# Patient Record
Sex: MALE | Race: WHITE | HISPANIC OR LATINO | Employment: OTHER | URBAN - METROPOLITAN AREA
[De-identification: names, ages, dates, MRNs, and addresses within clinical notes are randomized per-mention and may not be internally consistent; named-entity substitution may affect disease eponyms.]

---

## 2017-01-06 ENCOUNTER — OFFICE VISIT (OUTPATIENT)
Dept: ELECTROPHYSIOLOGY | Facility: CLINIC | Age: 77
End: 2017-01-06
Payer: MEDICARE

## 2017-01-06 ENCOUNTER — HOSPITAL ENCOUNTER (OUTPATIENT)
Dept: CARDIOLOGY | Facility: CLINIC | Age: 77
Discharge: HOME | End: 2017-01-06
Payer: MEDICARE

## 2017-01-06 VITALS
BODY MASS INDEX: 23.87 KG/M2 | DIASTOLIC BLOOD PRESSURE: 70 MMHG | WEIGHT: 176 LBS | SYSTOLIC BLOOD PRESSURE: 150 MMHG | HEART RATE: 55 BPM

## 2017-01-06 DIAGNOSIS — K20.90 ESOPHAGITIS: ICD-10-CM

## 2017-01-06 DIAGNOSIS — I45.10 RBBB: ICD-10-CM

## 2017-01-06 DIAGNOSIS — E78.5 HYPERLIPIDEMIA, UNSPECIFIED HYPERLIPIDEMIA TYPE: Chronic | ICD-10-CM

## 2017-01-06 DIAGNOSIS — J44.9 CHRONIC OBSTRUCTIVE PULMONARY DISEASE, UNSPECIFIED COPD TYPE: Primary | ICD-10-CM

## 2017-01-06 DIAGNOSIS — I48.0 PAF (PAROXYSMAL ATRIAL FIBRILLATION): Primary | ICD-10-CM

## 2017-01-06 DIAGNOSIS — I48.0 PAF (PAROXYSMAL ATRIAL FIBRILLATION): ICD-10-CM

## 2017-01-06 DIAGNOSIS — I48.0 PAROXYSMAL ATRIAL FIBRILLATION: Chronic | ICD-10-CM

## 2017-01-06 DIAGNOSIS — K25.7 CHRONIC GASTRIC ULCER: ICD-10-CM

## 2017-01-06 DIAGNOSIS — I10 ESSENTIAL HYPERTENSION: Chronic | ICD-10-CM

## 2017-01-06 PROCEDURE — 1160F RVW MEDS BY RX/DR IN RCRD: CPT | Mod: S$GLB,,, | Performed by: INTERNAL MEDICINE

## 2017-01-06 PROCEDURE — 99214 OFFICE O/P EST MOD 30 MIN: CPT | Mod: S$GLB,,, | Performed by: INTERNAL MEDICINE

## 2017-01-06 PROCEDURE — 1159F MED LIST DOCD IN RCRD: CPT | Mod: S$GLB,,, | Performed by: INTERNAL MEDICINE

## 2017-01-06 PROCEDURE — 1157F ADVNC CARE PLAN IN RCRD: CPT | Mod: S$GLB,,, | Performed by: INTERNAL MEDICINE

## 2017-01-06 PROCEDURE — 93000 ELECTROCARDIOGRAM COMPLETE: CPT | Mod: S$GLB,,, | Performed by: INTERNAL MEDICINE

## 2017-01-06 PROCEDURE — 99999 PR PBB SHADOW E&M-EST. PATIENT-LVL III: CPT | Mod: PBBFAC,,, | Performed by: INTERNAL MEDICINE

## 2017-01-06 PROCEDURE — 3077F SYST BP >= 140 MM HG: CPT | Mod: S$GLB,,, | Performed by: INTERNAL MEDICINE

## 2017-01-06 PROCEDURE — 1126F AMNT PAIN NOTED NONE PRSNT: CPT | Mod: S$GLB,,, | Performed by: INTERNAL MEDICINE

## 2017-01-06 PROCEDURE — 3078F DIAST BP <80 MM HG: CPT | Mod: S$GLB,,, | Performed by: INTERNAL MEDICINE

## 2017-01-06 RX ORDER — MAGNESIUM 200 MG
TABLET ORAL DAILY
COMMUNITY
Start: 2016-11-06

## 2017-01-06 NOTE — PROGRESS NOTES
Subjective:    Patient ID:  Benitez Cabrales is a 76 y.o. male who presents for follow up of AF    HPI  76 y.o. M  Remote dx of AF (~ 8 y ago; only Rx was beta blockade)  HTN   HL  RBBB, longstanding     Had event monitor placed for dizzy/palpitations. Turns out dizziness was really vertigo and balance issues -- no LH, no presync/sync.    Interval history:  He remains on amiodarone (normal PFT and TFT/LFT); recent hospitalization for GI bleed (unclear if upper vs lower, though ulcer noted on EGD), off pradaxa since then (11/27) - never required transfusion.  No bleeding since.  Concerned about going back on blood thinner; currently on ASA 81mg.  CHADSVASC 3.  ILR with no events on several recent interrogations.    My interpretation of today's ecg is sinus shraddha at 55 bpm with first deg AVB and RBBB (baseline).    Review of Systems   Constitution: Negative for weakness and malaise/fatigue.   HENT: Negative for congestion and headaches.    Eyes: Negative for blurred vision and double vision.   Cardiovascular: Negative for chest pain, dyspnea on exertion and palpitations.   Respiratory: Negative for cough and shortness of breath.    Endocrine: Negative for cold intolerance and heat intolerance.   Hematologic/Lymphatic: Positive for bleeding problem. Bruises/bleeds easily.   Skin: Negative for dry skin and flushing.   Musculoskeletal: Negative for back pain and falls.   Gastrointestinal: Negative for nausea and vomiting.   Genitourinary: Negative for dysuria and flank pain.   Neurological: Negative for dizziness and light-headedness.   Psychiatric/Behavioral: Negative for altered mental status. The patient is not nervous/anxious.         Objective:    Physical Exam   Constitutional: He is oriented to person, place, and time. He appears well-developed and well-nourished.   HENT:   Head: Normocephalic and atraumatic.   Eyes: Conjunctivae and EOM are normal.   Neck: Normal range of motion. Neck supple.   Cardiovascular:  Normal rate, regular rhythm and normal heart sounds.    Pulmonary/Chest: Effort normal and breath sounds normal.   Abdominal: Soft. Bowel sounds are normal.   Musculoskeletal: Normal range of motion. He exhibits no edema.   Neurological: He is alert and oriented to person, place, and time. No cranial nerve deficit.   Skin: Skin is warm and dry.   Psychiatric: He has a normal mood and affect. His behavior is normal.   Nursing note and vitals reviewed.        Assessment:       1. Chronic obstructive pulmonary disease, unspecified COPD type    2. Paroxysmal atrial fibrillation    3. Essential hypertension    4. RBBB    5. Chronic gastric ulcer    6. Esophagitis    7. Hyperlipidemia, unspecified hyperlipidemia type         Plan:       76 year old male with pAF on amiodarone with ILR demonstrating no recent episodes of AF.  Recent GI bleed, off NOAC currently (was taking incorrectly, as he was taking 2 pills of his pradaxa once daily).  Discussed risks and benefits of anticoagulation,   His annual risk of stroke + thromboembolism is 4.3% with no therapy, 3.4% with aspirin, and about 1% with full anticoagulation.  Given no further bleeding, would recommend stopping ASA (as NSAIDS can increase the risk of ulcers) and resume full anticoagulation.  Will resume pradaxa BID and discontinue aspirin.    Mickey Nixon MD

## 2017-01-06 NOTE — MR AVS SNAPSHOT
[facility]  [address]  [facility] [address]  Phone: [phone]  Fax: [phone]                  Benitez Cabrales   2017 10:00 AM   Office Visit    Description:  Male : 1940   Provider:  Mickey Morales MD   Department:  [facility]           Diagnoses this Visit        Comments    Chronic obstructive pulmonary disease, unspecified COPD type    -  Primary     Paroxysmal atrial fibrillation         Essential hypertension         RBBB         Chronic gastric ulcer         Esophagitis         Hyperlipidemia, unspecified hyperlipidemia type                To Do List           Future Appointments        Provider Department Dept Phone    2017 8:00 AM HOME MONITOR DEVICE CHECK, [facility] [phone]      Goals (5 Years of Data)     None      [facility]     [facility] -  Assistance  Registered nurses in the [facility] provide clinical advisement, health education, appointment booking, and other advisory services.  Call for this free service at [phone].             Medications           Message regarding Medications     Verify the changes and/or additions to your medication regime listed below are the same as discussed with your clinician today.  If any of these changes or additions are incorrect, please notify your healthcare provider.             Verify that the below list of medications is an accurate representation of the medications you are currently taking.  If none reported, the list may be blank. If incorrect, please contact your healthcare provider. Carry this list with you in case of emergency.           Current Medications     amiodarone (PACERONE) 200 MG Tab Take 1 tablet (200 mg total) by mouth once daily.    cholecalciferol, vitamin D3, (VITAMIN D3) 2,000 unit Cap Take 1 capsule by mouth once daily.    dabigatran etexilate (PRADAXA) 150 mg Cap Take 1 capsule (150 mg total) by mouth 2  (two) times daily.    DIABETIC SUPPLIES,MISCELL (BD MAGNI-GUIDE SYRINGE MAGNIFI MISC) by Misc.(Non-Drug; Combo Route) route.    diclofenac sodium (VOLTAREN) 1 % Gel Apply 2 g topically 4 (four) times daily.    fish oil-omega-3 fatty acids 300-1,000 mg capsule Take 2 g by mouth once daily.      magnesium 200 mg Tab Take by mouth once daily.    meclizine (ANTIVERT) 25 mg tablet Take 1 tablet (25 mg total) by mouth 3 (three) times daily as needed for Dizziness (at least one HS).    metoprolol succinate (TOPROL-XL) 50 MG 24 hr tablet Take 1 tablet (50 mg total) by mouth once daily.    MV-MN/FA/LYCOPENE/LUT/HB#178 (NEHEMIAH MULTIVITAMIN FOR MEN ORAL) Take 1 tablet by mouth once daily.      pantoprazole (PROTONIX) 40 MG tablet Take 1 tablet (40 mg total) by mouth 2 (two) times daily.    rosuvastatin (CRESTOR) 10 MG tablet Take 1 tablet (10 mg total) by mouth every evening.    valsartan (DIOVAN) 160 MG tablet Take 1 tablet (160 mg total) by mouth 2 (two) times daily.    VITAMIN K2 ORAL Take by mouth. 2 tablet every other Day.           Clinical Reference Information           Vital Signs - Last Recorded  Most recent update: 1/6/2017 10:15 AM by Jaja Rincon MA    BP Pulse Wt BMI       (!) 150/70 (!) 55 79.8 kg (176 lb) 23.87 kg/m2       Blood Pressure          Most Recent Value    BP  (!)  150/70      Allergies as of 1/6/2017     No Known Allergies      Immunizations Administered on Date of Encounter - 1/6/2017     None

## 2017-01-10 ENCOUNTER — TELEPHONE (OUTPATIENT)
Dept: ADMINISTRATIVE | Facility: HOSPITAL | Age: 77
End: 2017-01-10

## 2017-01-10 NOTE — TELEPHONE ENCOUNTER
Called [facility] and requested the pathology report from Colonoscopy 9/22/2016 and EGD report/pathology from 11/27/2016.  Fax received, updated health maintenance and reports sent to scanning.

## 2017-01-17 ENCOUNTER — LAB VISIT (OUTPATIENT)
Dept: LAB | Facility: HOSPITAL | Age: 77
End: 2017-01-17
Attending: PHYSICIAN ASSISTANT
Payer: MEDICARE

## 2017-01-17 DIAGNOSIS — D68.8: ICD-10-CM

## 2017-01-17 DIAGNOSIS — K92.1 BLOOD IN STOOL: Primary | ICD-10-CM

## 2017-01-17 DIAGNOSIS — K27.9: ICD-10-CM

## 2017-01-17 LAB
BASOPHILS # BLD AUTO: 0.03 K/UL
BASOPHILS NFR BLD: 0.4 %
DIFFERENTIAL METHOD: ABNORMAL
EOSINOPHIL # BLD AUTO: 0.3 K/UL
EOSINOPHIL NFR BLD: 3.6 %
ERYTHROCYTE [DISTWIDTH] IN BLOOD BY AUTOMATED COUNT: 13.5 %
HCT VFR BLD AUTO: 35.9 %
HGB BLD-MCNC: 11.9 G/DL
LYMPHOCYTES # BLD AUTO: 1.5 K/UL
LYMPHOCYTES NFR BLD: 20.7 %
MCH RBC QN AUTO: 29.5 PG
MCHC RBC AUTO-ENTMCNC: 33.1 %
MCV RBC AUTO: 89 FL
MONOCYTES # BLD AUTO: 1 K/UL
MONOCYTES NFR BLD: 13.9 %
NEUTROPHILS # BLD AUTO: 4.4 K/UL
NEUTROPHILS NFR BLD: 61.1 %
PLATELET # BLD AUTO: 195 K/UL
PMV BLD AUTO: 12 FL
RBC # BLD AUTO: 4.03 M/UL
WBC # BLD AUTO: 7.25 K/UL

## 2017-01-17 PROCEDURE — 36415 COLL VENOUS BLD VENIPUNCTURE: CPT | Mod: PO

## 2017-01-17 PROCEDURE — 85025 COMPLETE CBC W/AUTO DIFF WBC: CPT

## 2017-01-20 ENCOUNTER — CLINICAL SUPPORT (OUTPATIENT)
Dept: ELECTROPHYSIOLOGY | Facility: CLINIC | Age: 77
End: 2017-01-20
Payer: MEDICARE

## 2017-01-20 DIAGNOSIS — Z95.818 STATUS POST PLACEMENT OF IMPLANTABLE LOOP RECORDER: ICD-10-CM

## 2017-01-20 DIAGNOSIS — R00.2 PALPITATIONS: ICD-10-CM

## 2017-01-20 PROCEDURE — 93299 LOOP RECORDER REMOTE: CPT | Mod: S$GLB,,, | Performed by: INTERNAL MEDICINE

## 2017-01-20 PROCEDURE — 93297 REM INTERROG DEV EVAL ICPMS: CPT | Mod: S$GLB,,, | Performed by: INTERNAL MEDICINE

## 2017-01-31 ENCOUNTER — OFFICE VISIT (OUTPATIENT)
Dept: OPTOMETRY | Facility: CLINIC | Age: 77
End: 2017-01-31
Payer: MEDICARE

## 2017-01-31 DIAGNOSIS — H11.32 SUBCONJUNCTIVAL HEMORRHAGE, LEFT: Primary | ICD-10-CM

## 2017-01-31 PROCEDURE — 99999 PR PBB SHADOW E&M-EST. PATIENT-LVL II: CPT | Mod: PBBFAC,,, | Performed by: OPTOMETRIST

## 2017-01-31 PROCEDURE — 92012 INTRM OPH EXAM EST PATIENT: CPT | Mod: S$GLB,,, | Performed by: OPTOMETRIST

## 2017-01-31 NOTE — LETTER
January 31, 2017        No Recipients             [facility]  [address] [facility] [address]  [place] [address]  Phone: [phone]  Fax: [phone]   Patient: Benitez Cabrales   MR Number: [id number]   YOB: 1940   Date of Visit: 1/31/2017       Dear Dr. Harrison Recipients:    I saw your patient, Benitez Cabrales, today for evaluation. Attached you will find relevant portions of my assessment and plan of care.       If you have questions, please do not hesitate to call me. I look forward to following Benitez Cabrales along with you.    Sincerely,      Elizabeth Devries, OD            CC  No Recipients    Enclosure

## 2017-01-31 NOTE — PROGRESS NOTES
HPI     Dls: 9/19/16 Dr. Devries    Pt states x 2 days ago red os, tearing os, pain 1-2 os, photophobia ou, no   fbs  no mucous, no itching. Pt used otc saline and allergy gtts.            Last edited by Elizabeth Devries, OD on 1/31/2017  2:57 PM.     Assessment /Plan     For exam results, see Encounter Report.    Subconjunctival hemorrhage, left      Discussed diagnosis with patient. Pt is hypertensive and on blood thinner. He reports bouts of sneezing due to allergies recently.  Educated patient that it can take 2-3 weeks for symptoms to resolve. Artificial tears QID for comfort. Can alternate between warm and cold compresses. RTC if no improvement in symptoms.

## 2017-01-31 NOTE — PATIENT INSTRUCTIONS
Subconjunctival Hemorrhage    A subconjunctival hemorrhage is when a blood vessel breaks open in the white of the eye. It causes a bright red patch in the white of the eye. It is similar to a bruise on the skin. This type of hemorrhage is common. It can look quite alarming, but it is usually harmless.    Understanding the conjunctiva  The conjunctiva is the thin layer that covers the inside of the eyelids and the surface of the eye. It has many tiny blood vessels that bring oxygen and nutrients to the eye. The sclera is the white part of the eye that lies beneath the conjunctiva. Sometimes a blood vessel in the conjunctiva breaks open and bleeds. The blood then collects under the conjunctiva and turns part of the eye red. Over several weeks, your body then absorbs the blood.    What causes subconjunctival hemorrhage?  In many cases the cause isnt known. But some health conditions may make it more likely. These include:  Eye injury  Eye surgery  High blood pressure  Inflammation of the conjunctiva  Contact lens use  Diabetes  Arteriosclerosis  Tumor of the conjunctiva  Diseases that affect blood clotting  Violent sneezing, coughing, or vomiting  Certain medicines that can increase bleeding, such as aspirin  Pushing hard during childbirth  Straining during constipation    Symptoms of subconjunctival hemorrhage  The main symptom is a red patch on the eye. You may notice it after waking up in the morning. In most cases just 1 eye will have a hemorrhage. It usually happens once and then goes away. But some health conditions may cause repeat hemorrhages. You may feel like you have something in your eye, but this is not common. The hemorrhage shouldnt affect your eyesight or cause any pain. If you do have pain, you may have another type of problem with your eye.    Diagnosing subconjunctival hemorrhage  Your health care provider will ask about your health history. You may have a physical exam. This includes a basic eye  exam. Your health care provider will make sure you dont have other causes of red eye that may need other treatment.  You will need to see an eye doctor if you have had an eye injury. This doctor might use a special lighted microscope to look closely at your eye. This helps show the doctor if the injury hurt the eye itself and not just its outer layer.  If this is not your first subconjunctival hemorrhage, your doctor may need to find the cause. For example, you may need blood tests to check for a blood clotting disorder.  Treatment for subconjunctival hemorrhage  In most cases you will not need treatment. The red patch will usually go away on its own in a few weeks. It will turn from red to brown and then yellow. There are no treatments to speed up this process. Your doctor may suggest you use a warm compress and artificial tears eye drops to help relieve some of the redness.  If your subconjunctival hemorrhage was caused by a health condition, that condition will be treated. For example, you may need a blood pressure medicine to treat high blood pressure.    When to call your health care provider  Call your health care provider right away if you have any of these:  Hemorrhage that doesnt go away in 2 to 3 weeks  Eye pain  Loss of eyesight  Another subconjunctival hemorrhage       © 4166-8563 [employer]. [address] [place] [address], [place] [address]. All rights reserved. This information is not intended as a substitute for professional medical care. Always follow your healthcare professional's instructions.

## 2017-01-31 NOTE — MR AVS SNAPSHOT
[facility]  [address] [facility]  [place] [address]  Phone: [phone]  Fax: [phone]                  Benitez Cabrales   2017 2:30 PM   Office Visit    Description:  Male : 1940   Provider:  Elizabeth Devries OD   Department:  [facility]           Reason for Visit     Eye Problem           Diagnoses this Visit        Comments    Subconjunctival hemorrhage, left    -  Primary            To Do List           Goals (5 Years of Data)     None      Follow-Up and Disposition     Return if symptoms worsen or fail to improve.      [facility]     [facility] -  Assistance  Registered nurses in the [facility] Center provide clinical advisement, health education, appointment booking, and other advisory services.  Call for this free service at [phone].             Medications           Message regarding Medications     Verify the changes and/or additions to your medication regime listed below are the same as discussed with your clinician today.  If any of these changes or additions are incorrect, please notify your healthcare provider.             Verify that the below list of medications is an accurate representation of the medications you are currently taking.  If none reported, the list may be blank. If incorrect, please contact your healthcare provider. Carry this list with you in case of emergency.           Current Medications     amiodarone (PACERONE) 200 MG Tab Take 1 tablet (200 mg total) by mouth once daily.    cholecalciferol, vitamin D3, (VITAMIN D3) 2,000 unit Cap Take 1 capsule by mouth once daily.    dabigatran etexilate (PRADAXA) 150 mg Cap Take 1 capsule (150 mg total) by mouth 2 (two) times daily.    DIABETIC SUPPLIES,MISCELL (BD MAGNI-GUIDE SYRINGE MAGNIFI MISC) by Misc.(Non-Drug; Combo Route) route.    diclofenac sodium (VOLTAREN) 1 % Gel Apply 2 g topically 4 (four) times daily.    fish oil-omega-3 fatty acids 300-1,000 mg capsule Take  2 g by mouth once daily.      magnesium 200 mg Tab Take by mouth once daily.    meclizine (ANTIVERT) 25 mg tablet Take 1 tablet (25 mg total) by mouth 3 (three) times daily as needed for Dizziness (at least one HS).    metoprolol succinate (TOPROL-XL) 50 MG 24 hr tablet Take 1 tablet (50 mg total) by mouth once daily.    MV-MN/FA/LYCOPENE/LUT/HB#178 (NEHEMIAH MULTIVITAMIN FOR MEN ORAL) Take 1 tablet by mouth once daily.      pantoprazole (PROTONIX) 40 MG tablet Take 1 tablet (40 mg total) by mouth 2 (two) times daily.    rosuvastatin (CRESTOR) 10 MG tablet Take 1 tablet (10 mg total) by mouth every evening.    valsartan (DIOVAN) 160 MG tablet Take 1 tablet (160 mg total) by mouth 2 (two) times daily.    VITAMIN K2 ORAL Take by mouth. 2 tablet every other Day.           Clinical Reference Information           Allergies as of 1/31/2017     No Known Allergies      Immunizations Administered on Date of Encounter - 1/31/2017     None      Instructions    Subconjunctival Hemorrhage    A subconjunctival hemorrhage is when a blood vessel breaks open in the white of the eye. It causes a bright red patch in the white of the eye. It is similar to a bruise on the skin. This type of hemorrhage is common. It can look quite alarming, but it is usually harmless.    Understanding the conjunctiva  The conjunctiva is the thin layer that covers the inside of the eyelids and the surface of the eye. It has many tiny blood vessels that bring oxygen and nutrients to the eye. The sclera is the white part of the eye that lies beneath the conjunctiva. Sometimes a blood vessel in the conjunctiva breaks open and bleeds. The blood then collects under the conjunctiva and turns part of the eye red. Over several weeks, your body then absorbs the blood.    What causes subconjunctival hemorrhage?  In many cases the cause isnt known. But some health conditions may make it more likely. These include:  Eye injury  Eye surgery  High blood  pressure  Inflammation of the conjunctiva  Contact lens use  Diabetes  Arteriosclerosis  Tumor of the conjunctiva  Diseases that affect blood clotting  Violent sneezing, coughing, or vomiting  Certain medicines that can increase bleeding, such as aspirin  Pushing hard during childbirth  Straining during constipation    Symptoms of subconjunctival hemorrhage  The main symptom is a red patch on the eye. You may notice it after waking up in the morning. In most cases just 1 eye will have a hemorrhage. It usually happens once and then goes away. But some health conditions may cause repeat hemorrhages. You may feel like you have something in your eye, but this is not common. The hemorrhage shouldnt affect your eyesight or cause any pain. If you do have pain, you may have another type of problem with your eye.    Diagnosing subconjunctival hemorrhage  Your health care provider will ask about your health history. You may have a physical exam. This includes a basic eye exam. Your health care provider will make sure you dont have other causes of red eye that may need other treatment.  You will need to see an eye doctor if you have had an eye injury. This doctor might use a special lighted microscope to look closely at your eye. This helps show the doctor if the injury hurt the eye itself and not just its outer layer.  If this is not your first subconjunctival hemorrhage, your doctor may need to find the cause. For example, you may need blood tests to check for a blood clotting disorder.  Treatment for subconjunctival hemorrhage  In most cases you will not need treatment. The red patch will usually go away on its own in a few weeks. It will turn from red to brown and then yellow. There are no treatments to speed up this process. Your doctor may suggest you use a warm compress and artificial tears eye drops to help relieve some of the redness.  If your subconjunctival hemorrhage was caused by a health condition, that  condition will be treated. For example, you may need a blood pressure medicine to treat high blood pressure.    When to call your health care provider  Call your health care provider right away if you have any of these:  Hemorrhage that doesnt go away in 2 to 3 weeks  Eye pain  Loss of eyesight  Another subconjunctival hemorrhage       © 0922-4308 [employer]. [address] [place] [address], [place] [id number]. All rights reserved. This information is not intended as a substitute for professional medical care. Always follow your healthcare professional's instructions.

## 2017-02-20 ENCOUNTER — CLINICAL SUPPORT (OUTPATIENT)
Dept: ELECTROPHYSIOLOGY | Facility: CLINIC | Age: 77
End: 2017-02-20
Payer: MEDICARE

## 2017-02-20 DIAGNOSIS — R00.2 PALPITATIONS: ICD-10-CM

## 2017-02-20 DIAGNOSIS — Z95.818 STATUS POST PLACEMENT OF IMPLANTABLE LOOP RECORDER: ICD-10-CM

## 2017-02-20 PROCEDURE — 93299 LOOP RECORDER REMOTE: CPT | Mod: S$GLB,,, | Performed by: INTERNAL MEDICINE

## 2017-02-20 PROCEDURE — 93297 REM INTERROG DEV EVAL ICPMS: CPT | Mod: S$GLB,,, | Performed by: INTERNAL MEDICINE

## 2017-03-22 ENCOUNTER — CLINICAL SUPPORT (OUTPATIENT)
Dept: ELECTROPHYSIOLOGY | Facility: CLINIC | Age: 77
End: 2017-03-22
Payer: MEDICARE

## 2017-03-22 DIAGNOSIS — Z95.818 STATUS POST PLACEMENT OF IMPLANTABLE LOOP RECORDER: ICD-10-CM

## 2017-03-22 DIAGNOSIS — R00.2 PALPITATIONS: ICD-10-CM

## 2017-03-22 PROCEDURE — 93299 LOOP RECORDER REMOTE: CPT | Mod: S$GLB,,, | Performed by: INTERNAL MEDICINE

## 2017-03-22 PROCEDURE — 93297 REM INTERROG DEV EVAL ICPMS: CPT | Mod: S$GLB,,, | Performed by: INTERNAL MEDICINE

## 2017-04-07 DIAGNOSIS — I48.0 PAF (PAROXYSMAL ATRIAL FIBRILLATION): Primary | ICD-10-CM

## 2017-04-07 RX ORDER — VALSARTAN 160 MG/1
160 TABLET ORAL 2 TIMES DAILY
Qty: 180 TABLET | Refills: 3 | Status: SHIPPED | OUTPATIENT
Start: 2017-04-07 | End: 2018-03-23

## 2017-04-24 ENCOUNTER — CLINICAL SUPPORT (OUTPATIENT)
Dept: ELECTROPHYSIOLOGY | Facility: CLINIC | Age: 77
End: 2017-04-24
Payer: MEDICARE

## 2017-04-24 DIAGNOSIS — R00.2 PALPITATIONS: ICD-10-CM

## 2017-04-24 DIAGNOSIS — Z95.818 STATUS POST PLACEMENT OF IMPLANTABLE LOOP RECORDER: ICD-10-CM

## 2017-04-24 PROCEDURE — 93297 REM INTERROG DEV EVAL ICPMS: CPT | Mod: S$GLB,,, | Performed by: INTERNAL MEDICINE

## 2017-04-24 PROCEDURE — 93299 LOOP RECORDER REMOTE: CPT | Mod: S$GLB,,, | Performed by: INTERNAL MEDICINE

## 2017-04-25 DIAGNOSIS — E78.5 HYPERLIPIDEMIA, UNSPECIFIED HYPERLIPIDEMIA TYPE: Primary | ICD-10-CM

## 2017-04-25 RX ORDER — ROSUVASTATIN CALCIUM 10 MG/1
10 TABLET, COATED ORAL NIGHTLY
Qty: 90 TABLET | Refills: 3 | Status: SHIPPED | OUTPATIENT
Start: 2017-04-25 | End: 2018-05-16 | Stop reason: SDUPTHER

## 2017-04-29 DIAGNOSIS — Z95.818 STATUS POST PLACEMENT OF IMPLANTABLE LOOP RECORDER: Primary | ICD-10-CM

## 2017-04-29 DIAGNOSIS — R00.2 PALPITATIONS: ICD-10-CM

## 2017-05-08 ENCOUNTER — OFFICE VISIT (OUTPATIENT)
Dept: FAMILY MEDICINE | Facility: CLINIC | Age: 77
End: 2017-05-08
Payer: MEDICARE

## 2017-05-08 VITALS
WEIGHT: 179.88 LBS | BODY MASS INDEX: 24.36 KG/M2 | SYSTOLIC BLOOD PRESSURE: 128 MMHG | DIASTOLIC BLOOD PRESSURE: 50 MMHG | HEART RATE: 57 BPM | HEIGHT: 72 IN | OXYGEN SATURATION: 97 % | TEMPERATURE: 98 F

## 2017-05-08 DIAGNOSIS — M54.2 NECK PAIN: Primary | ICD-10-CM

## 2017-05-08 PROCEDURE — 1159F MED LIST DOCD IN RCRD: CPT | Mod: S$GLB,,, | Performed by: NURSE PRACTITIONER

## 2017-05-08 PROCEDURE — 3078F DIAST BP <80 MM HG: CPT | Mod: S$GLB,,, | Performed by: NURSE PRACTITIONER

## 2017-05-08 PROCEDURE — 1160F RVW MEDS BY RX/DR IN RCRD: CPT | Mod: S$GLB,,, | Performed by: NURSE PRACTITIONER

## 2017-05-08 PROCEDURE — 99999 PR PBB SHADOW E&M-EST. PATIENT-LVL IV: CPT | Mod: PBBFAC,,, | Performed by: NURSE PRACTITIONER

## 2017-05-08 PROCEDURE — 99214 OFFICE O/P EST MOD 30 MIN: CPT | Mod: S$GLB,,, | Performed by: NURSE PRACTITIONER

## 2017-05-08 PROCEDURE — 3074F SYST BP LT 130 MM HG: CPT | Mod: S$GLB,,, | Performed by: NURSE PRACTITIONER

## 2017-05-08 NOTE — MR AVS SNAPSHOT
[facility]  [address] [facility]  [place] [address]  Phone: [phone]  Fax: [phone]                  Benitez Cabrales   2017 1:20 PM   Office Visit    Description:  Male : 1940   Provider:  Ari Brannon NP   Department:  [facility]           Reason for Visit     Neck Pain           Diagnoses this Visit        Comments    Neck pain    -  Primary            To Do List           Future Appointments        Provider Department Dept Phone    2017 8:00 AM HOME MONITOR DEVICE CHECK, [facility] [phone]      Goals (5 Years of Data)     None      Follow-Up and Disposition     Return if symptoms worsen or fail to improve.      [facility] On [facility]     [facility] On Call Nurse Care Line -  Assistance  Unless otherwise directed by your provider, please contact [facility] On-Call, our nurse care line that is available for  assistance.     Registered nurses in the [facility] On Call Center provide: appointment scheduling, clinical advisement, health education, and other advisory services.  Call: [phone] (toll free)               Medications           Message regarding Medications     Verify the changes and/or additions to your medication regime listed below are the same as discussed with your clinician today.  If any of these changes or additions are incorrect, please notify your healthcare provider.             Verify that the below list of medications is an accurate representation of the medications you are currently taking.  If none reported, the list may be blank. If incorrect, please contact your healthcare provider. Carry this list with you in case of emergency.           Current Medications     amiodarone (PACERONE) 200 MG Tab Take 1 tablet (200 mg total) by mouth once daily.    cholecalciferol, vitamin D3, (VITAMIN D3) 2,000 unit Cap Take 1 capsule by mouth once daily.    dabigatran etexilate (PRADAXA) 150 mg Cap Take 1 capsule (150 mg  total) by mouth 2 (two) times daily.    DIABETIC SUPPLIES,MISCELL (BD MAGNI-GUIDE SYRINGE MAGNIFI MISC) by Misc.(Non-Drug; Combo Route) route.    diclofenac sodium (VOLTAREN) 1 % Gel Apply 2 g topically 4 (four) times daily.    fish oil-omega-3 fatty acids 300-1,000 mg capsule Take 2 g by mouth once daily.      magnesium 200 mg Tab Take by mouth once daily.    meclizine (ANTIVERT) 25 mg tablet Take 1 tablet (25 mg total) by mouth 3 (three) times daily as needed for Dizziness (at least one HS).    metoprolol succinate (TOPROL-XL) 50 MG 24 hr tablet Take 1 tablet (50 mg total) by mouth once daily.    MV-MN/FA/LYCOPENE/LUT/HB#178 (NEHEMIAH MULTIVITAMIN FOR MEN ORAL) Take 1 tablet by mouth once daily.      pantoprazole (PROTONIX) 40 MG tablet Take 1 tablet (40 mg total) by mouth 2 (two) times daily.    rosuvastatin (CRESTOR) 10 MG tablet Take 1 tablet (10 mg total) by mouth every evening.    valsartan (DIOVAN) 160 MG tablet Take 1 tablet (160 mg total) by mouth 2 (two) times daily.    VITAMIN K2 ORAL Take by mouth. 2 tablet every other Day.           Clinical Reference Information           Your Vitals Were     BP Pulse Temp Height Weight SpO2    128/50 (BP Location: Right arm, Patient Position: Sitting, BP Method: Manual) 57 97.8 °F (36.6 °C) (Oral) 6' (1.829 m) 81.6 kg (179 lb 14.3 oz) 97%    BMI                24.4 kg/m2          Blood Pressure          Most Recent Value    BP  (!)  128/50      Allergies as of 5/8/2017     No Known Allergies      Immunizations Administered on Date of Encounter - 5/8/2017     None      Orders Placed During Today's Visit      Normal Orders This Visit    Ambulatory Referral to Physical/Occupational Therapy       Instructions    Heat for the neck.  Ok to use Voltaren gel on neck.        Language Assistance Services     ATTENTION: Language assistance services are available, free of charge. Please call [phone].      ATENCIÓN: Si estefaniala paulette, tiene a lane disposición servicios gratuitos  de asistencia lingüística. Daniela valle [phone].     TEE Ý: N?u b?n nói Ti?ng Vi?t, có các d?ch v? h? tr? ngôn ng? mi?n phí dành cho b?n. G?i s? [phone].         [facility] complies with applicable Federal civil rights laws and does not discriminate on the basis of race, color, national origin, age, disability, or sex.
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
## 2017-05-22 ENCOUNTER — CLINICAL SUPPORT (OUTPATIENT)
Dept: REHABILITATION | Facility: HOSPITAL | Age: 77
End: 2017-05-22
Attending: NURSE PRACTITIONER
Payer: MEDICARE

## 2017-05-22 DIAGNOSIS — R29.3 POSTURE IMBALANCE: ICD-10-CM

## 2017-05-22 DIAGNOSIS — R29.898 DECREASED RANGE OF MOTION OF NECK: ICD-10-CM

## 2017-05-22 DIAGNOSIS — M54.2 NECK PAIN: Primary | ICD-10-CM

## 2017-05-22 DIAGNOSIS — R53.1 DECREASED STRENGTH: ICD-10-CM

## 2017-05-22 PROCEDURE — 97161 PT EVAL LOW COMPLEX 20 MIN: CPT | Mod: PN

## 2017-05-22 PROCEDURE — 97110 THERAPEUTIC EXERCISES: CPT | Mod: PN

## 2017-05-22 PROCEDURE — G8978 MOBILITY CURRENT STATUS: HCPCS | Mod: CK,PN

## 2017-05-22 PROCEDURE — 97140 MANUAL THERAPY 1/> REGIONS: CPT | Mod: PN

## 2017-05-22 PROCEDURE — G8979 MOBILITY GOAL STATUS: HCPCS | Mod: CJ,PN
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
## 2017-05-23 PROBLEM — R29.898 DECREASED RANGE OF MOTION OF NECK: Status: ACTIVE | Noted: 2017-05-23

## 2017-05-23 PROBLEM — R53.1 DECREASED STRENGTH: Status: ACTIVE | Noted: 2017-05-23

## 2017-05-23 PROBLEM — R29.3 POSTURE IMBALANCE: Status: ACTIVE | Noted: 2017-05-23
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
## 2017-05-26 ENCOUNTER — CLINICAL SUPPORT (OUTPATIENT)
Dept: ELECTROPHYSIOLOGY | Facility: CLINIC | Age: 77
End: 2017-05-26
Payer: MEDICARE

## 2017-05-26 DIAGNOSIS — Z95.818 STATUS POST PLACEMENT OF IMPLANTABLE LOOP RECORDER: ICD-10-CM

## 2017-05-26 DIAGNOSIS — R00.2 PALPITATIONS: ICD-10-CM

## 2017-05-30 ENCOUNTER — CLINICAL SUPPORT (OUTPATIENT)
Dept: REHABILITATION | Facility: HOSPITAL | Age: 77
End: 2017-05-30
Attending: NURSE PRACTITIONER
Payer: MEDICARE

## 2017-05-30 DIAGNOSIS — R29.898 DECREASED RANGE OF MOTION OF NECK: ICD-10-CM

## 2017-05-30 DIAGNOSIS — R53.1 DECREASED STRENGTH: ICD-10-CM

## 2017-05-30 DIAGNOSIS — R29.3 POSTURE IMBALANCE: ICD-10-CM

## 2017-05-30 PROCEDURE — 97110 THERAPEUTIC EXERCISES: CPT | Mod: PN

## 2017-05-30 PROCEDURE — 97140 MANUAL THERAPY 1/> REGIONS: CPT | Mod: PN

## 2017-05-30 NOTE — PROGRESS NOTES
"                                                    Physical Therapy Daily Note     Name: Benitez Cabrales  Clinic Number: [id number]  Diagnosis:   Encounter Diagnoses   Name Primary?   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 Pt states that he continues to have difficulty turning his head to the side while driving. Pt reports completing HEP once a day as "that's already enough to do."   Pain Scale: Benitez rates pain on a scale of 0-10 to be 5 currently.    Objective     Benitez received individual therapeutic exercises to develop strength, endurance, ROM, flexibility, posture and core stabilization for 42 minutes including:  UBE: 3' fwd, 3' bwd  UT stretch: 4 x 10"  LS stretch: 4 x 10"  Doorway stretch: 3 x 20"  Scapular squeezes: 2 x 10  Chin tucks: 3 minutes x 5" holds   Rotation with chin tucks: 2 x 10 x 3" holds    Side flexion with chin tucks: 2 x 10 x 3" holds  Bi shoulder ER: 2 x 10 x RTB  Bi shoulder IR: 2 x 10 x RTB   SA punches: 2 x 10 - consider adding weight next time     Benitez received the following manual therapy techniques: were applied to the: CSP/TSP/subcranial spine for 10 minutes:  10  Minutes - Joint mobilizations, Manual traction, Myofacial release/Soft tissue Mobilization and Friction Massage  8 minutes - preparation and application of kinesiotape (1 x I-strip) for CSP inhibition. Patient received education regarding appropriate care and removal of Kinesiotape. Patient instructed in proper removal techniques if skin irritation occurs.    The patient received the following unsupervised modalities after being cleared " for contradictions: MHP for 0 minutes    Written Home Exercises Provided: reviewed HEP from initial visit for understanding/technique. Pt demo good understanding of the education provided. Benitez demonstrated good return demonstration of activities.     Education provided re: upright posture and importance of consistency/compliance with HEP to improve flexibility, ROM, CSP stability, and reduce c/o pain. Benitez verbalized good understanding of education provided. No spiritual or educational barriers to learning provided    Assessment     Patient tolerated treatment well today. Pt with fair return demonstration of HEP and reporting completion of exercises once a day, indicating decreased compliance. Pt demonstrates difficulty maintaining chin tucks with cervical motion likely from decreased muscular endurance. Pt is also still limited by decreased ROM, decreased strength, and increased pain. C/o crepitus and decreased joint mobility may be attributed to OA. This is a 76 y.o. male referred to outpatient physical therapy and presents with a medical diagnosis of left neck pain and demonstrates limitations as described in the problem list. Pt prognosis is Good. Pt will continue to benefit from skilled outpatient physical therapy to address the deficits listed in the problem list, provide pt/family education and to maximize pt's level of independence in the home and community environment.     Goals as follows: See IE on 5/22/17 (PN due by 6/22/17)     Plan     Continue with established Plan of Care towards PT goals.    Therapist: Tammie Mcdonald, PT  5/30/2017

## 2017-06-06 ENCOUNTER — CLINICAL SUPPORT (OUTPATIENT)
Dept: REHABILITATION | Facility: HOSPITAL | Age: 77
End: 2017-06-06
Attending: NURSE PRACTITIONER
Payer: MEDICARE

## 2017-06-06 DIAGNOSIS — R29.3 POSTURE IMBALANCE: ICD-10-CM

## 2017-06-06 DIAGNOSIS — R53.1 DECREASED STRENGTH: ICD-10-CM

## 2017-06-06 DIAGNOSIS — R29.898 DECREASED RANGE OF MOTION OF NECK: ICD-10-CM

## 2017-06-06 PROCEDURE — 97110 THERAPEUTIC EXERCISES: CPT | Mod: PN

## 2017-06-06 NOTE — PROGRESS NOTES
"                                                    Physical Therapy Daily Note     Name: Benitez Cabrales  Clinic Number: [id number]  Diagnosis:   Encounter Diagnoses   Name Primary?   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 He says that his grandkids were in town, so the stress from that may have caused his neck to tighten up. He reports compliance with his HEP but noted minimal change or improvement in symptoms following home stretching. His c/c remains pain in the L neck. Pt reports that the tape helped some and he kept it on for 3 days; symptoms returned after he took it off.    Pain Scale: Benitez rates pain on a scale of 0-10 to be 6 currently.    Objective     Palpation: TTP scalenes / CSP paraspinals on L    Benitez received individual therapeutic exercises to develop strength, endurance, ROM, flexibility, posture and core stabilization for 45 minutes including:  UBE: 3' fwd, 3' bwd  UT stretch: 4 x 10"  LS stretch: 4 x 10"  +scalene stretch: add next visit  Doorway stretch: 3 x 20"  Scapular squeezes: 2 x 10  Chin tucks: 3 minutes x 5" holds   Rotation with chin tucks: 2 x 10 x 3" holds    Side flexion with chin tucks: 2 x 10 x 3" holds  Bi shoulder ER: 2 x 10 x RTB - decreased reps today (15x) 2* to time constraints  Bi shoulder IR: 2 x 10 x RTB  - decreased reps today (15x) 2* to time constraints  SA punches: 2 x 10 - consider adding weight next time     Benitez received the following manual therapy techniques: were applied to the: CSP/TSP/subcranial spine:  15 Minutes - Joint mobilizations, Manual traction, Myofacial release/Soft tissue " Mobilization and Friction Massage  0 minutes - preparation and application of kinesiotape (1 x I-strip) for CSP inhibition. Patient received education regarding appropriate care and removal of Kinesiotape. Patient instructed in proper removal techniques if skin irritation occurs.  Consider IDN next visit?    The patient received the following unsupervised modalities after being cleared for contradictions: MHP for 0 minutes    Written Home Exercises Provided: reviewed HEP from initial visit for understanding/technique. Pt demo good understanding of the education provided. Benitez demonstrated good return demonstration of activities.     Education provided re: upright posture and importance of consistency/compliance with HEP to improve flexibility, ROM, CSP stability, and reduce c/o pain. Benitez verbalized good understanding of education provided. No spiritual or educational barriers to learning provided    Assessment     Patient tolerated treatment well today. Decreased MF mobility and muscular flexibility in L cervical musculature continues to be causative factor for pain 2* poor postural awareness and alignment. This is a 76 y.o. male referred to outpatient physical therapy and presents with a medical diagnosis of left neck pain and demonstrates limitations as described in the problem list. Pt prognosis is Good. Pt will continue to benefit from skilled outpatient physical therapy to address the deficits listed in the problem list, provide pt/family education and to maximize pt's level of independence in the home and community environment.     Goals as follows: See IE on 5/22/17 (PN due by 6/22/17)     Plan     Continue with established Plan of Care towards PT goals.    Therapist: Tammie Mcdonald, PT  6/6/2017

## 2017-06-08 ENCOUNTER — CLINICAL SUPPORT (OUTPATIENT)
Dept: REHABILITATION | Facility: HOSPITAL | Age: 77
End: 2017-06-08
Attending: NURSE PRACTITIONER
Payer: MEDICARE

## 2017-06-08 DIAGNOSIS — R29.3 POSTURE IMBALANCE: ICD-10-CM

## 2017-06-08 DIAGNOSIS — R29.898 DECREASED RANGE OF MOTION OF NECK: ICD-10-CM

## 2017-06-08 DIAGNOSIS — R53.1 DECREASED STRENGTH: ICD-10-CM

## 2017-06-08 PROCEDURE — 97140 MANUAL THERAPY 1/> REGIONS: CPT | Mod: PN

## 2017-06-08 NOTE — PROGRESS NOTES
"                                                    Physical Therapy Daily Note     Name: Benitez Cabrales  Clinic Number: [id number]  Diagnosis:   No diagnosis found.  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 Pt states that right after the last session he felt a lot better, but it got worse once he "cooled down." Pt reports that he might have done too much with his back and now there is pain radiating out to the shoulder.      Pain Scale: Benitez rates pain on a scale of 0-10 to be 6 currently.    Objective     Palpation: TTP scalenes / CSP paraspinals on L    Benitez received individual therapeutic exercises to develop strength, endurance, ROM, flexibility, posture and core stabilization for 30 minutes including:  UBE: 3' fwd, 3' bwd  UT stretch: 4 x 10"  LS stretch: 4 x 10"  +scalene stretch: add next visit  Doorway stretch: 3 x 20"  Scapular squeezes: 2 x 10  Chin tucks: 3 minutes x 5" holds   Rotation with chin tucks: 2 x 10 x 3" holds    Side flexion with chin tucks: 2 x 10 x 3" holds  Bi shoulder ER: 2 x 10 x RTB   Bi shoulder abduction: 2 x 10 x RTB   SA punches: 2 x 10 x 1#    Progress next visit: scapular strengthening, postural awareness/endurance, thoracic mobility    Benitez received the following manual therapy techniques: were applied to the: CSP/TSP/subcranial spine:  15 Minutes - Joint mobilizations, Manual traction, Myofacial release/Soft tissue Mobilization and Friction Massage  15 minutes - preparation and application of kinesiotape (1 x I-strip) for CSP inhibition, UT inhibition, and postural correction. Patient received education regarding appropriate care and removal of Kinesiotape. Patient instructed in proper removal " techniques if skin irritation occurs.    The patient received the following unsupervised modalities after being cleared for contradictions: MHP for 15 minutes to LSP and CSP concurrent with therex.    Written Home Exercises Provided: reviewed HEP from initial visit for understanding/technique. Pt demo good understanding of the education provided. Benitez demonstrated good return demonstration of activities.     Education provided re: upright posture and importance of consistency/compliance with HEP to improve flexibility, ROM, CSP stability, and reduce c/o pain. Benitez verbalized good understanding of education provided. No spiritual or educational barriers to learning provided    Assessment     Patient tolerated treatment well today. Pt demonstrates poor postural awareness and MF mobility with good response to STM/MFR. Pt would benefit from a progression in exercises to improve postural endurance. This is a 76 y.o. male referred to outpatient physical therapy and presents with a medical diagnosis of left neck pain and demonstrates limitations as described in the problem list. Pt prognosis is Good. Pt will continue to benefit from skilled outpatient physical therapy to address the deficits listed in the problem list, provide pt/family education and to maximize pt's level of independence in the home and community environment.     Goals as follows: See IE on 5/22/17 (PN due by 6/22/17)     Plan     Continue with established Plan of Care towards PT goals.    Therapist: Tammie Mcdonald, PT  6/8/2017

## 2017-06-12 ENCOUNTER — CLINICAL SUPPORT (OUTPATIENT)
Dept: REHABILITATION | Facility: HOSPITAL | Age: 77
End: 2017-06-12
Attending: NURSE PRACTITIONER
Payer: MEDICARE

## 2017-06-12 DIAGNOSIS — R53.1 DECREASED STRENGTH: ICD-10-CM

## 2017-06-12 DIAGNOSIS — R29.898 DECREASED RANGE OF MOTION OF NECK: ICD-10-CM

## 2017-06-12 DIAGNOSIS — R29.3 POSTURE IMBALANCE: ICD-10-CM

## 2017-06-12 PROCEDURE — 97110 THERAPEUTIC EXERCISES: CPT | Mod: PN

## 2017-06-12 NOTE — PROGRESS NOTES
"                                                    Physical Therapy Daily Note     Name: Benitez Cabrales  Clinic Number: [id number]  Diagnosis:   Encounter Diagnoses   Name Primary?   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      Pain Scale: Benitez rates pain on a scale of 0-10 to be 0 currently.    Objective     Palpation: TTP scalenes / CSP paraspinals on L    Benitez received individual therapeutic exercises to develop strength, endurance, ROM, flexibility, posture and core stabilization for 45 minutes including:  UBE: 3' fwd, 3' bwd  UT stretch: 4 x 10"  LS stretch: 4 x 10"  +scalene stretch: 4 x 10"  Doorway stretch: 3 x 20"  Scapular squeezes: 2 x 10  Chin tucks: 3 minutes x 5" holds   Rotation with chin tucks: 2 x 10 x 3" holds    +Chin tuck with occular movement: 2 x 30" (ea direction - up/down, L/R)  +Chin tuck with isometric rotation: 2 x 30" with manual OP  Side flexion with chin tucks: 2 x 10 x 3" holds  Bi shoulder ER: 2 x 10 x RTB   Bi shoulder abduction: 2 x 10 x RTB   SA punches: 2 x 10 x 1#    Progress next visit: scapular strengthening, postural awareness/endurance, thoracic mobility    Benitez received the following manual therapy techniques: were applied to the: CSP/TSP/subcranial spine:  15 Minutes - Joint mobilizations, Manual traction, Myofacial release/Soft tissue Mobilization and Friction Massage  0 minutes - preparation and application of kinesiotape (1 x I-strip) for CSP inhibition, UT inhibition, and postural correction.  - defer per pt request    The patient received the following unsupervised modalities after being " "cleared for contradictions: MHP for 0 minutes to LSP and CSP concurrent with therex.    Written Home Exercises Provided: reviewed HEP from initial visit for understanding/technique. Pt demo good understanding of the education provided. Benitez demonstrated good return demonstration of activities.     Education provided re: upright posture and importance of consistency/compliance with HEP to improve flexibility, ROM, CSP stability, and reduce c/o pain. Benitez verbalized good understanding of education provided. No spiritual or educational barriers to learning provided    Assessment     Patient tolerated treatment well today. Pt demonstrates good chin tuck but is UA to maintain it >5" in sitting without returning to FHP, indicating continuation of poor postural awareness and endurance. New exercises added to improve deep neck flexor muscular endurance and coordination. Pt demonstrates increased difficulty maintaining chin tuck during occular movement due to decreased NM coordination.  This is a 76 y.o. male referred to outpatient physical therapy and presents with a medical diagnosis of left neck pain and demonstrates limitations as described in the problem list. Pt prognosis is Good. Pt will continue to benefit from skilled outpatient physical therapy to address the deficits listed in the problem list, provide pt/family education and to maximize pt's level of independence in the home and community environment.     Goals as follows: See IE on 5/22/17 (PN due by 6/22/17)     Plan     Continue with established Plan of Care towards PT goals.    Therapist: Tammie Mcdonald, PT  6/12/2017  "

## 2017-06-14 DIAGNOSIS — I48.0 PAROXYSMAL ATRIAL FIBRILLATION: Primary | ICD-10-CM

## 2017-06-14 RX ORDER — METOPROLOL SUCCINATE 50 MG/1
50 TABLET, EXTENDED RELEASE ORAL DAILY
Qty: 90 TABLET | Refills: 3
Start: 2017-06-14 | End: 2018-03-12 | Stop reason: SDUPTHER

## 2017-06-14 NOTE — TELEPHONE ENCOUNTER
----- Message from Danielle Quevedo sent at 6/14/2017 12:57 PM CDT -----  Contact: Pete Riley called to get a verbal refill request for the pt's metoprolol succinate (TOPROL-XL) 50 MG 24 hr tablet.  The pt wants a 90 day supply.  PT of Dr. Morales, LOV 1/6/17.  [facility] can be reached @ [phone].  Thanks!!

## 2017-06-23 ENCOUNTER — CLINICAL SUPPORT (OUTPATIENT)
Dept: REHABILITATION | Facility: HOSPITAL | Age: 77
End: 2017-06-23
Attending: NURSE PRACTITIONER
Payer: MEDICARE

## 2017-06-23 DIAGNOSIS — M54.2 NECK PAIN: Primary | ICD-10-CM

## 2017-06-23 DIAGNOSIS — R29.898 DECREASED RANGE OF MOTION OF NECK: ICD-10-CM

## 2017-06-23 DIAGNOSIS — R53.1 DECREASED STRENGTH: ICD-10-CM

## 2017-06-23 DIAGNOSIS — R29.3 POSTURE IMBALANCE: ICD-10-CM

## 2017-06-23 PROCEDURE — 97110 THERAPEUTIC EXERCISES: CPT | Mod: PN

## 2017-06-23 PROCEDURE — G8978 MOBILITY CURRENT STATUS: HCPCS | Mod: CK,PN

## 2017-06-23 PROCEDURE — G8979 MOBILITY GOAL STATUS: HCPCS | Mod: CJ,PN

## 2017-06-23 NOTE — PROGRESS NOTES
"                                                    Physical Therapy Daily Note     Name: Benitez Cabrales  Clinic Number: [id number]  Diagnosis:   Encounter Diagnoses   Name Primary?    Decreased range of motion of neck     Decreased strength     Posture imbalance      Physician: Ari Brannon NP  Precautions: COPD, Afib, GERD, Hx dizzinesss from old head injury  Visit #: 7 of 12   PTA Visit #: 0  Time In: 11:00  Time Out: 12:05  Total treatment time: 60 minutes (1:1 with PT 35 minutes)    Functional Outcome Measure: FOTO limitation = 52% Disability  G-codes + Modifier + % Impairment: (mobility):   Current =  (40% - 60% disability), Goal =  (20% - 40% disability)    Subjective     Pt reports: feeling okay but he recently hurt his back over the weekend while getting stuff out of the trunk of a car. His neck has been feeling better though.  Pain Scale: Benitez rates pain on a scale of 0-10 to be 4 currently in his neck. Pt reports a 6/10 in his low back.    Objective     Palpation: TTP scalenes / CSP paraspinals on L    Cervical/Thoracic AROM: Pain/Dysfunction with Movement:   Flexion 30 deg, c/o pulling feeling   Extension 45 deg, pain   Right side bending 25 deg   Left side bending 20 deg, c/o pulling pain in L neck   Right rotation 45 deg, "pulling" in L neck   Left rotation 35 deg, co increased pain in L neck     Scapular strength: grossly 3/5, poor activation with scapular retraction  Shoulder strength: limited: B ER = 4+/5    Flexibility:    Right Left  Pectoralis Major/Minor:  Fair Fair   Upper Traps / Levator Scap Fair- Fair    Joint Mobility: C2-7 DS L = 1+/6, R = 2-/6  Other: FAQ: 66%, FOTO limitation: 52% disability    Benitez received individual therapeutic exercises to develop strength, endurance, ROM, flexibility, posture and core stabilization for 45 minutes including:  UBE: 3' fwd, 3' bwd  UT stretch: 4 x 10"  LS stretch: 4 x 10"  Scalene stretch: 4 x 10"  Doorway stretch: 3 x 20"    Scapular " "squeezes: 2 x 10  Chin tucks: 3 minutes x 5" holds   Rotation with chin tucks: 2 x 10 x 3" holds     +Chin tuck with occular movement: 2 x 30" (ea direction - up/down, L/R)  +Chin tuck with isometric rotation: 2 x 30" with manual OP  Side flexion with chin tucks: 2 x 10 x 3" holds  Bi shoulder ER: 2 x 10 x RTB   Bi shoulder abduction: 2 x 10 x RTB   SA punches: 2 x 10 x 1#    +Narrow rows: 2 x 10 x RTB  +SALPD: 2 x 10 x RTB    Progress next visit: scapular strengthening, postural awareness/endurance, thoracic mobility    Benitez received the following manual therapy techniques: were applied to the: CSP/TSP/subcranial spine:  5 Minutes - Joint mobilizations, Manual traction, Myofacial release/Soft tissue Mobilization and Friction Massage  0 minutes - preparation and application of kinesiotape (1 x I-strip) for CSP inhibition, UT inhibition, and postural correction.  - defer per pt request    The patient received the following unsupervised modalities after being cleared for contradictions: MHP for 10 minutes to CSP.    Written Home Exercises Provided: reviewed HEP from initial visit for understanding/technique. Pt demo good understanding of the education provided. Benitez demonstrated good return demonstration of activities.     Education provided re: upright posture and importance of consistency/compliance with HEP to improve flexibility, ROM, CSP stability, and reduce c/o pain. Benitez verbalized good understanding of education provided. No spiritual or educational barriers to learning provided    Assessment     Patient tolerated treatment well today. Pt requires mod VC/TC for upright posture, indicating continuation of poor postural awareness. Pt able to chin tuck well, but UA to maintain it during exercises 2* to decreased muscular endurance and decreased neuromuscular control. Pt with some improvements in ROM and flexibility, but still limited by pain on L side. Pt may benefit from an addition of exercises that focus on " postural awareness and endurance.  This is a 76 y.o. male referred to outpatient physical therapy and presents with a medical diagnosis of left neck pain and demonstrates limitations as described in the problem list. Pt prognosis is Good. Pt will continue to benefit from skilled outpatient physical therapy to address the deficits listed in the problem list, provide pt/family education and to maximize pt's level of independence in the home and community environment.     Goals as follows: See PN on 6/23/17 (PN due by 7/23/17)  Short Term Goals (6 Weeks):   1. Pt to demonstrate improved ROM by 5% to allow pt to perform self care activities with increased ease. - Not Met  2. Pt to demonstrate improved flexibility by a half grade to allow improved ADLs with increased ease.  - Not Met  3. Pt to demonstrate improvement in strength by a half grade to improve sitting tolerance with minimal c/o symptoms. - Not Met  4. Pt to demonstrate improved functional ability with FOTO limitation <=47% disability. - Not Met    Long Term Goals (12 Weeks):   1. Pt to demonstrate improved ROM to WNL, R=L, to allow pt to perform self care with increased ease. - Not Met  2. Pt to demonstrate improved flexibility by a half grade to allow improved postural alignment with increased ease. - Not Met  3. Pt to demonstrate improved strength to >=4+/5 to allow for increased ease with HHCs. - Not Met  4. Pt to demonstrate improved functional ability with FOTO limitation <=37% disability. - Not Met  5. Pt independent with HEP and demonstrates good return technique. - Not Met     Plan     Continue with established Plan of Care towards PT goals.    Therapist: Tammie Mcdonald, PT  6/23/2017

## 2017-06-26 ENCOUNTER — CLINICAL SUPPORT (OUTPATIENT)
Dept: REHABILITATION | Facility: HOSPITAL | Age: 77
End: 2017-06-26
Attending: NURSE PRACTITIONER
Payer: MEDICARE

## 2017-06-26 DIAGNOSIS — R29.3 POSTURE IMBALANCE: ICD-10-CM

## 2017-06-26 DIAGNOSIS — M54.2 NECK PAIN: Primary | ICD-10-CM

## 2017-06-26 DIAGNOSIS — R29.898 DECREASED RANGE OF MOTION OF NECK: ICD-10-CM

## 2017-06-26 DIAGNOSIS — R53.1 DECREASED STRENGTH: ICD-10-CM

## 2017-06-26 PROCEDURE — 97140 MANUAL THERAPY 1/> REGIONS: CPT | Mod: PN

## 2017-06-26 PROCEDURE — 97110 THERAPEUTIC EXERCISES: CPT | Mod: PN

## 2017-06-26 NOTE — PROGRESS NOTES
"                                                    Physical Therapy Daily Note     Name: Benitez Cabrales  Clinic Number: [id number]  Diagnosis:   Encounter Diagnoses   Name Primary?   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 Pt denies low back pain today. He also reports that turning his head to the L continues to hurt but not as bad as it used to and that he is able to go farther   Pain Scale: Benitez rates pain on a scale of 0-10 to be 4 currently in his neck.     Objective       Benitez received individual therapeutic exercises to develop strength, endurance, ROM, flexibility, posture and core stabilization for 45 minutes including:  UBE: 3' fwd, 3' bwd  UT stretch: 4 x 10"  LS stretch: 4 x 10"  Scalene stretch: 4 x 10"  Doorway stretch: 3 x 20"    Scapular squeezes: 2 x 10  Chin tucks: 3 minutes x 5" holds   Rotation with chin tucks: 2 x 10 x 3" holds     Chin tuck with occular movement: 2 x 30" (ea direction - up/down, L/R)  Chin tuck with isometric rotation: 2 x 30" with manual OP  Side flexion with chin tucks: 2 x 10 x 3" holds  Bi shoulder ER: 2 x 10 x RTB   Bi shoulder abduction: 2 x 10 x RTB   SA punches: 2 x 10 x 2#  +Open books: 1 x 10    Narrow rows: 2 x 10 x RTB  SALPD: 2 x 10 x RTB    Progress next visit: scapular strengthening, postural awareness/endurance, thoracic mobility    Benitez received the following manual therapy techniques: were applied to the: CSP/TSP/subcranial spine: 10 Minutes - Joint mobilizations, Manual traction, Myofacial release/Soft tissue Mobilization and Friction Massage - IASTYM next visit?    The patient received the " following unsupervised modalities after being cleared for contradictions: MHP for 0 minutes to CSP. - defer per pt request today    Written Home Exercises Provided: reviewed HEP from initial visit for understanding/technique. Pt demo good understanding of the education provided. Benitez demonstrated good return demonstration of activities.     Education provided re: upright posture and importance of consistency/compliance with HEP to improve flexibility, ROM, CSP stability, and reduce c/o pain. Benitez verbalized good understanding of education provided. No spiritual or educational barriers to learning provided    Assessment     Patient tolerated treatment well today. New exercise added today to improve trunk mobility and ROM. Pt able to tolerate with good response.   This is a 76 y.o. male referred to outpatient physical therapy and presents with a medical diagnosis of left neck pain and demonstrates limitations as described in the problem list. Pt prognosis is Good. Pt will continue to benefit from skilled outpatient physical therapy to address the deficits listed in the problem list, provide pt/family education and to maximize pt's level of independence in the home and community environment.     Goals as follows: See PN on 6/23/17 (PN due by 7/23/17)     Plan     Continue with established Plan of Care towards PT goals.    Therapist: Tammie Mcdonald, PT  6/26/2017

## 2017-07-06 ENCOUNTER — CLINICAL SUPPORT (OUTPATIENT)
Dept: REHABILITATION | Facility: HOSPITAL | Age: 77
End: 2017-07-06
Attending: NURSE PRACTITIONER
Payer: MEDICARE

## 2017-07-06 DIAGNOSIS — R29.898 DECREASED RANGE OF MOTION OF NECK: ICD-10-CM

## 2017-07-06 DIAGNOSIS — R29.3 POSTURE IMBALANCE: ICD-10-CM

## 2017-07-06 DIAGNOSIS — M54.2 NECK PAIN: Primary | ICD-10-CM

## 2017-07-06 DIAGNOSIS — R53.1 DECREASED STRENGTH: ICD-10-CM

## 2017-07-06 PROCEDURE — 97140 MANUAL THERAPY 1/> REGIONS: CPT | Mod: PN

## 2017-07-06 PROCEDURE — 97110 THERAPEUTIC EXERCISES: CPT | Mod: PN

## 2017-07-06 NOTE — PROGRESS NOTES
"                                                    Physical Therapy Daily Note     Name: Benitez Cabrales  Clinic Number: [id number]  Diagnosis:   Encounter Diagnoses   Name Primary?   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 He reports continued discomfort in the back of the L neck with looking down.   Pain Scale: Benitez rates pain on a scale of 0-10 to be 4 currently in his neck.     Objective       Benitez received individual therapeutic exercises to develop strength, endurance, ROM, flexibility, posture and core stabilization for 45 minutes including:  UBE: 3' fwd, 3' bwd  UT stretch: 4 x 10"  LS stretch: 4 x 10"  Scalene stretch: 4 x 10"  Doorway stretch: 3 x 20"    Scapular squeezes: 2 x 10  Chin tucks: 3 minutes x 5" holds   Rotation with chin tucks: 2 x 10 x 3" holds     Chin tuck with occular movement: 2 x 30" (ea direction - up/down, L/R)  Chin tuck with isometric rotation: 2 x 30" with manual OP  Side flexion with chin tucks: 2 x 10 x 3" holds  Bi shoulder ER: 2 x 10 x RTB   Bi shoulder abduction: 2 x 10 x RTB   SA punches: 2 x 10 x 2#  Open books: 1 x 10    Narrow rows: 2 x 10 x RTB  SALPD: 2 x 10 x RTB    Progress next visit: scapular strengthening, postural awareness/endurance, thoracic mobility    Benitez received the following manual therapy techniques: were applied to the: CSP/TSP/subcranial spine: 10 Minutes -  IASTYM Myofacial release/Soft tissue Mobilization and Friction Massage  NP - Joint mobilizations, Manual traction    The patient received the following unsupervised modalities after being cleared for contradictions: P for " 10 minutes to CSP.     Written Home Exercises Provided: reviewed HEP from initial visit for understanding/technique. Pt demo good understanding of the education provided. Benitez demonstrated good return demonstration of activities.   Education provided re: upright posture and importance of consistency/compliance with HEP to improve flexibility, ROM, CSP stability, and reduce c/o pain. Benitez verbalized good understanding of education provided. No spiritual or educational barriers to learning provided    Assessment     Patient tolerated treatment well today. Pt presents with decreased MF mobility and muscular flexibility of L cervical scalenes, UT, and LS towards proximal attachments. Poor postural awareness continues to facilitate poor muscular flexibility and c/o pain.   This is a 76 y.o. male referred to outpatient physical therapy and presents with a medical diagnosis of left neck pain and demonstrates limitations as described in the problem list. Pt prognosis is Good. Pt will continue to benefit from skilled outpatient physical therapy to address the deficits listed in the problem list, provide pt/family education and to maximize pt's level of independence in the home and community environment.     Goals as follows: See PN on 6/23/17 (PN due by 7/23/17)     Plan     Continue with established Plan of Care towards PT goals.    Therapist: Tammie Mcdonald, PT  7/6/2017

## 2017-07-10 ENCOUNTER — CLINICAL SUPPORT (OUTPATIENT)
Dept: REHABILITATION | Facility: HOSPITAL | Age: 77
End: 2017-07-10
Attending: NURSE PRACTITIONER
Payer: MEDICARE

## 2017-07-10 DIAGNOSIS — R29.3 POSTURE IMBALANCE: ICD-10-CM

## 2017-07-10 DIAGNOSIS — R29.898 DECREASED RANGE OF MOTION OF NECK: ICD-10-CM

## 2017-07-10 DIAGNOSIS — R53.1 DECREASED STRENGTH: ICD-10-CM

## 2017-07-10 PROCEDURE — 93299 LOOP RECORDER REMOTE: CPT | Mod: S$GLB,,, | Performed by: INTERNAL MEDICINE

## 2017-07-10 PROCEDURE — 97110 THERAPEUTIC EXERCISES: CPT | Mod: PN

## 2017-07-10 PROCEDURE — 93297 REM INTERROG DEV EVAL ICPMS: CPT | Mod: S$GLB,,, | Performed by: INTERNAL MEDICINE

## 2017-07-10 NOTE — PROGRESS NOTES
"                                                    Physical Therapy Daily Note     Name: Benitez Cabrales  Clinic Number: [id number]  Diagnosis:   Encounter Diagnoses   Name Primary?   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    Pain Scale: Benitez rates pain on a scale of 0-10 to be 3 currently in his neck.     Objective       Benitez received individual therapeutic exercises to develop strength, endurance, ROM, flexibility, posture and core stabilization for 53 minutes including:  UBE: 3' fwd, 3' bwd  UT stretch: 4 x 10"  LS stretch: 4 x 10"  Scalene stretch: 4 x 10"  Doorway stretch: 3 x 20"    Scapular squeezes: 2 x 10  Chin tucks: 3 minutes x 5" holds   Rotation with chin tucks: 2 x 10 x 3" holds     Chin tuck with occular movement: 2 x 30" (ea direction - up/down, L/R)  Chin tuck with isometric rotation: 2 x 30" with manual OP  Side flexion with chin tucks: 2 x 10 x 3" holds  Bi shoulder ER: 2 x 10 x GTB  - increased resistance  Bi shoulder abduction: 2 x 10 x GTB  - increased resistance  SA punches: 2 x 10 x 3# - increased resistance  Open books: 1 x 15 - increased reps    Narrow rows: 2 x 10 x RTB  SALPD: 2 x 10 x RTB  +Ball on wall circles (CW, CCW): 1 x 10 ea  +Trunk ext with row: 1 x 10 (lawnmower)    Progress next visit: scapular strengthening, postural awareness/endurance, thoracic mobility    Benitez received the following manual therapy techniques: were applied to the: CSP/TSP/subcranial spine: 0 Minutes -  IASTYM Myofacial release/Soft tissue Mobilization and Friction Massage  NP - Joint mobilizations, Manual " traction    The patient received the following unsupervised modalities after being cleared for contradictions: MHP for 7 minutes to CSP. Consider ESTIM next visit    Written Home Exercises Provided: reviewed HEP from initial visit for understanding/technique. Pt demo good understanding of the education provided. Benitez demonstrated good return demonstration of activities.   Education provided re: upright posture and importance of consistency/compliance with HEP to improve flexibility, ROM, CSP stability, and reduce c/o pain. Benitez verbalized good understanding of education provided. No spiritual or educational barriers to learning provided    Assessment     Patient tolerated treatment well today. New exercises added to improve scapular strength and postural alignment / endurance. Pt required VC/TC for correct scapular placement during exercises. Pt able to tolerate with good muscle response.  This is a 76 y.o. male referred to outpatient physical therapy and presents with a medical diagnosis of left neck pain and demonstrates limitations as described in the problem list. Pt prognosis is Good. Pt will continue to benefit from skilled outpatient physical therapy to address the deficits listed in the problem list, provide pt/family education and to maximize pt's level of independence in the home and community environment.     Goals as follows: See PN on 6/23/17 (PN due by 7/23/17)     Plan     Continue with established Plan of Care towards PT goals.    Therapist: Tammie Mcdonald, PT  7/10/2017

## 2017-07-17 ENCOUNTER — CLINICAL SUPPORT (OUTPATIENT)
Dept: REHABILITATION | Facility: HOSPITAL | Age: 77
End: 2017-07-17
Attending: NURSE PRACTITIONER
Payer: MEDICARE

## 2017-07-17 DIAGNOSIS — R53.1 DECREASED STRENGTH: ICD-10-CM

## 2017-07-17 DIAGNOSIS — M54.2 NECK PAIN: Primary | ICD-10-CM

## 2017-07-17 DIAGNOSIS — R29.898 DECREASED RANGE OF MOTION OF NECK: ICD-10-CM

## 2017-07-17 DIAGNOSIS — R29.3 POSTURE IMBALANCE: ICD-10-CM

## 2017-07-17 PROCEDURE — 97110 THERAPEUTIC EXERCISES: CPT | Mod: PN

## 2017-07-17 NOTE — PROGRESS NOTES
"                                                    Physical Therapy Daily Note     Name: Benitez Cabrales  Clinic Number: [id number]  Diagnosis:   Encounter Diagnoses   Name Primary?   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    Pain Scale: Benitez rates pain on a scale of 0-10 to be 3 currently in his neck.     Objective       Benitez received individual therapeutic exercises to develop strength, endurance, ROM, flexibility, posture and core stabilization for 40 minutes including:  UBE: 3' fwd, 3' bwd  UT stretch: 4 x 10"  LS stretch: 4 x 10"  Scalene stretch: 4 x 10"  Doorway stretch: 3 x 20"    Scapular squeezes: 2 x 10  Chin tucks: 3 minutes x 5" holds   Rotation with chin tucks: 2 x 10 x 3" holds     Chin tuck with occular movement: 2 x 30" (ea direction - up/down, L/R)  Chin tuck with isometric rotation: 2 x 30" with manual OP  Side flexion with chin tucks: 2 x 10 x 3" holds  Bi shoulder ER: 2 x 10 x GTB  - increased resistance  Bi shoulder abduction: 2 x 10 x GTB  - increased resistance  SA punches: 2 x 10 x 3# - increased resistance  Open books: 1 x 15 - increased reps    Narrow rows: 2 x 10 x RTB  SALPD: 2 x 10 x RTB  Ball on wall circles (CW, CCW): 1 x 10 ea  Trunk ext with row: 1 x 10 (lawnmower)    Progress next visit: scapular strengthening, postural awareness/endurance, thoracic mobility    Benitez received the following manual therapy techniques: were applied to the: CSP/TSP/subcranial spine:   0 Minutes -  IASTYM   10 Minutes - Myofacial release/Soft tissue Mobilization and Friction Massage, " Joint mobilizations, Manual traction    The patient received the following unsupervised modalities after being cleared for contradictions: MHP for 10 minutes to CSP. Consider ESTIM next visit    Written Home Exercises Provided: reviewed HEP from initial visit for understanding/technique. Pt demo good understanding of the education provided. Benitez demonstrated good return demonstration of activities.   Education provided re: upright posture and importance of consistency/compliance with HEP to improve flexibility, ROM, CSP stability, and reduce c/o pain. Benitez verbalized good understanding of education provided. No spiritual or educational barriers to learning provided    Assessment     Patient tolerated treatment well today. Pt able to tolerate increased resistance with several therex 2* increasing strength. This is a 76 y.o. male referred to outpatient physical therapy and presents with a medical diagnosis of left neck pain and demonstrates limitations as described in the problem list. Pt prognosis is Good. Pt will continue to benefit from skilled outpatient physical therapy to address the deficits listed in the problem list, provide pt/family education and to maximize pt's level of independence in the home and community environment.     Goals as follows: See PN on 6/23/17 (PN due by 7/23/17)     Plan     Continue with established Plan of Care towards PT goals.    Therapist: Tammie Mcdonald, PT  7/17/2017

## 2017-07-20 ENCOUNTER — CLINICAL SUPPORT (OUTPATIENT)
Dept: REHABILITATION | Facility: HOSPITAL | Age: 77
End: 2017-07-20
Attending: NURSE PRACTITIONER
Payer: MEDICARE

## 2017-07-20 DIAGNOSIS — R29.898 DECREASED RANGE OF MOTION OF NECK: ICD-10-CM

## 2017-07-20 DIAGNOSIS — R29.3 POSTURE IMBALANCE: ICD-10-CM

## 2017-07-20 DIAGNOSIS — R53.1 DECREASED STRENGTH: ICD-10-CM

## 2017-07-20 DIAGNOSIS — M54.2 NECK PAIN: Primary | ICD-10-CM

## 2017-07-20 PROCEDURE — 97140 MANUAL THERAPY 1/> REGIONS: CPT | Mod: PN

## 2017-07-20 PROCEDURE — 97110 THERAPEUTIC EXERCISES: CPT | Mod: PN

## 2017-07-20 NOTE — PROGRESS NOTES
"                                                    Physical Therapy Daily Note     Name: Benitez Cabrales  Clinic Number: [id number]  Diagnosis:   Encounter Diagnoses   Name Primary?   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  Pain Scale: Benitez rates pain on a scale of 0-10 to be 2 currently in his neck.     Objective     Benitez received individual therapeutic exercises to develop strength, endurance, ROM, flexibility, posture and core stabilization for 50 minutes including:  UBE: 3' fwd, 3' bwd  UT stretch: 4 x 10"  LS stretch: 4 x 10"  Scalene stretch: 4 x 10"  Doorway stretch: 3 x 20"    Scapular squeezes: 2 x 10  Chin tucks: 3 minutes x 5" holds   Rotation with chin tucks: 2 x 10 x 3" holds     Chin tuck with occular movement: 2 x 30" (ea direction - up/down, L/R)  Chin tuck with isometric rotation: 2 x 30" with manual OP  Side flexion with chin tucks: 2 x 10 x 3" holds  Bi shoulder ER: 2 x 10 x GTB   Bi shoulder abduction: 2 x 10 x GTB    SA punches: 2 x 10 x 5# - increased resistance today  Open books: 1 x 15    Narrow rows: 2 x 10 x RTB  SALPD: 2 x 10 x RTB  Ball on wall circles (CW, CCW): 1 x 10 ea  Trunk ext with row: 1 x 10 (lawnmower)    Progress next visit: scapular strengthening, postural awareness/endurance, thoracic mobility    Benitez received the following manual therapy techniques: were applied to the: CSP/TSP/subcranial spine:   0 Minutes -  IASTYM   10 Minutes - Myofacial release/Soft tissue Mobilization and Friction Massage, Joint mobilizations, Manual traction    The patient received the " following unsupervised modalities after being cleared for contradictions: MHP for 10 minutes to CSP. Consider ESTIM next visit    Written Home Exercises Provided: reviewed HEP from initial visit for understanding/technique. Pt demo good understanding of the education provided. Benitez demonstrated good return demonstration of activities.   Education provided re: upright posture and importance of consistency/compliance with HEP to improve flexibility, ROM, CSP stability, and reduce c/o pain. Benitez verbalized good understanding of education provided. No spiritual or educational barriers to learning provided    Assessment     Patient tolerated treatment well today. Notable TTP L CSP paraspinals and suboccipital muscles with increased tone today. Pt continues to sit with HEP and is UA to self correct without VC. Poor postural awareness continues to facilitate muscular imbalance and c/o pain.  This is a 76 y.o. male referred to outpatient physical therapy and presents with a medical diagnosis of left neck pain and demonstrates limitations as described in the problem list. Pt prognosis is Good. Pt will continue to benefit from skilled outpatient physical therapy to address the deficits listed in the problem list, provide pt/family education and to maximize pt's level of independence in the home and community environment.     Goals as follows: See PN on 6/23/17 (PN due by 7/23/17)     Plan     Continue with established Plan of Care towards PT goals.    Therapist: Tammie Mcdonald, PT  7/20/2017

## 2017-07-26 ENCOUNTER — TELEPHONE (OUTPATIENT)
Dept: ELECTROPHYSIOLOGY | Facility: CLINIC | Age: 77
End: 2017-07-26

## 2017-07-27 ENCOUNTER — CLINICAL SUPPORT (OUTPATIENT)
Dept: REHABILITATION | Facility: HOSPITAL | Age: 77
End: 2017-07-27
Attending: NURSE PRACTITIONER
Payer: MEDICARE

## 2017-07-27 DIAGNOSIS — R29.3 POSTURE IMBALANCE: ICD-10-CM

## 2017-07-27 DIAGNOSIS — R29.898 DECREASED RANGE OF MOTION OF NECK: ICD-10-CM

## 2017-07-27 DIAGNOSIS — R53.1 DECREASED STRENGTH: ICD-10-CM

## 2017-07-27 DIAGNOSIS — M54.2 NECK PAIN: Primary | ICD-10-CM

## 2017-07-27 PROCEDURE — 97140 MANUAL THERAPY 1/> REGIONS: CPT | Mod: PN

## 2017-07-27 PROCEDURE — G8979 MOBILITY GOAL STATUS: HCPCS | Mod: CJ,PN

## 2017-07-27 PROCEDURE — G8978 MOBILITY CURRENT STATUS: HCPCS | Mod: CK,PN

## 2017-07-27 PROCEDURE — 97110 THERAPEUTIC EXERCISES: CPT | Mod: PN

## 2017-07-27 NOTE — PROGRESS NOTES
"                                                    Physical Therapy Daily Note     Name: Benitez Cabrales  Clinic Number: [id number]  Diagnosis:   Encounter Diagnoses   Name Primary?   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 He reports that turning remains painful and difficult.   Pain Scale: Benitez rates pain on a scale of 0-10 to be 3 currently in his neck.     Objective     Palpation: TTP scalenes / CSP paraspinals on L        Cervical/Thoracic AROM: Pain/Dysfunction with Movement:   Flexion WFL, c/o pulling feeling   Extension 45 deg, pain   Right side bending 25 deg   Left side bending 20 deg, c/o pulling pain in L neck   Right rotation 55 deg, "pulling" in L neck   Left rotation 45 deg, c/o pain in L neck     Scapular strength: grossly 3+/5  Shoulder strength: grossly 5/5    Flexibility:    Right Left  Pectoralis Major/Minor:  Fair Fair   Upper Traps / Levator Scap Fair Fair    Joint Mobility: C2-7 DS L = 2-/6, R = 2/6  Other: FAQ: 26%, FOTO limitation: 42% disability    Benitez received individual therapeutic exercises to develop strength, endurance, ROM, flexibility, posture and core stabilization for 50 minutes including:  UBE: 3' fwd, 3' bwd  UT stretch: 4 x 10"  LS stretch: 4 x 10"  Scalene stretch: 4 x 10"  Doorway stretch: 3 x 20"    (+)Cane OH flexion: 1 x 15  Open books: 1 x 10  Chin tucks: 3 minutes x 5" holds " "  Rotation with chin tucks: 1 x 15 x 3" holds (+) manual OP  Chin tuck with occular movement: 2 x 30" (ea direction - up/down, L/R)  Chin tuck with isometric rotation: 2 x 30" with manual OP  Side flexion with chin tucks: 2 x 10 x 3" holds  Supine Bi ER: 2 x 10 x GTB   Supine Bi abduction: 2 x 10 x GTB    SA punches: 2 x 10 x 5#     Narrow rows: 2 x 10 x GTB - increased resistance  SALPD: 2 x 10 x GTB - increased resistance  Ball on wall circles (CW, CCW): 1 x 10 ea  +Trunk ext with row (lawnmower) : 2 x 10 x GTB    Progress next visit: scapular strengthening, postural awareness/endurance, thoracic mobility    Benitez received the following manual therapy techniques: were applied to the: CSP/TSP/subcranial spine:   0 Minutes -  IASTYM   10 Minutes - Myofacial release/Soft tissue Mobilization and Friction Massage, Joint mobilizations, Manual traction    The patient received the following unsupervised modalities after being cleared for contradictions: MHP for 10 minutes to CSP. Consider ESTIM next visit    Written Home Exercises Provided: reviewed HEP from initial visit for understanding/technique. Pt demo good understanding of the education provided. Benitez demonstrated good return demonstration of activities.   Education provided re: upright posture and importance of consistency/compliance with HEP to improve flexibility, ROM, CSP stability, and reduce c/o pain. Benitez verbalized good understanding of education provided. No spiritual or educational barriers to learning provided    Assessment     Patient tolerated treatment well today. Pt demonstrates improving ROM necessary for ADLs. Pt continues to lack joint mobility, muscular flexibility, postural awareness and trunk strength, which continues to promote FHP and c/o pain. New exercises added to improve trunk and extremity strength. Pt able to tolerate with good muscle response.  This is a 76 y.o. male referred to outpatient physical therapy and presents with a medical " diagnosis of left neck pain and demonstrates limitations as described in the problem list. Pt prognosis is Good. Pt will continue to benefit from skilled outpatient physical therapy to address the deficits listed in the problem list, provide pt/family education and to maximize pt's level of independence in the home and community environment.     Goals as follows: See PN on 7/27/17 (PN due by 8/27/17)  Short Term Goals (6 Weeks):   1. Pt to demonstrate improved ROM by 5% to allow pt to perform self care activities with increased ease. - Met  2. Pt to demonstrate improved flexibility by a half grade to allow improved ADLs with increased ease.  - Met  3. Pt to demonstrate improvement in strength by a half grade to improve sitting tolerance with minimal c/o symptoms. - Met  4. Pt to demonstrate improved functional ability with FOTO limitation <=47% disability. - Met    Long Term Goals (12 Weeks):   1. Pt to demonstrate improved ROM to WNL, R=L, to allow pt to perform self care with increased ease. - Not Met  2. Pt to demonstrate improved flexibility by a half grade to allow improved postural alignment with increased ease. - Not Met  3. Pt to demonstrate improved strength to >=4+/5 to allow for increased ease with HHCs. - Not Met  4. Pt to demonstrate improved functional ability with FOTO limitation <=37% disability. - Not Met  5. Pt independent with HEP and demonstrates good return technique. - Not Met     Plan     Continue with established Plan of Care towards PT goals. Continue x 4 weeks with progression towards D/C with HEP.    Therapist: Tammie Mcdonald, PT  7/27/2017

## 2017-08-03 ENCOUNTER — CLINICAL SUPPORT (OUTPATIENT)
Dept: REHABILITATION | Facility: HOSPITAL | Age: 77
End: 2017-08-03
Attending: NURSE PRACTITIONER
Payer: MEDICARE

## 2017-08-03 DIAGNOSIS — R29.898 DECREASED RANGE OF MOTION OF NECK: ICD-10-CM

## 2017-08-03 DIAGNOSIS — R53.1 DECREASED STRENGTH: ICD-10-CM

## 2017-08-03 DIAGNOSIS — M54.2 NECK PAIN: Primary | ICD-10-CM

## 2017-08-03 DIAGNOSIS — R29.3 POSTURE IMBALANCE: ICD-10-CM

## 2017-08-03 PROCEDURE — 97110 THERAPEUTIC EXERCISES: CPT | Mod: PN

## 2017-08-03 NOTE — PROGRESS NOTES
"                                                    Physical Therapy Daily Note     Name: Benitez Cabrales  Clinic Number: [id number]  Diagnosis:   Encounter Diagnoses   Name Primary?    Decreased range of motion of neck     Decreased strength     Posture imbalance     Neck pain Yes     Physician: Ari Brannon NP  Precautions: COPD, Afib, GERD, Hx dizzinesss from old head injury  Visit #: 12 of 12   PTA Visit #: 0  Time In: 1:30  Time Out: 2:30  Total treatment time: 60 minutes (1:1 with PT 45 minutes)     Subjective     Pt reports: that he is having a "bad day" with increased L neck pain.    Pain Scale: Benitez rates pain on a scale of 0-10 to be 2 currently in his neck. Pt reports pain is <2/10 by end of session today.    Objective       Benitez received individual therapeutic exercises to develop strength, endurance, ROM, flexibility, posture and core stabilization for 55 minutes including:  UBE: 3' fwd, 3' bwd  UT stretch: 4 x 10"  LS stretch: 4 x 10"  Scalene stretch: 4 x 10"  Doorway stretch: 3 x 20"    Cane OH flexion: 1 x 10  Open books: 1 x 10  Chin tucks with cervical flexion: 2 x 10   Rotation with chin tucks: 1 x 15 x 3" holds (+) manual OP  Chin tuck with occular movement: 2 x 30" (ea direction - up/down, L/R)  Chin tuck with isometric rotation: 2 x 30" with manual OP  Side flexion with chin tucks: 2 x 10 x 3" holds  Supine Bi ER: 2 x 10 x GTB   Supine Bi abduction: 2 x 10 x GTB    SA punches: 2 x 10 x 5#     Narrow rows: 2 x 10 x GTB   SALPD: 2 x 10 x GTB   Ball on wall circles (CW, CCW): 1 x 10 ea  Trunk ext with row (lawnmower) : 2 x 10 x GTB    Progress next visit: scapular strengthening, postural awareness/endurance, thoracic mobility    Benitez received the following manual therapy techniques: were applied to the: CSP/TSP/subcranial spine:   5 Minutes -  IASTYM   0 Minutes - Myofacial release/Soft tissue Mobilization and Friction Massage, Joint mobilizations, Manual traction    The patient received the " following unsupervised modalities after being cleared for contradictions: MHP for 10 minutes to CSP. Consider ESTIM next visit    Written Home Exercises Provided: reviewed HEP from initial visit for understanding/technique. Pt demo good understanding of the education provided. Benitez demonstrated good return demonstration of activities.   Education provided re: upright posture and importance of consistency/compliance with HEP to improve flexibility, ROM, CSP stability, and reduce c/o pain. Benitez verbalized good understanding of education provided. No spiritual or educational barriers to learning provided    Assessment     Patient tolerated treatment well today. Pt demonstrates improving deep neck flexor strength as he was able to perform chin tuck with cervical flexion with Mod-Min cuing to maintain subcranial flexion. Pt demo's improved technique with slow, controlled movement. Pt requires VC during standing exercises to perform simultaneous chin tuck to reduce c/o neck pain during exercises and to improve upright posture. Pt continues to lack sufficient postural awareness as he is UA to self correct posture without cuing. Poor posture continues to facilitate over activation of cervical extensors and increases c/o pain.  This is a 76 y.o. male referred to outpatient physical therapy and presents with a medical diagnosis of left neck pain and demonstrates limitations as described in the problem list. Pt prognosis is Good. Pt will continue to benefit from skilled outpatient physical therapy to address the deficits listed in the problem list, provide pt/family education and to maximize pt's level of independence in the home and community environment.     Goals as follows: See PN on 7/27/17 (PN due by 8/27/17)  Short Term Goals (6 Weeks):   1. Pt to demonstrate improved ROM by 5% to allow pt to perform self care activities with increased ease. - Met  2. Pt to demonstrate improved flexibility by a half grade to allow  improved ADLs with increased ease.  - Met  3. Pt to demonstrate improvement in strength by a half grade to improve sitting tolerance with minimal c/o symptoms. - Met  4. Pt to demonstrate improved functional ability with FOTO limitation <=47% disability. - Met    Long Term Goals (12 Weeks):   1. Pt to demonstrate improved ROM to WNL, R=L, to allow pt to perform self care with increased ease. - Not Met  2. Pt to demonstrate improved flexibility by a half grade to allow improved postural alignment with increased ease. - Not Met  3. Pt to demonstrate improved strength to >=4+/5 to allow for increased ease with HHCs. - Not Met  4. Pt to demonstrate improved functional ability with FOTO limitation <=37% disability. - Not Met  5. Pt independent with HEP and demonstrates good return technique. - Not Met     Plan     Continue with established Plan of Care towards PT goals. Continue x 4 weeks with progression towards D/C with HEP.    Therapist: Tammie Mcdonald, PT  8/3/2017

## 2017-08-10 ENCOUNTER — CLINICAL SUPPORT (OUTPATIENT)
Dept: REHABILITATION | Facility: HOSPITAL | Age: 77
End: 2017-08-10
Attending: NURSE PRACTITIONER
Payer: MEDICARE

## 2017-08-10 DIAGNOSIS — R29.898 DECREASED RANGE OF MOTION OF NECK: ICD-10-CM

## 2017-08-10 DIAGNOSIS — R29.3 POSTURE IMBALANCE: ICD-10-CM

## 2017-08-10 DIAGNOSIS — R53.1 DECREASED STRENGTH: ICD-10-CM

## 2017-08-10 DIAGNOSIS — M54.2 NECK PAIN: Primary | ICD-10-CM

## 2017-08-10 PROCEDURE — 97110 THERAPEUTIC EXERCISES: CPT | Mod: PN

## 2017-08-10 NOTE — PROGRESS NOTES
"                                                    Physical Therapy Daily Note     Name: Benitez Cabrales  Clinic Number: [id number]  Diagnosis:   Encounter Diagnoses   Name Primary?    Decreased range of motion of neck     Decreased strength     Posture imbalance     Neck pain Yes     Physician: Ari Brannon NP  Precautions: COPD, Afib, GERD, Hx dizzinesss from old head injury  Visit #: 12 of 12   PTA Visit #: 0  Time In: 1:35  Time Out: 2:40  Total treatment time: 65 minutes (1:1 with PT 25 minutes)     Subjective     Pt reports: less pain today with increased ability to turn head with increased ease.  Pain Scale: Benitez rates pain on a scale of 0-10 to be 2 currently in his neck.    Objective     Benitez received individual therapeutic exercises to develop strength, endurance, ROM, flexibility, posture and core stabilization for 55 minutes including:  UBE: 3' fwd, 3' bwd  UT stretch: 4 x 10"  LS stretch: 4 x 10"  Scalene stretch: 4 x 10"  Doorway stretch: 3 x 20"    Cane OH flexion: 1 x 10  Open books: 1 x 10  Chin tucks with cervical flexion: 2 x 10   Rotation with chin tucks: 1 x 15 x 3" holds (+) manual OP  Chin tuck with occular movement: 2 x 30" (ea direction - up/down, L/R)  Chin tuck with isometric rotation: 2 x 30" with manual OP  Side flexion with chin tucks: 2 x 10 x 3" holds  Supine Bi ER: 2 x 10 x GTB   Supine Bi abduction: 2 x 10 x GTB    SA punches: 2 x 10 x 5#     Narrow rows: 2 x 10 x GTB   SALPD: 2 x 10 x GTB   Ball on wall circles (CW, CCW): 1 x 10 ea x 4# (red ball)  Trunk ext with row (lawnmower) : 2 x 10 x GTB    Progress next visit: scapular strengthening, postural awareness/endurance, thoracic mobility    Benitez received the following manual therapy techniques: were applied to the: CSP/TSP/subcranial spine:   0 Minutes -  IASTYM - pt refused today  10 Minutes - Myofacial release/Soft tissue Mobilization and Friction Massage, Joint mobilizations, Manual traction    The patient received the " following unsupervised modalities after being cleared for contradictions: MHP for 10 minutes to CSP. Consider ESTIM next visit    Written Home Exercises Provided: reviewed HEP from initial visit for understanding/technique. Pt demo good understanding of the education provided. Benitez demonstrated good return demonstration of activities.   Education provided re: upright posture and importance of consistency/compliance with HEP to improve flexibility, ROM, CSP stability, and reduce c/o pain. Benitez verbalized good understanding of education provided. No spiritual or educational barriers to learning provided    Assessment     Patient tolerated treatment well today. Pt presents with improved MF flexibility of the L LS and cervical paraspinals with decreased tone and tenderness. This has allowed for improved ROM and MF flexibility while turning head.  This is a 76 y.o. male referred to outpatient physical therapy and presents with a medical diagnosis of left neck pain and demonstrates limitations as described in the problem list. Pt prognosis is Good. Pt will continue to benefit from skilled outpatient physical therapy to address the deficits listed in the problem list, provide pt/family education and to maximize pt's level of independence in the home and community environment.     Goals as follows: See PN on 7/27/17 (PN due by 8/27/17)       Plan     Continue with established Plan of Care towards PT goals. Continue x 4 weeks with progression towards D/C with HEP.    Therapist: Tammie Mcdonald, PT  8/10/2017

## 2017-08-14 ENCOUNTER — CLINICAL SUPPORT (OUTPATIENT)
Dept: ELECTROPHYSIOLOGY | Facility: CLINIC | Age: 77
End: 2017-08-14
Payer: MEDICARE

## 2017-08-14 ENCOUNTER — CLINICAL SUPPORT (OUTPATIENT)
Dept: REHABILITATION | Facility: HOSPITAL | Age: 77
End: 2017-08-14
Attending: NURSE PRACTITIONER
Payer: MEDICARE

## 2017-08-14 DIAGNOSIS — R29.898 DECREASED RANGE OF MOTION OF NECK: ICD-10-CM

## 2017-08-14 DIAGNOSIS — M54.2 NECK PAIN: Primary | ICD-10-CM

## 2017-08-14 DIAGNOSIS — R53.1 DECREASED STRENGTH: ICD-10-CM

## 2017-08-14 DIAGNOSIS — R00.2 PALPITATIONS: ICD-10-CM

## 2017-08-14 DIAGNOSIS — Z95.818 STATUS POST PLACEMENT OF IMPLANTABLE LOOP RECORDER: ICD-10-CM

## 2017-08-14 DIAGNOSIS — R29.3 POSTURE IMBALANCE: ICD-10-CM

## 2017-08-14 PROCEDURE — 93299 LOOP RECORDER REMOTE: CPT | Mod: S$GLB,,, | Performed by: INTERNAL MEDICINE

## 2017-08-14 PROCEDURE — 93297 REM INTERROG DEV EVAL ICPMS: CPT | Mod: S$GLB,,, | Performed by: INTERNAL MEDICINE

## 2017-08-14 PROCEDURE — 97110 THERAPEUTIC EXERCISES: CPT | Mod: PN

## 2017-08-14 NOTE — PROGRESS NOTES
"                                                    Physical Therapy Daily Note     Name: Benitez Cabrales  Clinic Number: [id number]  Diagnosis:   Encounter Diagnoses   Name Primary?    Decreased range of motion of neck     Decreased strength     Posture imbalance     Neck pain Yes     Physician: Ari Brannon NP  Precautions: COPD, Afib, GERD, Hx dizzinesss from old head injury  Visit #: 4/12 + 11/12   PTA Visit #: 0  Time In: 1:30  Time Out: 2:30  Total treatment time: 60 minutes (1:1 with PT 30 minutes)     Subjective     Pt reports: minimal pain today. "Every day I'm getting a little bit better." Pt reports that he still has pain with during his head, but it is less than before. When offered, he reports that he is not interested in integrative dry needling.  Pain Scale: Benitez rates pain on a scale of 0-10 to be 1 currently in his neck.    Objective     Benitez received individual therapeutic exercises to develop strength, endurance, ROM, flexibility, posture and core stabilization for 60 minutes including:  UBE: 3' fwd, 3' bwd  UT stretch: 4 x 10"  LS stretch: 4 x 10"  Doorway stretch: 3 x 20"    Cane OH flexion: 1 x 10  Chin tucks with cervical flexion: 2 x 10   Rotation with chin tucks: 1 x 15 x 3" holds w/ manual OP  Supine Bi ER: 2 x 10 x GTB   Supine Bi abduction: 2 x 10 x GTB    SA punches: 2 x 10 x 6#     Narrow rows: 2 x 10 x 30# - increased resistance today  SALPD: 2 x 10 x 10# - increased resistance today  Ball on wall circles (CW, CCW): 1 x 10 ea x 4# (red ball)  Trunk ext with row (lawnmower) : 2 x 10 x GTB  +Wall clocks: 1 x 7 x YTB    Progress next visit: scapular strengthening, postural awareness/endurance, thoracic mobility    Benitez received the following manual therapy techniques: were applied to the: CSP/TSP/subcranial spine:   0 Minutes -  IASTYM - pt refused today  0 Minutes - Myofacial release/Soft tissue Mobilization and Friction Massage, Joint mobilizations, Manual traction    The patient " received the following unsupervised modalities after being cleared for contradictions: MHP for 0 minutes to CSP. Consider ESTIM next visit -  Pt refused today    Written Home Exercises Provided: reviewed HEP from initial visit for understanding/technique. Pt demo good understanding of the education provided. Benitez demonstrated good return demonstration of activities.   Education provided re: upright posture and importance of consistency/compliance with HEP to improve flexibility, ROM, CSP stability, and reduce c/o pain. Benitez verbalized good understanding of education provided. No spiritual or educational barriers to learning provided    Assessment     Patient tolerated treatment well today. Added therex to improve scapular strength and endurance while maintaining good upright posture. Pt required VC for upright posture and chin tuck during standing activities to improve NM control and coordination of deep neck flexors during dual tasking.   This is a 76 y.o. male referred to outpatient physical therapy and presents with a medical diagnosis of left neck pain and demonstrates limitations as described in the problem list. Pt prognosis is Good. Pt will continue to benefit from skilled outpatient physical therapy to address the deficits listed in the problem list, provide pt/family education and to maximize pt's level of independence in the home and community environment.     Goals as follows: See PN on 7/27/17 (PN due by 8/27/17)       Plan     Continue with established Plan of Care towards PT goals. Continue x 4 weeks with progression towards D/C with HEP.    Therapist: Tammie Mcdonald, PT  8/15/2017

## 2017-08-17 ENCOUNTER — CLINICAL SUPPORT (OUTPATIENT)
Dept: REHABILITATION | Facility: HOSPITAL | Age: 77
End: 2017-08-17
Attending: NURSE PRACTITIONER
Payer: MEDICARE

## 2017-08-17 DIAGNOSIS — R53.1 DECREASED STRENGTH: ICD-10-CM

## 2017-08-17 DIAGNOSIS — R29.898 DECREASED RANGE OF MOTION OF NECK: ICD-10-CM

## 2017-08-17 DIAGNOSIS — R29.3 POSTURE IMBALANCE: ICD-10-CM

## 2017-08-17 PROCEDURE — G8980 MOBILITY D/C STATUS: HCPCS | Mod: CJ,PN

## 2017-08-17 PROCEDURE — G8979 MOBILITY GOAL STATUS: HCPCS | Mod: CJ,PN

## 2017-08-17 PROCEDURE — 97110 THERAPEUTIC EXERCISES: CPT | Mod: PN

## 2017-08-17 NOTE — PROGRESS NOTES
"                                                    Physical Therapy Daily Note     Name: Benitez Cabrales  Clinic Number: [id number]  Diagnosis:   Encounter Diagnoses   Name Primary?    Decreased range of motion of neck     Decreased strength     Posture imbalance      Physician: Ari Brannon NP  Precautions: COPD, Afib, GERD, Hx dizzinesss from old head injury  Visit #: 5/12 + 11/12   PTA Visit #: 0  Time In: 1:30  Time Out: 2:30  Total treatment time: 60 minutes (1:1 with PT 30 minutes)     Functional Outcome Measure: FOTO limitation = 22% Disability  G-codes + Modifier + % Impairment: (mobility):   DC =  (20% - 40% disability), Goal =  (20% - 40% disability)    Subjective     Pt reports: that he continues to feel good. "I think I'm ready for this to be my last day."  Pain Scale: Benitez rates pain on a scale of 0-10 to be 1 currently in his neck.    Objective     Palpation: mild TTP scalenes / CSP paraspinals on L    Cervical/Thoracic AROM: Pain/Dysfunction with Movement:   Flexion WFL, c/o pulling feeling   Extension 45 deg, pain   Right side bending 25 deg   Left side bending 25 deg   Right rotation 65 deg   Left rotation 50 deg, c/o pain in L neck     Scapular strength: grossly 4/5  Shoulder strength: grossly 5/5    Flexibility:    Right Left  Pectoralis Major/Minor:  Fair Fair   Upper Traps / Levator Scap Fair+ Fair+    Joint Mobility: C2-7 DS L = 2+/6, R = 2+/6  Other: FAQ: 26%, FOTO limitation: 22% disability    Benitez received individual therapeutic exercises to develop strength, endurance, ROM, flexibility, posture and core stabilization for 60 minutes including:  UBE: 3' fwd, 3' bwd  UT stretch: 4 x 10"  LS stretch: 4 x 10"  Doorway stretch: 3 x 20"    Cane OH flexion: 1 x 10  Chin tucks with cervical flexion: 2 x 10   Rotation with chin tucks: 1 x 15 x 3" holds w/ manual OP  Supine Bi ER: 2 x 10 x GTB   Supine Bi abduction: 2 x 10 x GTB    SA punches: 2 x 10 x 6#     Narrow rows: 2 x 10 x 30# - " increased resistance today  SALPD: 2 x 10 x 10# - increased resistance today  Ball on wall circles (CW, CCW): 1 x 10 ea x 4# (red ball)  Trunk ext with row (lawnmower) : 2 x 10 x GTB  Wall clocks: 1 x 7 x YTB    Written Home Exercises Provided: reviewed HEP from initial visit for understanding/technique. Pt demo good understanding of the education provided. Benitez demonstrated good return demonstration of activities.   Education provided re: upright posture and importance of consistency/compliance with HEP to improve flexibility, ROM, CSP stability, and reduce c/o pain. Benitez verbalized good understanding of education provided. No spiritual or educational barriers to learning provided    Assessment     Patient tolerated treatment well today. Pt demo's ROM, flexibility, and strength WFL to allow pt to perform functional activities with less pain. Pt is independent with HEP and demonstrates good return technique. Pt able to reduce symptoms independently with use of HEP and modalities. Pt no longer has difficulty with functional activities and has returned to PLOF. Pt has progressed well and has met 7 of 9 therapeutic goals. Pt no longer requires skilled PT. Recommend D/C from skilled care.      Goals as follows: 8/17/2017  Short Term Goals (6 Weeks):   1. Pt to demonstrate improved ROM by 5% to allow pt to perform self care activities with increased ease. - Met  2. Pt to demonstrate improved flexibility by a half grade to allow improved ADLs with increased ease.  - Met  3. Pt to demonstrate improvement in strength by a half grade to improve sitting tolerance with minimal c/o symptoms. - Met  4. Pt to demonstrate improved functional ability with FOTO limitation <=47% disability. - Met    Long Term Goals (12 Weeks):   1. Pt to demonstrate improved ROM to WNL, R=L, to allow pt to perform self care with increased ease. - Not Met  2. Pt to demonstrate improved flexibility by a half grade to allow improved postural alignment  with increased ease. - Not Met  3. Pt to demonstrate improved strength to >=4+/5 to allow for increased ease with HHCs. - Met  4. Pt to demonstrate improved functional ability with FOTO limitation <=37% disability. - Met  5. Pt independent with HEP and demonstrates good return technique. - Met       Plan     D/C to HEP per pt request.    Therapist: Tammie Mcdonald, PT  8/17/2017

## 2017-09-15 ENCOUNTER — TELEPHONE (OUTPATIENT)
Dept: FAMILY MEDICINE | Facility: CLINIC | Age: 77
End: 2017-09-15

## 2017-09-15 ENCOUNTER — CLINICAL SUPPORT (OUTPATIENT)
Dept: ELECTROPHYSIOLOGY | Facility: CLINIC | Age: 77
End: 2017-09-15
Payer: MEDICARE

## 2017-09-15 DIAGNOSIS — Z95.818 STATUS POST PLACEMENT OF IMPLANTABLE LOOP RECORDER: ICD-10-CM

## 2017-09-15 DIAGNOSIS — R00.2 PALPITATIONS: ICD-10-CM

## 2017-09-15 NOTE — TELEPHONE ENCOUNTER
----- Message from Claudine nileshirene sent at 9/13/2017  4:35 PM CDT -----  Contact: self  Would like to know if he should get a strong dosage of the flu shot because he is older.  [phone].

## 2017-09-28 DIAGNOSIS — I48.0 PAROXYSMAL ATRIAL FIBRILLATION: Primary | ICD-10-CM

## 2017-09-28 DIAGNOSIS — I48.91 ATRIAL FIBRILLATION, UNSPECIFIED TYPE: ICD-10-CM

## 2017-09-28 NOTE — TELEPHONE ENCOUNTER
----- Message from Penny Faria sent at 9/28/2017 10:53 AM CDT -----  Contact: pt  RX request - refill or new RX.  Is this a refill or new RX:  Refill  RX name and strength: amiodarone (PACERONE) 200 MG Tab    Pharmacy name: [facility]    Dr.Morales  1/26/17 last visit

## 2017-09-28 NOTE — TELEPHONE ENCOUNTER
----- Message from Ivan Ann sent at 9/28/2017  1:51 PM CDT -----  Contact: patient  Please call pt at [phone] if any questions. Refill needed for Amiodarone 200 mg x 90 day supply called into [facility] at [phone].  Out of meds.  Dr Morales pt    Thank you

## 2017-09-29 ENCOUNTER — CLINICAL SUPPORT (OUTPATIENT)
Dept: FAMILY MEDICINE | Facility: CLINIC | Age: 77
End: 2017-09-29
Payer: MEDICARE

## 2017-09-29 DIAGNOSIS — Z23 NEED FOR INFLUENZA VACCINATION: Primary | ICD-10-CM

## 2017-09-29 PROCEDURE — 90662 IIV NO PRSV INCREASED AG IM: CPT | Mod: S$GLB,,, | Performed by: INTERNAL MEDICINE

## 2017-09-29 PROCEDURE — 99499 UNLISTED E&M SERVICE: CPT | Mod: S$GLB,,, | Performed by: INTERNAL MEDICINE

## 2017-09-29 PROCEDURE — G0008 ADMIN INFLUENZA VIRUS VAC: HCPCS | Mod: S$GLB,,, | Performed by: INTERNAL MEDICINE

## 2017-09-29 RX ORDER — AMIODARONE HYDROCHLORIDE 200 MG/1
200 TABLET ORAL DAILY
Qty: 90 TABLET | Refills: 3 | Status: SHIPPED | OUTPATIENT
Start: 2017-09-29 | End: 2018-03-14 | Stop reason: SDUPTHER

## 2017-10-09 ENCOUNTER — OFFICE VISIT (OUTPATIENT)
Dept: OPTOMETRY | Facility: CLINIC | Age: 77
End: 2017-10-09
Payer: MEDICARE

## 2017-10-09 DIAGNOSIS — H52.7 REFRACTIVE ERROR: ICD-10-CM

## 2017-10-09 DIAGNOSIS — I10 ESSENTIAL HYPERTENSION: Chronic | ICD-10-CM

## 2017-10-09 DIAGNOSIS — H25.13 NUCLEAR SCLEROSIS, BILATERAL: Primary | ICD-10-CM

## 2017-10-09 PROCEDURE — 92015 DETERMINE REFRACTIVE STATE: CPT | Mod: S$GLB,,, | Performed by: OPTOMETRIST

## 2017-10-09 PROCEDURE — 92014 COMPRE OPH EXAM EST PT 1/>: CPT | Mod: S$GLB,,, | Performed by: OPTOMETRIST

## 2017-10-09 PROCEDURE — 99999 PR PBB SHADOW E&M-EST. PATIENT-LVL I: CPT | Mod: PBBFAC,,, | Performed by: OPTOMETRIST

## 2017-10-09 NOTE — PROGRESS NOTES
"Subjective:       Patient ID: Benitez Cabrales is a 77 y.o. male      Chief Complaint   Patient presents with    Concerns About Ocular Health    Hypertensive Eye Exam     History of Present Illness  Dls: 1/31/17 Dr. Devries    Pt here for hypertensive eye exam.   Pt states no changes in vision.pt wears bifocal glasses. Pt wants a new rx   for glasses. Pt c/o tearing ou off/on no itching no burning no pain some   redness ou off/on ha's floaters ou off/on.     Eye meds:   B&L gtts ou qd prn       Assessment/Plan:     1. Nuclear sclerosis, bilateral  Educated patient on the presence of cataracts and effects on vision, including clouded, blurred or dim vision, increasing difficulty with vision at night, sensitivity to light and glare, need for brighter light for reading and other activities, and seeing "halos" around lights. No surgery at this time. Recheck in one year.    2. Essential hypertension  Vessel changes. Continue BP control and follow up care with PCP. Monitor yearly with DFE.     3. Refractive error  Educated patient on refractive error and discussed lens options. Dispensed updated spectacle Rx. Educated about adaptation period to new specs.    Eyeglass Final Rx     Eyeglass Final Rx       Sphere Cylinder Axis Add    Right -1.00 +0.50 175 +2.75    Left -0.50 +0.75 055 +2.75    Type:  Bifocal    Expiration Date:  10/10/2018                  Return in about 1 year (around 10/9/2018) for Annual eye exam.       "

## 2017-10-17 ENCOUNTER — CLINICAL SUPPORT (OUTPATIENT)
Dept: ELECTROPHYSIOLOGY | Facility: CLINIC | Age: 77
End: 2017-10-17
Payer: MEDICARE

## 2017-10-17 DIAGNOSIS — R00.2 PALPITATIONS: ICD-10-CM

## 2017-10-17 DIAGNOSIS — Z95.818 STATUS POST PLACEMENT OF IMPLANTABLE LOOP RECORDER: ICD-10-CM

## 2017-11-09 ENCOUNTER — TELEPHONE (OUTPATIENT)
Dept: OPTOMETRY | Facility: CLINIC | Age: 77
End: 2017-11-09

## 2017-11-13 ENCOUNTER — OFFICE VISIT (OUTPATIENT)
Dept: FAMILY MEDICINE | Facility: CLINIC | Age: 77
End: 2017-11-13
Payer: MEDICARE

## 2017-11-13 ENCOUNTER — TELEPHONE (OUTPATIENT)
Dept: OPTOMETRY | Facility: CLINIC | Age: 77
End: 2017-11-13

## 2017-11-13 VITALS
TEMPERATURE: 98 F | BODY MASS INDEX: 25.12 KG/M2 | DIASTOLIC BLOOD PRESSURE: 62 MMHG | HEIGHT: 72 IN | WEIGHT: 185.44 LBS | OXYGEN SATURATION: 99 % | HEART RATE: 55 BPM | SYSTOLIC BLOOD PRESSURE: 130 MMHG

## 2017-11-13 DIAGNOSIS — I83.90 VARICOSE VEIN OF LEG: ICD-10-CM

## 2017-11-13 DIAGNOSIS — I10 ESSENTIAL HYPERTENSION: ICD-10-CM

## 2017-11-13 DIAGNOSIS — I70.0 AORTIC ATHEROSCLEROSIS: ICD-10-CM

## 2017-11-13 DIAGNOSIS — L29.9 PRURITUS: Primary | ICD-10-CM

## 2017-11-13 DIAGNOSIS — I48.0 PAF (PAROXYSMAL ATRIAL FIBRILLATION): ICD-10-CM

## 2017-11-13 PROCEDURE — 99214 OFFICE O/P EST MOD 30 MIN: CPT | Mod: S$GLB,,, | Performed by: INTERNAL MEDICINE

## 2017-11-13 PROCEDURE — 90670 PCV13 VACCINE IM: CPT | Mod: S$GLB,,, | Performed by: INTERNAL MEDICINE

## 2017-11-13 PROCEDURE — G0009 ADMIN PNEUMOCOCCAL VACCINE: HCPCS | Mod: S$GLB,,, | Performed by: INTERNAL MEDICINE

## 2017-11-13 PROCEDURE — 99999 PR PBB SHADOW E&M-EST. PATIENT-LVL III: CPT | Mod: PBBFAC,,, | Performed by: INTERNAL MEDICINE

## 2017-11-13 NOTE — PROGRESS NOTES
Chief complaint: Itching, varicose vein    77-year-old  male reports that for 4-5 months at night only he will get a sustaining an itching sensation in different spots of his body.  He goes away soon as he scratches.  Sometimes in his arms sometimes eyes knee sometimes on one side by his year and then the other.  No particular rash.  Problems during the day.  He does not take hot showers.  We discussed the possibility of other exposures such as laundry detergents and they need to switch out of these if possible.  If it was a rash we might refer to dermatology.  He is due for general blood work and a physical in the near future.  He would like to get his Prevnar today but does not want to get labs on the same day.  He would like to come back in a week or so.  He also has some varicose veins and had vein stripping in the past.  He said more varicose veins are rise on the left leg.  He is inquiring about seeing vascular to discuss other less invasive procedures and we'll put in a referral          ROS:   CONST: weight stable. EYES: no vision change. ENT: no sore throat. CV: no chest pain w/ exertion. RESP: no shortness of breath. GI: no nausea, vomiting, diarrhea. No dysphagia. : no urinary issues. MUSCULOSKELETAL: no new myalgias or arthralgias. SKIN: no new changes. NEURO: no focal deficits. PSYCH: no new issues. ENDOCRINE: no polyuria. HEME: no lymph nodes. ALLERGY: no general pruritis.     PAST MEDICAL HISTORY:  1. Hypertension.  2. Hyperlipidemia.  3. GERD.  4. Vertigo after trauma  5. Colonoscopy was normal around 2009 per [facility]  6. Atrial fib/flutter -[facility] EP -cardioverted  7.  Dizziness, seen by neurology   8.peptic ulcer disease on EGDNovember 2016 with GI bleeding  9.  Small bowel obstruction, resolved on its own November 2016    PAST SURGICAL HISTORY:  1. Left leg surgery, sounds like venous surgery -Dr Limon  2. Nasal septal repair.      ALLERGIES: NKDA.    SOCIAL HISTORY: He puffs on a  rare cigar, he is not a smoker. Drinks wine on occasion,  with 3 children. He has 7 grandchildren. 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 He is originally from [facility] [place] [facility]. He is quite active.    FAMILY HISTORY: Sr. had breast cancer. Otherwise negative for prostate or colon cancer, negative for premature coronary disease or diabetes.    HEALTHCARE SCREENING: Colonoscopy was normal around 2009 per [facility], last cholesterol was 2011, , due for PSA , he had eye examination.      Gen: no distress  Skin does not have any appreciable rash or excoriation.  He does have nontender varicose veins of the left leg      Benitez was seen today for discuss itching all over body.    Diagnoses and all orders for this visit:    Pruritus, sounds like it is itching although he describes it as a stinging quality but it would not have any neurological pattern so I do suspect this may be intermittent areas of itching and since it only occurs at night it could well have a relationship to a certain exposure for which she will assess there although it is not diffuse itching to suggest an underlying metabolic problem, he is due for general blood work  -     Comprehensive metabolic panel; Future  -     CBC auto differential; Future  -     TSH; Future  -     Sedimentation rate, manual; Future    Essential hypertension, chronic and stable  -     Comprehensive metabolic panel; Future  -     CBC auto differential; Future  -     TSH; Future  -     Sedimentation rate, manual; Future    PAF (paroxysmal atrial fibrillation), follows with cardiology    Aortic atherosclerosis, noted in the x-ray reports    Varicose vein of leg, desires referral to vascular to discuss other options.  Counseled at length regarding all the above issues.Total time over 25 minutes with over 50% counseling.  -     Ambulatory referral to Vascular Surgery    Other orders  -     Pneumococcal Conjugate Vaccine (13 Valent) (IM)     Pace on his anxiety  "expressed referable to the above issues, access would not be therapeutic "This note will not be shared with the patient."  "

## 2017-11-14 ENCOUNTER — TELEPHONE (OUTPATIENT)
Dept: FAMILY MEDICINE | Facility: CLINIC | Age: 77
End: 2017-11-14

## 2017-11-14 DIAGNOSIS — I83.93 VARICOSE VEINS OF LEGS: Primary | ICD-10-CM

## 2017-11-14 NOTE — LETTER
November 14, 2017    Benitez CHRISTOPH Samra  [address]  [place] [address]             [facility]  [address] [facility] [address]  Phone: [phone]  Fax: [phone] Dear Mr. Cabrales:    I have been unable to reach you by phone for your appointment to Vascular Surgery .  Please call me at the clinic [phone] to book your appointment.      If you have any questions or concerns, please don't hesitate to call.    Sincerely,        Yumiko Gregorio MA

## 2017-11-20 ENCOUNTER — CLINICAL SUPPORT (OUTPATIENT)
Dept: ELECTROPHYSIOLOGY | Facility: CLINIC | Age: 77
End: 2017-11-20
Attending: INTERNAL MEDICINE
Payer: MEDICARE

## 2017-11-20 DIAGNOSIS — Z95.818 STATUS POST PLACEMENT OF IMPLANTABLE LOOP RECORDER: ICD-10-CM

## 2017-11-20 DIAGNOSIS — R00.2 PALPITATIONS: ICD-10-CM

## 2017-11-20 PROCEDURE — 93299 LOOP RECORDER REMOTE: CPT | Mod: S$GLB,,, | Performed by: INTERNAL MEDICINE

## 2017-11-20 PROCEDURE — 93298 REM INTERROG DEV EVAL SCRMS: CPT | Mod: S$GLB,,, | Performed by: INTERNAL MEDICINE

## 2017-11-29 ENCOUNTER — HOSPITAL ENCOUNTER (OUTPATIENT)
Dept: RADIOLOGY | Facility: HOSPITAL | Age: 77
Discharge: HOME | End: 2017-11-29
Attending: SURGERY
Payer: MEDICARE

## 2017-11-29 DIAGNOSIS — I83.93 VARICOSE VEINS OF LEGS: ICD-10-CM

## 2017-11-29 PROCEDURE — 93970 EXTREMITY STUDY: CPT | Mod: TC

## 2017-11-29 PROCEDURE — 93970 EXTREMITY STUDY: CPT | Mod: 26,,, | Performed by: RADIOLOGY

## 2017-11-30 ENCOUNTER — OFFICE VISIT (OUTPATIENT)
Dept: VASCULAR SURGERY | Facility: CLINIC | Age: 77
End: 2017-11-30
Payer: MEDICARE

## 2017-11-30 VITALS
SYSTOLIC BLOOD PRESSURE: 128 MMHG | HEART RATE: 60 BPM | WEIGHT: 185.44 LBS | DIASTOLIC BLOOD PRESSURE: 60 MMHG | BODY MASS INDEX: 25.12 KG/M2 | HEIGHT: 72 IN

## 2017-11-30 DIAGNOSIS — I87.2 VENOUS INSUFFICIENCY OF BOTH LOWER EXTREMITIES: Primary | ICD-10-CM

## 2017-11-30 DIAGNOSIS — I83.90 VARICOSE VEIN OF LEG: ICD-10-CM

## 2017-11-30 DIAGNOSIS — R60.0 BILATERAL LEG EDEMA: ICD-10-CM

## 2017-11-30 PROCEDURE — 99999 PR PBB SHADOW E&M-EST. PATIENT-LVL III: CPT | Mod: PBBFAC,,, | Performed by: SURGERY

## 2017-11-30 PROCEDURE — 99204 OFFICE O/P NEW MOD 45 MIN: CPT | Mod: S$GLB,,, | Performed by: SURGERY

## 2017-11-30 NOTE — LETTER
November 30, 2017      Diomedes Ayoub MD  [address] [facility]  [place] [address]           [facility]  [address] [facility] [address]  [place] [address]  Phone: [phone]  Fax: [phone]          Patient: Benitez Cabrales   MR Number: [id number]   YOB: 1940   Date of Visit: 11/30/2017       Dear Dr. Diomedes Ayoub:    Thank you for referring Benitez Cabrales to me for evaluation. He has recurrent LLE varicose veins requiring conservative management which I initiated today.  I will f/u with him in 3 months and if his symptoms persist he will require laser ablation and phlebectomy.  Attached you will find relevant portions of my assessment and plan of care.    If you have questions, please do not hesitate to call me. I look forward to following Benitez Cabrales along with you.    Sincerely,    Cuauhtemoc Juarez MD    Enclosure  CC:  No Recipients    If you would like to receive this communication electronically, please contact [email or web address] or [phone] to request more information on [employer] access.    For providers and/or their staff who would like to refer a patient to [facility], please contact us through our one-stop-shop provider referral line, [facility], at [phone].    If you feel you have received this communication in error or would no longer like to receive these types of communications, please e-mail [email or web address]

## 2017-11-30 NOTE — PROGRESS NOTES
Cuauhtemoc Juarez MD VI                       [facility]                         11/30/2017    HPI:  Benitez Cabrales is a 77 y.o. male with   Patient Active Problem List   Diagnosis    Hypertension    Hyperlipidemia    GERD (gastroesophageal reflux disease)    Chronic nasal congestion    RBBB    AF (atrial fibrillation)    Rhinitis medicamentosa    Dizziness due to old head injury    Neck pain    Aortic atherosclerosis    Imbalance    Acute blood loss anemia    COPD (chronic obstructive pulmonary disease)    Gastric ulcer    Esophagitis    Decreased range of motion of neck    Decreased strength    Posture imbalance    Nuclear sclerosis, bilateral    Refractive error   s/p LLE GSV stripping 2006 being managed by PCP and specialists who is here today for evaluation of BLE varicose veins.  Pt states he is a  and on feet daily causing L ankle burning pain intermittently.  Also has bulging varicose veins. Patient states location is LLE occurring for 2 years.  Associated signs and symptoms include burning pain.  Quality is burning and severity is 1/10.  Symptoms began 2 yrs ago.  Alleviating factors include rest and elevation.  Worsening factors include dependency.  Low level of exercise.    no MI  no Stroke  Tobacco use: denies    Past Medical History:   Diagnosis Date    Anticoagulant long-term use     Atrial fibrillation     COPD (chronic obstructive pulmonary disease)     GERD (gastroesophageal reflux disease)     Hyperlipidemia     Hypertension     Nuclear sclerosis, bilateral 10/9/2017    Rhinitis medicamentosa 6/30/2014    Small bowel obstruction     Vertigo 6/13/2013     Past Surgical History:   Procedure Laterality Date    ICM implant      NOSE SURGERY      Septal Repair    VASCULAR SURGERY      left leg     Family History   Problem Relation Age of Onset    Cancer Maternal Aunt     No Known Problems Mother     No Known Problems Father     No Known  Problems Sister     No Known Problems Brother     No Known Problems Maternal Uncle     No Known Problems Paternal Aunt     No Known Problems Paternal Uncle     No Known Problems Maternal Grandmother     No Known Problems Maternal Grandfather     No Known Problems Paternal Grandmother     No Known Problems Paternal Grandfather     Asthma Neg Hx     Emphysema Neg Hx     Amblyopia Neg Hx     Blindness Neg Hx     Cataracts Neg Hx     Diabetes Neg Hx     Glaucoma Neg Hx     Hypertension Neg Hx     Macular degeneration Neg Hx     Retinal detachment Neg Hx     Strabismus Neg Hx     Stroke Neg Hx     Thyroid disease Neg Hx      Social History     Social History    Marital status:      Spouse name: N/A    Number of children: N/A    Years of education: N/A     Occupational History     Retired      Social History Main Topics    Smoking status: Never Smoker    Smokeless tobacco: Never Used      Comment: .  Three kids.  Occup:   at [employer].      Alcohol use 0.6 oz/week     1 Glasses of wine per week      Comment: occasional    Drug use: No    Sexual activity: Not on file     Other Topics Concern    Not on file     Social History Narrative    No narrative on file       Current Outpatient Prescriptions:     amiodarone (PACERONE) 200 MG Tab, Take 1 tablet (200 mg total) by mouth once daily., Disp: 90 tablet, Rfl: 3    cholecalciferol, vitamin D3, (VITAMIN D3) 2,000 unit Cap, Take 1 capsule by mouth once daily., Disp: , Rfl:     dabigatran etexilate (PRADAXA) 150 mg Cap, Take 1 capsule (150 mg total) by mouth 2 (two) times daily., Disp: 180 capsule, Rfl: 3    DIABETIC SUPPLIES,MISCELL (BD MAGNI-GUIDE SYRINGE MAGNIFI MISC), by Misc.(Non-Drug; Combo Route) route., Disp: , Rfl:     diclofenac sodium (VOLTAREN) 1 % Gel, Apply 2 g topically 4 (four) times daily., Disp: 1 Tube, Rfl: 3    fish oil-omega-3 fatty acids 300-1,000 mg capsule, Take 2 g by mouth once daily.  , Disp:  , Rfl:     magnesium 200 mg Tab, Take by mouth once daily., Disp: , Rfl:     metoprolol succinate (TOPROL-XL) 50 MG 24 hr tablet, Take 1 tablet (50 mg total) by mouth once daily., Disp: 90 tablet, Rfl: 3    MV-MN/FA/LYCOPENE/LUT/HB#178 (NEHEMIAH MULTIVITAMIN FOR MEN ORAL), Take 1 tablet by mouth once daily.  , Disp: , Rfl:     rosuvastatin (CRESTOR) 10 MG tablet, Take 1 tablet (10 mg total) by mouth every evening., Disp: 90 tablet, Rfl: 3    valsartan (DIOVAN) 160 MG tablet, Take 1 tablet (160 mg total) by mouth 2 (two) times daily., Disp: 180 tablet, Rfl: 3    VITAMIN K2 ORAL, Take by mouth. 2 tablet every other Day., Disp: , Rfl:     meclizine (ANTIVERT) 25 mg tablet, Take 1 tablet (25 mg total) by mouth 3 (three) times daily as needed for Dizziness (at least one HS)., Disp: 60 tablet, Rfl: 0    pantoprazole (PROTONIX) 40 MG tablet, Take 1 tablet (40 mg total) by mouth 2 (two) times daily., Disp: 60 tablet, Rfl: 4    REVIEW OF SYSTEMS:  General: No fevers or chills; ENT: No sore throat; Allergy and Immunology: no persistent infections; Hematological and Lymphatic: No history of bleeding or easy bruising; Endocrine: negative; Respiratory: no cough, shortness of breath, or wheezing; Cardiovascular: no chest pain or dyspnea on exertion; Gastrointestinal: no abdominal pain/back, change in bowel habits, or bloody stools; Genito-Urinary: no dysuria, trouble voiding, or hematuria; Musculoskeletal: negative; Neurological: no TIA or stroke symptoms; Psychiatric: no nervousness, anxiety or depression.    PHYSICAL EXAM:   Right Arm BP - Sittin/70 (17 1507)  Left Arm BP - Sittin/60 (17 1507)  Pulse: 60         General appearance:  Alert, well-appearing, and in no distress.  Oriented to person, place, and time                    Neurological: Normal speech, no focal findings noted; CN II - XII grossly intact. RLE with sensation to light touch, LLE with sensation to light touch.             Musculoskeletal: Digits/nail without cyanosis/clubbing.  Strength 5/5 BLE.                    Neck: Supple, no significant adenopathy, no carotid bruit can be auscultated                  Chest:  Clear to auscultation, no wheezes, rales or rhonchi, symmetric air entry. No use of accessory muscles               Cardiac: Normal rate and regular rhythm, S1 and S2 normal            Abdomen: Soft, nontender, nondistended, no masses or organomegaly, no hernia     No rebound tenderness noted; bowel sounds normal     No groin adenopathy      Extremities:  2+ R femoral pulse, 2+ L femoral pulse       2+ pedal pulses     1+ RLE edema, 1+ LLE edema    Skin: RLE without ulcer; LLE without ulcer    LAB RESULTS:  No results found for: CBC  Lab Results   Component Value Date    LABPROT 10.7 11/24/2016    INR 1.0 11/24/2016     Lab Results   Component Value Date     11/25/2016    K 3.7 11/25/2016     11/25/2016    CO2 23 11/25/2016     (H) 11/25/2016    BUN 8 11/25/2016    CREATININE 1.0 11/25/2016    CALCIUM 8.7 11/25/2016    ANIONGAP 8 11/25/2016    EGFRNONAA >60 11/25/2016     Lab Results   Component Value Date    WBC 7.25 01/17/2017    RBC 4.03 (L) 01/17/2017    HGB 11.9 (L) 01/17/2017    HCT 35.9 (L) 01/17/2017    MCV 89 01/17/2017    MCH 29.5 01/17/2017    MCHC 33.1 01/17/2017    RDW 13.5 01/17/2017     01/17/2017    MPV 12.0 01/17/2017    GRAN 4.4 01/17/2017    GRAN 61.1 01/17/2017    LYMPH 1.5 01/17/2017    LYMPH 20.7 01/17/2017    MONO 1.0 01/17/2017    MONO 13.9 01/17/2017    EOS 0.3 01/17/2017    BASO 0.03 01/17/2017    EOSINOPHIL 3.6 01/17/2017    BASOPHIL 0.4 01/17/2017    DIFFMETHOD Automated 01/17/2017     .  Lab Results   Component Value Date    HGBA1C 5.9 06/13/2013       IMAGING:  All pertinent imaging has been reviewed and interpreted independently.    IMP/PLAN:  77 y.o. male with   Patient Active Problem List   Diagnosis    Hypertension    Hyperlipidemia    GERD (gastroesophageal  reflux disease)    Chronic nasal congestion    RBBB    AF (atrial fibrillation)    Rhinitis medicamentosa    Dizziness due to old head injury    Neck pain    Aortic atherosclerosis    Imbalance    Acute blood loss anemia    COPD (chronic obstructive pulmonary disease)    Gastric ulcer    Esophagitis    Decreased range of motion of neck    Decreased strength    Posture imbalance    Nuclear sclerosis, bilateral    Refractive error    being managed by PCP and specialists who is here today for evaluation of BLE varicose veins, L>R.    -BLE varicose veins, LLE>RLE, recurrent LLE symptomatic varicose veins s/p L GSV stripping 2006 - recommend compression with Rx stockings, elevation, dietary changes associated with water and sodium intake discussed at length with patient  -RTC 3 months to evaluate symptoms and determine if further intervention is warranted    I spent 45 minutes evaluating this patient and greater than 50% of the time was spent counseling, coordinator care and discussing the plan of care.  All questions were answered and patient stated understanding with agreement with the above treatment plan.    Cuauhtemoc Juarez MD [facility]  Vascular and Endovascular Surgery

## 2017-11-30 NOTE — PATIENT INSTRUCTIONS
Self-Care for Spider and Varicose Veins  Your healthcare provider may suggest that you try self-care. Exercising and maintaining a healthy weight may keep problem veins from getting worse. Wearing elastic stockings and elevating your legs can help improve blood flow. Taking breaks when you sit or stand helps, too.     The top of the elastic stocking should be below the bend in your knee for a proper fit.   Exercising  Exercising is good for your veins because it improves blood flow. Walking, cycling, or swimming are great exercises for vein health. But be sure to check with your healthcare provider before starting any exercise program. Also, keep these hints in mind:  · When exercising, start out slowly and try to build up to 30 minutes on most days.  · Elevate your legs above heart level after exercise to keep blood from pooling in veins.  Maintaining a healthy weight  Being overweight puts extra pressure on your veins. To maintain a healthy weight, try these tips:  · Choose lean meats, fish, and skinless chicken.  · Use low-fat dairy products.  · Eat foods high in fiber, such as whole grains, fruits, and vegetables.  · Cut down on sugar, salt, and saturated and trans fats.  · Exercise regularly.  Wearing elastic stockings  Elastic stockings gently squeeze veins so blood flows upward. If you need elastic stockings, your healthcare provider can prescribe them for you. Follow your healthcare providers advice about how and when to wear them. Elastic stockings come in several different levels of pressure. Ask your healthcare provider which level of pressure would benefit you the most.   Elevating your legs  Raising your legs above heart level will help relieve swelling and keep blood from pooling in veins. Try to elevate your legs for 15 to 20 minutes at the end of the day, and whenever youre relaxing. To make sure your legs are raised above heart level, prop them up on cushions or large pillows.  When sitting and  standing  To keep blood moving when you have to sit or stand for long periods, try these tips:  · At work, take walking breaks instead of coffee breaks. Walk during your lunch hour. Or try flexing your feet up and down 10 times each hour.  · When standing, raise yourself up and down on your toes, or rock back and forth on your heels.  Date Last Reviewed: 5/1/2016 © 2000-2017 [employer]. [address] [place] [address], [place] [address]. All rights reserved. This information is not intended as a substitute for professional medical care. Always follow your healthcare professional's instructions.        Understanding Spider and Varicose Veins  Do you often hide your legs because of the way they look? You may have noticed tiny red or blue bursts (spider veins). Or maybe you have veins that bulge or look twisted (varicose veins). If so, there are treatments that can help  What are the symptoms?  Spider veins or varicose veins may never be a problem. But sometimes they can cause legs to ache or swell. Your legs may also feel heavy and tired, or like theyre burning. These symptoms may be more severe at the end of the day. Prolonged sitting or standing can also make your symptoms worse.  Who gets spider and varicose veins?  Anyone can get spider or varicose veins. But vein problems tend to be hereditary (run in families). Other factors that can affect veins include:  · Pregnancy, hormones, and birth control pills  · A job where you stand or sit a lot  · Extra weight or lack of exercise  · Age     Spider veins look like tiny webs on the ankles, legs, and upper thighs.      Ropy, dark blue, red, or flesh-colored varicose veins are most common on the thighs, calves, and feet.     What can be done?  Spider and varicose veins can affect the way you feel about yourself. Talk to your healthcare provider about your concerns. There are treatments that can ease symptoms and make your legs look better.  Your treatment  choices  Treatment may include self-care, sclerotherapy (injecting veins with a chemical), surgery, or newer nonsurgical minimally invasive therapies. Spider veins and some varicose veins can be treated with sclerotherapy. Large varicose veins can often be treated with newer minimally invasive procedures and, in rare cases, surgery may be needed.   Date Last Reviewed: 5/1/2016 © 2000-2017 [employer]. [address] [place] [address], [place] [address]. All rights reserved. This information is not intended as a substitute for professional medical care. Always follow your healthcare professional's instructions.        Varicose Veins     Varicose veins are swollen, enlarged veins most often found in the legs. They are usually blue or purple in color and may bulge, twist, and stand out under the skin.  Normally, veins return blood from the body to the heart. The leg veins have one-way valves that prevent blood from flowing backward in the vein. When the valves are weak or damaged, blood backs up in the veins. This may cause some of the veins to swell and bulge and become varicose veins.  Symptoms  Varicose veins may or may not cause symptoms. If symptoms do occur, they can include:  · Legs that feel tired, achy, heavy, or itchy  · Leg muscle cramps  · Skin changes, such as discoloration, dryness, redness, or rash (in more severe cases, you may also have sores on the skin called venous leg ulcers)  Risk Factors  There are a number of factors that increase the risk for varicose veins. These can include:  · Being a woman  · Being older  · Sitting or standing for long periods  · Being overweight  · Being pregnant  · Having a family history of varicose veins  Treatment begins with simple self-help measures (see below). If these dont help, there are many procedures that can be done to shrink or remove varicose veins. Your healthcare provider can tell you more about these options, if needed.  Home care  · Support or  compression stockings will likely be prescribed. If so, be sure to wear them as directed. They may help improve blood flow.  · Exercising helps strengthen your leg muscles and improve blood flow. To get the most benefit, choose exercises such as walking, swimming, or cycling. Also try to exercise for at least 30 minutes on most days.  · Raising (elevating) your legs lets gravity help blood flow back to the heart. Sit or lie with your feet above heart level a few times throughout the day, or as directed.  · Avoid long periods of sitting or standing. Change positions often. Also, move your ankles, toes and knees often. This may also help improve blood flow.  · If you are overweight, talk with your healthcare provider about setting up a weight-loss plan. Maintaining a healthy weight can help reduce the strain on your veins. It may also improve symptoms, such as swelling and aching.  · If you have dryness and itching, ask your provider about special lotions that can be applied to the skin to help improve symptoms.  Follow-up care  Follow up with your healthcare provider, or as directed. If imaging tests were done, youll be told the results and if there are any new findings that affect your care.  When to seek medical advice  Call your healthcare provider right away if any of these occur:  · Sudden, severe leg swelling, pain, or redness  · Symptoms worsen, or they dont improve with self-care  · Bleeding from any affected veins  · Ulcers form on the legs, ankles, or feet  · Fever of 100.4°F (38°C) or higher, or as advised by your provider  Date Last Reviewed: 9/21/2015 © 2000-2017 [employer]. [address] [place] [address], [place] [address]. All rights reserved. This information is not intended as a substitute for professional medical care. Always follow your healthcare professional's instructions.        Endovenous Laser Treatment (EVLT) for Varicose Veins  Endovenous laser treatment (EVLT) is a procedure  that uses laser heat to treat varicose veins.   Varicose veins are swollen and enlarged veins. They occur most often in the legs. Varicose veins can develop when valves in your veins become damaged. This causes problems with blood flow. Over time, too much blood collects in the veins. The veins may bulge, twist, and stand out under your skin. They can also cause symptoms such as aching, cramping, or swelling in your legs.  During EVLT, heat created using a laser is sent into the vein through a thin, flexible tube (catheter). This closes off blood flow in the main problem vein.  Getting ready for your treatment  Follow any instructions from your healthcare provider.  Tell your healthcare provider if you:  · Are pregnant or think you may be pregnant  · Are breastfeeding  · Smoke or use alcohol on a regular basis  · Have other health problems, such as diabetes or kidney problems  Tell your provider about any medicines you are taking. You may need to stop taking all or some of these before the procedure. This includes:  · Medicines that can thin your blood or prevent clotting (anticoagulants)  · All prescription medicines  · Over-the-counter medicines such as aspirin or ibuprofen  · Street drugs  · Herbs, vitamins, and other supplements  Follow any directions youre given for not eating or drinking before the treatment.  The day of your treatment    The treatment takes 45 to 60 minutes. The entire treatment (including time to prepare and recover) takes about 1 to 3 hours. You can go home the same day. For the treatment:  · Youll lie down on a hospital bed.  · An imaging method, such as ultrasound, is used to guide the procedure.  · The leg to be treated is injected with numbing medicine.  · Once your leg is numb, a needle makes a small hole (puncture) in the vein to be treated.  · The catheter containing the laser heat source is inserted into your vein.  · More numbing medicine may be injected around the vein.  · Once  the catheter is in the right position, it is then slowly drawn backward. As the catheter sends out heat, the vein is closed off.  · In some cases, other side branch varicose veins may be removed or tied off through several small cuts (incisions).  · When the treatment is done, the catheter is removed. Pressure is applied to the insertion site to stop any bleeding. An elastic compression stocking or a bandage may then be put on your leg.  Recovering at home  Once at home, follow all the instructions youve been given. Be sure to:  · Take all medicines as directed  · Care for the catheter insertion site as directed  · Check for signs of infection at the catheter insertion site (see below)  · Wear elastic stockings or bandages as directed  · Keep your legs raised (elevated) as directed  · Walk a few times a day  · Avoid heavy exercise, lifting, and standing for long periods as advised  · Avoid air travel, hot baths, saunas, or whirlpools as advised  Call your healthcare provider  Call your healthcare provider if you have any of the following:  · Fever of 100.4°F (38°C) or higher, or as directed by your provider  · Chest pain or trouble breathing  · Signs of infection at the catheter insertion site. These include greater redness or swelling (inflammation), warmth, increasing pain, bleeding, or bad-smelling discharge.  · Severe numbness or tingling in the treated leg  · Severe pain or swelling in the treated leg   Follow-up  Youll have a follow-up visit with your healthcare provider within a week. An ultrasound will likely be done to check for problems, such as blood clots. Your provider will discuss more treatments with you, if needed.   Risk and possible complications  These include:  · Bleeding  · Infection  · Blood clots  · Damage to the nerves in the treated area  · Irritation or burning of the skin over the treated vein  · Treatment doesn't improve the look or the symptoms of the problem veins  · Risks of any  medicines used during the treatment   Date Last Reviewed: 5/1/2016  © 7555-9974 [employer]. [address] [place] [address], [place] [address]. All rights reserved. This information is not intended as a substitute for professional medical care. Always follow your healthcare professional's instructions.        Low-Salt Diet  This diet removes foods that are high in salt. It also limits the amount of salt you use when cooking. It is most often used for people with high blood pressure, edema (fluid retention), and kidney, liver, or heart disease.  Table salt contains the mineral sodium. Your body needs sodium to work normally. But too much sodium can make your health problems worse. Your healthcare provider is recommending a low-salt (also called low-sodium) diet for you. Your total daily allowance of salt is 1,500 to 2,300 milligrams (mg). It is less than 1 teaspoon of table salt. This means you can have only about 500 to 700 mg of sodium at each meal. People with certain health problems should limit salt intake to the lower end of the recommended range.    When you cook, dont add much salt. If you can cook without using salt, even better. Dont add salt to your food at the table.  When shopping, read food labels. Salt is often called sodium on the label. Choose foods that are salt-free, low salt, or very low salt. Note that foods with reduced salt may not lower your salt intake enough.    Beans, potatoes, and pasta  Ok: Dry beans, split peas, lentils, potatoes, rice, macaroni, pasta, spaghetti without added salt  Avoid: Potato chips, tortilla chips, and similar products  Breads and cereals  Ok: Low-sodium breads, rolls, cereals, and cakes; low-salt crackers, matzo crackers  Avoid: Salted crackers, pretzels, popcorn, Slovenian toast, pancakes, muffins  Dairy  Ok: Milk, chocolate milk, hot chocolate mix, low-salt cheeses, and yogurt  Avoid: Processed cheese and cheese spreads; Roquefort, Camembert, and cottage  cheese; buttermilk, instant breakfast drink  Desserts  Ok: Ice cream, frozen yogurt, juice bars, gelatin, cookies and pies, sugar, honey, jelly, hard candy  Avoid: Most pies, cakes and cookies prepared or processed with salt; instant pudding  Drinks  Ok: Tea, coffee, fizzy (carbonated) drinks, juices  Avoid: Flavored coffees, electrolyte replacement drinks, sports drinks  Meats  Ok: All fresh meat, fish, poultry, low-salt tuna, eggs, egg substitute  Avoid: Smoked, pickled, brine-cured, or salted meats and fish. This includes hernandez, chipped beef, corned beef, hot dogs, deli meats, ham, kosher meats, salt pork, sausage, canned tuna, salted codfish, smoked salmon, herring, sardines, or anchovies.  Seasonings and spices  Ok: Most seasonings are okay. Good substitutes for salt include: fresh herb blends, hot sauce, lemon, garlic, carter, vinegar, dry mustard, parsley, cilantro, horseradish, tomato paste, regular margarine, mayonnaise, unsalted butter, cream cheese, vegetable oil, cream, low-salt salad dressing and gravy.  Avoid: Regular ketchup, relishes, pickles, soy sauce, teriyaki sauce, Worcestershire sauce, BBQ sauce, tartar sauce, meat tenderizer, chili sauce, regular gravy, regular salad dressing, salted butter  Soups  Ok: Low-salt soups and broths made with allowed foods  Avoid: Bouillon cubes, soups with smoked or salted meats, regular soup and broth  Vegetables  Ok: Most vegetables are okay; also low-salt tomato and vegetable juices  Avoid: Sauerkraut and other brine-soaked vegetables; pickles and other pickled vegetables; tomato juice, olives  Date Last Reviewed: 8/1/2016  © 7182-7146 [employer]. [address] [place] [address]. All rights reserved. This information is not intended as a substitute for professional medical care. Always follow your healthcare professional's instructions.        Putting on Compression Stockings     Turn the stocking inside-out, then fit it over your toes  and heel.          Roll the stocking up your leg.            Once stockings are on, make sure the top of the stocking is about two fingers width below the crease of the knee (or the groin if you wear thigh-high stockings).          Use equipment, such as a stocking yara, or wear rubber gloves to make it easier to put on compression stockings.         Elastic compression stockings are prescribed to treat many vein problems. Wearing them may be the most important thing you do to manage your symptoms. The stockings fit tightly around your ankle, gradually reducing in pressure as they go up your legs. This helps keep blood flowing to your heart. As a result, swelling is reduced. Your healthcare provider will prescribe stockings at a safe pressure for you. He or she will also tell you how often to wear and remove the stockings. Follow these instructions closely. Also, do not buy or wear compression stockings without first seeing your healthcare provider.  Tips for wear and care  To wear stockings safely and to get the most benefit:  · Wear the length prescribed by your healthcare provider.  · Pull them to the designated height and no farther. Dont let them bunch at the top. This can restrict blood flow and increase swelling.  · Wear the stockings for the amount of time your healthcare provider recommends. Replace them when they start to feel loose. This will likely be every 3 to 6 months.  · Remove them as your healthcare provider directs. When removed, wash your legs. Then check your legs and feet for sores. Call your healthcare provider if you find a sore. Dont put the stockings back on unless your healthcare provider directs.   · Wash the stockings as instructed. They may need to be hand-washed.  Date Last Reviewed: 5/1/2016  © 7833-1139 [employer]. [address] [place] [address], [place] [address]. All rights reserved. This information is not intended as a substitute for professional medical care. Always  follow your healthcare professional's instructions.

## 2017-12-01 ENCOUNTER — PATIENT MESSAGE (OUTPATIENT)
Dept: VASCULAR SURGERY | Facility: CLINIC | Age: 77
End: 2017-12-01

## 2017-12-18 DIAGNOSIS — I48.0 PAF (PAROXYSMAL ATRIAL FIBRILLATION): Primary | ICD-10-CM

## 2017-12-18 RX ORDER — DABIGATRAN ETEXILATE 150 MG/1
150 CAPSULE ORAL 2 TIMES DAILY
Qty: 180 CAPSULE | Refills: 3 | Status: SHIPPED | OUTPATIENT
Start: 2017-12-18 | End: 2019-02-21 | Stop reason: SDUPTHER

## 2017-12-27 ENCOUNTER — CLINICAL SUPPORT (OUTPATIENT)
Dept: ELECTROPHYSIOLOGY | Facility: CLINIC | Age: 77
End: 2017-12-27
Attending: INTERNAL MEDICINE
Payer: MEDICARE

## 2017-12-27 DIAGNOSIS — R00.2 PALPITATIONS: ICD-10-CM

## 2017-12-27 DIAGNOSIS — Z95.818 STATUS POST PLACEMENT OF IMPLANTABLE LOOP RECORDER: ICD-10-CM

## 2017-12-27 PROCEDURE — 93299 LOOP RECORDER REMOTE: CPT | Mod: S$GLB,,, | Performed by: INTERNAL MEDICINE

## 2017-12-27 PROCEDURE — 93298 REM INTERROG DEV EVAL SCRMS: CPT | Mod: S$GLB,,, | Performed by: INTERNAL MEDICINE

## 2018-01-18 ENCOUNTER — OFFICE VISIT (OUTPATIENT)
Dept: FAMILY MEDICINE | Facility: CLINIC | Age: 78
End: 2018-01-18
Payer: MEDICARE

## 2018-01-18 VITALS
HEIGHT: 72 IN | WEIGHT: 183 LBS | HEART RATE: 60 BPM | SYSTOLIC BLOOD PRESSURE: 140 MMHG | BODY MASS INDEX: 24.79 KG/M2 | TEMPERATURE: 99 F | OXYGEN SATURATION: 97 % | DIASTOLIC BLOOD PRESSURE: 60 MMHG

## 2018-01-18 DIAGNOSIS — I48.0 PAROXYSMAL ATRIAL FIBRILLATION: Chronic | ICD-10-CM

## 2018-01-18 DIAGNOSIS — J44.9 CHRONIC OBSTRUCTIVE PULMONARY DISEASE, UNSPECIFIED COPD TYPE: ICD-10-CM

## 2018-01-18 DIAGNOSIS — J20.9 ACUTE BRONCHITIS, UNSPECIFIED ORGANISM: Primary | ICD-10-CM

## 2018-01-18 DIAGNOSIS — J44.1 COPD EXACERBATION: ICD-10-CM

## 2018-01-18 DIAGNOSIS — I70.0 AORTIC ATHEROSCLEROSIS: ICD-10-CM

## 2018-01-18 PROCEDURE — 96372 THER/PROPH/DIAG INJ SC/IM: CPT | Mod: S$GLB,,, | Performed by: INTERNAL MEDICINE

## 2018-01-18 PROCEDURE — 99999 PR PBB SHADOW E&M-EST. PATIENT-LVL IV: CPT | Mod: PBBFAC,,, | Performed by: NURSE PRACTITIONER

## 2018-01-18 PROCEDURE — 99214 OFFICE O/P EST MOD 30 MIN: CPT | Mod: 25,S$GLB,, | Performed by: NURSE PRACTITIONER

## 2018-01-18 RX ORDER — AZITHROMYCIN 250 MG/1
TABLET, FILM COATED ORAL
Qty: 6 TABLET | Refills: 0 | Status: SHIPPED | OUTPATIENT
Start: 2018-01-18 | End: 2018-03-14

## 2018-01-18 NOTE — PROGRESS NOTES
This dictation has been generated using Dragon Dictation some phonetic errors may occur.     Benitez was seen today for cough and chills.    Diagnoses and all orders for this visit:    Acute bronchitis, unspecified organism    Chronic obstructive pulmonary disease, unspecified COPD type    COPD exacerbation  -     methylPREDNISolone sod suc(PF) injection 125 mg; Inject 125 mg into the muscle one time.    Paroxysmal atrial fibrillation    Aortic atherosclerosis    Other orders  -     azithromycin (Z-ASHLEY) 250 MG tablet; Take 2 tablets by mouth on day 1; Take 1 tablet by mouth on days 2-5      Patient Instructions   Claritin(loratadine), Allegra(fexofenadine), or zyrtec(cetirizine) for runny nose and congestion.   Delsym twice daily for cough.      Acute bronchitis with COPD exacerbation treated with meds as above.  Proximal atrial fibrillation stable and controlled.  Follows with cardiology.  Continue current therapy amiodarone.  Aortic Sclerosis stable.  Continue current management of risk factors.    Follow-up if symptoms worsen or fail to improve.      ________________________________________________________________  ________________________________________________________________        Chief Complaint   Patient presents with    Cough    Chills     History of present illness  This 77 y.o. presents today for complaint of runny nose chills and coughing.  Symptoms started Sunday night Monday morning.  He had fever and chills without body aches.  He has had some sinus congestion.  He notes coughing and shortness of breath.  He has had some right lower chest pain with coughing.  He's had some right upper abdominal pain with coughing.  No nausea vomiting or diarrhea.  No stool changes.  He hasn't taken any medication for her symptoms.  His symptoms worsened.  Review of systems  No chest pain or palpitations  No sinus pain or pressure      Past Medical History:   Diagnosis Date    Anticoagulant long-term use     Atrial  fibrillation     COPD (chronic obstructive pulmonary disease)     GERD (gastroesophageal reflux disease)     Hyperlipidemia     Hypertension     Nuclear sclerosis, bilateral 10/9/2017    Rhinitis medicamentosa 6/30/2014    Small bowel obstruction     Vertigo 6/13/2013       Past Surgical History:   Procedure Laterality Date    ICM implant      NOSE SURGERY      Septal Repair    VASCULAR SURGERY      left leg       Family History   Problem Relation Age of Onset    Cancer Maternal Aunt     No Known Problems Mother     No Known Problems Father     No Known Problems Sister     No Known Problems Brother     No Known Problems Maternal Uncle     No Known Problems Paternal Aunt     No Known Problems Paternal Uncle     No Known Problems Maternal Grandmother     No Known Problems Maternal Grandfather     No Known Problems Paternal Grandmother     No Known Problems Paternal Grandfather     Asthma Neg Hx     Emphysema Neg Hx     Amblyopia Neg Hx     Blindness Neg Hx     Cataracts Neg Hx     Diabetes Neg Hx     Glaucoma Neg Hx     Hypertension Neg Hx     Macular degeneration Neg Hx     Retinal detachment Neg Hx     Strabismus Neg Hx     Stroke Neg Hx     Thyroid disease Neg Hx        Social History     Social History    Marital status:      Spouse name: N/A    Number of children: N/A    Years of education: N/A     Occupational History     Retired      Social History Main Topics    Smoking status: Never Smoker    Smokeless tobacco: Never Used      Comment: .  Three kids.  Occup:   at [employer].      Alcohol use 0.6 oz/week     1 Glasses of wine per week      Comment: occasional    Drug use: No    Sexual activity: Not Asked     Other Topics Concern    None     Social History Narrative    None       Current Outpatient Prescriptions   Medication Sig Dispense Refill    amiodarone (PACERONE) 200 MG Tab Take 1 tablet (200 mg total) by mouth once daily. 90 tablet 3     cholecalciferol, vitamin D3, (VITAMIN D3) 2,000 unit Cap Take 1 capsule by mouth once daily.      dabigatran etexilate (PRADAXA) 150 mg Cap Take 1 capsule (150 mg total) by mouth 2 (two) times daily. 180 capsule 3    DIABETIC SUPPLIES,MISCELL (BD MAGNI-GUIDE SYRINGE MAGNIFI MISC) by Misc.(Non-Drug; Combo Route) route.      diclofenac sodium (VOLTAREN) 1 % Gel Apply 2 g topically 4 (four) times daily. 1 Tube 3    fish oil-omega-3 fatty acids 300-1,000 mg capsule Take 2 g by mouth once daily.        magnesium 200 mg Tab Take by mouth once daily.      meclizine (ANTIVERT) 25 mg tablet Take 1 tablet (25 mg total) by mouth 3 (three) times daily as needed for Dizziness (at least one HS). 60 tablet 0    metoprolol succinate (TOPROL-XL) 50 MG 24 hr tablet Take 1 tablet (50 mg total) by mouth once daily. 90 tablet 3    MV-MN/FA/LYCOPENE/LUT/HB#178 (NEHEMIAH MULTIVITAMIN FOR MEN ORAL) Take 1 tablet by mouth once daily.        pantoprazole (PROTONIX) 40 MG tablet Take 1 tablet (40 mg total) by mouth 2 (two) times daily. 60 tablet 4    rosuvastatin (CRESTOR) 10 MG tablet Take 1 tablet (10 mg total) by mouth every evening. 90 tablet 3    valsartan (DIOVAN) 160 MG tablet Take 1 tablet (160 mg total) by mouth 2 (two) times daily. 180 tablet 3    VITAMIN K2 ORAL Take by mouth. 2 tablet every other Day.      azithromycin (Z-ASHLEY) 250 MG tablet Take 2 tablets by mouth on day 1; Take 1 tablet by mouth on days 2-5 6 tablet 0     No current facility-administered medications for this visit.        Review of patient's allergies indicates:  No Known Allergies    Physical examination  Vitals Reviewed  Gen. Well-dressed well-nourished no apparent distress  Skin warm dry and intact.  No rashes noted.  HEENT.  TM intact bilateral with normal light reflex.  No mastoid tenderness during percussion.  Nares patent bilateral.  Pharynx is unremarkable.  No maxillary or frontal sinus tenderness when percussed.    Neck is supple without  adenopathy  Chest.  Respirations are even unlabored.  Rhonchi and wheezes noted in the bases..  Cardiac regular rate and rhythm.  No chest wall adenopathy noted.  Neuro. Awake alert oriented x4.  Normal judgment and cognition noted.  Extremities no clubbing cyanosis or edema noted.     Call or return to clinic prn if these symptoms worsen or fail to improve as anticipated.

## 2018-01-18 NOTE — PATIENT INSTRUCTIONS
Claritin(loratadine), Allegra(fexofenadine), or zyrtec(cetirizine) for runny nose and congestion.   Delsym twice daily for cough.

## 2018-01-29 ENCOUNTER — CLINICAL SUPPORT (OUTPATIENT)
Dept: ELECTROPHYSIOLOGY | Facility: CLINIC | Age: 78
End: 2018-01-29
Attending: INTERNAL MEDICINE
Payer: MEDICARE

## 2018-01-29 DIAGNOSIS — Z95.818 STATUS POST PLACEMENT OF IMPLANTABLE LOOP RECORDER: ICD-10-CM

## 2018-01-29 DIAGNOSIS — R00.2 PALPITATIONS: ICD-10-CM

## 2018-01-29 PROCEDURE — 93298 REM INTERROG DEV EVAL SCRMS: CPT | Mod: S$GLB,,, | Performed by: INTERNAL MEDICINE

## 2018-01-29 PROCEDURE — 93299 LOOP RECORDER REMOTE: CPT | Mod: S$GLB,,, | Performed by: INTERNAL MEDICINE

## 2018-01-30 ENCOUNTER — TELEPHONE (OUTPATIENT)
Dept: FAMILY MEDICINE | Facility: CLINIC | Age: 78
End: 2018-01-30

## 2018-01-30 NOTE — TELEPHONE ENCOUNTER
----- Message from Veronika Thayer sent at 1/25/2018  8:41 AM CST -----  Contact: Self  Patient is requesting a script on cough medication. He says last time he used something along the lines of Delsym but he would like something stronger. Please call back at [phone].      [address] [facility]

## 2018-02-28 ENCOUNTER — CLINICAL SUPPORT (OUTPATIENT)
Dept: ELECTROPHYSIOLOGY | Facility: CLINIC | Age: 78
End: 2018-02-28
Attending: INTERNAL MEDICINE
Payer: MEDICARE

## 2018-02-28 DIAGNOSIS — R00.2 PALPITATIONS: ICD-10-CM

## 2018-02-28 DIAGNOSIS — Z95.818 STATUS POST PLACEMENT OF IMPLANTABLE LOOP RECORDER: ICD-10-CM

## 2018-02-28 PROCEDURE — 93298 REM INTERROG DEV EVAL SCRMS: CPT | Mod: S$GLB,,, | Performed by: INTERNAL MEDICINE

## 2018-02-28 PROCEDURE — 93299 LOOP RECORDER REMOTE: CPT | Mod: S$GLB,,, | Performed by: INTERNAL MEDICINE

## 2018-03-01 ENCOUNTER — OFFICE VISIT (OUTPATIENT)
Dept: VASCULAR SURGERY | Facility: CLINIC | Age: 78
End: 2018-03-01
Payer: MEDICARE

## 2018-03-01 VITALS
HEIGHT: 72 IN | WEIGHT: 183.44 LBS | DIASTOLIC BLOOD PRESSURE: 70 MMHG | SYSTOLIC BLOOD PRESSURE: 130 MMHG | BODY MASS INDEX: 24.85 KG/M2

## 2018-03-01 DIAGNOSIS — I87.2 VENOUS INSUFFICIENCY OF BOTH LOWER EXTREMITIES: Primary | ICD-10-CM

## 2018-03-01 DIAGNOSIS — I83.90 VARICOSE VEIN OF LEG: ICD-10-CM

## 2018-03-01 PROCEDURE — 99999 PR PBB SHADOW E&M-EST. PATIENT-LVL II: CPT | Mod: PBBFAC,,, | Performed by: SURGERY

## 2018-03-01 PROCEDURE — 3078F DIAST BP <80 MM HG: CPT | Mod: S$GLB,,, | Performed by: SURGERY

## 2018-03-01 PROCEDURE — 3075F SYST BP GE 130 - 139MM HG: CPT | Mod: S$GLB,,, | Performed by: SURGERY

## 2018-03-01 PROCEDURE — 99214 OFFICE O/P EST MOD 30 MIN: CPT | Mod: S$GLB,,, | Performed by: SURGERY

## 2018-03-01 NOTE — LETTER
March 1, 2018        Diomedes Ayoub MD  [address] [facility]  [place] [address]             [facility]  [address]  [place] [address]  Phone: [phone]  Fax: [phone]   Patient: Benitez Cabrales   MR Number: [id number]   YOB: 1940   Date of Visit: 3/1/2018       Dear Dr. Ayoub:    Thank you for referring Benitez Cabrales to me for evaluation. His legs have improved with conservative treatment and he does not have any disabling symptoms.  He will continue compression, elevation and diet changes.  He is not interested in phlebectomy for cosmesis.  Below are the relevant portions of my assessment and plan of care.  Please let me know if there is anything else that I can do to help you.      If you have questions, please do not hesitate to call me. I look forward to following Benitez along with you.    Sincerely,      Cuauhtemoc Juarez MD           CC  No Recipients

## 2018-03-01 NOTE — PROGRESS NOTES
Cuauhtemoc Juarez MD [facility]                         03/01/2018    HPI:  Benitez Cabrales is a 77 y.o. male with   Patient Active Problem List   Diagnosis    Hypertension    Hyperlipidemia    GERD (gastroesophageal reflux disease)    Chronic nasal congestion    RBBB    AF (atrial fibrillation)    Rhinitis medicamentosa    Dizziness due to old head injury    Neck pain    Aortic atherosclerosis    Imbalance    Acute blood loss anemia    COPD (chronic obstructive pulmonary disease)    Gastric ulcer    Esophagitis    Decreased range of motion of neck    Decreased strength    Posture imbalance    Nuclear sclerosis, bilateral    Refractive error   s/p LLE GSV stripping 2006 being managed by PCP and specialists who is here today for evaluation of BLE varicose veins.  Pt states he is a  and on feet daily causing L ankle burning pain intermittently.  Also has bulging varicose veins. Patient states location is LLE occurring for 2 years.  Associated signs and symptoms include burning pain.  Quality is burning and severity is 1/10.  Symptoms began 2 yrs ago.  Alleviating factors include rest and elevation.  Worsening factors include dependency.  Low level of exercise.    no MI  no Stroke  Tobacco use: denies    Interval history: Wearing stockings daily, LLE discomfort improved.  Elevating limbs.  Low Na diet.    Past Medical History:   Diagnosis Date    Anticoagulant long-term use     Atrial fibrillation     COPD (chronic obstructive pulmonary disease)     GERD (gastroesophageal reflux disease)     Hyperlipidemia     Hypertension     Nuclear sclerosis, bilateral 10/9/2017    Rhinitis medicamentosa 6/30/2014    Small bowel obstruction     Vertigo 6/13/2013     Past Surgical History:   Procedure Laterality Date    ICM implant      NOSE SURGERY      Septal Repair    VASCULAR SURGERY      left leg     Family History   Problem Relation Age of  Onset    Cancer Maternal Aunt     No Known Problems Mother     No Known Problems Father     No Known Problems Sister     No Known Problems Brother     No Known Problems Maternal Uncle     No Known Problems Paternal Aunt     No Known Problems Paternal Uncle     No Known Problems Maternal Grandmother     No Known Problems Maternal Grandfather     No Known Problems Paternal Grandmother     No Known Problems Paternal Grandfather     Asthma Neg Hx     Emphysema Neg Hx     Amblyopia Neg Hx     Blindness Neg Hx     Cataracts Neg Hx     Diabetes Neg Hx     Glaucoma Neg Hx     Hypertension Neg Hx     Macular degeneration Neg Hx     Retinal detachment Neg Hx     Strabismus Neg Hx     Stroke Neg Hx     Thyroid disease Neg Hx      Social History     Social History    Marital status:      Spouse name: N/A    Number of children: N/A    Years of education: N/A     Occupational History     Retired      Social History Main Topics    Smoking status: Never Smoker    Smokeless tobacco: Never Used      Comment: .  Three kids.  Occup:   at [employer].      Alcohol use 0.6 oz/week     1 Glasses of wine per week      Comment: occasional    Drug use: No    Sexual activity: Not on file     Other Topics Concern    Not on file     Social History Narrative    No narrative on file       Current Outpatient Prescriptions:     amiodarone (PACERONE) 200 MG Tab, Take 1 tablet (200 mg total) by mouth once daily., Disp: 90 tablet, Rfl: 3    azithromycin (Z-ASHLEY) 250 MG tablet, Take 2 tablets by mouth on day 1; Take 1 tablet by mouth on days 2-5, Disp: 6 tablet, Rfl: 0    cholecalciferol, vitamin D3, (VITAMIN D3) 2,000 unit Cap, Take 1 capsule by mouth once daily., Disp: , Rfl:     dabigatran etexilate (PRADAXA) 150 mg Cap, Take 1 capsule (150 mg total) by mouth 2 (two) times daily., Disp: 180 capsule, Rfl: 3    DIABETIC SUPPLIES,MISCELL (BD MAGNI-GUIDE SYRINGE MAGNIFI MISC), by  Misc.(Non-Drug; Combo Route) route., Disp: , Rfl:     diclofenac sodium (VOLTAREN) 1 % Gel, Apply 2 g topically 4 (four) times daily., Disp: 1 Tube, Rfl: 3    fish oil-omega-3 fatty acids 300-1,000 mg capsule, Take 2 g by mouth once daily.  , Disp: , Rfl:     magnesium 200 mg Tab, Take by mouth once daily., Disp: , Rfl:     meclizine (ANTIVERT) 25 mg tablet, Take 1 tablet (25 mg total) by mouth 3 (three) times daily as needed for Dizziness (at least one HS)., Disp: 60 tablet, Rfl: 0    metoprolol succinate (TOPROL-XL) 50 MG 24 hr tablet, Take 1 tablet (50 mg total) by mouth once daily., Disp: 90 tablet, Rfl: 3    MV-MN/FA/LYCOPENE/LUT/HB#178 (NEHEMIAH MULTIVITAMIN FOR MEN ORAL), Take 1 tablet by mouth once daily.  , Disp: , Rfl:     rosuvastatin (CRESTOR) 10 MG tablet, Take 1 tablet (10 mg total) by mouth every evening., Disp: 90 tablet, Rfl: 3    valsartan (DIOVAN) 160 MG tablet, Take 1 tablet (160 mg total) by mouth 2 (two) times daily., Disp: 180 tablet, Rfl: 3    VITAMIN K2 ORAL, Take by mouth. 2 tablet every other Day., Disp: , Rfl:     pantoprazole (PROTONIX) 40 MG tablet, Take 1 tablet (40 mg total) by mouth 2 (two) times daily., Disp: 60 tablet, Rfl: 4    REVIEW OF SYSTEMS:  General: No fevers or chills; ENT: No sore throat; Allergy and Immunology: no persistent infections; Hematological and Lymphatic: No history of bleeding or easy bruising; Endocrine: negative; Respiratory: no cough, shortness of breath, or wheezing; Cardiovascular: no chest pain or dyspnea on exertion; Gastrointestinal: no abdominal pain/back, change in bowel habits, or bloody stools; Genito-Urinary: no dysuria, trouble voiding, or hematuria; Musculoskeletal: negative; Neurological: no TIA or stroke symptoms; Psychiatric: no nervousness, anxiety or depression.    PHYSICAL EXAM:   Right Arm BP - Sittin/70 (18 1421)  Left Arm BP - Sittin/70 (18 1421)            General appearance:  Alert, well-appearing, and in  no distress.  Oriented to person, place, and time                    Neurological: Normal speech, no focal findings noted; CN II - XII grossly intact. RLE with sensation to light touch, LLE with sensation to light touch.            Musculoskeletal: Digits/nail without cyanosis/clubbing.  Strength 5/5 BLE.                    Neck: Supple, no significant adenopathy, no carotid bruit can be auscultated                  Chest:  Clear to auscultation, no wheezes, rales or rhonchi, symmetric air entry. No use of accessory muscles               Cardiac: Normal rate and regular rhythm, S1 and S2 normal            Abdomen: Soft, nontender, nondistended, no masses or organomegaly, no hernia     No rebound tenderness noted; bowel sounds normal     No groin adenopathy      Extremities:  2+ R femoral pulse, 2+ L femoral pulse       2+ pedal pulses     1+ RLE edema, 1+ LLE edema    Skin: RLE without ulcer; LLE without ulcer    CEAP 3/3    LAB RESULTS:  No results found for: CBC  Lab Results   Component Value Date    LABPROT 10.7 11/24/2016    INR 1.0 11/24/2016     Lab Results   Component Value Date     11/25/2016    K 3.7 11/25/2016     11/25/2016    CO2 23 11/25/2016     (H) 11/25/2016    BUN 8 11/25/2016    CREATININE 1.0 11/25/2016    CALCIUM 8.7 11/25/2016    ANIONGAP 8 11/25/2016    EGFRNONAA >60 11/25/2016     Lab Results   Component Value Date    WBC 7.25 01/17/2017    RBC 4.03 (L) 01/17/2017    HGB 11.9 (L) 01/17/2017    HCT 35.9 (L) 01/17/2017    MCV 89 01/17/2017    MCH 29.5 01/17/2017    MCHC 33.1 01/17/2017    RDW 13.5 01/17/2017     01/17/2017    MPV 12.0 01/17/2017    GRAN 4.4 01/17/2017    GRAN 61.1 01/17/2017    LYMPH 1.5 01/17/2017    LYMPH 20.7 01/17/2017    MONO 1.0 01/17/2017    MONO 13.9 01/17/2017    EOS 0.3 01/17/2017    BASO 0.03 01/17/2017    EOSINOPHIL 3.6 01/17/2017    BASOPHIL 0.4 01/17/2017    DIFFMETHOD Automated 01/17/2017     .  Lab Results   Component Value Date     HGBA1C 5.9 06/13/2013       IMAGING:  All pertinent imaging has been reviewed and interpreted independently.    BLE venous US 11/2017:  1. Hemodynamically significant venous reflux identified and the bilateral greater and lesser saphenous veins.  2. There is no evidence of bilateral lower extremity deep venous thrombosis.    IMP/PLAN:  77 y.o. male with   Patient Active Problem List   Diagnosis    Hypertension    Hyperlipidemia    GERD (gastroesophageal reflux disease)    Chronic nasal congestion    RBBB    AF (atrial fibrillation)    Rhinitis medicamentosa    Dizziness due to old head injury    Neck pain    Aortic atherosclerosis    Imbalance    Acute blood loss anemia    COPD (chronic obstructive pulmonary disease)    Gastric ulcer    Esophagitis    Decreased range of motion of neck    Decreased strength    Posture imbalance    Nuclear sclerosis, bilateral    Refractive error    being managed by PCP and specialists who is here today for evaluation of BLE varicose veins, L>R.    -BLE varicose veins, LLE>RLE, recurrent LLE symptomatic varicose veins s/p L GSV stripping 2006 - recommend cont compression with Rx stockings, elevation, dietary changes associated with water and sodium intake discussed at length with patient  -RTC 6 mo for further evaluation    I spent 30 minutes evaluating this patient and greater than 50% of the time was spent counseling, coordinator care and discussing the plan of care.  All questions were answered and patient stated understanding with agreement with the above treatment plan.    Cuauhtemoc Juarez MD [facility]  Vascular and Endovascular Surgery

## 2018-03-01 NOTE — PATIENT INSTRUCTIONS
Understanding Chronic Venous Insufficiency  Problems with the veins in the legs may lead to chronic venous insufficiency (CVI). CVI means that there is a long-term problem with the veins not being able to pump blood back to your heart. When this happens, blood stays in the legs and causes swelling and aching.   Two problems that may lead to chronic venous insufficiency are:  · Damaged valves. Valves keep blood flowing from the legs through the blood vessels and back to the heart. When the valves are damaged, blood does not flow as well.   · Deep vein thrombosis (DVT). Blood clots may form in the deep veins of the legs. This may cause pain, redness, and swelling in the legs. It may also block the flow of blood back to the heart. Seek immediate medical care if you have these symptoms.  · A blood clot in the leg can also break off and travel to the lungs. This is called pulmonary embolism (PE). In the lungs, the clot can cut off the flow of blood. This may cause chest pain, trouble breathing, sweating, a fast heartbeat, coughing (may cough up blood), and fainting. It is a medical emergency and may cause death. Call 911 if you have these symptoms.  · Healthcare providers call the two conditions, DVT and PE, venous thromboembolism (VTE).  CVI cant be cured, but you can control leg swelling to reduce the likelihood of ulcers (sores).  Recognizing the symptoms  Be aware of the following:  · If you stand or sit with your feet down for long periods, your legs may ache or feel heavy.  · Swollen ankles are possibly the most common symptom of CVI.  · As swelling increases, the skin over your ankles may show red spots or a brownish tinge. The skin may feel leathery or scaly, and may start to itch.  · If swelling is not controlled, an ulcer (open wound) may form.  What you can do  Reduce your risk of developing ulcers by doing the following:  · Increase blood flow back to your heart by elevating your legs, exercising daily,  and wearing elastic stockings.  · Boost blood flow in your legs by losing excess weight.  · If you must stand or sit in one place for a period of time, keep your blood moving by wiggling your toes, shifting your body position, and rising up on the balls of your feet.    Date Last Reviewed: 5/1/2016  © 0830-9468 [employer]. [address] [place] [address], [place] [address]. All rights reserved. This information is not intended as a substitute for professional medical care. Always follow your healthcare professional's instructions.        Self-Care for Spider and Varicose Veins  Your healthcare provider may suggest that you try self-care. Exercising and maintaining a healthy weight may keep problem veins from getting worse. Wearing elastic stockings and elevating your legs can help improve blood flow. Taking breaks when you sit or stand helps, too.     The top of the elastic stocking should be below the bend in your knee for a proper fit.   Exercising  Exercising is good for your veins because it improves blood flow. Walking, cycling, or swimming are great exercises for vein health. But be sure to check with your healthcare provider before starting any exercise program. Also, keep these hints in mind:  · When exercising, start out slowly and try to build up to 30 minutes on most days.  · Elevate your legs above heart level after exercise to keep blood from pooling in veins.  Maintaining a healthy weight  Being overweight puts extra pressure on your veins. To maintain a healthy weight, try these tips:  · Choose lean meats, fish, and skinless chicken.  · Use low-fat dairy products.  · Eat foods high in fiber, such as whole grains, fruits, and vegetables.  · Cut down on sugar, salt, and saturated and trans fats.  · Exercise regularly.  Wearing elastic stockings  Elastic stockings gently squeeze veins so blood flows upward. If you need elastic stockings, your healthcare provider can prescribe them for you. Follow  your healthcare providers advice about how and when to wear them. Elastic stockings come in several different levels of pressure. Ask your healthcare provider which level of pressure would benefit you the most.   Elevating your legs  Raising your legs above heart level will help relieve swelling and keep blood from pooling in veins. Try to elevate your legs for 15 to 20 minutes at the end of the day, and whenever youre relaxing. To make sure your legs are raised above heart level, prop them up on cushions or large pillows.  When sitting and standing  To keep blood moving when you have to sit or stand for long periods, try these tips:  · At work, take walking breaks instead of coffee breaks. Walk during your lunch hour. Or try flexing your feet up and down 10 times each hour.  · When standing, raise yourself up and down on your toes, or rock back and forth on your heels.  Date Last Reviewed: 5/1/2016  © 9116-6654 [employer]. [address] [place] [address]. All rights reserved. This information is not intended as a substitute for professional medical care. Always follow your healthcare professional's instructions.        Understanding Spider and Varicose Veins  Do you often hide your legs because of the way they look? You may have noticed tiny red or blue bursts (spider veins). Or maybe you have veins that bulge or look twisted (varicose veins). If so, there are treatments that can help  What are the symptoms?  Spider veins or varicose veins may never be a problem. But sometimes they can cause legs to ache or swell. Your legs may also feel heavy and tired, or like theyre burning. These symptoms may be more severe at the end of the day. Prolonged sitting or standing can also make your symptoms worse.  Who gets spider and varicose veins?  Anyone can get spider or varicose veins. But vein problems tend to be hereditary (run in families). Other factors that can affect veins  include:  · Pregnancy, hormones, and birth control pills  · A job where you stand or sit a lot  · Extra weight or lack of exercise  · Age     Spider veins look like tiny webs on the ankles, legs, and upper thighs.      Ropy, dark blue, red, or flesh-colored varicose veins are most common on the thighs, calves, and feet.     What can be done?  Spider and varicose veins can affect the way you feel about yourself. Talk to your healthcare provider about your concerns. There are treatments that can ease symptoms and make your legs look better.  Your treatment choices  Treatment may include self-care, sclerotherapy (injecting veins with a chemical), surgery, or newer nonsurgical minimally invasive therapies. Spider veins and some varicose veins can be treated with sclerotherapy. Large varicose veins can often be treated with newer minimally invasive procedures and, in rare cases, surgery may be needed.   Date Last Reviewed: 5/1/2016  © 7241-5893 [employer]. [address] [place] [address]. All rights reserved. This information is not intended as a substitute for professional medical care. Always follow your healthcare professional's instructions.        Endovenous Laser Treatment (EVLT) for Varicose Veins  Endovenous laser treatment (EVLT) is a procedure that uses laser heat to treat varicose veins.   Varicose veins are swollen and enlarged veins. They occur most often in the legs. Varicose veins can develop when valves in your veins become damaged. This causes problems with blood flow. Over time, too much blood collects in the veins. The veins may bulge, twist, and stand out under your skin. They can also cause symptoms such as aching, cramping, or swelling in your legs.  During EVLT, heat created using a laser is sent into the vein through a thin, flexible tube (catheter). This closes off blood flow in the main problem vein.  Getting ready for your treatment  Follow any instructions from your  healthcare provider.  Tell your healthcare provider if you:  · Are pregnant or think you may be pregnant  · Are breastfeeding  · Smoke or use alcohol on a regular basis  · Have other health problems, such as diabetes or kidney problems  Tell your provider about any medicines you are taking. You may need to stop taking all or some of these before the procedure. This includes:  · Medicines that can thin your blood or prevent clotting (anticoagulants)  · All prescription medicines  · Over-the-counter medicines such as aspirin or ibuprofen  · Street drugs  · Herbs, vitamins, and other supplements  Follow any directions youre given for not eating or drinking before the treatment.  The day of your treatment    The treatment takes 45 to 60 minutes. The entire treatment (including time to prepare and recover) takes about 1 to 3 hours. You can go home the same day. For the treatment:  · Youll lie down on a hospital bed.  · An imaging method, such as ultrasound, is used to guide the procedure.  · The leg to be treated is injected with numbing medicine.  · Once your leg is numb, a needle makes a small hole (puncture) in the vein to be treated.  · The catheter containing the laser heat source is inserted into your vein.  · More numbing medicine may be injected around the vein.  · Once the catheter is in the right position, it is then slowly drawn backward. As the catheter sends out heat, the vein is closed off.  · In some cases, other side branch varicose veins may be removed or tied off through several small cuts (incisions).  · When the treatment is done, the catheter is removed. Pressure is applied to the insertion site to stop any bleeding. An elastic compression stocking or a bandage may then be put on your leg.  Recovering at home  Once at home, follow all the instructions youve been given. Be sure to:  · Take all medicines as directed  · Care for the catheter insertion site as directed  · Check for signs of infection  at the catheter insertion site (see below)  · Wear elastic stockings or bandages as directed  · Keep your legs raised (elevated) as directed  · Walk a few times a day  · Avoid heavy exercise, lifting, and standing for long periods as advised  · Avoid air travel, hot baths, saunas, or whirlpools as advised  Call your healthcare provider  Call your healthcare provider if you have any of the following:  · Fever of 100.4°F (38°C) or higher, or as directed by your provider  · Chest pain or trouble breathing  · Signs of infection at the catheter insertion site. These include greater redness or swelling (inflammation), warmth, increasing pain, bleeding, or bad-smelling discharge.  · Severe numbness or tingling in the treated leg  · Severe pain or swelling in the treated leg   Follow-up  Youll have a follow-up visit with your healthcare provider within a week. An ultrasound will likely be done to check for problems, such as blood clots. Your provider will discuss more treatments with you, if needed.   Risk and possible complications  These include:  · Bleeding  · Infection  · Blood clots  · Damage to the nerves in the treated area  · Irritation or burning of the skin over the treated vein  · Treatment doesn't improve the look or the symptoms of the problem veins  · Risks of any medicines used during the treatment   Date Last Reviewed: 5/1/2016  © 9765-4520 [employer]. [address] [place] [address], [place] [address]. All rights reserved. This information is not intended as a substitute for professional medical care. Always follow your healthcare professional's instructions.

## 2018-03-12 DIAGNOSIS — I48.0 PAROXYSMAL ATRIAL FIBRILLATION: ICD-10-CM

## 2018-03-13 RX ORDER — METOPROLOL SUCCINATE 50 MG/1
50 TABLET, EXTENDED RELEASE ORAL DAILY
Qty: 90 TABLET | Refills: 3
Start: 2018-03-13 | End: 2018-03-14 | Stop reason: SDUPTHER

## 2018-03-14 DIAGNOSIS — I48.91 ATRIAL FIBRILLATION, UNSPECIFIED TYPE: ICD-10-CM

## 2018-03-14 DIAGNOSIS — I48.0 PAROXYSMAL ATRIAL FIBRILLATION: ICD-10-CM

## 2018-03-14 RX ORDER — AMIODARONE HYDROCHLORIDE 200 MG/1
200 TABLET ORAL DAILY
Qty: 90 TABLET | Refills: 3 | Status: SHIPPED | OUTPATIENT
Start: 2018-03-14 | End: 2019-03-21 | Stop reason: SDUPTHER

## 2018-03-14 RX ORDER — METOPROLOL SUCCINATE 50 MG/1
50 TABLET, EXTENDED RELEASE ORAL DAILY
Qty: 90 TABLET | Refills: 3
Start: 2018-03-14 | End: 2018-03-23 | Stop reason: SDUPTHER

## 2018-03-20 DIAGNOSIS — I48.0 PAF (PAROXYSMAL ATRIAL FIBRILLATION): Primary | ICD-10-CM

## 2018-03-22 DIAGNOSIS — I48.0 PAF (PAROXYSMAL ATRIAL FIBRILLATION): Primary | ICD-10-CM

## 2018-03-23 ENCOUNTER — HOSPITAL ENCOUNTER (OUTPATIENT)
Dept: CARDIOLOGY | Facility: CLINIC | Age: 78
Discharge: HOME | End: 2018-03-23
Attending: INTERNAL MEDICINE
Payer: MEDICARE

## 2018-03-23 ENCOUNTER — OFFICE VISIT (OUTPATIENT)
Dept: ELECTROPHYSIOLOGY | Facility: CLINIC | Age: 78
End: 2018-03-23
Payer: MEDICARE

## 2018-03-23 ENCOUNTER — HOSPITAL ENCOUNTER (OUTPATIENT)
Dept: PULMONOLOGY | Facility: CLINIC | Age: 78
Discharge: HOME | End: 2018-03-23
Payer: MEDICARE

## 2018-03-23 ENCOUNTER — HOSPITAL ENCOUNTER (OUTPATIENT)
Dept: CARDIOLOGY | Facility: CLINIC | Age: 78
Discharge: HOME | End: 2018-03-23
Payer: MEDICARE

## 2018-03-23 VITALS
HEART RATE: 50 BPM | DIASTOLIC BLOOD PRESSURE: 94 MMHG | SYSTOLIC BLOOD PRESSURE: 189 MMHG | HEIGHT: 72 IN | BODY MASS INDEX: 24.79 KG/M2 | WEIGHT: 183 LBS

## 2018-03-23 DIAGNOSIS — I48.0 PAF (PAROXYSMAL ATRIAL FIBRILLATION): ICD-10-CM

## 2018-03-23 DIAGNOSIS — I10 ESSENTIAL HYPERTENSION: Chronic | ICD-10-CM

## 2018-03-23 DIAGNOSIS — I48.0 PAROXYSMAL ATRIAL FIBRILLATION: Primary | Chronic | ICD-10-CM

## 2018-03-23 DIAGNOSIS — E78.5 HYPERLIPIDEMIA, UNSPECIFIED HYPERLIPIDEMIA TYPE: Chronic | ICD-10-CM

## 2018-03-23 DIAGNOSIS — I45.10 RBBB: ICD-10-CM

## 2018-03-23 LAB
AORTIC VALVE REGURGITATION: NORMAL
DIASTOLIC DYSFUNCTION: NO
ESTIMATED PA SYSTOLIC PRESSURE: 24.53
MITRAL VALVE REGURGITATION: NORMAL
PRE FEV1 FVC: 78
PRE FEV1: 2.84
PRE FVC: 3.65
PREDICTED FEV1 FVC: 78
PREDICTED FEV1: 3.1
PREDICTED FVC: 3.94
RETIRED EF AND QEF - SEE NOTES: 55 (ref 55–65)
TRICUSPID VALVE REGURGITATION: NORMAL

## 2018-03-23 PROCEDURE — 3077F SYST BP >= 140 MM HG: CPT | Mod: CPTII,S$GLB,, | Performed by: INTERNAL MEDICINE

## 2018-03-23 PROCEDURE — 94010 BREATHING CAPACITY TEST: CPT | Mod: S$GLB,,, | Performed by: INTERNAL MEDICINE

## 2018-03-23 PROCEDURE — 93000 ELECTROCARDIOGRAM COMPLETE: CPT | Mod: S$GLB,,, | Performed by: INTERNAL MEDICINE

## 2018-03-23 PROCEDURE — 93306 TTE W/DOPPLER COMPLETE: CPT | Mod: S$GLB,,, | Performed by: INTERNAL MEDICINE

## 2018-03-23 PROCEDURE — 99214 OFFICE O/P EST MOD 30 MIN: CPT | Mod: S$GLB,,, | Performed by: INTERNAL MEDICINE

## 2018-03-23 PROCEDURE — 99999 PR PBB SHADOW E&M-EST. PATIENT-LVL III: CPT | Mod: PBBFAC,,, | Performed by: INTERNAL MEDICINE

## 2018-03-23 PROCEDURE — 3080F DIAST BP >= 90 MM HG: CPT | Mod: CPTII,S$GLB,, | Performed by: INTERNAL MEDICINE

## 2018-03-23 PROCEDURE — 94729 DIFFUSING CAPACITY: CPT | Mod: S$GLB,,, | Performed by: INTERNAL MEDICINE

## 2018-03-23 RX ORDER — VALSARTAN AND HYDROCHLOROTHIAZIDE 320; 25 MG/1; MG/1
1 TABLET, FILM COATED ORAL DAILY
Qty: 90 TABLET | Refills: 3 | Status: SHIPPED | OUTPATIENT
Start: 2018-03-23 | End: 2019-03-27 | Stop reason: SDUPTHER

## 2018-03-23 RX ORDER — METOPROLOL SUCCINATE 50 MG/1
50 TABLET, EXTENDED RELEASE ORAL DAILY
Qty: 90 TABLET | Refills: 3
Start: 2018-03-23 | End: 2018-03-26 | Stop reason: SDUPTHER

## 2018-03-23 NOTE — PROGRESS NOTES
Subjective:    Patient ID:  Benitez Cabrales is a 77 y.o. male who presents for follow up of AF    Atrial Fibrillation   Symptoms are negative for chest pain, dizziness, palpitations, shortness of breath, syncope and weakness. Past medical history includes atrial fibrillation.     76 y.o. M  Remote dx of AF (~ 8 y ago; only Rx was beta blockade)  HTN on meds   HL on meds   RBBB, longstanding     Had event monitor placed for dizzy/palpitations. Turns out dizziness was really vertigo and balance issues -- no LH, no presync/sync.    He remains on amiodarone (normal PFT and TFT/LFT); CHADSVASC 3.  ILR with no events on several interrogations.    echo 55-60%. severe LAE.  TSH, LFTs OK.    My interpretation of today's ecg is sinus shraddha at 50 bpm with first deg AVB and RBBB (baseline).    Review of Systems   Constitution: Negative. Negative for weakness and malaise/fatigue.   HENT: Negative.  Negative for congestion, ear pain and tinnitus.    Eyes: Negative for blurred vision and double vision.   Cardiovascular: Negative.  Negative for chest pain, dyspnea on exertion, near-syncope, palpitations and syncope.   Respiratory: Negative.  Negative for cough and shortness of breath.    Endocrine: Negative.  Negative for cold intolerance, heat intolerance and polyuria.   Skin: Negative.  Negative for dry skin, flushing and rash.   Musculoskeletal: Negative.  Negative for back pain, falls, joint pain and muscle weakness.   Gastrointestinal: Negative.  Negative for abdominal pain, change in bowel habit, nausea and vomiting.   Genitourinary: Negative for dysuria, flank pain and frequency.   Neurological: Negative.  Negative for dizziness, headaches and light-headedness.   Psychiatric/Behavioral: Negative.  Negative for altered mental status and depression. The patient is not nervous/anxious.    Allergic/Immunologic: Negative for environmental allergies.        Objective:    Physical Exam   Constitutional: He is oriented to person,  place, and time. He appears well-developed and well-nourished.   HENT:   Head: Normocephalic and atraumatic.   Eyes: Conjunctivae, EOM and lids are normal. No scleral icterus.   Neck: Normal range of motion. Neck supple. No JVD present. No tracheal deviation present. No thyromegaly present.   Cardiovascular: Regular rhythm, normal heart sounds and intact distal pulses.   No extrasystoles are present. Bradycardia present.  PMI is not displaced.  Exam reveals no gallop and no friction rub.    No murmur heard.  Pulses:       Radial pulses are 2+ on the right side, and 2+ on the left side.   Pulmonary/Chest: Effort normal and breath sounds normal. No accessory muscle usage. No tachypnea. No respiratory distress. He has no wheezes. He has no rales.   Abdominal: Soft. Bowel sounds are normal. He exhibits no distension. There is no hepatosplenomegaly. There is no tenderness.   Musculoskeletal: Normal range of motion. He exhibits no edema.   Neurological: He is alert and oriented to person, place, and time. He has normal reflexes. No cranial nerve deficit. He exhibits normal muscle tone.   Skin: Skin is warm and dry. No rash noted.   Psychiatric: He has a normal mood and affect. His behavior is normal.   Nursing note and vitals reviewed.        Assessment:       1. Paroxysmal atrial fibrillation    2. RBBB    3. Hyperlipidemia, unspecified hyperlipidemia type    4. Essential hypertension         Plan:       76 year old male with pAF on amiodarone with ILR demonstrating no recent episodes of AF.  Recent GI bleed, off NOAC currently (was taking incorrectly, as he was taking 2 pills of his pradaxa once daily).  Discussed risks and benefits of anticoagulation,     Add HCTZ to diovan for HTN.  Return in 1 year with echo and amio tests, or earlier prn.

## 2018-03-26 DIAGNOSIS — I48.0 PAROXYSMAL ATRIAL FIBRILLATION: Chronic | ICD-10-CM

## 2018-03-26 RX ORDER — METOPROLOL SUCCINATE 50 MG/1
50 TABLET, EXTENDED RELEASE ORAL DAILY
Qty: 90 TABLET | Refills: 3
Start: 2018-03-26 | End: 2018-03-27 | Stop reason: SDUPTHER

## 2018-03-27 ENCOUNTER — TELEPHONE (OUTPATIENT)
Dept: ELECTROPHYSIOLOGY | Facility: CLINIC | Age: 78
End: 2018-03-27

## 2018-03-27 DIAGNOSIS — I48.0 PAROXYSMAL ATRIAL FIBRILLATION: Chronic | ICD-10-CM

## 2018-03-27 RX ORDER — METOPROLOL SUCCINATE 50 MG/1
50 TABLET, EXTENDED RELEASE ORAL DAILY
Qty: 90 TABLET | Refills: 3
Start: 2018-03-27 | End: 2018-03-27 | Stop reason: SDUPTHER

## 2018-03-27 NOTE — TELEPHONE ENCOUNTER
Rx sent      ----- Message from Sera Conley sent at 3/27/2018  9:31 AM CDT -----  Contact: Patient  The pt needs a refill on his Metoprolol succinate (TOPROL-XL) 50 MG 24 hr tablet and can you send it to [place] [id number]  [place] - [address] [facility] [phone] (phone) [phone] (fax). Thanks, Sera

## 2018-03-27 NOTE — TELEPHONE ENCOUNTER
----- Message from Sera Conley sent at 3/27/2018  9:31 AM CDT -----  Contact: Patient  The pt needs a refill on his Metoprolol succinate (TOPROL-XL) 50 MG 24 hr tablet and can you send it to [place] [id number]  [place] - [address] [facility] [phone] (phone) [phone] (fax). Thanks, Sera

## 2018-03-28 ENCOUNTER — TELEPHONE (OUTPATIENT)
Dept: ELECTROPHYSIOLOGY | Facility: CLINIC | Age: 78
End: 2018-03-28

## 2018-03-28 DIAGNOSIS — I48.0 PAROXYSMAL ATRIAL FIBRILLATION: Chronic | ICD-10-CM

## 2018-03-28 RX ORDER — METOPROLOL SUCCINATE 50 MG/1
50 TABLET, EXTENDED RELEASE ORAL DAILY
Qty: 90 TABLET | Refills: 3 | Status: CANCELLED | OUTPATIENT
Start: 2018-03-28

## 2018-03-28 RX ORDER — METOPROLOL SUCCINATE 50 MG/1
50 TABLET, EXTENDED RELEASE ORAL DAILY
Qty: 90 TABLET | Refills: 3 | Status: ON HOLD
Start: 2018-03-28 | End: 2018-12-10 | Stop reason: SDUPTHER

## 2018-03-28 NOTE — TELEPHONE ENCOUNTER
Returned  Call to Pt. Whe Dr Morales signed Rx it was sent as no print. Will resend.      ----- Message from Ivan Ann sent at 3/28/2018  4:30 PM CDT -----  Contact: pt wife  Please call pt wife at [phone] after calling the pharmacy. Patient was told by [facility] [employer]Timmy at [phone] that the Metoprolol 50 mg had been denied by Dr Morales. Is this true? Out of medication    Thank you

## 2018-04-05 ENCOUNTER — CLINICAL SUPPORT (OUTPATIENT)
Dept: ELECTROPHYSIOLOGY | Facility: CLINIC | Age: 78
End: 2018-04-05
Attending: INTERNAL MEDICINE
Payer: MEDICARE

## 2018-04-05 DIAGNOSIS — Z95.818 STATUS POST PLACEMENT OF IMPLANTABLE LOOP RECORDER: ICD-10-CM

## 2018-04-05 DIAGNOSIS — R00.2 PALPITATIONS: ICD-10-CM

## 2018-05-16 DIAGNOSIS — E78.5 HYPERLIPIDEMIA, UNSPECIFIED HYPERLIPIDEMIA TYPE: ICD-10-CM

## 2018-05-16 RX ORDER — ROSUVASTATIN CALCIUM 10 MG/1
10 TABLET, COATED ORAL NIGHTLY
Qty: 90 TABLET | Refills: 3 | Status: SHIPPED | OUTPATIENT
Start: 2018-05-16 | End: 2019-06-05 | Stop reason: SDUPTHER

## 2018-06-07 ENCOUNTER — OFFICE VISIT (OUTPATIENT)
Dept: FAMILY MEDICINE | Facility: CLINIC | Age: 78
End: 2018-06-07
Payer: MEDICARE

## 2018-06-07 VITALS
HEIGHT: 72 IN | SYSTOLIC BLOOD PRESSURE: 130 MMHG | BODY MASS INDEX: 23.56 KG/M2 | TEMPERATURE: 98 F | HEART RATE: 53 BPM | OXYGEN SATURATION: 96 % | WEIGHT: 173.94 LBS | DIASTOLIC BLOOD PRESSURE: 80 MMHG

## 2018-06-07 DIAGNOSIS — I83.90 VARICOSE VEIN OF LEG: ICD-10-CM

## 2018-06-07 DIAGNOSIS — G47.00 INSOMNIA, UNSPECIFIED TYPE: ICD-10-CM

## 2018-06-07 DIAGNOSIS — I10 ESSENTIAL HYPERTENSION: ICD-10-CM

## 2018-06-07 DIAGNOSIS — L29.9 PRURITUS: Primary | ICD-10-CM

## 2018-06-07 DIAGNOSIS — I48.0 PAF (PAROXYSMAL ATRIAL FIBRILLATION): ICD-10-CM

## 2018-06-07 PROCEDURE — 3075F SYST BP GE 130 - 139MM HG: CPT | Mod: CPTII,S$GLB,, | Performed by: INTERNAL MEDICINE

## 2018-06-07 PROCEDURE — 99214 OFFICE O/P EST MOD 30 MIN: CPT | Mod: S$GLB,,, | Performed by: INTERNAL MEDICINE

## 2018-06-07 PROCEDURE — 99999 PR PBB SHADOW E&M-EST. PATIENT-LVL III: CPT | Mod: PBBFAC,,, | Performed by: INTERNAL MEDICINE

## 2018-06-07 PROCEDURE — 3079F DIAST BP 80-89 MM HG: CPT | Mod: CPTII,S$GLB,, | Performed by: INTERNAL MEDICINE

## 2018-06-07 RX ORDER — DOXEPIN HYDROCHLORIDE 10 MG/1
CAPSULE ORAL
Qty: 60 CAPSULE | Refills: 11 | Status: SHIPPED | OUTPATIENT
Start: 2018-06-07 | End: 2020-03-09 | Stop reason: SDUPTHER

## 2018-06-07 NOTE — PROGRESS NOTES
Chief complaint: Itching, varicose vein    77-year-old  male last seen 7 mo ago and c/o itching at that time ---then reported that for 4-5 months at night only he will get a sustaining an itching sensation in different spots of his body.  He goes away soon as he scratches.  Sometimes in his arms sometimes eyes knee sometimes on one side by his year and then the other.  No particular rash.  Problems during the day.  He does not take hot showers.  We discussed the possibility of other exposures such as laundry detergents and they need to switch out of these if possible.  If it was a rash we might refer to dermatology.  One time all in the hospital he was given some form of cream by Dr. Sorenson that he thinks helped and he can discuss with the pharmacy if he can find out what that was.   He also has some varicose veins and had vein stripping in the past.  He said more varicose veins are rise on the left leg.  He has seen vascular x 2.     Continues with itching in various spots but still no rash.  He has some tingling in his feet but that's not necessarily the source of itching.  We discussed that it appears to occur when he is in bed so he needs to change laundry detergent and assess if indeed he gets itching of sleeping in another location.  He takes Tylenol PM at night.  We discussed trying some doxepin and while doing so not to take any other Benadryl-containing substances.  The doxepin may help with itching as well as his insomnia.  Patient does exhibit some anxiety referable to these issues and we reviewed his labs, interval clinic notes, vascular notes and so forth and he was counseled regarding plan of actionTotal time over 25 minutes with over 50% counseling.    ROS:   No fevers chills or weight loss, no diffuse rash, no other ALLERGY or angioedema symptoms    PAST MEDICAL HISTORY:  1. Hypertension.  2. Hyperlipidemia.  3. GERD.  4. Vertigo after trauma  5. Colonoscopy was normal around 2009 per [facility]  6. Atrial fib/flutter -[facility] -cardioverted  7.  Dizziness, seen by neurology   8.peptic ulcer disease on EGDNovember 2016 with GI bleeding  9.  Small bowel obstruction, resolved on its own November 2016    PAST SURGICAL HISTORY:  1. Left leg surgery, sounds like venous surgery -Dr Limon  2. Nasal septal repair.      ALLERGIES: NKDA.    SOCIAL HISTORY: He puffs on a rare cigar, he is not a smoker. Drinks wine on occasion,  with 3 children. He has 7 grandchildren. 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 He is originally from [place]. He is quite active.    FAMILY HISTORY: Sr. had breast cancer. Otherwise negative for prostate or colon cancer, negative for premature coronary disease or diabetes.    HEALTHCARE SCREENING: Colonoscopy was normal around 2009 per [facility], last cholesterol was 2011, , due for PSA , he had eye examination.      Gen: no distress  Skin does not have any appreciable rash or excoriation.  He does have scattered seborrheic keratoses and other benign skin abnormalities but these are not the focal points of his rash      Benitez was seen today for body itching.    Diagnoses and all orders for this visit:    Pruritus, still undetermined etiology and does not appear to associate with a specific rash.  Seems to occur at night while in bed so consider contact reactions and we will address that.  Assuming might not find a cause of his itching, it is associated with some insomnia and anxiety and so we'll try some doxepin    Essential hypertension, runs better at home    PAF (paroxysmal atrial fibrillation) followed by cardiology    Varicose vein of leg    Insomnia, unspecified type    Other orders  -     doxepin (SINEQUAN) 10 MG capsule; 1-2 HS prn itching            Pace on his anxiety expressed referable to the above issues, access would not be therapeutic//"This note will not be shared with the patient."  "

## 2018-09-04 ENCOUNTER — PATIENT MESSAGE (OUTPATIENT)
Dept: FAMILY MEDICINE | Facility: CLINIC | Age: 78
End: 2018-09-04

## 2018-09-18 ENCOUNTER — IMMUNIZATION (OUTPATIENT)
Dept: INTERNAL MEDICINE | Facility: CLINIC | Age: 78
End: 2018-09-18
Payer: MEDICARE

## 2018-09-18 PROCEDURE — 90662 IIV NO PRSV INCREASED AG IM: CPT | Mod: PBBFAC

## 2018-09-24 ENCOUNTER — PATIENT MESSAGE (OUTPATIENT)
Dept: FAMILY MEDICINE | Facility: CLINIC | Age: 78
End: 2018-09-24

## 2018-09-25 NOTE — TELEPHONE ENCOUNTER
Looks like he has received Pneumovax sometime after 65.      Please check the links site to find out if he has recently had a Prevnar 13.    His health maintenance does not show that he needs Prevnar 13

## 2018-10-19 ENCOUNTER — TELEPHONE (OUTPATIENT)
Dept: ELECTROPHYSIOLOGY | Facility: CLINIC | Age: 78
End: 2018-10-19

## 2018-10-19 NOTE — TELEPHONE ENCOUNTER
Called pt to scheduled loop removal. Spoke with Pt and he will return call to schedule once he looks at his calender.

## 2018-10-22 ENCOUNTER — OFFICE VISIT (OUTPATIENT)
Dept: OPTOMETRY | Facility: CLINIC | Age: 78
End: 2018-10-22
Payer: MEDICARE

## 2018-10-22 DIAGNOSIS — I10 ESSENTIAL HYPERTENSION: ICD-10-CM

## 2018-10-22 DIAGNOSIS — H25.13 NUCLEAR SCLEROSIS, BILATERAL: Primary | ICD-10-CM

## 2018-10-22 DIAGNOSIS — H52.7 REFRACTIVE ERROR: ICD-10-CM

## 2018-10-22 PROCEDURE — 92014 COMPRE OPH EXAM EST PT 1/>: CPT | Mod: S$PBB,,, | Performed by: OPTOMETRIST

## 2018-10-22 PROCEDURE — 99999 PR PBB SHADOW E&M-EST. PATIENT-LVL I: CPT | Mod: PBBFAC,,, | Performed by: OPTOMETRIST

## 2018-10-22 PROCEDURE — 99211 OFF/OP EST MAY X REQ PHY/QHP: CPT | Mod: PBBFAC,PO | Performed by: OPTOMETRIST

## 2018-10-22 NOTE — PROGRESS NOTES
Subjective:       Patient ID: Benitez Cabrales is a 78 y.o. male      Chief Complaint   Patient presents with    Concerns About Ocular Health    Hypertensive Eye Exam     History of Present Illness  Dls: 10/9/17 Dr. Devries    77 y/o male presents today for hypertensive eye exam.   Pt states no changes in vision. Pt wears bifocal glasses.     No tearing  No itching   No burning  No pain  No ha's  Ou off/on floaters  No flashes    Eye meds  otc gtts ou prn         Assessment/Plan:     1. Nuclear sclerosis, bilateral  Educated pt on presence of cataracts and effects on vision. No surgery at this time. Recheck in one year.    2. Essential hypertension  Vessel changes. Continue BP control and follow up care with PCP. Monitor yearly with DFE.     3. Refractive error  Pt declined MRx. Continue with current spectacles.     Follow-up in about 1 year (around 10/22/2019).

## 2018-11-12 ENCOUNTER — OFFICE VISIT (OUTPATIENT)
Dept: FAMILY MEDICINE | Facility: CLINIC | Age: 78
End: 2018-11-12
Payer: MEDICARE

## 2018-11-12 ENCOUNTER — LAB VISIT (OUTPATIENT)
Dept: LAB | Facility: HOSPITAL | Age: 78
End: 2018-11-12
Attending: INTERNAL MEDICINE
Payer: MEDICARE

## 2018-11-12 VITALS
TEMPERATURE: 98 F | BODY MASS INDEX: 26.29 KG/M2 | HEART RATE: 53 BPM | HEIGHT: 69 IN | WEIGHT: 177.5 LBS | OXYGEN SATURATION: 98 % | SYSTOLIC BLOOD PRESSURE: 120 MMHG | DIASTOLIC BLOOD PRESSURE: 62 MMHG

## 2018-11-12 DIAGNOSIS — Z00.00 ROUTINE MEDICAL EXAM: Primary | ICD-10-CM

## 2018-11-12 DIAGNOSIS — I48.0 PAF (PAROXYSMAL ATRIAL FIBRILLATION): ICD-10-CM

## 2018-11-12 DIAGNOSIS — Z12.12 SCREENING FOR COLORECTAL CANCER: ICD-10-CM

## 2018-11-12 DIAGNOSIS — N40.0 BENIGN PROSTATIC HYPERPLASIA, UNSPECIFIED WHETHER LOWER URINARY TRACT SYMPTOMS PRESENT: ICD-10-CM

## 2018-11-12 DIAGNOSIS — I70.0 AORTIC ATHEROSCLEROSIS: ICD-10-CM

## 2018-11-12 DIAGNOSIS — D62 ACUTE BLOOD LOSS ANEMIA: ICD-10-CM

## 2018-11-12 DIAGNOSIS — J44.9 CHRONIC OBSTRUCTIVE PULMONARY DISEASE, UNSPECIFIED COPD TYPE: ICD-10-CM

## 2018-11-12 DIAGNOSIS — Z12.11 SCREENING FOR COLORECTAL CANCER: ICD-10-CM

## 2018-11-12 DIAGNOSIS — Z12.5 SCREENING FOR PROSTATE CANCER: ICD-10-CM

## 2018-11-12 DIAGNOSIS — I10 ESSENTIAL HYPERTENSION: ICD-10-CM

## 2018-11-12 DIAGNOSIS — M79.672 LEFT FOOT PAIN: ICD-10-CM

## 2018-11-12 DIAGNOSIS — E78.5 HYPERLIPIDEMIA, UNSPECIFIED HYPERLIPIDEMIA TYPE: ICD-10-CM

## 2018-11-12 LAB
ALBUMIN SERPL BCP-MCNC: 3.2 G/DL
ALP SERPL-CCNC: 60 U/L
ALT SERPL W/O P-5'-P-CCNC: 24 U/L
ANION GAP SERPL CALC-SCNC: 5 MMOL/L
AST SERPL-CCNC: 32 U/L
BASOPHILS # BLD AUTO: 0.06 K/UL
BASOPHILS NFR BLD: 0.9 %
BILIRUB SERPL-MCNC: 0.7 MG/DL
BUN SERPL-MCNC: 20 MG/DL
CALCIUM SERPL-MCNC: 9.4 MG/DL
CHLORIDE SERPL-SCNC: 99 MMOL/L
CO2 SERPL-SCNC: 27 MMOL/L
COMPLEXED PSA SERPL-MCNC: 0.62 NG/ML
CREAT SERPL-MCNC: 1 MG/DL
DIFFERENTIAL METHOD: ABNORMAL
EOSINOPHIL # BLD AUTO: 0.2 K/UL
EOSINOPHIL NFR BLD: 2.6 %
ERYTHROCYTE [DISTWIDTH] IN BLOOD BY AUTOMATED COUNT: 14.2 %
EST. GFR  (AFRICAN AMERICAN): >60 ML/MIN/1.73 M^2
EST. GFR  (NON AFRICAN AMERICAN): >60 ML/MIN/1.73 M^2
FERRITIN SERPL-MCNC: 26 NG/ML
GLUCOSE SERPL-MCNC: 64 MG/DL
HCT VFR BLD AUTO: 38.7 %
HGB BLD-MCNC: 13.1 G/DL
IMM GRANULOCYTES # BLD AUTO: 0.03 K/UL
IMM GRANULOCYTES NFR BLD AUTO: 0.5 %
IRON SERPL-MCNC: 84 UG/DL
LYMPHOCYTES # BLD AUTO: 1.2 K/UL
LYMPHOCYTES NFR BLD: 18 %
MCH RBC QN AUTO: 29.1 PG
MCHC RBC AUTO-ENTMCNC: 33.9 G/DL
MCV RBC AUTO: 86 FL
MONOCYTES # BLD AUTO: 0.8 K/UL
MONOCYTES NFR BLD: 11.7 %
NEUTROPHILS # BLD AUTO: 4.4 K/UL
NEUTROPHILS NFR BLD: 66.3 %
NRBC BLD-RTO: 0 /100 WBC
PLATELET # BLD AUTO: 191 K/UL
PMV BLD AUTO: 11.3 FL
POTASSIUM SERPL-SCNC: 4.6 MMOL/L
PROT SERPL-MCNC: 6.6 G/DL
RBC # BLD AUTO: 4.5 M/UL
SATURATED IRON: 19 %
SODIUM SERPL-SCNC: 131 MMOL/L
TOTAL IRON BINDING CAPACITY: 453 UG/DL
TRANSFERRIN SERPL-MCNC: 306 MG/DL
TSH SERPL DL<=0.005 MIU/L-ACNC: 0.53 UIU/ML
WBC # BLD AUTO: 6.6 K/UL

## 2018-11-12 PROCEDURE — 84153 ASSAY OF PSA TOTAL: CPT

## 2018-11-12 PROCEDURE — 99214 OFFICE O/P EST MOD 30 MIN: CPT | Mod: 25,S$GLB,, | Performed by: INTERNAL MEDICINE

## 2018-11-12 PROCEDURE — 1101F PT FALLS ASSESS-DOCD LE1/YR: CPT | Mod: CPTII,S$GLB,, | Performed by: INTERNAL MEDICINE

## 2018-11-12 PROCEDURE — 3074F SYST BP LT 130 MM HG: CPT | Mod: CPTII,S$GLB,, | Performed by: INTERNAL MEDICINE

## 2018-11-12 PROCEDURE — 36415 COLL VENOUS BLD VENIPUNCTURE: CPT | Mod: PO

## 2018-11-12 PROCEDURE — 85025 COMPLETE CBC W/AUTO DIFF WBC: CPT

## 2018-11-12 PROCEDURE — 82728 ASSAY OF FERRITIN: CPT

## 2018-11-12 PROCEDURE — 3078F DIAST BP <80 MM HG: CPT | Mod: CPTII,S$GLB,, | Performed by: INTERNAL MEDICINE

## 2018-11-12 PROCEDURE — 99999 PR PBB SHADOW E&M-EST. PATIENT-LVL III: CPT | Mod: PBBFAC,,, | Performed by: INTERNAL MEDICINE

## 2018-11-12 PROCEDURE — 99499 UNLISTED E&M SERVICE: CPT | Mod: S$GLB,,, | Performed by: INTERNAL MEDICINE

## 2018-11-12 PROCEDURE — 83540 ASSAY OF IRON: CPT

## 2018-11-12 PROCEDURE — 80053 COMPREHEN METABOLIC PANEL: CPT

## 2018-11-12 PROCEDURE — 84443 ASSAY THYROID STIM HORMONE: CPT

## 2018-11-12 PROCEDURE — 99397 PER PM REEVAL EST PAT 65+ YR: CPT | Mod: 25,S$GLB,, | Performed by: INTERNAL MEDICINE

## 2018-11-12 NOTE — PROGRESS NOTES
Chief complaint: Physical exam,      78 -year-old  male   He is due for PSA and appears to be up-to-date on colonoscopy 9/16  there was a polyp -5 yrs..  Outside records reviewed.  He is followed by cardiology.  Still working as a .  No other increased risk based upon social history or family history.  He is reduced overall his alcohol intake.            ROS:   CONST: weight stable. EYES: no vision change. ENT: no sore throat. CV: no chest pain w/ exertion. RESP: no shortness of breath. GI: no nausea, vomiting, diarrhea. No dysphagia. : no urinary issues. MUSCULOSKELETAL: no new myalgias or arthralgias. SKIN: no new changes. NEURO: no focal deficits. PSYCH: no new issues. ENDOCRINE: no polyuria. HEME: no lymph nodes. ALLERGY: no general pruritis.     PAST MEDICAL HISTORY:  1. Hypertension.  2. Hyperlipidemia.  3. GERD.  4. Vertigo after trauma  5. Colonoscopy was normal around 2009 per [facility], 9/16  there was a polyp -5 yrs.  6. Atrial fib/flutter -[facility] -cardioverted  7.  Dizziness, seen by neurology    8.peptic ulcer disease on EGDNovember 2016 with GI bleeding  9.  Small bowel obstruction, resolved on its own November 2016    PAST SURGICAL HISTORY:  1. Left leg surgery, sounds like venous surgery -Dr Limon  2. Nasal septal repair.      ALLERGIES: NKDA.    SOCIAL HISTORY: He puffs on a rare cigar, he is not a smoker. Drinks wine on occasion,  with 3 children. He has 7 grandchildren. 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 He is originally from [place]. He is quite active.    FAMILY HISTORY: Sr. had breast cancer. Otherwise negative for prostate or colon cancer, negative for premature coronary disease or diabetes.    HEALTHCARE SCREENING: Colonoscopy was normal around 2009 per [facility], last cholesterol was 2011, , due for PSA , he had eye examination.      Gen: no distress  EYES: conjunctiva clear, non-icteric, PERRL  ENT: nose clear, nasal mucosa normal,  oropharynx clear and moist, teeth good,ears clear  NECK:supple, thyroid non-palpable  RESP: effort is good, lungs clear  CV: heart RRR w/o murmur, gallops or rubs; no carotid bruits, no edema  GI: abdomen soft, non-distended, non-tender, no hepatosplenomegaly  MS: gait normal, no clubbing or cyanosis of the digits,   SKIN: no rashes, warm to touch   Benitez was seen today for annual exam.    Diagnoses and associated orders for this visit:    Routine medical exam--- .  We will update all his lab work including PSA.  Continue exercise.    Screening for prostate cancer  - PSA, Screening; Future    Screening for colorectal cancer--- done                                           Additional evaluation and management issues:    Additionally patient has other medical issues and complaints to address.      Regarding hypertension isn't a good control.  Regarding hyperlipidemia he is on medication but is been a year or 2 since he's had a lipid profile.  He is not fasting today.  He is previously well controlled on current statin so we will defer.  He does have a moderate amount of pain that is been chronic in his feet.  It looks like in review podiatry no from a few years ago he has a bunion.  We discussed the condition will not improve and looks like podiatry said conservative therapies might work but only definitive would be surgery.  He has an appointment with Dr. Langley in Orthopedics tomorrow and he will consider seeing Podiatry again.  He has atrial fibrillation which apparently is under control.  He has COPD without any problems. He has atherosclerosis of the aorta noted in prior records.  He also did have some bleeding.  I reviewed his last CBC which was improving but still not back to normal.  He does have some occasional jumping of the left leg at night.  We will check iron studies.  He is due for his PSA as well.  Counseled at length to review prior records.  All these issues reviewed and patient counseled in  "evaluation and management will be based upon time counseling. Total time over 25 minutes with over 50% counseling.    Assessment and plan:        Essential hypertension, chronic and stable  -     TSH; Future  -     Cancel: Lipid panel; Future  -     CBC auto differential; Future  -     Iron and TIBC; Future  -     Ferritin; Future  -     Comprehensive metabolic panel; Future    PAF (paroxysmal atrial fibrillation), chronic and stable    Hyperlipidemia, unspecified hyperlipidemia type, good prior control on statin    Chronic obstructive pulmonary disease, unspecified COPD type, chronic and stable    Aortic atherosclerosis, noted    Acute blood loss anemia, reassess and check iron levels  -     TSH; Future  -     CBC auto differential; Future  -     Iron and TIBC; Future  -     Ferritin; Future    Benign prostatic hyperplasia, unspecified whether lower urinary tract symptoms present  -     Prostate Specific Antigen, Diagnostic; Future    Left foot pain, apparently has ongoing foot deformities, planning to see Orthopedics but may well need to see podiatry to assess all causes of foot pain            Clinical no be sensitive so as to not cause any issues regarding coverage of evaluation and management issues.  Patient himself also is not well with access"This note will not be shared with the patient."    "

## 2018-11-14 ENCOUNTER — TELEPHONE (OUTPATIENT)
Dept: ELECTROPHYSIOLOGY | Facility: CLINIC | Age: 78
End: 2018-11-14

## 2018-11-14 NOTE — TELEPHONE ENCOUNTER
Attempted to reach Pt to schedule loop removal for battery being at end of life. No answer. Left message with number for pt to return call.

## 2018-11-23 ENCOUNTER — TELEPHONE (OUTPATIENT)
Dept: ELECTROPHYSIOLOGY | Facility: CLINIC | Age: 78
End: 2018-11-23

## 2018-11-26 ENCOUNTER — PATIENT MESSAGE (OUTPATIENT)
Dept: ELECTROPHYSIOLOGY | Facility: CLINIC | Age: 78
End: 2018-11-26

## 2018-11-26 ENCOUNTER — HOSPITAL ENCOUNTER (OUTPATIENT)
Dept: CARDIOLOGY | Facility: CLINIC | Age: 78
Discharge: HOME | End: 2018-11-26
Attending: INTERNAL MEDICINE
Payer: MEDICARE

## 2018-11-26 ENCOUNTER — OFFICE VISIT (OUTPATIENT)
Dept: ELECTROPHYSIOLOGY | Facility: CLINIC | Age: 78
End: 2018-11-26
Payer: MEDICARE

## 2018-11-26 VITALS
SYSTOLIC BLOOD PRESSURE: 120 MMHG | WEIGHT: 177 LBS | DIASTOLIC BLOOD PRESSURE: 77 MMHG | WEIGHT: 176 LBS | HEART RATE: 52 BPM | HEIGHT: 72 IN | BODY MASS INDEX: 24.64 KG/M2 | HEART RATE: 45 BPM | HEIGHT: 71 IN | BODY MASS INDEX: 23.98 KG/M2 | SYSTOLIC BLOOD PRESSURE: 191 MMHG | DIASTOLIC BLOOD PRESSURE: 64 MMHG

## 2018-11-26 DIAGNOSIS — E78.5 HYPERLIPIDEMIA, UNSPECIFIED HYPERLIPIDEMIA TYPE: Chronic | ICD-10-CM

## 2018-11-26 DIAGNOSIS — I10 ESSENTIAL HYPERTENSION: ICD-10-CM

## 2018-11-26 DIAGNOSIS — E78.5 HYPERLIPIDEMIA, UNSPECIFIED HYPERLIPIDEMIA TYPE: ICD-10-CM

## 2018-11-26 DIAGNOSIS — I48.0 PAROXYSMAL ATRIAL FIBRILLATION: Chronic | ICD-10-CM

## 2018-11-26 DIAGNOSIS — I45.10 RBBB: ICD-10-CM

## 2018-11-26 DIAGNOSIS — I48.0 PAROXYSMAL ATRIAL FIBRILLATION: ICD-10-CM

## 2018-11-26 DIAGNOSIS — I48.0 PAF (PAROXYSMAL ATRIAL FIBRILLATION): ICD-10-CM

## 2018-11-26 DIAGNOSIS — J44.9 CHRONIC OBSTRUCTIVE PULMONARY DISEASE, UNSPECIFIED COPD TYPE: Primary | ICD-10-CM

## 2018-11-26 DIAGNOSIS — I10 ESSENTIAL HYPERTENSION: Chronic | ICD-10-CM

## 2018-11-26 LAB
ASCENDING AORTA: 3.65 CM
AV INDEX (PROSTH): 0.62
AV MEAN GRADIENT: 2.79 MMHG
AV PEAK GRADIENT: 5.29 MMHG
AV VALVE AREA: 2.64 CM2
BSA FOR ECHO PROCEDURE: 2.02 M2
CV ECHO LV RWT: 0.32 CM
DOP CALC AO PEAK VEL: 1.15 M/S
DOP CALC AO VTI: 28.15 CM
DOP CALC LVOT AREA: 4.26 CM2
DOP CALC LVOT DIAMETER: 2.33 CM
DOP CALC LVOT STROKE VOLUME: 74.41 CM3
DOP CALCLVOT PEAK VEL VTI: 17.46 CM
E WAVE DECELERATION TIME: 212.07 MSEC
E/A RATIO: 1.23
E/E' RATIO: 6.11
ECHO LV POSTERIOR WALL: 0.96 CM (ref 0.6–1.1)
FRACTIONAL SHORTENING: 33 % (ref 28–44)
INTERVENTRICULAR SEPTUM: 0.89 CM (ref 0.6–1.1)
LA MAJOR: 5.5 CM
LA MINOR: 5 CM
LA WIDTH: 3.84 CM
LEFT ATRIUM SIZE: 4.6 CM
LEFT ATRIUM VOLUME INDEX: 38.9 ML/M2
LEFT ATRIUM VOLUME: 78.65 CM3
LEFT INTERNAL DIMENSION IN SYSTOLE: 4 CM (ref 2.1–4)
LEFT VENTRICLE DIASTOLIC VOLUME INDEX: 60.88 ML/M2
LEFT VENTRICLE DIASTOLIC VOLUME: 122.98 ML
LEFT VENTRICLE MASS INDEX: 110.6 G/M2
LEFT VENTRICLE SYSTOLIC VOLUME INDEX: 25.1 ML/M2
LEFT VENTRICLE SYSTOLIC VOLUME: 50.72 ML
LEFT VENTRICULAR INTERNAL DIMENSION IN DIASTOLE: 6 CM (ref 3.5–6)
LEFT VENTRICULAR MASS: 223.36 G
LV LATERAL E/E' RATIO: 5.8
LV SEPTAL E/E' RATIO: 6.44
MV PEAK A VEL: 0.47 M/S
MV PEAK E VEL: 0.58 M/S
PISA TR MAX VEL: 2.64 M/S
RA MAJOR: 6.08 CM
RA PRESSURE: 3 MMHG
RA WIDTH: 3.58 CM
RIGHT VENTRICULAR END-DIASTOLIC DIMENSION: 4.02 CM
SINUS: 3.6 CM
STJ: 3.4 CM
TDI LATERAL: 0.1
TDI SEPTAL: 0.09
TDI: 0.1
TR MAX PG: 27.88 MMHG
TRICUSPID ANNULAR PLANE SYSTOLIC EXCURSION: 2.42 CM
TV REST PULMONARY ARTERY PRESSURE: 30.88 MMHG

## 2018-11-26 PROCEDURE — 99999 PR PBB SHADOW E&M-EST. PATIENT-LVL III: CPT | Mod: PBBFAC,,, | Performed by: INTERNAL MEDICINE

## 2018-11-26 PROCEDURE — 99215 OFFICE O/P EST HI 40 MIN: CPT | Mod: S$GLB,,, | Performed by: INTERNAL MEDICINE

## 2018-11-26 PROCEDURE — 1101F PT FALLS ASSESS-DOCD LE1/YR: CPT | Mod: CPTII,S$GLB,, | Performed by: INTERNAL MEDICINE

## 2018-11-26 PROCEDURE — 93000 ELECTROCARDIOGRAM COMPLETE: CPT | Mod: S$GLB,,, | Performed by: INTERNAL MEDICINE

## 2018-11-26 PROCEDURE — 3078F DIAST BP <80 MM HG: CPT | Mod: CPTII,S$GLB,, | Performed by: INTERNAL MEDICINE

## 2018-11-26 PROCEDURE — 93306 TTE W/DOPPLER COMPLETE: CPT | Mod: S$GLB,,, | Performed by: INTERNAL MEDICINE

## 2018-11-26 PROCEDURE — 3074F SYST BP LT 130 MM HG: CPT | Mod: CPTII,S$GLB,, | Performed by: INTERNAL MEDICINE

## 2018-11-26 RX ORDER — AMLODIPINE BESYLATE 5 MG/1
5 TABLET ORAL DAILY
Qty: 90 TABLET | Refills: 3 | Status: ON HOLD | OUTPATIENT
Start: 2018-11-26 | End: 2018-12-10 | Stop reason: SDUPTHER

## 2018-11-26 NOTE — H&P (VIEW-ONLY)
Subjective:    Patient ID:  Benitez Cabrales is a 78 y.o. male who presents for follow up of AF    Atrial Fibrillation   Symptoms are negative for chest pain, dizziness, palpitations, shortness of breath, syncope and weakness. Past medical history includes atrial fibrillation.     78 y.o. M  Remote dx of AF (~10 y ago; only Rx was beta blockade)  HTN on meds   HL on meds   RBBB, longstanding     Had event monitor placed for dizzy/palpitations. Turns out dizziness was really vertigo and balance issues -- no LH, no presync/sync.  He remains on amiodarone (normal PFT and TFT/LFT); CHADSVASC 3.  ILR with no events on several interrogations. It's reached RRT.    echo 55-60%. severe LAE.  TSH, LFTs OK.    My interpretation of today's ecg is sinus shraddha at 45 bpm with first deg AVB and RBBB (baseline).    Review of Systems   Constitution: Negative. Negative for weakness and malaise/fatigue.   HENT: Negative.  Negative for congestion, ear pain and tinnitus.    Eyes: Negative for blurred vision and double vision.   Cardiovascular: Negative.  Negative for chest pain, dyspnea on exertion, near-syncope, palpitations and syncope.   Respiratory: Negative.  Negative for cough and shortness of breath.    Endocrine: Negative.  Negative for cold intolerance, heat intolerance and polyuria.   Hematologic/Lymphatic: Does not bruise/bleed easily.   Skin: Negative.  Negative for dry skin, flushing and rash.   Musculoskeletal: Negative.  Negative for back pain, falls, joint pain and muscle weakness.   Gastrointestinal: Negative.  Negative for abdominal pain, change in bowel habit, nausea and vomiting.   Genitourinary: Negative for dysuria, flank pain and frequency.   Neurological: Negative.  Negative for dizziness, headaches and light-headedness.   Psychiatric/Behavioral: Negative.  Negative for altered mental status and depression. The patient is not nervous/anxious.    Allergic/Immunologic: Negative for environmental allergies.         Objective:    Physical Exam   Constitutional: He is oriented to person, place, and time. He appears well-developed and well-nourished.   HENT:   Head: Normocephalic and atraumatic.   Eyes: Conjunctivae, EOM and lids are normal. No scleral icterus.   Neck: Normal range of motion. Neck supple. No JVD present. No tracheal deviation present. No thyromegaly present.   Cardiovascular: Regular rhythm, normal heart sounds and intact distal pulses.  No extrasystoles are present. Bradycardia present. PMI is not displaced. Exam reveals no gallop and no friction rub.   No murmur heard.  Pulses:       Radial pulses are 2+ on the right side, and 2+ on the left side.   Pulmonary/Chest: Effort normal and breath sounds normal. No accessory muscle usage. No tachypnea. No respiratory distress. He has no wheezes. He has no rales.   Abdominal: Soft. Bowel sounds are normal. He exhibits no distension. There is no hepatosplenomegaly. There is no tenderness.   Musculoskeletal: Normal range of motion. He exhibits no edema.   Neurological: He is alert and oriented to person, place, and time. He has normal reflexes. No cranial nerve deficit. He exhibits normal muscle tone.   Skin: Skin is warm and dry. No rash noted.   Psychiatric: He has a normal mood and affect. His behavior is normal.   Nursing note and vitals reviewed.        Assessment:       1. Chronic obstructive pulmonary disease, unspecified COPD type    2. PAF (paroxysmal atrial fibrillation)    3. Essential hypertension    4. Hyperlipidemia, unspecified hyperlipidemia type    5. Paroxysmal atrial fibrillation    6. RBBB         Plan:       78 y.o. male with pAF on amiodarone with ILR demonstrating no recent episodes of AF.    Add norvasc for HTN.  ILR removal in near future. No intent to replace at this time.  I discussed with patient risks, indications, benefits, and alternatives of the planned procedure. All questions were answered. Patient understands and wishes to  proceed.    Return in 1 year with echo and amio tests, or earlier prn.

## 2018-11-28 ENCOUNTER — PATIENT MESSAGE (OUTPATIENT)
Dept: ELECTROPHYSIOLOGY | Facility: CLINIC | Age: 78
End: 2018-11-28

## 2018-11-28 DIAGNOSIS — I48.91 ATRIAL FIBRILLATION, UNSPECIFIED TYPE: Primary | ICD-10-CM

## 2018-11-29 NOTE — PROGRESS NOTES
LOOP RECORDER REMOVAL EDUCATION CHECKLIST    PRE PROCEDURE TESTING     12/3/18 @ 10 AM   Pre-procedure labs have been ordered for you at:  [facility]  · Be sure to arrive at your scheduled time. YOU DO NOT HAVE TO FAST FOR THIS LAB WORK!      DAY OF PROCEDURE    12/10/18 @ 9 AM   Report to Cardiology Waiting Room on the 3rd floor of the Hospital  · Wash your chest with HIBICLENS OR AN ANTIBACTERIAL SOAP (such as Dial) on the night before and the morning of your procedure.  · Please do not wear makeup, especially mascara, when arriving for your procedure.  · Do not eat or drink anything after 12 mn on the night before your procedure    Medications  · HOLD PRADAXA for 3 days prior to your procedure. LAST DOSE: 12/6/18  · You may take your other usual morning medications with a sip of water.    Directions to Cardiology Waiting Room  If you park in the Parking Garage:  Take elevators to the 2nd floor  Walk up ramp and turn right by Gold Elevators  Take elevator to the 3rd floor  Upon exiting the elevator, turn away from the clinic areas  Walk long garcia around to front of hospital to area with windows overlooking [facility]  Check in at Reception Desk  OR  If family is dropping you off:  Have them drop you off at the front of the Hospital  (Near the ER, where all the flags are hung).  Take the E elevators to the 3rd floor.  Check in at the Reception Desk in the waiting room.    · YOU WILL BE GOING HOME AFTER YOUR PROCEDURE  · YOU WILL NEED SOMEONE TO DRIVE YOU HOME AFTER YOUR PROCEDURE  · YOUR PAIN DURING YOUR PROCEDURE WILL BE MANAGED BY THE SEDATION NURSE    Any need to reschedule or cancel procedures, or any questions regarding your procedures should be addressed directly with the Arrhythmia Department Nurses at the following phone number: [phone]

## 2018-12-04 ENCOUNTER — PATIENT MESSAGE (OUTPATIENT)
Dept: FAMILY MEDICINE | Facility: CLINIC | Age: 78
End: 2018-12-04

## 2018-12-04 DIAGNOSIS — M79.672 LEFT FOOT PAIN: Primary | ICD-10-CM

## 2018-12-05 ENCOUNTER — TELEPHONE (OUTPATIENT)
Dept: FAMILY MEDICINE | Facility: CLINIC | Age: 78
End: 2018-12-05

## 2018-12-05 NOTE — TELEPHONE ENCOUNTER
----- Message from Marysol Gill sent at 12/3/2018  3:44 PM CST -----  Contact: Self   Patient need a referral for Podiatry. Please call at [phone].

## 2018-12-05 NOTE — TELEPHONE ENCOUNTER
Please review, patient already has a pending my [facility] message regarding this referral issue.  If there is a pending message, we can close out on phone message.  Always ask patient if this is there 1st or 2nd call regarding a matter     Thanks, I have his my [facility] message to this could be closed

## 2018-12-05 NOTE — TELEPHONE ENCOUNTER
----- Message from Sonia Yost sent at 12/5/2018  9:43 AM CST -----  Contact: Self/ [phone]  Patient returned staffs call.  Thank you

## 2018-12-06 ENCOUNTER — TELEPHONE (OUTPATIENT)
Dept: FAMILY MEDICINE | Facility: CLINIC | Age: 78
End: 2018-12-06

## 2018-12-06 ENCOUNTER — LAB VISIT (OUTPATIENT)
Dept: LAB | Facility: HOSPITAL | Age: 78
End: 2018-12-06
Attending: INTERNAL MEDICINE
Payer: MEDICARE

## 2018-12-06 DIAGNOSIS — I10 ESSENTIAL HYPERTENSION: Chronic | ICD-10-CM

## 2018-12-06 DIAGNOSIS — I45.10 RBBB: ICD-10-CM

## 2018-12-06 DIAGNOSIS — E78.5 HYPERLIPIDEMIA, UNSPECIFIED HYPERLIPIDEMIA TYPE: Chronic | ICD-10-CM

## 2018-12-06 DIAGNOSIS — I48.0 PAROXYSMAL ATRIAL FIBRILLATION: Chronic | ICD-10-CM

## 2018-12-06 LAB
ALBUMIN SERPL BCP-MCNC: 3.4 G/DL
ALP SERPL-CCNC: 63 U/L
ALT SERPL W/O P-5'-P-CCNC: 29 U/L
AST SERPL-CCNC: 28 U/L
BILIRUB DIRECT SERPL-MCNC: 0.4 MG/DL
BILIRUB SERPL-MCNC: 0.7 MG/DL
PROT SERPL-MCNC: 6.5 G/DL
TSH SERPL DL<=0.005 MIU/L-ACNC: 0.5 UIU/ML

## 2018-12-06 PROCEDURE — 36415 COLL VENOUS BLD VENIPUNCTURE: CPT

## 2018-12-06 PROCEDURE — 84443 ASSAY THYROID STIM HORMONE: CPT

## 2018-12-06 PROCEDURE — 80076 HEPATIC FUNCTION PANEL: CPT

## 2018-12-06 NOTE — LETTER
December 6, 2018    Benitez CHRISTOPH Cabrales  [address]  [place] [address]             [facility]  [address] [address]  Phone: [phone]  Fax: [phone] Dear Mr. Cabrales:    I have been unable to reach you by phone for your appointment to Podiatry .  Please call me at the clinic [phone] to book your appointment.      If you have any questions or concerns, please don't hesitate to call.    Sincerely,        Yumiko Gregorio MA

## 2018-12-07 ENCOUNTER — TELEPHONE (OUTPATIENT)
Dept: ELECTROPHYSIOLOGY | Facility: CLINIC | Age: 78
End: 2018-12-07

## 2018-12-07 NOTE — TELEPHONE ENCOUNTER
Called pt to review pre op instructions for loop removal on Monday. Confirmed arrival time and reminded pt to fast after midnight, and hold pradaxa today - after procedure. Pt verbalized understanding.

## 2018-12-10 ENCOUNTER — HOSPITAL ENCOUNTER (OUTPATIENT)
Facility: HOSPITAL | Age: 78
Discharge: HOME | End: 2018-12-10
Attending: INTERNAL MEDICINE | Admitting: INTERNAL MEDICINE
Payer: MEDICARE

## 2018-12-10 VITALS
HEART RATE: 55 BPM | RESPIRATION RATE: 16 BRPM | TEMPERATURE: 98 F | DIASTOLIC BLOOD PRESSURE: 71 MMHG | WEIGHT: 174 LBS | HEIGHT: 72 IN | SYSTOLIC BLOOD PRESSURE: 155 MMHG | BODY MASS INDEX: 23.57 KG/M2 | OXYGEN SATURATION: 99 %

## 2018-12-10 DIAGNOSIS — I48.0 PAROXYSMAL ATRIAL FIBRILLATION: Chronic | ICD-10-CM

## 2018-12-10 DIAGNOSIS — Z45.09 ENCOUNTER FOR LOOP RECORDER AT END OF BATTERY LIFE: Primary | ICD-10-CM

## 2018-12-10 PROCEDURE — 25000003 PHARM REV CODE 250: Performed by: INTERNAL MEDICINE

## 2018-12-10 PROCEDURE — 33284 *HC REMOVAL LOOP RECORDER: CPT | Performed by: INTERNAL MEDICINE

## 2018-12-10 PROCEDURE — 33284 PR RMV CARD EVENT RECRD,PT-ACT: CPT | Mod: ,,, | Performed by: INTERNAL MEDICINE

## 2018-12-10 PROCEDURE — 93005 ELECTROCARDIOGRAM TRACING: CPT

## 2018-12-10 PROCEDURE — 93010 ELECTROCARDIOGRAM REPORT: CPT | Mod: ,,, | Performed by: INTERNAL MEDICINE

## 2018-12-10 PROCEDURE — 63600175 PHARM REV CODE 636 W HCPCS: Performed by: INTERNAL MEDICINE

## 2018-12-10 PROCEDURE — 25000003 PHARM REV CODE 250: Performed by: NURSE PRACTITIONER

## 2018-12-10 PROCEDURE — 63600175 PHARM REV CODE 636 W HCPCS: Performed by: NURSE PRACTITIONER

## 2018-12-10 RX ORDER — METOPROLOL SUCCINATE 25 MG/1
25 TABLET, EXTENDED RELEASE ORAL DAILY
Start: 2018-12-10 | End: 2019-08-16 | Stop reason: SDUPTHER

## 2018-12-10 RX ORDER — SODIUM CHLORIDE 9 MG/ML
INJECTION, SOLUTION INTRAVENOUS CONTINUOUS
Status: DISCONTINUED | OUTPATIENT
Start: 2018-12-10 | End: 2018-12-10 | Stop reason: HOSPADM

## 2018-12-10 RX ORDER — CEFAZOLIN SODIUM 1 G/3ML
INJECTION, POWDER, FOR SOLUTION INTRAMUSCULAR; INTRAVENOUS
Status: DISCONTINUED | OUTPATIENT
Start: 2018-12-10 | End: 2018-12-10 | Stop reason: HOSPADM

## 2018-12-10 RX ORDER — AMLODIPINE BESYLATE 5 MG/1
10 TABLET ORAL DAILY
Qty: 90 TABLET | Refills: 3 | Status: SHIPPED | OUTPATIENT
Start: 2018-12-10 | End: 2019-12-12 | Stop reason: SDUPTHER

## 2018-12-10 RX ORDER — LIDOCAINE HCL/EPINEPHRINE/PF 2%-1:200K
VIAL (ML) INJECTION
Status: DISCONTINUED | OUTPATIENT
Start: 2018-12-10 | End: 2018-12-10 | Stop reason: HOSPADM

## 2018-12-10 RX ORDER — ACETAMINOPHEN 325 MG/1
650 TABLET ORAL EVERY 6 HOURS PRN
Status: DISCONTINUED | OUTPATIENT
Start: 2018-12-10 | End: 2018-12-10 | Stop reason: HOSPADM

## 2018-12-10 RX ORDER — CEFAZOLIN SODIUM 1 G/3ML
2 INJECTION, POWDER, FOR SOLUTION INTRAMUSCULAR; INTRAVENOUS
Status: DISCONTINUED | OUTPATIENT
Start: 2018-12-10 | End: 2018-12-10 | Stop reason: HOSPADM

## 2018-12-10 RX ORDER — AMLODIPINE BESYLATE 5 MG/1
10 TABLET ORAL DAILY
Status: DISCONTINUED | OUTPATIENT
Start: 2018-12-10 | End: 2018-12-10 | Stop reason: HOSPADM

## 2018-12-10 RX ADMIN — AMLODIPINE BESYLATE 10 MG: 5 TABLET ORAL at 11:12

## 2018-12-10 NOTE — PLAN OF CARE
Problem: Surgical Site Infection  Goal: Improved Infection Symptoms  Outcome: Ongoing (interventions implemented as appropriate)  Site intact. No signs of infection.

## 2018-12-10 NOTE — NURSING
Ambulated on unit this morning with wife at side.  Verbalized understanding of procedure.  Denies pain or SOB.  Will continue to monitor.

## 2018-12-10 NOTE — NURSING
Awake and alert.  Tolerating sandwich and fluids.  Wife at bedside.  Left chest wall aquacell dressing clean dry and intact.

## 2018-12-10 NOTE — DISCHARGE SUMMARY
[facility]  Cardiac Electrophysiology  Discharge Summary      Patient Name: Benitez Cabrales  MRN: [id number]  Admission Date: 12/10/2018  Hospital Length of Stay: 0 days  Discharge Date and Time: 12/10/2018  1:32 PM  Attending Physician: Mickey Morales MD  Discharging Provider: Beth Patino NP  Primary Care Physician: Diomedes Ayoub MD    HPI: Mr. Cabrales is a 78 year old male with a PMHx of AF, HTN, HLD, RBBB, vertigo, ILR (no events over the past several interrogations-has reached RRT-no need for reimplant), CHADSVASC 3, and EF 55-60%. He presents today for removal of ILR with Dr. Morales. He denies any chest pain, palpitations, SOB, dizziness, light headedness, weakness, syncope, MAZA, or near syncopal episodes. He denies any bleeding, infections, rashes, or surgeries in the past 30 days. He is currently taking pradaxa (last dose 12/6/18), Toprol 50 mg daily, and amlodipine 5 mg daily.      The patient has been examined and the H&P has been reviewed:     I concur with the findings and no changes have occurred since H&P was written.     No labs needed per Dr. Morales. Labs from 11/12/18 reviewed. Vital signs and nursing noted reviewed. /76 HR 49. Patient is slightly nervous about procedure. Dr. Morales spoke with patient and is aware of BP. Plan to adjust home medications upon discharge.     I have personally reviewed the patient's EKG today, which shows sinus bradycardia with a ventricular rate of 49 bpm. QT is 524.     Procedure(s) (LRB):  REMOVAL, IMPLANTABLE LOOP RECORDER (N/A)     Indwelling Lines/Drains at time of discharge:  Lines/Drains/Airways          None        Hospital Course: Patient underwent ILR removal, tolerated procedure well, no acute complications noted. Left chest site with Aquacel foam dressing, C/D/I with no bleeding or hematoma noted. VSS. Patient monitors BP at home daily. Patient to follow up with device clinic in 1 week for wound check and in 1 month with   Carmen. Resume Pradaxa in 2 days per Dr. Morales. Decrease Toprol to 25 mg daily and increase Amlodipine to 10 mg daily. Patient's BP elevated. Daily dose of amlodipine (10 mg) adminstered after his procedure while in [facility]. BP improved. Instructed patient he received his daily dose and to resume Amlodipine 10 mg tomorrow on his normal schedule. Continue all other home medications. Patient and wife verbalized understanding.   Discharge plans/instructions discussed with patient and personal home nurse who verbalized understanding and agreement of plans of care. No further questions or concern voiced at this time.   Physical Exam   Constitutional: He is oriented to person, place, and time. He appears well-developed and well-nourished.   HENT:   Head: Normocephalic and atraumatic.   Eyes: Conjunctivae, EOM and lids are normal. No scleral icterus.   Neck: Normal range of motion. No JVD present. No tracheal deviation present. No thyromegaly present.   Cardiovascular: Normal rate and intact distal pulses. Sinus bradycardia with first degree AV block. No extrasystoles are present. PMI is not displaced. Exam reveals no gallop and no friction rub. No murmur heard.  Pulses:       Radial pulses are 2+ on the right side, and 2+ on the left side.        Pedal pulses are 2+ on the right side, and 2+ on the left side.   Pulmonary/Chest: Effort normal and breath sounds normal. No accessory muscle usage. No tachypnea. No respiratory distress. He has no wheezes. He has no rales. Left chest site with Aquacel foam dressing, C/D/I with no bleeding or hematoma noted.   Abdominal: Soft. Bowel sounds are normal. He exhibits no distension. There is no hepatosplenomegaly. There is no tenderness.   Musculoskeletal: Normal range of motion. He exhibits no edema.   Neurological: He is alert and oriented to person, place, and time. He has normal reflexes. He exhibits normal muscle tone.   Skin: Skin is warm and dry. No rash noted.   Psychiatric: He  has a normal mood and affect. His behavior is normal.   Nursing note and vitals reviewed.     Consults:   None.     Significant Diagnostic Studies: Labs from 11/12/18 reviewed. No new labs needed per Dr. Morales.  Lab Results   Component Value Date    INR 1.0 11/24/2016    INR 1.0 07/01/2015    INR 1.2 04/21/2014     Cardiac Graphics: Echocardiogram:   2D echo with color flow doppler:   Results for orders placed or performed during the hospital encounter of 03/23/18   2D echo with color flow doppler   Result Value Ref Range    QEF 55 55 - 65    Mitral Valve Regurgitation MILD     Diastolic Dysfunction No     Aortic Valve Regurgitation TRIVIAL     Est. PA Systolic Pressure 24.53     Tricuspid Valve Regurgitation TRIVIAL       Results for orders placed or performed during the hospital encounter of 11/26/18   Transthoracic echo (TTE) complete   Result Value Ref Range    Ascending aorta 3.65 cm    STJ 3.40 cm    AV mean gradient 2.79 mmHg    Ao peak chan 1.15 m/s    Ao VTI 28.15 cm    IVS 0.89 0.6 - 1.1 cm    LA size 4.60 cm    Left Atrium Major Axis 5.50 cm    Left Atrium Minor Axis 5.00 cm    LVIDD 6.00 3.5 - 6.0 cm    LVIDS 4.00 2.1 - 4.0 cm    LVOT diameter 2.33 cm    LVOT peak VTI 17.46 cm    PW 0.96 0.6 - 1.1 cm    MV Peak A Chan 0.47 m/s    E wave decelartion time 212.07 msec    MV Peak E Chan 0.58 m/s    RA Major Axis 6.08 cm    RA Width 3.58 cm    RVDD 4.02 cm    Sinus 3.60 cm    TAPSE 2.42 cm    TR Max Chan 2.64 m/s    TDI LATERAL 0.10     TDI SEPTAL 0.09     LA WIDTH 3.84 cm    LV Diastolic Volume 122.98 mL    LV Systolic Volume 50.72 mL    LV LATERAL E/E' RATIO 5.80     LV SEPTAL E/E' RATIO 6.44     FS 33 %    LA volume 78.65 cm3    LV mass 223.36 g    Left Ventricle Relative Wall Thickness 0.32 cm    AV valve area 2.64 cm2    AV index (prosthetic) 0.62     E/A ratio 1.23     Mean e' 0.10     LVOT area 4.26 cm2    LVOT stroke volume 74.41 cm3    AV peak gradient 5.29 mmHg    E/E' ratio 6.11     Triscuspid Valve  Regurgitation Peak Gradient 27.88 mmHg    BSA 2.02 m2    LV Systolic Volume Index 25.1 mL/m2    LV Diastolic Volume Index 60.88 mL/m2    LA Volume Index 38.9 mL/m2    LV Mass Index 110.6 g/m2    Right Atrial Pressure (from IVC) 3 mmHg    TV rest pulmonary artery pressure 30.88 mmHg     Final Active Diagnoses:    Diagnosis Date Noted POA    PRINCIPAL PROBLEM:  Encounter for loop recorder at end of battery life [Z45.09] 12/10/2018 Not Applicable      Problems Resolved During this Admission:     No new Assessment & Plan notes have been filed under this hospital service since the last note was generated.  Service: Arrhythmia    Discharged Condition: stable    Disposition: Home or Self Care    Follow Up:  Follow-up Information     WOUND CHECK, [facility] In 1 week.    Why:  s/p ILR removal         Mickey Morales MD In 1 month.    Specialties:  Cardiology, Electrophysiology  Why:  s/p ILR removal/med changes  Contact information:  [address]  [facility] [address]  [phone]             Patient Instructions:      Diet Cardiac     Other restrictions (specify):   Order Comments: 1. Do not get the wound wet for 5 days.  You should avoid taking a shower and if you need to bathe you should use a sponge to wet all areas of your body except for your wound.  2. You can remove the outside bandage in 48 hours. The gauze pad will also detach from your wound. That is okay.  You should not remove the purple skin glue that covers your wound. These will come off on their own.  3. It is normal to have some pain around the site and some bruising. However constant pain, severe tenderness and pus coming from your wound is not normal and you should call your physician immediately or seek attention at the ER. Fevers and chills and feeling ill is not normal and you should seek immediate medical attention  4. Follow up wound check in 1 week.  5. Medication changes:  -Amlodipine increase to 10 mg once per day. (You received your daily dose  of this medication today while you were in the hospital. Resume this medication tomorrow at your normal scheduled time.)  -Metoprolol decrease to 25 mg once per day (continue this medication today at your normal scheduled time).  -Resume your Pradaxa two days after your procedure.  -Continue all of your other home medications.  -Call the clinic if you have any questions regarding your medications.     Notify your health care provider if you experience any of the following:  temperature >100.4     Notify your health care provider if you experience any of the following:  persistent nausea and vomiting or diarrhea     Notify your health care provider if you experience any of the following:  severe uncontrolled pain     Notify your health care provider if you experience any of the following:  redness, tenderness, or signs of infection (pain, swelling, redness, odor or green/yellow discharge around incision site)     Notify your health care provider if you experience any of the following:  difficulty breathing or increased cough     Notify your health care provider if you experience any of the following:  severe persistent headache     Notify your health care provider if you experience any of the following:  worsening rash     Notify your health care provider if you experience any of the following:  persistent dizziness, light-headedness, or visual disturbances     Notify your health care provider if you experience any of the following:  increased confusion or weakness     Notify your health care provider if you experience any of the following:   Order Comments: For any concerning symptoms.     Remove dressing in 48 hours   Order Comments: Keep site clean, dry, and intact.     Activity as tolerated     Medications:  Reconciled Home Medications:      Medication List      CHANGE how you take these medications    amLODIPine 5 MG tablet  Commonly known as:  NORVASC  Take 2 tablets (10 mg total) by mouth once daily.  What  changed:  how much to take     metoprolol succinate 25 MG 24 hr tablet  Commonly known as:  TOPROL-XL  Take 1 tablet (25 mg total) by mouth once daily.  What changed:    · medication strength  · how much to take        CONTINUE taking these medications    amiodarone 200 MG Tab  Commonly known as:  PACERONE  Take 1 tablet (200 mg total) by mouth once daily.     BD MAGNI-GUIDE SYRINGE MAGNIFI MISC  by Misc.(Non-Drug; Combo Route) route.     dabigatran etexilate 150 mg Cap  Commonly known as:  PRADAXA  Take 1 capsule (150 mg total) by mouth 2 (two) times daily.     diclofenac sodium 1 % Gel  Commonly known as:  VOLTAREN  Apply 2 g topically 4 (four) times daily.     doxepin 10 MG capsule  Commonly known as:  SINEQUAN  1-2 HS prn itching     fish oil-omega-3 fatty acids 300-1,000 mg capsule  Take 2 g by mouth once daily.     magnesium 200 mg Tab  Take by mouth once daily.     meclizine 25 mg tablet  Commonly known as:  ANTIVERT  Take 1 tablet (25 mg total) by mouth 3 (three) times daily as needed for Dizziness (at least one HS).     NEHEMIAH MULTIVITAMIN FOR MEN ORAL  Take 1 tablet by mouth once daily.     pantoprazole 40 MG tablet  Commonly known as:  PROTONIX  Take 1 tablet (40 mg total) by mouth 2 (two) times daily.     rosuvastatin 10 MG tablet  Commonly known as:  CRESTOR  Take 1 tablet (10 mg total) by mouth every evening.     valsartan-hydrochlorothiazide 320-25 mg per tablet  Commonly known as:  DIOVAN-HCT  Take 1 tablet by mouth once daily.     VITAMIN D3 2,000 unit Cap  Generic drug:  cholecalciferol (vitamin D3)  Take 1 capsule by mouth once daily.     VITAMIN K2 ORAL  Take by mouth. 2 tablet every other Day.          Plan:  -Increase Amlodipine to 10 mg daily.  -Decrease Toprol to 25 mg daily.  -Resume pradaxa 2 days post procedure.  -Continue all other home medications.  -Wound check in 1 week.  -Follow up with Dr. Morales in 1 month.     Time spent on the discharge of patient: 19 minutes    Beth Patino  NP  Cardiac Electrophysiology  [facility]    Attending: Mickey Morales MD

## 2018-12-10 NOTE — NURSING
Pt and pt wife verbalized understanding of d/c instructions.  Dressing to left chest wall remains clean dry and intact.  Refused wheelchair and ambulated off unit for discharge home.

## 2018-12-10 NOTE — PLAN OF CARE
Problem: Adult Inpatient Plan of Care  Goal: Plan of Care Review  Outcome: Ongoing (interventions implemented as appropriate)  Pt returned to [facility] 7A. Spouse at bedside. Pt ambulates with no assistance and has even gait. Dermabond to left chest wall incision. Dressing at bedside for placement at 1150. Chair locked in lowest position, nonskid socks on pt. Pt denies pain at this time.

## 2018-12-10 NOTE — INTERVAL H&P NOTE
Mr. Cabrales is a 78 year old male with a PMHx of AF, HTN, HLD, RBBB, vertigo, ILR (no events over the past several interrogations-has reached RRT-no need for reimplant), CHADSVASC 3, and EF 55-60%. He presents today for removal of ILR with Dr. Morales. He denies any chest pain, palpitations, SOB, dizziness, light headedness, weakness, syncope, MAZA, or near syncopal episodes. He denies any bleeding, infections, rashes, or surgeries in the past 30 days. He is currently taking pradaxa (last dose 12/6/18), Toprol 50 mg daily, and amlodipine 5 mg daily.     The patient has been examined and the H&P has been reviewed:    I concur with the findings and no changes have occurred since H&P was written.    No labs needed per Dr. Morales. Labs from 11/12/18 reviewed. Vital signs and nursing noted reviewed. /76 HR 49. Patient is slightly nervous about procedure. Dr. Morales spoke with patient and is aware of BP. Plan to adjust home medications upon discharge.    I have personally reviewed the patient's EKG today, which shows sinus bradycardia with a ventricular rate of 49 bpm. QT is 524.    Review of Systems   Constitution: Negative. Negative for fevers/chills, weakness and malaise/fatigue.   HENT: Negative. Negative for ear pain and tinnitus.    Eyes: Negative for blurred vision.   Cardiovascular: Negative. Negative for chest pain, dyspnea on exertion, leg swelling, near-syncope, palpitations and syncope.   Respiratory: Negative. Negative for shortness of breath.    Endocrine: Negative.  Negative for polyuria.   Hematologic/Lymphatic: Positive for bruise/bleed easily. Negative for significant bleeding.  Skin: Negative.  Negative for rash.   Musculoskeletal: Negative.  Negative for joint pain and muscle weakness.   Gastrointestinal: Negative.  Negative for abdominal pain hematemesis, hematochezia, and change in bowel habit.   Genitourinary: Negative for frequency or hematuria.   Neurological: Negative.  Negative for dizziness.    Psychiatric/Behavioral: Negative.  Negative for depression. The patient is not nervous/anxious.       Physical Exam   Constitutional: He is oriented to person, place, and time. He appears well-developed and well-nourished.   HENT:   Head: Normocephalic and atraumatic.   Eyes: Conjunctivae, EOM and lids are normal. No scleral icterus.   Neck: Normal range of motion. No JVD present. No tracheal deviation present. No thyromegaly present.   Cardiovascular: Normal rate and intact distal pulses. Sinus bradycardia with first degree AV block. No extrasystoles are present. PMI is not displaced. Exam reveals no gallop and no friction rub. No murmur heard.  Pulses:       Radial pulses are 2+ on the right side, and 2+ on the left side.        Pedal pulses are 2+ on the right side, and 2+ on the left side.   Pulmonary/Chest: Effort normal and breath sounds normal. No accessory muscle usage. No tachypnea. No respiratory distress. He has no wheezes. He has no rales. ILR to left chest wall skin intact, without any rashes, erythema, swelling, or warmth.  Abdominal: Soft. Bowel sounds are normal. He exhibits no distension. There is no hepatosplenomegaly. There is no tenderness.   Musculoskeletal: Normal range of motion. He exhibits no edema.   Neurological: He is alert and oriented to person, place, and time. He has normal reflexes. He exhibits normal muscle tone.   Skin: Skin is warm and dry. No rash noted.   Psychiatric: He has a normal mood and affect. His behavior is normal.   Nursing note and vitals reviewed.      Active Hospital Problems    Diagnosis  POA    Encounter for loop recorder at end of battery life [Z45.09]  Not Applicable      Resolved Hospital Problems   No resolved problems to display.     Dr. Morales at bedside speaking with patient. Risks, benefits, and alternatives of ILR removal discussed with patient and wife. Patient will receive local anesthetic for removal. Patient and wife verbalized understanding, all  questions answered.    Plan:  -ILR removal.  -Local anesthetic.    DAVID Poole  Cardiology Electrophysiology  NP   [facility]    Attending: Mickey Morales MD

## 2018-12-13 ENCOUNTER — PATIENT MESSAGE (OUTPATIENT)
Dept: ELECTROPHYSIOLOGY | Facility: CLINIC | Age: 78
End: 2018-12-13

## 2018-12-27 ENCOUNTER — LAB VISIT (OUTPATIENT)
Dept: LAB | Facility: HOSPITAL | Age: 78
End: 2018-12-27
Attending: INTERNAL MEDICINE
Payer: MEDICARE

## 2018-12-27 ENCOUNTER — OFFICE VISIT (OUTPATIENT)
Dept: FAMILY MEDICINE | Facility: CLINIC | Age: 78
End: 2018-12-27
Payer: MEDICARE

## 2018-12-27 VITALS
HEIGHT: 72 IN | WEIGHT: 179.88 LBS | TEMPERATURE: 98 F | OXYGEN SATURATION: 97 % | SYSTOLIC BLOOD PRESSURE: 132 MMHG | DIASTOLIC BLOOD PRESSURE: 70 MMHG | HEART RATE: 57 BPM | BODY MASS INDEX: 24.36 KG/M2

## 2018-12-27 DIAGNOSIS — I48.0 PAROXYSMAL ATRIAL FIBRILLATION: Primary | ICD-10-CM

## 2018-12-27 DIAGNOSIS — J44.9 CHRONIC OBSTRUCTIVE PULMONARY DISEASE, UNSPECIFIED COPD TYPE: ICD-10-CM

## 2018-12-27 DIAGNOSIS — I48.0 PAROXYSMAL ATRIAL FIBRILLATION: ICD-10-CM

## 2018-12-27 DIAGNOSIS — R42 DIZZINESS: ICD-10-CM

## 2018-12-27 LAB
ANION GAP SERPL CALC-SCNC: 7 MMOL/L
BUN SERPL-MCNC: 22 MG/DL
CALCIUM SERPL-MCNC: 9.4 MG/DL
CHLORIDE SERPL-SCNC: 96 MMOL/L
CO2 SERPL-SCNC: 28 MMOL/L
CREAT SERPL-MCNC: 1.2 MG/DL
EST. GFR  (AFRICAN AMERICAN): >60 ML/MIN/1.73 M^2
EST. GFR  (NON AFRICAN AMERICAN): 58 ML/MIN/1.73 M^2
GLUCOSE SERPL-MCNC: 97 MG/DL
MAGNESIUM SERPL-MCNC: 1.9 MG/DL
POTASSIUM SERPL-SCNC: 4 MMOL/L
SODIUM SERPL-SCNC: 131 MMOL/L
T4 FREE SERPL-MCNC: 1.39 NG/DL
TSH SERPL DL<=0.005 MIU/L-ACNC: 0.51 UIU/ML

## 2018-12-27 PROCEDURE — 80048 BASIC METABOLIC PNL TOTAL CA: CPT

## 2018-12-27 PROCEDURE — 3075F SYST BP GE 130 - 139MM HG: CPT | Mod: CPTII,S$GLB,, | Performed by: INTERNAL MEDICINE

## 2018-12-27 PROCEDURE — 1101F PT FALLS ASSESS-DOCD LE1/YR: CPT | Mod: CPTII,S$GLB,, | Performed by: INTERNAL MEDICINE

## 2018-12-27 PROCEDURE — 84439 ASSAY OF FREE THYROXINE: CPT

## 2018-12-27 PROCEDURE — 99499 UNLISTED E&M SERVICE: CPT | Mod: ,,, | Performed by: INTERNAL MEDICINE

## 2018-12-27 PROCEDURE — 99214 OFFICE O/P EST MOD 30 MIN: CPT | Mod: S$GLB,,, | Performed by: INTERNAL MEDICINE

## 2018-12-27 PROCEDURE — 36415 COLL VENOUS BLD VENIPUNCTURE: CPT | Mod: PO

## 2018-12-27 PROCEDURE — 99499 UNLISTED E&M SERVICE: CPT | Mod: S$GLB,,, | Performed by: INTERNAL MEDICINE

## 2018-12-27 PROCEDURE — 3078F DIAST BP <80 MM HG: CPT | Mod: CPTII,S$GLB,, | Performed by: INTERNAL MEDICINE

## 2018-12-27 PROCEDURE — 84443 ASSAY THYROID STIM HORMONE: CPT

## 2018-12-27 PROCEDURE — 83735 ASSAY OF MAGNESIUM: CPT

## 2018-12-27 PROCEDURE — 99999 PR PBB SHADOW E&M-EST. PATIENT-LVL III: CPT | Mod: PBBFAC,,, | Performed by: INTERNAL MEDICINE

## 2018-12-27 NOTE — PROGRESS NOTES
Subjective:       Chief Complaint  Chief Complaint   Patient presents with    Dizziness     pt states on gloria day he had an episode of dizziness and then his legs and arms felt really heavy. He couldn't walk which lasted for about 20 mins before he could walk again     heavy legs       HPI  Benitez Cabrales is a 78 y.o. male with multiple medical diagnoses as listed in the medical history and problem list that presents for multiple symptoms.     2 days prior to visit experienced upper back/neck pain while he was standing in the kitchen   For a 20 minute period felt 'weak' and 'paralyzed' and unable to move  He sat down right away after feeling this way   Became sweaty/diaphoretic and pale per wife  Felt like his feet were 'heavy'  No LOC    Symptoms did not return  No history of stroke   Blood pressure being monitored at home - was relatively low at the time 103 SBP  States that recently changed amlodipine to 10 mg daily with decrease of metoprolol to 25 mg daily from 50 mg daily per Cardiology/Dr Morales  Recently implanted removed loop recorder that was placed 2 years    Patient Care Team:  Diomedes Ayoub MD as PCP - General (Internal Medicine)      PAST MEDICAL HISTORY:  Past Medical History:   Diagnosis Date    Anticoagulant long-term use     Atrial fibrillation     COPD (chronic obstructive pulmonary disease)     GERD (gastroesophageal reflux disease)     Hyperlipidemia     Hypertension     Nuclear sclerosis, bilateral 10/9/2017    Rhinitis medicamentosa 6/30/2014    Small bowel obstruction     Vertigo 6/13/2013       PAST SURGICAL HISTORY:  Past Surgical History:   Procedure Laterality Date    ICM implant      NOSE SURGERY      Septal Repair    REMOVAL, IMPLANTABLE LOOP RECORDER N/A 12/10/2018    Performed by [facility]ales [facility] at [facility]    VASCULAR SURGERY      left leg       SOCIAL HISTORY:  Social History     Socioeconomic History    Marital status:      Spouse name: Not  on file    Number of children: Not on file    Years of education: Not on file    Highest education level: Not on file   Social Needs    Financial resource strain: Not on file    Food insecurity - worry: Not on file    Food insecurity - inability: Not on file    Transportation needs - medical: Not on file    Transportation needs - non-medical: Not on file   Occupational History     Employer: Retired    Tobacco Use    Smoking status: Never Smoker    Smokeless tobacco: Never Used    Tobacco comment: .  Three kids.  Occup:   at [employer].     Substance and Sexual Activity    Alcohol use: Yes     Alcohol/week: 0.6 oz     Types: 1 Glasses of wine per week     Comment: occasional    Drug use: No    Sexual activity: Not Currently   Other Topics Concern    Not on file   Social History Narrative    Not on file       FAMILY HISTORY:  Family History   Problem Relation Age of Onset    Cancer Maternal Aunt     No Known Problems Mother     No Known Problems Father     No Known Problems Sister     No Known Problems Brother     No Known Problems Maternal Uncle     No Known Problems Paternal Aunt     No Known Problems Paternal Uncle     No Known Problems Maternal Grandmother     No Known Problems Maternal Grandfather     No Known Problems Paternal Grandmother     No Known Problems Paternal Grandfather     Asthma Neg Hx     Emphysema Neg Hx     Amblyopia Neg Hx     Blindness Neg Hx     Cataracts Neg Hx     Diabetes Neg Hx     Glaucoma Neg Hx     Hypertension Neg Hx     Macular degeneration Neg Hx     Retinal detachment Neg Hx     Strabismus Neg Hx     Stroke Neg Hx     Thyroid disease Neg Hx        ALLERGIES AND MEDICATIONS: updated and reviewed.  Review of patient's allergies indicates:  No Known Allergies  Current Outpatient Medications   Medication Sig Dispense Refill    amiodarone (PACERONE) 200 MG Tab Take 1 tablet (200 mg total) by mouth once daily. 90 tablet 3     "amLODIPine (NORVASC) 5 MG tablet Take 2 tablets (10 mg total) by mouth once daily. 90 tablet 3    cholecalciferol, vitamin D3, (VITAMIN D3) 2,000 unit Cap Take 1 capsule by mouth once daily.      dabigatran etexilate (PRADAXA) 150 mg Cap Take 1 capsule (150 mg total) by mouth 2 (two) times daily. 180 capsule 3    DIABETIC SUPPLIES,MISCELL (BD MAGNI-GUIDE SYRINGE MAGNIFI MISC) by Misc.(Non-Drug; Combo Route) route.      diclofenac sodium (VOLTAREN) 1 % Gel Apply 2 g topically 4 (four) times daily. 1 Tube 3    doxepin (SINEQUAN) 10 MG capsule 1-2 HS prn itching 60 capsule 11    fish oil-omega-3 fatty acids 300-1,000 mg capsule Take 2 g by mouth once daily.        magnesium 200 mg Tab Take by mouth once daily.      metoprolol succinate (TOPROL-XL) 25 MG 24 hr tablet Take 1 tablet (25 mg total) by mouth once daily.      MV-MN/FA/LYCOPENE/LUT/HB#178 (NEHEMIAH MULTIVITAMIN FOR MEN ORAL) Take 1 tablet by mouth once daily.        rosuvastatin (CRESTOR) 10 MG tablet Take 1 tablet (10 mg total) by mouth every evening. 90 tablet 3    valsartan-hydrochlorothiazide (DIOVAN-HCT) 320-25 mg per tablet Take 1 tablet by mouth once daily. 90 tablet 3    VITAMIN K2 ORAL Take by mouth. 2 tablet every other Day.      pantoprazole (PROTONIX) 40 MG tablet Take 1 tablet (40 mg total) by mouth 2 (two) times daily. 60 tablet 4     No current facility-administered medications for this visit.          ROS  Review of Systems   Eyes: Positive for visual disturbance.   Neurological: Positive for weakness. Negative for dizziness.         Objective:       Physical Exam  Vitals:    12/27/18 1133   BP: 132/70   BP Location: Left arm   Patient Position: Sitting   BP Method: Large (Manual)   Pulse: (!) 57   Temp: 97.6 °F (36.4 °C)   TempSrc: Oral   SpO2: 97%   Weight: 81.6 kg (179 lb 14.3 oz)   Height: 5' 11.5" (1.816 m)    Body mass index is 24.74 kg/m².  Weight: 81.6 kg (179 lb 14.3 oz)   Height: 5' 11.5" (181.6 cm)   Physical Exam "   Constitutional: He appears well-developed and well-nourished. No distress.   HENT:   Head: Normocephalic and atraumatic.   Right Ear: External ear normal.   Left Ear: External ear normal.   Nose: Nose normal.   Mouth/Throat: Oropharynx is clear and moist.   Eyes: EOM are normal. Pupils are equal, round, and reactive to light.   Neck: Normal range of motion. Neck supple. No thyromegaly present.   Cardiovascular: Normal rate, normal heart sounds and intact distal pulses.   No murmur heard.  Pulmonary/Chest: Effort normal and breath sounds normal. No stridor. No respiratory distress.   Abdominal: Soft. Bowel sounds are normal. He exhibits no distension and no mass. There is no tenderness. There is no guarding. No hernia.   Musculoskeletal: Normal range of motion. He exhibits no edema or tenderness.   Neurological: He is alert. No cranial nerve deficit.   Skin: Skin is warm and dry. No rash noted. No erythema.   Psychiatric: He has a normal mood and affect. His behavior is normal.   Vitals reviewed.          Assessment:     1. Paroxysmal atrial fibrillation    2. Dizziness    3. Chronic obstructive pulmonary disease, unspecified COPD type      Plan:     Benitez was seen today for dizziness and heavy legs.    Diagnoses and all orders for this visit:    Paroxysmal atrial fibrillation  Dizziness  Patient on chronic anticoagulation - obtain labs as noted given recent episodic dizziness  Likely multifactorial cause given excessive alcohol ingestion and usage of medications for chronic disease  Symptoms resolved without recurrence   Counseled to limit EtOH intake   -     Basic metabolic panel; Future  -     Magnesium; Future  -     TSH; Future  -     T4, free; Future    Chronic obstructive pulmonary disease, unspecified COPD type  The current medical regimen is effective;  continue present plan and medications.            Health Maintenance       Date Due Completion Date    TETANUS VACCINE 09/28/1959 ---    Zoster Vaccine  09/28/2001 ---    Lipid Panel 07/11/2021 7/11/2016    Colonoscopy 09/22/2021 9/22/2016    Override on 9/22/2016: Done ([facility]Cuauhtemoc Harding MD-Impressions: Diverticulosis of the whole colon. Polypectomy in the distal ascending and tranverse colon. Plan: Colonoscopy in 5 years.)    Override on 6/30/2014: Declined            Health Maintenance reviewed, addressed as per orders    No Follow-up on file.    The patient expressed understanding and no barriers to adherence were identified.     1. The patient indicates understanding of these issues and agrees with the plan. Brief care plan is updated and reviewed with the patient as applicable.     2. The patient is given an After Visit Summary that lists all medications with directions, allergies, orders placed during this encounter and follow-up instructions.     3. I have reviewed the patient's medical information including past medical, family, and social history sections including the medications and allergies.     4. We discussed the patient's current medications. I reconciled the patient's medication list and prepared and supplied needed refills.       Anastacio Rizzo MD  Internal Medicine-Pediatrics

## 2019-01-10 ENCOUNTER — TELEPHONE (OUTPATIENT)
Dept: ELECTROPHYSIOLOGY | Facility: CLINIC | Age: 79
End: 2019-01-10

## 2019-01-10 NOTE — TELEPHONE ENCOUNTER
Called pharmacy per pt's request. [facility] does not carry alternate  for valsartan-hctz recall. Advised pt ok to transfer Rx to another pharmacy to accommodate new medication for recall.

## 2019-01-10 NOTE — TELEPHONE ENCOUNTER
----- Message from Mickey Morales MD sent at 1/9/2019  5:03 PM CST -----  Aren't pts who are affected by the ARB recalls supposed to discuss with the pharmacy whether that batch was in that recall? Has he done that? If so, and yes, is there a new batch available?      ----- Message -----  From: Nasima Mckinney RN  Sent: 1/9/2019   4:15 PM  To: MD Dr. Carmen Barbosa,     In March 2018, you prescribed patient Valsartan Hctz 320/25mg QD per clinic note.     Pt calling because this medication is now under recall.     Is there an alternative or other advice for patient?     Thanks!  Nasima

## 2019-01-15 ENCOUNTER — OFFICE VISIT (OUTPATIENT)
Dept: PODIATRY | Facility: CLINIC | Age: 79
End: 2019-01-15
Payer: MEDICARE

## 2019-01-15 VITALS
SYSTOLIC BLOOD PRESSURE: 138 MMHG | WEIGHT: 179 LBS | HEIGHT: 71 IN | BODY MASS INDEX: 25.06 KG/M2 | DIASTOLIC BLOOD PRESSURE: 74 MMHG

## 2019-01-15 DIAGNOSIS — M20.41 HAMMER TOES OF BOTH FEET: ICD-10-CM

## 2019-01-15 DIAGNOSIS — M20.12 HALLUX ABDUCTO VALGUS, LEFT: ICD-10-CM

## 2019-01-15 DIAGNOSIS — M20.42 HAMMER TOES OF BOTH FEET: ICD-10-CM

## 2019-01-15 DIAGNOSIS — M79.671 RIGHT FOOT PAIN: Primary | ICD-10-CM

## 2019-01-15 DIAGNOSIS — M21.6X1 ACQUIRED EQUINUS DEFORMITY OF BOTH FEET: ICD-10-CM

## 2019-01-15 DIAGNOSIS — M21.622 TAILOR'S BUNION OF BOTH FEET: ICD-10-CM

## 2019-01-15 DIAGNOSIS — M20.11 HALLUX ABDUCTO VALGUS, RIGHT: ICD-10-CM

## 2019-01-15 DIAGNOSIS — M21.621 TAILOR'S BUNION OF BOTH FEET: ICD-10-CM

## 2019-01-15 DIAGNOSIS — L84 CORN OR CALLUS: ICD-10-CM

## 2019-01-15 DIAGNOSIS — L90.9 PLANTAR FAT PAD ATROPHY: ICD-10-CM

## 2019-01-15 DIAGNOSIS — M21.6X2 ACQUIRED EQUINUS DEFORMITY OF BOTH FEET: ICD-10-CM

## 2019-01-15 PROCEDURE — 3075F PR MOST RECENT SYSTOLIC BLOOD PRESS GE 130-139MM HG: ICD-10-PCS | Mod: CPTII,S$GLB,, | Performed by: PODIATRIST

## 2019-01-15 PROCEDURE — 3075F SYST BP GE 130 - 139MM HG: CPT | Mod: CPTII,S$GLB,, | Performed by: PODIATRIST

## 2019-01-15 PROCEDURE — 3078F PR MOST RECENT DIASTOLIC BLOOD PRESSURE < 80 MM HG: ICD-10-PCS | Mod: CPTII,S$GLB,, | Performed by: PODIATRIST

## 2019-01-15 PROCEDURE — 99213 PR OFFICE/OUTPT VISIT, EST, LEVL III, 20-29 MIN: ICD-10-PCS | Mod: S$GLB,,, | Performed by: PODIATRIST

## 2019-01-15 PROCEDURE — 99213 OFFICE O/P EST LOW 20 MIN: CPT | Mod: S$GLB,,, | Performed by: PODIATRIST

## 2019-01-15 PROCEDURE — 1101F PR PT FALLS ASSESS DOC 0-1 FALLS W/OUT INJ PAST YR: ICD-10-PCS | Mod: CPTII,S$GLB,, | Performed by: PODIATRIST

## 2019-01-15 PROCEDURE — 99999 PR PBB SHADOW E&M-EST. PATIENT-LVL III: ICD-10-PCS | Mod: PBBFAC,,, | Performed by: PODIATRIST

## 2019-01-15 PROCEDURE — 3078F DIAST BP <80 MM HG: CPT | Mod: CPTII,S$GLB,, | Performed by: PODIATRIST

## 2019-01-15 PROCEDURE — 99999 PR PBB SHADOW E&M-EST. PATIENT-LVL III: CPT | Mod: PBBFAC,,, | Performed by: PODIATRIST

## 2019-01-15 PROCEDURE — 1101F PT FALLS ASSESS-DOCD LE1/YR: CPT | Mod: CPTII,S$GLB,, | Performed by: PODIATRIST

## 2019-01-15 NOTE — PATIENT INSTRUCTIONS
Recommend lotions: eucerin, eucerin for diabetics, aquaphor, A&D ointment, gold bond for diabetics, sween, Baldwin's Bees all purpose baby ointment,  urea 40 with aloe (found on amazon.com)    Shoe recommendations: (try 6pm.com, zappos.com , nordstromrack.Rapid Pathogen Screening, or shoes.Rapid Pathogen Screening for discounted prices) you can visit DSW shoes in [place]  or [facility] in the [facility] (there are also several shoe brand outlets in the [facility])    Asics (GT 2000 or gel foundations), new balance stability type shoes (such as the 940 series), saucony (stabil c3),  Small (GTS or Beast or transcend), propet (tennis shoe)    Sofft Brand (women) Hong&Darrius (men), clarks, crocs, aerosoles, naturalizers, SAS, ecco, born, carolina jaimes, rockports (dress shoes)    Vionic, burkenstocks, fitflops, propet (sandals)  Nike comfort thong sandals, crocs, propet (house shoes)    Nail Home remedy:  Vicks Vapor rub to nails for easier manageability      Wearing Proper Shoes                    You walk on your feet every day, forcing them to support the weight of your body. Repeated stress on your feet can cause damage over time. The right shoes can help protect your feet. The wrong shoes can cause more foot problems. Read the information below to help you find a shoe that fits your foot needs.     A good shoe fit will cover your foot outline.       A shoe that doesnt cover the outline is a bad fit.      Whats your foot shape?  To get a good fit, you need to know the shape of your foot. Do this simple test: While standing, place your foot on a piece of paper and trace around it. Is your foot straight or curved? Do you have a foot problem, such as a bunion, that causes your foot outline to show a bulge on the side of your big toe?  Finding your fit  Bring your foot outline to the shoe store to help you find the right shoe. Place a shoe you like on top of the outline to see if it matches the shape. The shoe should cover the outline. (If you have a bunion,  the shoe may not cover the bulge on the outline. Look for soft leather shoes to stretch over the bunion.) Once youve found a pair of proper shoes, put them on. Walk around. Be sure the shoes dont rub or pinch. If the shoes feel good, youve found your fit!  The right shoe for you  A good shoe has features that provide comfort and support. It must also be the right size and shape for your feet. Look for a shoe made of breathable fabric and lining, such as leather or canvas. Be sure that shoes have enough tread to prevent slipping. Go to a good shoe store for help finding the right shoe.  Good shoe features  An ideal shoe has the following:  · Laces for support. If tying laces is a problem for you, try shoes with Velcro fasteners or dominga.  · A front of the shoe (toe box) with ½ inch space in front of your longest toes.  · An arch shape that supports your foot.  · No more than 1½ inches of heel.  · A stiff, snug back of the shoe to keep your foot from sliding around.  · A smooth lining with no rough seams.  Shoe shopping tips  Below are some dos and donts for when you go to the shoe store.  Do:  · Select the shoes that feel right. Wear them around the house. Then bring them to your foot healthcare provider to check for fit. If they dont fit well, return them.  · Shop late in the day, when your feet will be slightly bigger.  · Each time you buy shoes, have both your feet measured while you are standing. Foot size changes with time.  · Pick shoes to suit their purpose. High heels are OK for an occasional night on the town. But for everyday wear, choose a more sensible shoe.  · Try on shoes while wearing any inserts specially made for your feet (orthoses).  · Try on both the right and left shoes. If your feet are different sizes, pick a pair that fits the larger foot.  Dont:  · Dont buy shoes based on shoe size alone. Always try on shoes, as sizes differ from brand to brand and within brands.  · Dont expect  shoes to break in. If they dont fit at the store, dont buy them.  · Dont buy a shoe that doesnt match your foot shape.  What about socks?  Always wear socks with shoes. Socks help absorb sweat and reduce friction and blistering. When shopping for shoes, choose soft, padded socks with seams that dont irritate your feet.  If you have foot problems  Some foot problems cause deformities. This can make it hard to find a good fit. Look for shoes made of soft leather to stretch over the deformity. If you have bunions, buy shoes with a wider toe box. To fit hammertoes, look for shoes with a tall toe box. If you have arch problems, you may need inserts. In some cases, youll need to have custom footwear or orthoses made for your feet.  Suggested footwear  Ask your healthcare provider what kind of footwear you need. He or she may recommend a certain brand or shoe store.  Date Last Reviewed: 8/1/2016 © 2000-2016 [employer]. [address] [place] [address]. All rights reserved. This information is not intended as a substitute for professional medical care. Always follow your healthcare professional's instructions.        What Are Corns and Calluses?    Corns and calluses are your bodys response to friction or pressure against the skin. If your foot rubs the inside of your shoe, the affected area of skin thickens. Or, if a bone is not in the normal position, skin caught between bone and shoe or bone and ground builds up. In either case, the outer layer of skin thickens to protect the foot from unusual pressure. In many cases, corns and calluses look bad but are not harmful. However, more severe corns and calluses may become infected, destroy healthy tissue, or affect foot movement.    Corns  Corns usually grow on top of the foot, often at the toe joint. Corns can range from a slight thickening of skin to a painful soft or hard bump. They often form on top of buckled toe joints (hammer toes). If  your toes curl under, corns may grow on the tips of the toes. You may also get a corn on the end of a toe if it rubs against your shoe. Corns can also grow between toes, often between the first and second toes.    Calluses  Calluses grow on the bottom of the foot or on the outer edge of a toe or heel. A callus may spread across the ball of your foot. This type of callus is usually due to a problem with a metatarsal (the long bone at the base of a toe, near the ball of the foot). A pinch callus may grow along the outer edge of the heel or the big toe. Some calluses press up into the foot instead of spreading on the outside. A callus may form a central core or plug of tissue where pressure is greatest.  Date Last Reviewed: 9/21/2015 © 2000-2016 [employer] [address] [place] [address]. All rights reserved. This information is not intended as a substitute for professional medical care. Always follow your healthcare professional's instructions.        Treating Corns and Calluses  If your corns or calluses are mild, reducing friction may help. Different shoes, moleskin patches, or soft pads may be all the treatment you need. In more severe cases, treating tissue buildup may require your doctors care. Sometimes custom-made shoe inserts (orthotics) or special pads are prescribed to reduce friction and pressure.    Change shoes  If you have corns, your doctor may suggest wearing shoes that have more toe room. This way, buckled joints are less likely to be pinched against the top of the shoe. If you have calluses, wearing a cushioned insole, arch support, or heel counter can help reduce friction.  Visit your doctor  In some cases, your doctor may trim away the outer layers of skin that make up the corn or callus. For a painful corn, medicine may be injected beneath the built-up tissue.  Wear orthotics  Orthotics are specially made to meet the needs of your feet. They cushion calluses or divert  pressure away from these problem areas. Worn as directed, orthotics help limit existing problems and prevent new ones from forming.  If you need surgery  If a bone or joint is out of place, certain parts of your foot may be under too much pressure. This can cause severe corns and calluses. In such cases, surgery may be the best way to correct the problem.  Outpatient procedures  In most cases, surgery to improve bone position is an outpatient procedure. Your doctor may cut away excess bone, reposition prominent bones, or even fuse joints. Sometimes tendons or ligaments are cut to reduce tension on a bone or joint. Your doctor will talk with you about the procedure that is best suited to your needs.  Date Last Reviewed: 7/1/2016  © 7855-6892 [employer], [employer]. [address] [place] [address], [place] [address]. All rights reserved. This information is not intended as a substitute for professional medical care. Always follow your healthcare professional's instructions.

## 2019-01-20 NOTE — PROGRESS NOTES
Subjective:      Patient ID: Benitez Cabrales is a 78 y.o. male.    Chief Complaint: Foot Pain      Patient presents to clinic with continued pain to the Right. Foot.  Describes pain as aching and rates as a 5/10.  Symptoms are localized to the lateral aspect of the Right 5th mtpj.  Symptoms are exacerbated with direct pressure to the joint with wearing closed toe shoes and alleviated with removal.  Has tried applying ice and voltaren gel to the site infrequently.  Inquires as to possible treatment options.   Has been seen by podiatry for this issue in the past.  Denies any additional pedal complaints.      Previously seen by my colleague, new to me.    Past Medical History:   Diagnosis Date    Anticoagulant long-term use     Atrial fibrillation     COPD (chronic obstructive pulmonary disease)     GERD (gastroesophageal reflux disease)     Hyperlipidemia     Hypertension     Nuclear sclerosis, bilateral 10/9/2017    Rhinitis medicamentosa 6/30/2014    Small bowel obstruction     Vertigo 6/13/2013       Past Surgical History:   Procedure Laterality Date    ICM implant      NOSE SURGERY      Septal Repair    REMOVAL, IMPLANTABLE LOOP RECORDER N/A 12/10/2018    Performed by Mickey Morales MD at [facility]    VASCULAR SURGERY      left leg       Family History   Problem Relation Age of Onset    Cancer Maternal Aunt     No Known Problems Mother     No Known Problems Father     No Known Problems Sister     No Known Problems Brother     No Known Problems Maternal Uncle     No Known Problems Paternal Aunt     No Known Problems Paternal Uncle     No Known Problems Maternal Grandmother     No Known Problems Maternal Grandfather     No Known Problems Paternal Grandmother     No Known Problems Paternal Grandfather     Asthma Neg Hx     Emphysema Neg Hx     Amblyopia Neg Hx     Blindness Neg Hx     Cataracts Neg Hx     Diabetes Neg Hx     Glaucoma Neg Hx     Hypertension Neg Hx     Macular  degeneration Neg Hx     Retinal detachment Neg Hx     Strabismus Neg Hx     Stroke Neg Hx     Thyroid disease Neg Hx        Social History     Socioeconomic History    Marital status:      Spouse name: None    Number of children: None    Years of education: None    Highest education level: None   Social Needs    Financial resource strain: None    Food insecurity - worry: None    Food insecurity - inability: None    Transportation needs - medical: None    Transportation needs - non-medical: None   Occupational History     Employer: Retired    Tobacco Use    Smoking status: Never Smoker    Smokeless tobacco: Never Used    Tobacco comment: .  Three kids.  Occup:   at [employer].     Substance and Sexual Activity    Alcohol use: Yes     Alcohol/week: 0.6 oz     Types: 1 Glasses of wine per week     Comment: occasional    Drug use: No    Sexual activity: Not Currently   Other Topics Concern    None   Social History Narrative    None       Current Outpatient Medications   Medication Sig Dispense Refill    amiodarone (PACERONE) 200 MG Tab Take 1 tablet (200 mg total) by mouth once daily. 90 tablet 3    amLODIPine (NORVASC) 5 MG tablet Take 2 tablets (10 mg total) by mouth once daily. 90 tablet 3    cholecalciferol, vitamin D3, (VITAMIN D3) 2,000 unit Cap Take 1 capsule by mouth once daily.      dabigatran etexilate (PRADAXA) 150 mg Cap Take 1 capsule (150 mg total) by mouth 2 (two) times daily. 180 capsule 3    DIABETIC SUPPLIES,MISCELL (BD MAGNI-GUIDE SYRINGE MAGNIFI MISC) by Misc.(Non-Drug; Combo Route) route.      diclofenac sodium (VOLTAREN) 1 % Gel Apply 2 g topically 4 (four) times daily. 1 Tube 3    doxepin (SINEQUAN) 10 MG capsule 1-2 HS prn itching 60 capsule 11    fish oil-omega-3 fatty acids 300-1,000 mg capsule Take 2 g by mouth once daily.        magnesium 200 mg Tab Take by mouth once daily.      metoprolol succinate (TOPROL-XL) 25 MG 24 hr tablet Take 1  "tablet (25 mg total) by mouth once daily.      MV-MN/FA/LYCOPENE/LUT/HB#178 (NEHEMIAH MULTIVITAMIN FOR MEN ORAL) Take 1 tablet by mouth once daily.        rosuvastatin (CRESTOR) 10 MG tablet Take 1 tablet (10 mg total) by mouth every evening. 90 tablet 3    valsartan-hydrochlorothiazide (DIOVAN-HCT) 320-25 mg per tablet Take 1 tablet by mouth once daily. 90 tablet 3    VITAMIN K2 ORAL Take by mouth. 2 tablet every other Day.      pantoprazole (PROTONIX) 40 MG tablet Take 1 tablet (40 mg total) by mouth 2 (two) times daily. 60 tablet 4     No current facility-administered medications for this visit.        Review of patient's allergies indicates:  No Known Allergies    Review of Systems   Constitution: Negative for chills, decreased appetite, diaphoresis, fever and weakness.   Cardiovascular: Negative for claudication and leg swelling.   Respiratory: Negative for cough and shortness of breath.    Skin: Positive for dry skin. Negative for color change, flushing, itching, nail changes and rash.   Musculoskeletal: Positive for joint pain and myalgias. Negative for back pain, falls, gout, joint swelling and muscle weakness.   Gastrointestinal: Negative for diarrhea, nausea and vomiting.   Neurological: Negative for numbness, paresthesias and tremors.   Psychiatric/Behavioral: Negative for altered mental status and hallucinations.           Objective:       Vitals:    01/15/19 1055   BP: 138/74   Weight: 81.2 kg (179 lb)   Height: 5' 11" (1.803 m)   PainSc:   5   PainLoc: Foot       Physical Exam   Constitutional: He is oriented to person, place, and time. He appears well-developed and well-nourished. No distress.   Cardiovascular:   Pulses:       Dorsalis pedis pulses are 2+ on the right side, and 2+ on the left side.        Posterior tibial pulses are 2+ on the right side, and 2+ on the left side.   CFT <3 seconds bilateral.  Pedal hair growth present bilateral.  Varicosities noted to bilateral lower extremity. No " bilateral lower extremity edema.   Musculoskeletal: He exhibits tenderness. He exhibits no edema.        Right foot: There is tenderness.   Muscle strength 5/5 in all muscle groups bilateral.      Mild tenderness with palpation to the lateral aspect of the Lt. 5th mtpj and to the Rt. Plantar foot hyperkeratotic lesion.  Bilateral pes cavus foot type.  Bilateral hallux abducto valgus.  Bilateral Tailor's bunion.  Bilateral semi-rigid contracture of toes 2-5 on the Rt. And semi-reducible on the Lt.  Bilateral gastrocnemius equinus.  Fat pad atrophy noted to bilateral forefoot with distal shift of fat pad.       Neurological: He is alert and oriented to person, place, and time. He has normal strength. No sensory deficit.   Light touch intact bilateral.     Skin: Skin is warm and dry. Lesion noted. No abrasion, no bruising, no burn, no laceration, no petechiae and no rash noted. He is not diaphoretic. No cyanosis or erythema. No pallor. Nails show no clubbing.   Pedal skin has normal turgor, temperature, and texture bilateral.  Dystrophic Rt. 2nd nail plate.  Remaining toenails x 9 normotrophic. Porokeratosis noted to the Rt. Sub 2nd metatarsal head.  Examination of the skin reveals no evidence of significant maceration, rashes, open lesions, suspicious appearing nevi or other concerning lesions.                      Assessment:       Encounter Diagnoses   Name Primary?    Right foot pain Yes    Corn or callus     Hallux abducto valgus, left     Hallux abducto valgus, right     Hammer toes of both feet     Plantar fat pad atrophy     Acquired equinus deformity of both feet     Tailor's bunion of both feet          Plan:       Benitez was seen today for foot pain.    Diagnoses and all orders for this visit:    Right foot pain    Corn or callus    Hallux abducto valgus, left    Hallux abducto valgus, right    Hammer toes of both feet    Plantar fat pad atrophy    Acquired equinus deformity of both feet    Tailor's  bunion of both feet      I counseled the patient on his conditions, their implications and medical management.    -- Discussed biomechanical deformity correction surgically vs conservative management     -- Explained that the only curative treatment of the Henri's bunion is surgery.  Explained that he could attempt to treat conservatively by wearing shoes with either a wider toe box or to have the toe box stretched.      -- Conservative treatments for hammer digit discussed include padding, hammertoe crest  Pads, extra-depth shoes; Surgical treatments discussed, including hammertoe correction and generic post-op course. All questions answered. Patient opting to begin with conservative options first.      --  Patient was advised use of a pumice stone, and skin emollients to slow the progression of callus formation.     -- Patient instructed on adequate icing techniques. Patient should ice the affected area at least once per day x 10 minutes for 10 days .     --Dispensed information on proper shoe fit and modification including inserts, met pads.    --Pt to RTC as needed as procedure B for trimming of callus or if  he wishes to pursue surgical intervention.

## 2019-01-21 ENCOUNTER — HOSPITAL ENCOUNTER (OUTPATIENT)
Dept: CARDIOLOGY | Facility: CLINIC | Age: 79
Discharge: HOME | End: 2019-01-21
Payer: MEDICARE

## 2019-01-21 ENCOUNTER — OFFICE VISIT (OUTPATIENT)
Dept: ELECTROPHYSIOLOGY | Facility: CLINIC | Age: 79
End: 2019-01-21
Payer: MEDICARE

## 2019-01-21 VITALS
DIASTOLIC BLOOD PRESSURE: 70 MMHG | BODY MASS INDEX: 24.69 KG/M2 | HEIGHT: 71 IN | HEART RATE: 52 BPM | WEIGHT: 176.38 LBS | SYSTOLIC BLOOD PRESSURE: 117 MMHG

## 2019-01-21 DIAGNOSIS — E78.5 HYPERLIPIDEMIA, UNSPECIFIED HYPERLIPIDEMIA TYPE: Chronic | ICD-10-CM

## 2019-01-21 DIAGNOSIS — I49.9 CARDIAC ARRHYTHMIA, UNSPECIFIED CARDIAC ARRHYTHMIA TYPE: ICD-10-CM

## 2019-01-21 DIAGNOSIS — I10 ESSENTIAL HYPERTENSION: Chronic | ICD-10-CM

## 2019-01-21 DIAGNOSIS — I49.9 CARDIAC ARRHYTHMIA, UNSPECIFIED CARDIAC ARRHYTHMIA TYPE: Primary | ICD-10-CM

## 2019-01-21 DIAGNOSIS — J44.9 CHRONIC OBSTRUCTIVE PULMONARY DISEASE, UNSPECIFIED COPD TYPE: Primary | ICD-10-CM

## 2019-01-21 DIAGNOSIS — I48.0 PAROXYSMAL ATRIAL FIBRILLATION: Chronic | ICD-10-CM

## 2019-01-21 PROCEDURE — 1101F PT FALLS ASSESS-DOCD LE1/YR: CPT | Mod: CPTII,S$GLB,, | Performed by: INTERNAL MEDICINE

## 2019-01-21 PROCEDURE — 3074F PR MOST RECENT SYSTOLIC BLOOD PRESSURE < 130 MM HG: ICD-10-PCS | Mod: CPTII,S$GLB,, | Performed by: INTERNAL MEDICINE

## 2019-01-21 PROCEDURE — 99024 PR POST-OP FOLLOW-UP VISIT: ICD-10-PCS | Mod: S$GLB,,, | Performed by: INTERNAL MEDICINE

## 2019-01-21 PROCEDURE — 99499 UNLISTED E&M SERVICE: CPT | Mod: S$GLB,,, | Performed by: INTERNAL MEDICINE

## 2019-01-21 PROCEDURE — 99999 PR PBB SHADOW E&M-EST. PATIENT-LVL III: ICD-10-PCS | Mod: PBBFAC,,, | Performed by: INTERNAL MEDICINE

## 2019-01-21 PROCEDURE — 3078F PR MOST RECENT DIASTOLIC BLOOD PRESSURE < 80 MM HG: ICD-10-PCS | Mod: CPTII,S$GLB,, | Performed by: INTERNAL MEDICINE

## 2019-01-21 PROCEDURE — 1101F PR PT FALLS ASSESS DOC 0-1 FALLS W/OUT INJ PAST YR: ICD-10-PCS | Mod: CPTII,S$GLB,, | Performed by: INTERNAL MEDICINE

## 2019-01-21 PROCEDURE — 99024 POSTOP FOLLOW-UP VISIT: CPT | Mod: S$GLB,,, | Performed by: INTERNAL MEDICINE

## 2019-01-21 PROCEDURE — 93010 RHYTHM STRIP: ICD-10-PCS | Mod: S$GLB,,, | Performed by: INTERNAL MEDICINE

## 2019-01-21 PROCEDURE — 93010 ELECTROCARDIOGRAM REPORT: CPT | Mod: S$GLB,,, | Performed by: INTERNAL MEDICINE

## 2019-01-21 PROCEDURE — 3078F DIAST BP <80 MM HG: CPT | Mod: CPTII,S$GLB,, | Performed by: INTERNAL MEDICINE

## 2019-01-21 PROCEDURE — 99999 PR PBB SHADOW E&M-EST. PATIENT-LVL III: CPT | Mod: PBBFAC,,, | Performed by: INTERNAL MEDICINE

## 2019-01-21 PROCEDURE — 93005 ELECTROCARDIOGRAM TRACING: CPT | Mod: S$GLB,,, | Performed by: INTERNAL MEDICINE

## 2019-01-21 PROCEDURE — 3074F SYST BP LT 130 MM HG: CPT | Mod: CPTII,S$GLB,, | Performed by: INTERNAL MEDICINE

## 2019-01-21 PROCEDURE — 93005 RHYTHM STRIP: ICD-10-PCS | Mod: S$GLB,,, | Performed by: INTERNAL MEDICINE

## 2019-01-21 PROCEDURE — 99499 RISK ADDL DX/OHS AUDIT: ICD-10-PCS | Mod: S$GLB,,, | Performed by: INTERNAL MEDICINE

## 2019-01-21 NOTE — PROGRESS NOTES
Subjective:    Patient ID:  Benitez Cabrales is a 78 y.o. male who presents for follow up of AF    Atrial Fibrillation   Symptoms are negative for chest pain, dizziness, palpitations, shortness of breath, syncope and weakness. Past medical history includes atrial fibrillation.     78 y.o. M  Remote dx of AF (~10 y ago; only Rx was beta blockade)  HTN on meds   HL on meds   RBBB, longstanding     Had event monitor placed for dizzy/palpitations. Turns out dizziness was really vertigo and balance issues -- no LH, no presync/sync.  He remains on amiodarone (normal PFT and TFT/LFT); CHADSVASC 3.  ILR with no events on several interrogations. We removed the ILR 12/2018.    echo 55-60%. severe LAE.  TSH, LFTs OK.    My interpretation of today's ecg is sinus shraddha at 52 bpm with first deg AVB and RBBB (baseline).    Review of Systems   Constitution: Negative. Negative for weakness and malaise/fatigue.   HENT: Negative.  Negative for congestion, ear pain and tinnitus.    Eyes: Negative for blurred vision and double vision.   Cardiovascular: Negative.  Negative for chest pain, dyspnea on exertion, near-syncope, palpitations and syncope.   Respiratory: Negative.  Negative for cough and shortness of breath.    Endocrine: Negative.  Negative for cold intolerance, heat intolerance and polyuria.   Hematologic/Lymphatic: Does not bruise/bleed easily.   Skin: Negative.  Negative for dry skin, flushing and rash.   Musculoskeletal: Negative.  Negative for back pain, falls, joint pain and muscle weakness.   Gastrointestinal: Negative.  Negative for abdominal pain, change in bowel habit, nausea and vomiting.   Genitourinary: Negative for dysuria, flank pain and frequency.   Neurological: Negative.  Negative for dizziness, headaches and light-headedness.   Psychiatric/Behavioral: Negative.  Negative for altered mental status and depression. The patient is not nervous/anxious.    Allergic/Immunologic: Negative for environmental allergies.         Objective:    Physical Exam   Constitutional: He is oriented to person, place, and time. He appears well-developed and well-nourished.   HENT:   Head: Normocephalic and atraumatic.   Eyes: Conjunctivae, EOM and lids are normal. No scleral icterus.   Neck: Normal range of motion. Neck supple. No JVD present. No tracheal deviation present. No thyromegaly present.   Cardiovascular: Regular rhythm, normal heart sounds and intact distal pulses.  No extrasystoles are present. Bradycardia present. PMI is not displaced. Exam reveals no gallop and no friction rub.   No murmur heard.  Pulses:       Radial pulses are 2+ on the right side, and 2+ on the left side.   Pulmonary/Chest: Effort normal and breath sounds normal. No accessory muscle usage. No tachypnea. No respiratory distress. He has no wheezes. He has no rales.       Abdominal: Soft. Bowel sounds are normal. He exhibits no distension. There is no hepatosplenomegaly. There is no tenderness.   Musculoskeletal: Normal range of motion. He exhibits no edema.   Neurological: He is alert and oriented to person, place, and time. He has normal reflexes. No cranial nerve deficit. He exhibits normal muscle tone.   Skin: Skin is warm and dry. No rash noted.   Psychiatric: He has a normal mood and affect. His behavior is normal.   Nursing note and vitals reviewed.        Assessment:       1. Chronic obstructive pulmonary disease, unspecified COPD type    2. Paroxysmal atrial fibrillation    3. Hyperlipidemia, unspecified hyperlipidemia type    4. Essential hypertension         Plan:       78 y.o. male with pAF on amiodarone with ILR demonstrating no recent episodes of AF. Feeling well. ILR site healed well.    Return in 1 year with echo and amio tests, or earlier prn.

## 2019-02-13 ENCOUNTER — OFFICE VISIT (OUTPATIENT)
Dept: FAMILY MEDICINE | Facility: CLINIC | Age: 79
End: 2019-02-13
Payer: MEDICARE

## 2019-02-13 VITALS
HEIGHT: 72 IN | SYSTOLIC BLOOD PRESSURE: 132 MMHG | BODY MASS INDEX: 23.56 KG/M2 | TEMPERATURE: 98 F | WEIGHT: 173.94 LBS | HEART RATE: 58 BPM | OXYGEN SATURATION: 98 % | DIASTOLIC BLOOD PRESSURE: 72 MMHG

## 2019-02-13 DIAGNOSIS — G89.29 CHRONIC BILATERAL THORACIC BACK PAIN: Primary | ICD-10-CM

## 2019-02-13 DIAGNOSIS — I70.0 AORTIC ATHEROSCLEROSIS: ICD-10-CM

## 2019-02-13 DIAGNOSIS — M54.6 CHRONIC BILATERAL THORACIC BACK PAIN: Primary | ICD-10-CM

## 2019-02-13 DIAGNOSIS — I10 ESSENTIAL HYPERTENSION: ICD-10-CM

## 2019-02-13 PROCEDURE — 1101F PT FALLS ASSESS-DOCD LE1/YR: CPT | Mod: CPTII,S$GLB,, | Performed by: INTERNAL MEDICINE

## 2019-02-13 PROCEDURE — 3078F DIAST BP <80 MM HG: CPT | Mod: CPTII,S$GLB,, | Performed by: INTERNAL MEDICINE

## 2019-02-13 PROCEDURE — 3075F PR MOST RECENT SYSTOLIC BLOOD PRESS GE 130-139MM HG: ICD-10-PCS | Mod: CPTII,S$GLB,, | Performed by: INTERNAL MEDICINE

## 2019-02-13 PROCEDURE — 99999 PR PBB SHADOW E&M-EST. PATIENT-LVL IV: CPT | Mod: PBBFAC,,, | Performed by: INTERNAL MEDICINE

## 2019-02-13 PROCEDURE — 1101F PR PT FALLS ASSESS DOC 0-1 FALLS W/OUT INJ PAST YR: ICD-10-PCS | Mod: CPTII,S$GLB,, | Performed by: INTERNAL MEDICINE

## 2019-02-13 PROCEDURE — 3078F PR MOST RECENT DIASTOLIC BLOOD PRESSURE < 80 MM HG: ICD-10-PCS | Mod: CPTII,S$GLB,, | Performed by: INTERNAL MEDICINE

## 2019-02-13 PROCEDURE — 99499 UNLISTED E&M SERVICE: CPT | Mod: S$GLB,,, | Performed by: INTERNAL MEDICINE

## 2019-02-13 PROCEDURE — 99214 OFFICE O/P EST MOD 30 MIN: CPT | Mod: S$GLB,,, | Performed by: INTERNAL MEDICINE

## 2019-02-13 PROCEDURE — 99499 RISK ADDL DX/OHS AUDIT: ICD-10-PCS | Mod: S$GLB,,, | Performed by: INTERNAL MEDICINE

## 2019-02-13 PROCEDURE — 99999 PR PBB SHADOW E&M-EST. PATIENT-LVL IV: ICD-10-PCS | Mod: PBBFAC,,, | Performed by: INTERNAL MEDICINE

## 2019-02-13 PROCEDURE — 99214 PR OFFICE/OUTPT VISIT, EST, LEVL IV, 30-39 MIN: ICD-10-PCS | Mod: S$GLB,,, | Performed by: INTERNAL MEDICINE

## 2019-02-13 PROCEDURE — 3075F SYST BP GE 130 - 139MM HG: CPT | Mod: CPTII,S$GLB,, | Performed by: INTERNAL MEDICINE

## 2019-02-13 NOTE — PROGRESS NOTES
Chief complaint:  Back pain     78 -year-old  male   reports several months of some pain across the mid back.  Usually occurs at the end of the day and worse when he has been doing things especially with his upper arms.  One day he was standing and tearing up some boxes and appeared to cause him some discomfort at that time.  No central spine pain. No radicular symptoms.  No neck pain. He says his wife does bother him about his poor posture often leaning forward.            ROS:   CONST: weight stable. EYES: no vision change. ENT: no sore throat. CV: no chest pain w/ exertion. RESP: no shortness of breath. GI: no nausea, vomiting, diarrhea. No dysphagia. : no urinary issues. MUSCULOSKELETAL: no new myalgias or arthralgias. SKIN: no new changes. NEURO: no focal deficits. PSYCH: no new issues. ENDOCRINE: no polyuria. HEME: no lymph nodes. ALLERGY: no general pruritis.     PAST MEDICAL HISTORY:  1. Hypertension.  2. Hyperlipidemia.  3. GERD.  4. Vertigo after trauma  5. Colonoscopy was normal around 2009 per [facility], 9/16  there was a polyp -5 yrs.  6. Atrial fib/flutter -[facility] -cardioverted  7.  Dizziness, seen by neurology    8.peptic ulcer disease on EGDNovember 2016 with GI bleeding  9.  Small bowel obstruction, resolved on its own November 2016    PAST SURGICAL HISTORY:  1. Left leg surgery, sounds like venous surgery -Dr Limon  2. Nasal septal repair.      ALLERGIES: NKDA.    SOCIAL HISTORY: He puffs on a rare cigar, he is not a smoker. Drinks wine on occasion,  with 3 children. He has 7 grandchildren. 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 He is originally from [place]. He is quite active.    FAMILY HISTORY: Sr. had breast cancer. Otherwise negative for prostate or colon cancer, negative for premature coronary disease or diabetes.    HEALTHCARE SCREENING: Colonoscopy was normal around 2009 per [facility], last cholesterol was 2011, , due for PSA , he had eye  "examination.      Gen: no distress  BACK: spine with no defect, good ROM but forward flexion limited in that he only can get down to about his knees and he says that is always been like that for him., good extension, stable. Both legs full ROM, no defects, joints stable, strength 5/5. Negative straight leg testing.  Subjective pain which is not that present today is in the muscles between the scapula and the spine on both sides.  Some in the lower trapezius muscles as well.  Shoulders have good range of motion without shoulder pain.  Cervical spine has good range of motion without pain.    Benitez was seen today for back pain.    Diagnoses and all orders for this visit:    Chronic bilateral thoracic back pain, patient symptoms are clearly overuse of the mid to upper back muscles likely from poor posture as well as excessive overuse with re-injury.  I explained that he needs to readdress his posture and also apply heat to these areas every morning so that he can avoid re-injury during the rest of the day.  The condition itself will need to heal on its own and that there is no medication that could speed the process.  He is accepting of this plan.    Aortic atherosclerosis, noted in the record    Hypertension, chronic and stable and good control and he monitors at home            Clinical no be sensitive so as to not cause any issues regarding coverage of evaluation and management issues.  Patient himself also is not well with access"This note will not be shared with the patient."  Answers for HPI/ROS submitted by the patient on 2/8/2019   Back pain  Chronicity: recurrent  Onset: 1 to 4 weeks ago  Frequency: daily  Progression since onset: unchanged  Pain location: lumbar spine  Pain quality: aching  Radiates to: does not radiate  Pain - numeric: 8/10  Pain is: worse during the night  Aggravated by: bending, standing  Stiffness is present: at night  abdominal pain: No  bladder incontinence: No  bowel incontinence: " No  chest pain: No  dysuria: No  fever: No  headaches: No  leg pain: Yes  numbness: No  paresis: No  paresthesias: No  pelvic pain: No  perianal numbness: No  tingling: No  weakness: No  weight loss: No  genital pain: No  hematuria: No  Risk factors: lack of exercise, poor posture  Pain severity: moderate  Treatments tried: bed rest, home exercises  Improvement on treatment: mild

## 2019-02-21 DIAGNOSIS — I48.0 PAF (PAROXYSMAL ATRIAL FIBRILLATION): ICD-10-CM

## 2019-02-21 RX ORDER — DABIGATRAN ETEXILATE 150 MG/1
150 CAPSULE ORAL 2 TIMES DAILY
Qty: 180 CAPSULE | Refills: 3 | Status: SHIPPED | OUTPATIENT
Start: 2019-02-21 | End: 2020-02-26

## 2019-03-21 DIAGNOSIS — I48.0 PAROXYSMAL ATRIAL FIBRILLATION: ICD-10-CM

## 2019-03-21 DIAGNOSIS — I48.91 ATRIAL FIBRILLATION, UNSPECIFIED TYPE: ICD-10-CM

## 2019-03-21 RX ORDER — AMIODARONE HYDROCHLORIDE 200 MG/1
200 TABLET ORAL DAILY
Qty: 90 TABLET | Refills: 3 | Status: SHIPPED | OUTPATIENT
Start: 2019-03-21 | End: 2020-03-13

## 2019-03-21 NOTE — TELEPHONE ENCOUNTER
----- Message from Sera Conley sent at 3/21/2019  2:34 PM CDT -----  Contact: Patient  The Pt is calling to get a refill on his amiodarone (PACERONE) 200 MG Tab and send it to [address] [place], [place] - [address] [facility] [phone] (Phone) [phone] (Fax). The Pt states that he only has 1 pill left. Thanks, Sera - He states that he is getting low on his valsartan-hydrochlorothiazide (DIOVAN-HCT) 320-25 mg per tablet but [facility] doesn't have any and he will like to have this sent to [facility].Thanks, Sera

## 2019-03-27 RX ORDER — VALSARTAN AND HYDROCHLOROTHIAZIDE 320; 25 MG/1; MG/1
1 TABLET, FILM COATED ORAL DAILY
Qty: 90 TABLET | Refills: 3 | Status: SHIPPED | OUTPATIENT
Start: 2019-03-27 | End: 2020-05-13

## 2019-06-05 DIAGNOSIS — E78.5 HYPERLIPIDEMIA, UNSPECIFIED HYPERLIPIDEMIA TYPE: ICD-10-CM

## 2019-06-05 RX ORDER — ROSUVASTATIN CALCIUM 10 MG/1
10 TABLET, COATED ORAL NIGHTLY
Qty: 90 TABLET | Refills: 3 | Status: SHIPPED | OUTPATIENT
Start: 2019-06-05 | End: 2020-06-26

## 2019-07-02 ENCOUNTER — PATIENT OUTREACH (OUTPATIENT)
Dept: ADMINISTRATIVE | Facility: HOSPITAL | Age: 79
End: 2019-07-02

## 2019-07-16 ENCOUNTER — OFFICE VISIT (OUTPATIENT)
Dept: FAMILY MEDICINE | Facility: CLINIC | Age: 79
End: 2019-07-16
Payer: MEDICARE

## 2019-07-16 ENCOUNTER — HOSPITAL ENCOUNTER (OUTPATIENT)
Dept: RADIOLOGY | Facility: HOSPITAL | Age: 79
Discharge: HOME | End: 2019-07-16
Attending: INTERNAL MEDICINE
Payer: MEDICARE

## 2019-07-16 VITALS
DIASTOLIC BLOOD PRESSURE: 60 MMHG | HEART RATE: 59 BPM | SYSTOLIC BLOOD PRESSURE: 120 MMHG | TEMPERATURE: 98 F | HEIGHT: 70 IN | WEIGHT: 171.94 LBS | BODY MASS INDEX: 24.61 KG/M2 | OXYGEN SATURATION: 96 %

## 2019-07-16 DIAGNOSIS — S20.229A CONTUSION OF BACK, UNSPECIFIED LATERALITY, INITIAL ENCOUNTER: ICD-10-CM

## 2019-07-16 DIAGNOSIS — T14.90XA TRAUMA: Primary | ICD-10-CM

## 2019-07-16 DIAGNOSIS — T14.90XA TRAUMA: ICD-10-CM
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
## 2019-07-16 PROCEDURE — 72100 X-RAY EXAM L-S SPINE 2/3 VWS: CPT | Mod: TC,FY,PO

## 2019-07-16 PROCEDURE — 72100 X-RAY EXAM L-S SPINE 2/3 VWS: CPT | Mod: 26,,, | Performed by: RADIOLOGY

## 2019-07-16 PROCEDURE — 99214 OFFICE O/P EST MOD 30 MIN: CPT | Mod: S$GLB,,, | Performed by: INTERNAL MEDICINE

## 2019-07-16 PROCEDURE — 72070 X-RAY EXAM THORAC SPINE 2VWS: CPT | Mod: TC,FY,PO

## 2019-07-16 PROCEDURE — 72070 XR THORACIC SPINE AP LATERAL: ICD-10-PCS | Mod: 26,,, | Performed by: RADIOLOGY

## 2019-07-16 PROCEDURE — 72070 X-RAY EXAM THORAC SPINE 2VWS: CPT | Mod: 26,,, | Performed by: RADIOLOGY

## 2019-07-16 PROCEDURE — 1101F PT FALLS ASSESS-DOCD LE1/YR: CPT | Mod: CPTII,S$GLB,, | Performed by: INTERNAL MEDICINE

## 2019-07-16 PROCEDURE — 3074F SYST BP LT 130 MM HG: CPT | Mod: CPTII,S$GLB,, | Performed by: INTERNAL MEDICINE

## 2019-07-16 PROCEDURE — 3078F DIAST BP <80 MM HG: CPT | Mod: CPTII,S$GLB,, | Performed by: INTERNAL MEDICINE

## 2019-07-16 PROCEDURE — 3078F PR MOST RECENT DIASTOLIC BLOOD PRESSURE < 80 MM HG: ICD-10-PCS | Mod: CPTII,S$GLB,, | Performed by: INTERNAL MEDICINE

## 2019-07-16 PROCEDURE — 72100 XR LUMBAR SPINE AP AND LATERAL: ICD-10-PCS | Mod: 26,,, | Performed by: RADIOLOGY

## 2019-07-16 PROCEDURE — 3074F PR MOST RECENT SYSTOLIC BLOOD PRESSURE < 130 MM HG: ICD-10-PCS | Mod: CPTII,S$GLB,, | Performed by: INTERNAL MEDICINE

## 2019-07-16 PROCEDURE — 99999 PR PBB SHADOW E&M-EST. PATIENT-LVL IV: CPT | Mod: PBBFAC,,, | Performed by: INTERNAL MEDICINE

## 2019-07-16 PROCEDURE — 1101F PR PT FALLS ASSESS DOC 0-1 FALLS W/OUT INJ PAST YR: ICD-10-PCS | Mod: CPTII,S$GLB,, | Performed by: INTERNAL MEDICINE

## 2019-07-16 PROCEDURE — 99214 PR OFFICE/OUTPT VISIT, EST, LEVL IV, 30-39 MIN: ICD-10-PCS | Mod: S$GLB,,, | Performed by: INTERNAL MEDICINE

## 2019-07-16 PROCEDURE — 99999 PR PBB SHADOW E&M-EST. PATIENT-LVL IV: ICD-10-PCS | Mod: PBBFAC,,, | Performed by: INTERNAL MEDICINE

## 2019-07-16 NOTE — PROGRESS NOTES
Chief complaint:  Injury to the back     78 -year-old  male  here with his wife.  One week ago he was in the attic and fell through the ceiling.  His buttocks was sticking out through the ceiling.  The mid back fell against the row after.  The did need to call EMS for assistance getting him out of the whole but after which he was able to walk around.  The next day he had a moderate amount of pain and tightness in the back.  He is on Pradaxa.  The next day they did note bruising across the back where the injury occurred.  Some other minor abrasions he is treated effectively.  No new neurological symptoms.  Pain mild to moderate rated 8/10 in but only typically when he is going to change positions such as getting up after sitting for long periods of time.  He has been using some over-the-counter rubs with some success.          ROS:   CONST: weight stable. EYES: no vision change. ENT: no sore throat. CV: no chest pain w/ exertion. RESP: no shortness of breath. GI: no nausea, vomiting, diarrhea. No dysphagia. : no urinary issues. MUSCULOSKELETAL: no new myalgias or arthralgias. SKIN: no new changes. NEURO: no focal deficits. PSYCH: no new issues. ENDOCRINE: no polyuria. HEME: no lymph nodes. ALLERGY: no general pruritis.     PAST MEDICAL HISTORY:  1. Hypertension.  2. Hyperlipidemia.  3. GERD.  4. Vertigo after trauma  5. Colonoscopy was normal around 2009 per [facility], 9/16  there was a polyp -5 yrs.  6. Atrial fib/flutter -[facility] -cardioverted  7.  Dizziness, seen by neurology    8.peptic ulcer disease on EGDNovember 2016 with GI bleeding  9.  Small bowel obstruction, resolved on its own November 2016    PAST SURGICAL HISTORY:  1. Left leg surgery, sounds like venous surgery -Dr Limon  2. Nasal septal repair.      ALLERGIES: NKDA.    SOCIAL HISTORY: He puffs on a rare cigar, he is not a smoker. Drinks wine on occasion,  with 3 children. He has 7 grandchildren. 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 He is originally from [place]. He is quite active.    FAMILY HISTORY: Sr. had breast cancer. Otherwise negative for prostate or colon cancer, negative for premature coronary disease or diabetes.    HEALTHCARE SCREENING: Colonoscopy was normal around 2009 per [facility], last cholesterol was 2011, , due for PSA , he had eye examination.      Gen: no distress  Spine nontender to percussion.  He does have a bruise going horizontal across the lower thoracic and upper lumbar spine.  The ribs are nontender to percussion.  Right left legs no defects, the joints are stable, strength 5/5, negative straight leg testing.  His gait is normal.  Diagnoses and all orders for this visit:    Trauma, contusion to the lower thoracic and number lumbar spine.  We discussed Tylenol for pain.  He has a lot of associated muscle injury and spasm as would be expected for which I encouraged him to apply frequent heat and allow the condition to resolved.  He will avoid anti-inflammatories.  He does not feel that the severity of pain warrants any need for pain medications but if indeed he worsens to condition I will be happy to give him some pain medication.  No need for muscle relaxer needed inquire about that.  Side effects may be too much.  Based on the nature and location of the injury will obtain x-rays to assess for recent and/or previous compression fractures and so forth.  -     X-Ray Lumbar Spine AP And Lateral; Future  -     X-Ray Thoracic Spine AP Lateral; Future    Fall, initial encounter  -     X-Ray Lumbar Spine AP And Lateral; Future  -     X-Ray Thoracic Spine AP Lateral; Future    Contusion of back, unspecified laterality, initial encounter  -     X-Ray Lumbar Spine AP And Lateral; Future  -     X-Ray Thoracic Spine AP Lateral; Future         Clinical no be sensitive so as to not cause any issues regarding coverage of evaluation and management issues.  Patient himself also is not well with access"  "

## 2019-08-03 ENCOUNTER — PATIENT MESSAGE (OUTPATIENT)
Dept: FAMILY MEDICINE | Facility: CLINIC | Age: 79
End: 2019-08-03

## 2019-08-12 ENCOUNTER — PATIENT MESSAGE (OUTPATIENT)
Dept: FAMILY MEDICINE | Facility: CLINIC | Age: 79
End: 2019-08-12

## 2019-08-13 RX ORDER — TIZANIDINE 4 MG/1
TABLET ORAL
Qty: 40 TABLET | Refills: 3 | Status: SHIPPED | OUTPATIENT
Start: 2019-08-13 | End: 2020-01-08

## 2019-08-16 DIAGNOSIS — I48.0 PAROXYSMAL ATRIAL FIBRILLATION: Chronic | ICD-10-CM

## 2019-08-16 RX ORDER — METOPROLOL SUCCINATE 25 MG/1
25 TABLET, EXTENDED RELEASE ORAL DAILY
Qty: 90 TABLET | Refills: 3
Start: 2019-08-16 | End: 2019-08-20 | Stop reason: SDUPTHER

## 2019-08-20 DIAGNOSIS — I48.0 PAROXYSMAL ATRIAL FIBRILLATION: Chronic | ICD-10-CM

## 2019-08-20 RX ORDER — METOPROLOL SUCCINATE 25 MG/1
25 TABLET, EXTENDED RELEASE ORAL DAILY
Qty: 90 TABLET | Refills: 3
Start: 2019-08-20 | End: 2020-08-07

## 2019-09-17 ENCOUNTER — OFFICE VISIT (OUTPATIENT)
Dept: FAMILY MEDICINE | Facility: CLINIC | Age: 79
End: 2019-09-17
Payer: MEDICARE

## 2019-09-17 VITALS
HEART RATE: 60 BPM | HEIGHT: 72 IN | TEMPERATURE: 98 F | WEIGHT: 165.56 LBS | DIASTOLIC BLOOD PRESSURE: 70 MMHG | SYSTOLIC BLOOD PRESSURE: 130 MMHG | BODY MASS INDEX: 22.43 KG/M2 | OXYGEN SATURATION: 99 %

## 2019-09-17 DIAGNOSIS — I48.0 PAROXYSMAL ATRIAL FIBRILLATION: Chronic | ICD-10-CM

## 2019-09-17 DIAGNOSIS — I10 ESSENTIAL HYPERTENSION: Chronic | ICD-10-CM

## 2019-09-17 DIAGNOSIS — J06.9 URI, ACUTE: Primary | ICD-10-CM

## 2019-09-17 PROCEDURE — 99499 UNLISTED E&M SERVICE: CPT | Mod: S$GLB,,, | Performed by: INTERNAL MEDICINE

## 2019-09-17 PROCEDURE — 99214 OFFICE O/P EST MOD 30 MIN: CPT | Mod: S$GLB,,, | Performed by: INTERNAL MEDICINE

## 2019-09-17 PROCEDURE — 3075F SYST BP GE 130 - 139MM HG: CPT | Mod: CPTII,S$GLB,, | Performed by: INTERNAL MEDICINE

## 2019-09-17 PROCEDURE — 99999 PR PBB SHADOW E&M-EST. PATIENT-LVL III: CPT | Mod: PBBFAC,,, | Performed by: INTERNAL MEDICINE

## 2019-09-17 PROCEDURE — 3078F PR MOST RECENT DIASTOLIC BLOOD PRESSURE < 80 MM HG: ICD-10-PCS | Mod: CPTII,S$GLB,, | Performed by: INTERNAL MEDICINE

## 2019-09-17 PROCEDURE — 99999 PR PBB SHADOW E&M-EST. PATIENT-LVL III: ICD-10-PCS | Mod: PBBFAC,,, | Performed by: INTERNAL MEDICINE

## 2019-09-17 PROCEDURE — 3075F PR MOST RECENT SYSTOLIC BLOOD PRESS GE 130-139MM HG: ICD-10-PCS | Mod: CPTII,S$GLB,, | Performed by: INTERNAL MEDICINE

## 2019-09-17 PROCEDURE — 1101F PR PT FALLS ASSESS DOC 0-1 FALLS W/OUT INJ PAST YR: ICD-10-PCS | Mod: CPTII,S$GLB,, | Performed by: INTERNAL MEDICINE

## 2019-09-17 PROCEDURE — 1101F PT FALLS ASSESS-DOCD LE1/YR: CPT | Mod: CPTII,S$GLB,, | Performed by: INTERNAL MEDICINE

## 2019-09-17 PROCEDURE — 3078F DIAST BP <80 MM HG: CPT | Mod: CPTII,S$GLB,, | Performed by: INTERNAL MEDICINE

## 2019-09-17 PROCEDURE — 99214 PR OFFICE/OUTPT VISIT, EST, LEVL IV, 30-39 MIN: ICD-10-PCS | Mod: S$GLB,,, | Performed by: INTERNAL MEDICINE

## 2019-09-17 PROCEDURE — 99499 RISK ADDL DX/OHS AUDIT: ICD-10-PCS | Mod: S$GLB,,, | Performed by: INTERNAL MEDICINE

## 2019-09-17 NOTE — PROGRESS NOTES
Chief complaint:  Cough       78 -year-old  male  .  His wife was here today and he was with his wife and there was an opening and so I put him in by his request.  He has had 1 week of a cough with productive mucus sometimes but worse in the morning and at night.  Worse when he lays down.  No fever.  He has upper head congestion.  All rated moderate.  He was fearful of taking any medications due to his other meds and cardiac issues.        ROS:   CONST: weight stable. EYES: no vision change. ENT: no sore throat. CV: no chest pain w/ exertion. RESP: no shortness of breath. GI: no nausea, vomiting, diarrhea. No dysphagia. : no urinary issues. MUSCULOSKELETAL: no new myalgias or arthralgias. SKIN: no new changes. NEURO: no focal deficits. PSYCH: no new issues. ENDOCRINE: no polyuria. HEME: no lymph nodes. ALLERGY: no general pruritis.     PAST MEDICAL HISTORY:  1. Hypertension.  2. Hyperlipidemia.  3. GERD.  4. Vertigo after trauma  5. [facility]  there was a polyp -5 yrs.  [facility]  7.  Dizziness, seen by neurology    8.peptic ulcer disease on EGDNovember 2016 with GI bleeding  9.  Small bowel obstruction, resolved on its own November 2016    PAST SURGICAL HISTORY:  1. Left leg surgery, sounds like venous surgery -Dr Limon  2. Nasal septal repair.      ALLERGIES: NKDA.    SOCIAL HISTORY: He puffs on a rare cigar, he is not a smoker. Drinks wine on occasion,  with 3 children. He has 7 grandchildren. 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 He is originally from [place]. He is quite active.    FAMILY HISTORY: Sr. had breast cancer. Otherwise negative for prostate or colon cancer, negative for premature coronary disease or diabetes.    HEALTHCARE SCREENING: [facility], last cholesterol was 2011, , due for PSA , he had eye examination.      Gen: no  "distress  EYES: conjunctiva clear, non-icteric, PERRL  ENT: nose congested, nasal mucosa red, oropharynx slightly red and moist, teeth good  NECK:supple, thyroid non-palpable, no cervical lymph nodes  RESP: effort is good, lungs clear  CV: heart RRR w/o murmur, gallops or rubs; no carotid bruits, no edema  GI: abdomen soft, non-distended, non-tender  MS: gait normal, no clubbing or cyanosis of the digits  SKIN: no rashes, warm to touch, no sinus pain to touch      Benitez was seen today for cough.    Diagnoses and all orders for this visit:    URI, acute, likely viral and reassured patient he has about 1 more week of symptoms.  If he gets fever he can call her chest congestion and we can re-evaluate the need for antibiotics.  Right now he needs Delsym twice a day, Claritin in the morning and Chlor-Trimeton at night.    Essential hypertension, chronic and stable, meds reviewed    Paroxysmal atrial fibrillation, chronic and stable, the above medications for his URI should be safe the on a short-term use basis            Clinical no be sensitive so as to not cause any issues regarding coverage of evaluation and management issues.  Patient himself also is not well with access">>>>"This note will not be shared with the patient."  "

## 2019-09-25 ENCOUNTER — CLINICAL SUPPORT (OUTPATIENT)
Dept: FAMILY MEDICINE | Facility: CLINIC | Age: 79
End: 2019-09-25
Payer: MEDICARE

## 2019-09-25 DIAGNOSIS — Z23 NEED FOR INFLUENZA VACCINATION: Primary | ICD-10-CM

## 2019-09-25 PROCEDURE — 99999 PR PBB SHADOW E&M-EST. PATIENT-LVL I: CPT | Mod: PBBFAC,,,

## 2019-09-25 PROCEDURE — 99499 NO LOS: ICD-10-PCS | Mod: S$GLB,,, | Performed by: INTERNAL MEDICINE

## 2019-09-25 PROCEDURE — G0008 FLU VACCINE - HIGH DOSE (65+) PRESERVATIVE FREE IM: ICD-10-PCS | Mod: S$GLB,,, | Performed by: INTERNAL MEDICINE

## 2019-09-25 PROCEDURE — 99999 PR PBB SHADOW E&M-EST. PATIENT-LVL I: ICD-10-PCS | Mod: PBBFAC,,,

## 2019-09-25 PROCEDURE — G0008 ADMIN INFLUENZA VIRUS VAC: HCPCS | Mod: S$GLB,,, | Performed by: INTERNAL MEDICINE

## 2019-09-25 PROCEDURE — 90662 IIV NO PRSV INCREASED AG IM: CPT | Mod: S$GLB,,, | Performed by: INTERNAL MEDICINE

## 2019-09-25 PROCEDURE — 90662 FLU VACCINE - HIGH DOSE (65+) PRESERVATIVE FREE IM: ICD-10-PCS | Mod: S$GLB,,, | Performed by: INTERNAL MEDICINE

## 2019-09-25 PROCEDURE — 99499 UNLISTED E&M SERVICE: CPT | Mod: S$GLB,,, | Performed by: INTERNAL MEDICINE

## 2019-10-15 ENCOUNTER — OFFICE VISIT (OUTPATIENT)
Dept: FAMILY MEDICINE | Facility: CLINIC | Age: 79
End: 2019-10-15
Payer: MEDICARE

## 2019-10-15 ENCOUNTER — LAB VISIT (OUTPATIENT)
Dept: LAB | Facility: HOSPITAL | Age: 79
End: 2019-10-15
Attending: INTERNAL MEDICINE
Payer: MEDICARE

## 2019-10-15 VITALS
TEMPERATURE: 98 F | OXYGEN SATURATION: 98 % | HEIGHT: 72 IN | HEART RATE: 53 BPM | DIASTOLIC BLOOD PRESSURE: 62 MMHG | SYSTOLIC BLOOD PRESSURE: 124 MMHG | WEIGHT: 166.88 LBS | BODY MASS INDEX: 22.6 KG/M2

## 2019-10-15 DIAGNOSIS — I48.0 PAF (PAROXYSMAL ATRIAL FIBRILLATION): ICD-10-CM

## 2019-10-15 DIAGNOSIS — K64.0 GRADE I HEMORRHOIDS: Primary | ICD-10-CM

## 2019-10-15 DIAGNOSIS — I48.0 PAROXYSMAL ATRIAL FIBRILLATION: ICD-10-CM

## 2019-10-15 DIAGNOSIS — I10 ESSENTIAL HYPERTENSION: ICD-10-CM

## 2019-10-15 DIAGNOSIS — K21.9 GASTROESOPHAGEAL REFLUX DISEASE, ESOPHAGITIS PRESENCE NOT SPECIFIED: ICD-10-CM

## 2019-10-15 LAB
BASOPHILS # BLD AUTO: 0.05 K/UL (ref 0–0.2)
BASOPHILS NFR BLD: 0.8 % (ref 0–1.9)
DIFFERENTIAL METHOD: ABNORMAL
EOSINOPHIL # BLD AUTO: 0.1 K/UL (ref 0–0.5)
EOSINOPHIL NFR BLD: 1.3 % (ref 0–8)
ERYTHROCYTE [DISTWIDTH] IN BLOOD BY AUTOMATED COUNT: 13.9 % (ref 11.5–14.5)
HCT VFR BLD AUTO: 36.1 % (ref 40–54)
HGB BLD-MCNC: 12.7 G/DL (ref 14–18)
IMM GRANULOCYTES # BLD AUTO: 0.02 K/UL (ref 0–0.04)
IMM GRANULOCYTES NFR BLD AUTO: 0.3 % (ref 0–0.5)
LYMPHOCYTES # BLD AUTO: 1.2 K/UL (ref 1–4.8)
LYMPHOCYTES NFR BLD: 20.6 % (ref 18–48)
MCH RBC QN AUTO: 30.4 PG (ref 27–31)
MCHC RBC AUTO-ENTMCNC: 35.2 G/DL (ref 32–36)
MCV RBC AUTO: 86 FL (ref 82–98)
MONOCYTES # BLD AUTO: 0.8 K/UL (ref 0.3–1)
MONOCYTES NFR BLD: 13 % (ref 4–15)
NEUTROPHILS # BLD AUTO: 3.9 K/UL (ref 1.8–7.7)
NEUTROPHILS NFR BLD: 64 % (ref 38–73)
NRBC BLD-RTO: 0 /100 WBC
PLATELET # BLD AUTO: 201 K/UL (ref 150–350)
PMV BLD AUTO: 11.3 FL (ref 9.2–12.9)
RBC # BLD AUTO: 4.18 M/UL (ref 4.6–6.2)
TSH SERPL DL<=0.005 MIU/L-ACNC: 0.45 UIU/ML (ref 0.4–4)
WBC # BLD AUTO: 6.02 K/UL (ref 3.9–12.7)

## 2019-10-15 PROCEDURE — 80053 COMPREHEN METABOLIC PANEL: CPT

## 2019-10-15 PROCEDURE — 3078F DIAST BP <80 MM HG: CPT | Mod: CPTII,S$GLB,, | Performed by: INTERNAL MEDICINE

## 2019-10-15 PROCEDURE — 99999 PR PBB SHADOW E&M-EST. PATIENT-LVL III: CPT | Mod: PBBFAC,,, | Performed by: INTERNAL MEDICINE

## 2019-10-15 PROCEDURE — 1101F PR PT FALLS ASSESS DOC 0-1 FALLS W/OUT INJ PAST YR: ICD-10-PCS | Mod: CPTII,S$GLB,, | Performed by: INTERNAL MEDICINE

## 2019-10-15 PROCEDURE — 3074F PR MOST RECENT SYSTOLIC BLOOD PRESSURE < 130 MM HG: ICD-10-PCS | Mod: CPTII,S$GLB,, | Performed by: INTERNAL MEDICINE

## 2019-10-15 PROCEDURE — 99999 PR PBB SHADOW E&M-EST. PATIENT-LVL III: ICD-10-PCS | Mod: PBBFAC,,, | Performed by: INTERNAL MEDICINE

## 2019-10-15 PROCEDURE — 99214 PR OFFICE/OUTPT VISIT, EST, LEVL IV, 30-39 MIN: ICD-10-PCS | Mod: S$GLB,,, | Performed by: INTERNAL MEDICINE

## 2019-10-15 PROCEDURE — 1101F PT FALLS ASSESS-DOCD LE1/YR: CPT | Mod: CPTII,S$GLB,, | Performed by: INTERNAL MEDICINE

## 2019-10-15 PROCEDURE — 99214 OFFICE O/P EST MOD 30 MIN: CPT | Mod: S$GLB,,, | Performed by: INTERNAL MEDICINE

## 2019-10-15 PROCEDURE — 3078F PR MOST RECENT DIASTOLIC BLOOD PRESSURE < 80 MM HG: ICD-10-PCS | Mod: CPTII,S$GLB,, | Performed by: INTERNAL MEDICINE

## 2019-10-15 PROCEDURE — 36415 COLL VENOUS BLD VENIPUNCTURE: CPT | Mod: PO

## 2019-10-15 PROCEDURE — 85025 COMPLETE CBC W/AUTO DIFF WBC: CPT

## 2019-10-15 PROCEDURE — 84443 ASSAY THYROID STIM HORMONE: CPT

## 2019-10-15 PROCEDURE — 3074F SYST BP LT 130 MM HG: CPT | Mod: CPTII,S$GLB,, | Performed by: INTERNAL MEDICINE

## 2019-10-15 RX ORDER — PANTOPRAZOLE SODIUM 40 MG/1
40 TABLET, DELAYED RELEASE ORAL DAILY
Qty: 30 TABLET | Refills: 2 | Status: SHIPPED | OUTPATIENT
Start: 2019-10-15 | End: 2020-01-20

## 2019-10-15 NOTE — PROGRESS NOTES
Subjective:       Chief Complaint  Chief Complaint   Patient presents with    Rectal Bleeding     patient c/o rectal bleeding x 1 day       HPI  Benitez Cabrales is a 79 y.o. male with multiple medical diagnoses as listed in the medical history and problem list that presents for the above complaint.     Bright red blood per rectum this morning after having a bowel movement  Two episodes - second occurred this morning as well  He does note some pain in his lower abdominal region - mild - 'comes and goes'  He believes it might be gas   Patient called GI/Dr Harding - states could get an appointment as early as 10/30/19   Did not take his Pradaxa today because he is concerned about further     Patient Care Team:  Diomedes Ayoub MD as PCP - General (Internal Medicine)  Macarena Hernandez LPN as Licensed Practical Nurse      PAST MEDICAL HISTORY:  Past Medical History:   Diagnosis Date    Anticoagulant long-term use     Atrial fibrillation     COPD (chronic obstructive pulmonary disease)     GERD (gastroesophageal reflux disease)     Hyperlipidemia     Hypertension     Nuclear sclerosis, bilateral 10/9/2017    Rhinitis medicamentosa 6/30/2014    Small bowel obstruction     Vertigo 6/13/2013       PAST SURGICAL HISTORY:  Past Surgical History:   Procedure Laterality Date    ICM implant      NOSE SURGERY      Septal Repair    REMOVAL OF IMPLANTABLE LOOP RECORDER N/A 12/10/2018    Procedure: REMOVAL, IMPLANTABLE LOOP RECORDER;  Surgeon: Mickey Morales MD;  Location: [facility];  Service: Cardiology;  Laterality: N/A;    VASCULAR SURGERY      left leg       SOCIAL HISTORY:  Social History     Socioeconomic History    Marital status:      Spouse name: Not on file    Number of children: Not on file    Years of education: Not on file    Highest education level: Not on file   Occupational History     Employer: Retired    Social Needs    Financial resource strain: Somewhat hard    Food  insecurity:     Worry: Not on file     Inability: Never true    Transportation needs:     Medical: No     Non-medical: No   Tobacco Use    Smoking status: Never Smoker    Smokeless tobacco: Never Used    Tobacco comment: .  Three kids.  Occup:   at [employer].     Substance and Sexual Activity    Alcohol use: Yes     Alcohol/week: 1.0 standard drinks     Types: 1 Glasses of wine per week     Frequency: 2-3 times a week     Drinks per session: 1 or 2     Binge frequency: Never     Comment: occasional    Drug use: No    Sexual activity: Not Currently   Lifestyle    Physical activity:     Days per week: 2 days     Minutes per session: 60 min    Stress: Only a little   Relationships    Social connections:     Talks on phone: Twice a week     Gets together: Twice a week     Attends [employer] service: Not on file     Active member of club or organization: Yes     Attends meetings of clubs or organizations: Never     Relationship status:    Other Topics Concern    Not on file   Social History Narrative    Not on file       FAMILY HISTORY:  Family History   Problem Relation Age of Onset    Cancer Maternal Aunt     No Known Problems Mother     No Known Problems Father     No Known Problems Sister     No Known Problems Brother     No Known Problems Maternal Uncle     No Known Problems Paternal Aunt     No Known Problems Paternal Uncle     No Known Problems Maternal Grandmother     No Known Problems Maternal Grandfather     No Known Problems Paternal Grandmother     No Known Problems Paternal Grandfather     Asthma Neg Hx     Emphysema Neg Hx     Amblyopia Neg Hx     Blindness Neg Hx     Cataracts Neg Hx     Diabetes Neg Hx     Glaucoma Neg Hx     Hypertension Neg Hx     Macular degeneration Neg Hx     Retinal detachment Neg Hx     Strabismus Neg Hx     Stroke Neg Hx     Thyroid disease Neg Hx        ALLERGIES AND MEDICATIONS: updated and reviewed.  Review of  patient's allergies indicates:  No Known Allergies  Current Outpatient Medications   Medication Sig Dispense Refill    amiodarone (PACERONE) 200 MG Tab Take 1 tablet (200 mg total) by mouth once daily. 90 tablet 3    amLODIPine (NORVASC) 5 MG tablet Take 2 tablets (10 mg total) by mouth once daily. 90 tablet 3    cholecalciferol, vitamin D3, (VITAMIN D3) 2,000 unit Cap Take 1 capsule by mouth once daily.      dabigatran etexilate (PRADAXA) 150 mg Cap Take 1 capsule (150 mg total) by mouth 2 (two) times daily. 180 capsule 3    DIABETIC SUPPLIES,MISCELL (BD MAGNI-GUIDE SYRINGE MAGNIFI MISC) by Misc.(Non-Drug; Combo Route) route.      diclofenac sodium (VOLTAREN) 1 % Gel Apply 2 g topically 4 (four) times daily. 1 Tube 3    doxepin (SINEQUAN) 10 MG capsule 1-2 HS prn itching 60 capsule 11    fish oil-omega-3 fatty acids 300-1,000 mg capsule Take 2 g by mouth once daily.        magnesium 200 mg Tab Take by mouth once daily.      metoprolol succinate (TOPROL-XL) 25 MG 24 hr tablet Take 1 tablet (25 mg total) by mouth once daily. 90 tablet 3    MV-MN/FA/LYCOPENE/LUT/HB#178 (NEHEMIAH MULTIVITAMIN FOR MEN ORAL) Take 1 tablet by mouth once daily.        rosuvastatin (CRESTOR) 10 MG tablet Take 1 tablet (10 mg total) by mouth every evening. 90 tablet 3    tiZANidine (ZANAFLEX) 4 MG tablet 1-2 at night as needed for spasm 40 tablet 3    valsartan-hydrochlorothiazide (DIOVAN-HCT) 320-25 mg per tablet Take 1 tablet by mouth once daily. 90 tablet 3    pantoprazole (PROTONIX) 40 MG tablet Take 1 tablet (40 mg total) by mouth once daily. 30 tablet 2     No current facility-administered medications for this visit.          ROS  Review of Systems   Constitutional: Negative for chills, diaphoresis and fever.   HENT: Negative for congestion and rhinorrhea.    Respiratory: Negative for cough and shortness of breath.    Cardiovascular: Negative for chest pain.   Gastrointestinal: Positive for blood in stool. Negative for  abdominal pain, constipation, diarrhea and nausea.   Genitourinary: Negative for difficulty urinating, dysuria and enuresis.   Musculoskeletal: Negative for arthralgias and joint swelling.   Skin: Negative for rash and wound.   Neurological: Negative for dizziness and headaches.   Psychiatric/Behavioral: Negative for dysphoric mood. The patient is not nervous/anxious.          Objective:       Physical Exam  Vitals:    10/15/19 1600   BP: 124/62   BP Location: Left arm   Patient Position: Sitting   BP Method: Medium (Manual)   Pulse: (!) 53   Temp: 98.1 °F (36.7 °C)   TempSrc: Oral   SpO2: 98%   Weight: 75.7 kg (166 lb 14.2 oz)   Height: 6' (1.829 m)    Body mass index is 22.63 kg/m².  Weight: 75.7 kg (166 lb 14.2 oz)   Height: 6' (182.9 cm)   Physical Exam   Constitutional: He appears well-developed and well-nourished.   HENT:   Head: Normocephalic and atraumatic.   Eyes: Conjunctivae and EOM are normal.   Neck: Normal range of motion. Neck supple.   Cardiovascular: Normal rate.   Pulmonary/Chest: Effort normal.   Genitourinary:   Genitourinary Comments: Small internal hemorrhoid appreciated on manual MYRA   Musculoskeletal: He exhibits no edema.   Lymphadenopathy:     He has no cervical adenopathy.   Neurological: He is alert. No cranial nerve deficit.   Skin: Skin is warm and dry. No rash noted.   Vitals reviewed.      Assessment:     1. Essential hypertension    2. Paroxysmal atrial fibrillation    3. PAF (paroxysmal atrial fibrillation)    4. Grade I hemorrhoids    5. Gastroesophageal reflux disease, esophagitis presence not specified      Plan:     Benitez was seen today for rectal bleeding.    Diagnoses and all orders for this visit:    Grade I hemorrhoids  Counseled regarding hemorrhoid management  Recommend GI re-evaluation on non-urgent basis    Essential hypertension  BP controlled presently - reviewed anti-hypertensive regimen - continue current therapy  -     TSH; Future  -     Comprehensive metabolic  panel; Future    Paroxysmal atrial fibrillation  On NOAC - The current medical regimen is effective;  continue present plan and medications.  -     CBC auto differential; Future    Gastroesophageal reflux disease, esophagitis presence not specified  Sent medication refill to patient's preferred pharmacy on file.  -     pantoprazole (PROTONIX) 40 MG tablet; Take 1 tablet (40 mg total) by mouth once daily.          Health Maintenance       Date Due Completion Date    TETANUS VACCINE 09/28/1958 ---    Shingles Vaccine (2 of 2) 10/23/2019 8/28/2019    Lipid Panel 07/11/2021 7/11/2016    Colonoscopy 09/22/2021 9/22/2016    Override on 9/22/2016: Done (Kings Harding MD-Impressions: Diverticulosis of the whole colon. Polypectomy in the distal ascending and tranverse colon. Plan: Colonoscopy in 5 years.)    Override on 6/30/2014: Declined        Health Maintenance reviewed, addressed as per orders    Follow up if symptoms worsen or fail to improve.    The patient expressed understanding and no barriers to adherence were identified.     1. The patient indicates understanding of these issues and agrees with the plan. Brief care plan is updated and reviewed with the patient as applicable.     2. The patient is given an After Visit Summary that lists all medications with directions, allergies, orders placed during this encounter and follow-up instructions.     3. I have reviewed the patient's medical information including past medical, family, and social history sections including the medications and allergies.     4. We discussed the patient's current medications. I reconciled the patient's medication list and prepared and supplied needed refills.       Anastacio Rizzo MD  Internal Medicine-Pediatrics

## 2019-10-15 NOTE — PATIENT INSTRUCTIONS
Treating Hemorrhoids: Self-Care    Follow your healthcare providers advice about caring for your hemorrhoids at home. Some treatments help relieve symptoms right away. Others involve making changes in your diet and exercise habits. These can help ease constipation and prevent hemorrhoid symptoms from coming back.  Relieving symptoms  Your healthcare provider may prescribe anti-inflammatory medicine to help ease your symptoms. The following tips will also help relieve pain and swelling.  · Take sitz baths. Taking a sitz bath means sitting in a few inches of warm bath water. Soaking for 10 minutes twice a day can provide welcome relief from painful hemorrhoids. It can also help the area stay clean.  · Develop good bowel habits. Use the bathroom when you need to. Dont ignore the urge to move your bowels. This can lead to constipation, hard stools, and straining. Also, dont read while on the toilet. Sit only as long as needed. Wipe gently with soft, unscented toilet tissue or baby wipes.  · Use ice packs. Placing an ice pack on a thrombosed external hemorrhoid can help relieve pain right away. It will also help reduce the blood clot. Use the ice for 15 to 20 minutes at a time. Keep a cloth between the ice and your skin to prevent skin damage.  · Use other measures. Laxatives and enemas can help ease constipation. But use them only on your healthcare providers advice. For symptom relief, try using cotton pads soaked in witch hazel. These are available at most drugstores. Over-the-counter hemorrhoid ointments and petroleum jelly can also provide relief.    Add fiber to your diet  Adding fiber to your diet can help relieve constipation by making stools softer and easier to pass. To increase your fiber intake, your healthcare provider may recommend a bulking agent, such as psyllium. This is a high-fiber supplement available at most grocery stores and drugstores. Eating more fiber-rich foods will also help. There are  two types of fiber:  · Insoluble fiber is the main ingredient in bulking agents. Its also found in foods such as wheat bran, whole-grain breads, fresh fruits, and vegetables.  · Soluble fiber is found in foods such as oat bran. Although soluble fiber is good for you, it may not ease constipation as much as foods high in insoluble fiber.    Drink more water  Along with a high-fiber diet, drinking more water can help ease constipation. This is because insoluble fiber absorbs water, making stools soft and bulky. Be sure to drink plenty of water throughout the day. Drinking fruit juices, such as prune juice or apple juice, can also help prevent constipation.    Get more exercise  Regular exercise aids digestion and helps prevent constipation. Its also great for your health. So talk with your healthcare provider about starting an exercise program. Low-impact activities, such as swimming or walking, are good places to start. Take it easy at first. And remember to drink plenty of water when you exercise.    High-fiber foods  High-fiber foods offer many benefits. By making your stools softer, they help heal and prevent swollen hemorrhoids. They may also help reduce the risk of colon and rectal cancer. Best of all, theyre usually low in calories and taste great. Here are some examples of fiber-rich foods.  · Whole grains, such as wheat bran, corn bran, and brown rice.  · Vegetables, especially carrots, broccoli, cabbage, and peas.  · Fruits, such as apples, bananas, raisins, peaches, and pears.  · Nuts and legumes, especially peanuts, lentils, and kidney beans.  Easy ways to add fiber  The tips below offer some simple ways to add more high-fiber foods to your meals.  · Start your day with a high-fiber breakfast. Eat a wheat bran cereal along with a sliced banana. Or, try peanut butter on whole-wheat toast.  · Eat carrot sticks for snacks. Theyre easy to prepare, taste great, and are low in calories.  · Use whole-grain  breads instead of white bread for sandwiches.  · Eat fruits for treats. Try an apple and some raisins instead of a candy bar.   Date Last Reviewed: 7/1/2016  © 2793-7936 [employer]. [address] [place] [address], [place] [address]. All rights reserved. This information is not intended as a substitute for professional medical care. Always follow your healthcare professional's instructions.

## 2019-10-16 LAB
ALBUMIN SERPL BCP-MCNC: 3.6 G/DL (ref 3.5–5.2)
ALP SERPL-CCNC: 82 U/L (ref 55–135)
ALT SERPL W/O P-5'-P-CCNC: 54 U/L (ref 10–44)
ANION GAP SERPL CALC-SCNC: 14 MMOL/L (ref 8–16)
AST SERPL-CCNC: 63 U/L (ref 10–40)
BILIRUB SERPL-MCNC: 0.6 MG/DL (ref 0.1–1)
BUN SERPL-MCNC: 23 MG/DL (ref 8–23)
CALCIUM SERPL-MCNC: 9.4 MG/DL (ref 8.7–10.5)
CHLORIDE SERPL-SCNC: 92 MMOL/L (ref 95–110)
CO2 SERPL-SCNC: 21 MMOL/L (ref 23–29)
CREAT SERPL-MCNC: 1.3 MG/DL (ref 0.5–1.4)
EST. GFR  (AFRICAN AMERICAN): >60 ML/MIN/1.73 M^2
EST. GFR  (NON AFRICAN AMERICAN): 52.3 ML/MIN/1.73 M^2
GLUCOSE SERPL-MCNC: 98 MG/DL (ref 70–110)
POTASSIUM SERPL-SCNC: 4.3 MMOL/L (ref 3.5–5.1)
PROT SERPL-MCNC: 7.3 G/DL (ref 6–8.4)
SODIUM SERPL-SCNC: 127 MMOL/L (ref 136–145)

## 2019-11-05 ENCOUNTER — LAB VISIT (OUTPATIENT)
Dept: LAB | Facility: HOSPITAL | Age: 79
End: 2019-11-05
Attending: INTERNAL MEDICINE
Payer: MEDICARE

## 2019-11-05 ENCOUNTER — OFFICE VISIT (OUTPATIENT)
Dept: FAMILY MEDICINE | Facility: CLINIC | Age: 79
End: 2019-11-05
Payer: MEDICARE

## 2019-11-05 VITALS
WEIGHT: 165.81 LBS | HEIGHT: 72 IN | HEART RATE: 60 BPM | BODY MASS INDEX: 22.46 KG/M2 | TEMPERATURE: 98 F | OXYGEN SATURATION: 99 % | DIASTOLIC BLOOD PRESSURE: 58 MMHG | SYSTOLIC BLOOD PRESSURE: 120 MMHG

## 2019-11-05 DIAGNOSIS — Z12.5 SCREENING FOR PROSTATE CANCER: ICD-10-CM

## 2019-11-05 DIAGNOSIS — K62.5 RECTAL BLEEDING: ICD-10-CM

## 2019-11-05 DIAGNOSIS — J44.9 CHRONIC OBSTRUCTIVE PULMONARY DISEASE, UNSPECIFIED COPD TYPE: ICD-10-CM

## 2019-11-05 DIAGNOSIS — Z86.010 HISTORY OF COLONIC POLYPS: ICD-10-CM

## 2019-11-05 DIAGNOSIS — I10 ESSENTIAL HYPERTENSION: ICD-10-CM

## 2019-11-05 DIAGNOSIS — D64.9 ANEMIA, UNSPECIFIED TYPE: ICD-10-CM

## 2019-11-05 DIAGNOSIS — R79.89 ABNORMAL LIVER FUNCTION TESTS: ICD-10-CM

## 2019-11-05 DIAGNOSIS — E87.1 HYPONATREMIA: ICD-10-CM

## 2019-11-05 DIAGNOSIS — N40.0 BENIGN PROSTATIC HYPERPLASIA, UNSPECIFIED WHETHER LOWER URINARY TRACT SYMPTOMS PRESENT: ICD-10-CM

## 2019-11-05 DIAGNOSIS — K64.0 GRADE I HEMORRHOIDS: ICD-10-CM

## 2019-11-05 DIAGNOSIS — N28.9 RENAL INSUFFICIENCY: ICD-10-CM

## 2019-11-05 DIAGNOSIS — I70.0 AORTIC ATHEROSCLEROSIS: ICD-10-CM

## 2019-11-05 DIAGNOSIS — Z00.00 ROUTINE MEDICAL EXAM: ICD-10-CM

## 2019-11-05 DIAGNOSIS — E78.5 HYPERLIPIDEMIA, UNSPECIFIED HYPERLIPIDEMIA TYPE: ICD-10-CM

## 2019-11-05 DIAGNOSIS — Z00.00 ROUTINE MEDICAL EXAM: Primary | ICD-10-CM

## 2019-11-05 DIAGNOSIS — I48.0 PAF (PAROXYSMAL ATRIAL FIBRILLATION): ICD-10-CM

## 2019-11-05 LAB
ALBUMIN SERPL BCP-MCNC: 3.4 G/DL (ref 3.5–5.2)
ALP SERPL-CCNC: 73 U/L (ref 55–135)
ALT SERPL W/O P-5'-P-CCNC: 44 U/L (ref 10–44)
ANION GAP SERPL CALC-SCNC: 6 MMOL/L (ref 8–16)
AST SERPL-CCNC: 47 U/L (ref 10–40)
BASOPHILS # BLD AUTO: 0.08 K/UL (ref 0–0.2)
BASOPHILS NFR BLD: 1 % (ref 0–1.9)
BILIRUB SERPL-MCNC: 0.7 MG/DL (ref 0.1–1)
BUN SERPL-MCNC: 32 MG/DL (ref 8–23)
CALCIUM SERPL-MCNC: 9.4 MG/DL (ref 8.7–10.5)
CHLORIDE SERPL-SCNC: 100 MMOL/L (ref 95–110)
CO2 SERPL-SCNC: 28 MMOL/L (ref 23–29)
COMPLEXED PSA SERPL-MCNC: 0.55 NG/ML (ref 0–4)
CREAT SERPL-MCNC: 1.5 MG/DL (ref 0.5–1.4)
DIFFERENTIAL METHOD: ABNORMAL
EOSINOPHIL # BLD AUTO: 0.2 K/UL (ref 0–0.5)
EOSINOPHIL NFR BLD: 2.6 % (ref 0–8)
ERYTHROCYTE [DISTWIDTH] IN BLOOD BY AUTOMATED COUNT: 14.1 % (ref 11.5–14.5)
EST. GFR  (AFRICAN AMERICAN): 50.8 ML/MIN/1.73 M^2
EST. GFR  (NON AFRICAN AMERICAN): 44 ML/MIN/1.73 M^2
FERRITIN SERPL-MCNC: 77 NG/ML (ref 20–300)
GLUCOSE SERPL-MCNC: 66 MG/DL (ref 70–110)
HCT VFR BLD AUTO: 38.7 % (ref 40–54)
HGB BLD-MCNC: 12.3 G/DL (ref 14–18)
IMM GRANULOCYTES # BLD AUTO: 0.03 K/UL (ref 0–0.04)
IMM GRANULOCYTES NFR BLD AUTO: 0.4 % (ref 0–0.5)
IRON SERPL-MCNC: 84 UG/DL (ref 45–160)
LYMPHOCYTES # BLD AUTO: 1.4 K/UL (ref 1–4.8)
LYMPHOCYTES NFR BLD: 17.4 % (ref 18–48)
MCH RBC QN AUTO: 29.8 PG (ref 27–31)
MCHC RBC AUTO-ENTMCNC: 31.8 G/DL (ref 32–36)
MCV RBC AUTO: 94 FL (ref 82–98)
MONOCYTES # BLD AUTO: 0.8 K/UL (ref 0.3–1)
MONOCYTES NFR BLD: 10.1 % (ref 4–15)
NEUTROPHILS # BLD AUTO: 5.4 K/UL (ref 1.8–7.7)
NEUTROPHILS NFR BLD: 68.5 % (ref 38–73)
NRBC BLD-RTO: 0 /100 WBC
PLATELET # BLD AUTO: 201 K/UL (ref 150–350)
PMV BLD AUTO: 11.7 FL (ref 9.2–12.9)
POTASSIUM SERPL-SCNC: 4.3 MMOL/L (ref 3.5–5.1)
PROT SERPL-MCNC: 6.9 G/DL (ref 6–8.4)
RBC # BLD AUTO: 4.13 M/UL (ref 4.6–6.2)
SATURATED IRON: 20 % (ref 20–50)
SODIUM SERPL-SCNC: 134 MMOL/L (ref 136–145)
TOTAL IRON BINDING CAPACITY: 420 UG/DL (ref 250–450)
TRANSFERRIN SERPL-MCNC: 284 MG/DL (ref 200–375)
WBC # BLD AUTO: 7.81 K/UL (ref 3.9–12.7)

## 2019-11-05 PROCEDURE — 3074F PR MOST RECENT SYSTOLIC BLOOD PRESSURE < 130 MM HG: ICD-10-PCS | Mod: CPTII,S$GLB,, | Performed by: INTERNAL MEDICINE

## 2019-11-05 PROCEDURE — 82728 ASSAY OF FERRITIN: CPT

## 2019-11-05 PROCEDURE — 99499 RISK ADDL DX/OHS AUDIT: ICD-10-PCS | Mod: S$GLB,,, | Performed by: INTERNAL MEDICINE

## 2019-11-05 PROCEDURE — 80053 COMPREHEN METABOLIC PANEL: CPT

## 2019-11-05 PROCEDURE — 3078F PR MOST RECENT DIASTOLIC BLOOD PRESSURE < 80 MM HG: ICD-10-PCS | Mod: CPTII,S$GLB,, | Performed by: INTERNAL MEDICINE

## 2019-11-05 PROCEDURE — 99999 PR PBB SHADOW E&M-EST. PATIENT-LVL III: ICD-10-PCS | Mod: PBBFAC,,, | Performed by: INTERNAL MEDICINE

## 2019-11-05 PROCEDURE — 99999 PR PBB SHADOW E&M-EST. PATIENT-LVL III: CPT | Mod: PBBFAC,,, | Performed by: INTERNAL MEDICINE

## 2019-11-05 PROCEDURE — 1101F PT FALLS ASSESS-DOCD LE1/YR: CPT | Mod: CPTII,S$GLB,, | Performed by: INTERNAL MEDICINE

## 2019-11-05 PROCEDURE — 99214 OFFICE O/P EST MOD 30 MIN: CPT | Mod: S$GLB,,, | Performed by: INTERNAL MEDICINE

## 2019-11-05 PROCEDURE — 99499 UNLISTED E&M SERVICE: CPT | Mod: S$GLB,,, | Performed by: INTERNAL MEDICINE

## 2019-11-05 PROCEDURE — 1101F PR PT FALLS ASSESS DOC 0-1 FALLS W/OUT INJ PAST YR: ICD-10-PCS | Mod: CPTII,S$GLB,, | Performed by: INTERNAL MEDICINE

## 2019-11-05 PROCEDURE — 3074F SYST BP LT 130 MM HG: CPT | Mod: CPTII,S$GLB,, | Performed by: INTERNAL MEDICINE

## 2019-11-05 PROCEDURE — 3078F DIAST BP <80 MM HG: CPT | Mod: CPTII,S$GLB,, | Performed by: INTERNAL MEDICINE

## 2019-11-05 PROCEDURE — 85025 COMPLETE CBC W/AUTO DIFF WBC: CPT

## 2019-11-05 PROCEDURE — 84153 ASSAY OF PSA TOTAL: CPT

## 2019-11-05 PROCEDURE — 36415 COLL VENOUS BLD VENIPUNCTURE: CPT | Mod: PO

## 2019-11-05 PROCEDURE — 83540 ASSAY OF IRON: CPT

## 2019-11-05 PROCEDURE — 99214 PR OFFICE/OUTPT VISIT, EST, LEVL IV, 30-39 MIN: ICD-10-PCS | Mod: S$GLB,,, | Performed by: INTERNAL MEDICINE

## 2019-11-05 NOTE — PROGRESS NOTES
Chief complaint: Physical exam,      79 -year-old  male   He is due for PSA and appears to be up-to-date on colonoscopy 9/16  there was a polyp -5 yrs..  Outside records reviewed.  He is followed by cardiology.  Still working as a .  No other increased risk based upon social history or family history.  He is reduced overall his alcohol intake.            ROS:   CONST: weight stable. EYES: no vision change. ENT: no sore throat. CV: no chest pain w/ exertion. RESP: no shortness of breath. GI: no nausea, vomiting, diarrhea. No dysphagia. : no urinary issues. MUSCULOSKELETAL: no new myalgias or arthralgias. SKIN: no new changes. NEURO: no focal deficits. PSYCH: no new issues. ENDOCRINE: no polyuria. HEME: no lymph nodes. ALLERGY: no general pruritis.     PAST MEDICAL HISTORY:  1. Hypertension.  2. Hyperlipidemia.  3. GERD.  4. Vertigo after trauma  5. Colonoscopy was normal around 2009 per [facility], 9/16  there was a polyp -5 yrs.  6. Atrial fib/flutter -[facility] -cardioverted  7.  Dizziness, seen by neurology    8.peptic ulcer disease on EGDNovember 2016 with GI bleeding  9.  Small bowel obstruction, resolved on its own November 2016  10. anemia    PAST SURGICAL HISTORY:  1. Left leg surgery, sounds like venous surgery -Dr Limon  2. Nasal septal repair.      ALLERGIES: NKDA.    SOCIAL HISTORY: He puffs on a rare cigar, he is not a smoker. Drinks wine on occasion,  with 3 children. He has 7 grandchildren. 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 He is originally from [place] [facility]. He is quite active.    FAMILY HISTORY: Sr. had breast cancer. Otherwise negative for prostate or colon cancer, negative for premature coronary disease or diabetes.    HEALTHCARE SCREENING: Colonoscopy was normal around 2009 per [facility], colonoscopy 9/16  there was a polyp -5 yrs , due for PSA , he had eye examination.      Gen: no distress  EYES: conjunctiva clear, non-icteric, PERRL  ENT: nose  clear, nasal mucosa normal, oropharynx clear and moist, teeth good,ears clear  NECK:supple, thyroid non-palpable  RESP: effort is good, lungs clear  CV: heart RRR w/o murmur, gallops or rubs; no carotid bruits, no edema  GI: abdomen soft, non-distended, non-tender, no hepatosplenomegaly  MS: gait normal, no clubbing or cyanosis of the digits,   SKIN: no rashes, warm to touch           Benitez was seen today for annual exam.    Diagnoses and associated orders for this visit:    Routine medical exam--- .  We will update all his lab work including PSA.  Continue exercise.    Screening for prostate cancer  - PSA, overall in good health    Screening for colorectal cancer--- done                                           Additional evaluation and management issues:    Additionally patient has other medical issues and complaints to address.        Seen by MD 10/15- Bright red blood per rectum this morning after having a bowel movement  Two episodes - second occurred this morning as well  He does note some pain in his lower abdominal region - mild - 'comes and goes'  He believes it might be gas   Patient called GI/Dr Harding - states could get an appointment as early as 10/30/19   Did not take his Pradaxa today because he is concerned     Saw Dmitriy today and given pills for constipation. No bleeding    Regarding hypertension isn't a good control.  He is on Diovan HCTZ and his most recent sodium level was 127 about two weeks ago we will repeat.  He did increase his salt intake.  He is on the HCTZ which we might need to remove.      Regarding hyperlipidemia he is on medication but is been a year or 2 since he's had a lipid profile.  He is not fasting today.  He is previously well controlled on current statin so we will defer.      He previously had  a moderate amount of pain that is been chronic in his feet.  It looks like in review podiatry no from a few years ago he has a bunion.  We discussed the condition will not  improve and looks like podiatry said conservative therapies might work but only definitive would be surgery.  He has an appointment with Dr. Langley in Orthopedics  and he will consider seeing Podiatry again.  Better with WIDER shoes    He has atrial fibrillation which apparently is under control.      He has COPD without any problems. He has atherosclerosis of the aorta noted in prior records.      He also did have some bleeding.  I reviewed his last CBC which was improving but still not back to normal.  Iron was a year ago and will repeat.    He is due for his PSA as well.      Counseled at length to review prior records.  All these issues reviewed and patient counseled in evaluation and management will be based upon time counseling. Total time over 25 minutes with over 50% counseling.            Assessment and plan:        Essential hypertension ,chronic condition and stable.  -     Comprehensive metabolic panel; Future  -     CBC auto differential; Future  -     Iron and TIBC; Future  -     Ferritin; Future    PAF (paroxysmal atrial fibrillation) ,chronic condition and stable.    Rectal bleeding ,chronic condition and stable.    Grade I hemorrhoids    Aortic atherosclerosis    Chronic obstructive pulmonary disease, unspecified COPD type ,chronic condition and stable.    Hyperlipidemia, unspecified hyperlipidemia type ,chronic condition and stable.    Benign prostatic hyperplasia, unspecified whether lower urinary tract symptoms present  -     Prostate Specific Antigen, Diagnostic; Future    Screening for prostate cancer  -     Prostate Specific Antigen, Diagnostic; Future    Abnormal liver function tests    Renal insufficiency    Anemia, unspecified type  -     CBC auto differential; Future  -     Iron and TIBC; Future  -     Ferritin; Future    Hyponatremia, new issue, repeat and ? Drop HCTZ                Clinical no be sensitive so as to not cause any issues regarding coverage of evaluation and management  "issues.  Patient himself also is not well with access"This note will not be shared with the patient."  "

## 2019-11-15 ENCOUNTER — PATIENT OUTREACH (OUTPATIENT)
Dept: ADMINISTRATIVE | Facility: HOSPITAL | Age: 79
End: 2019-11-15

## 2019-11-15 ENCOUNTER — TELEPHONE (OUTPATIENT)
Dept: ADMINISTRATIVE | Facility: HOSPITAL | Age: 79
End: 2019-11-15

## 2019-12-12 RX ORDER — AMLODIPINE BESYLATE 5 MG/1
TABLET ORAL
Qty: 90 TABLET | Refills: 3 | Status: SHIPPED | OUTPATIENT
Start: 2019-12-12 | End: 2020-07-30 | Stop reason: SINTOL

## 2020-01-08 RX ORDER — TIZANIDINE 4 MG/1
TABLET ORAL
Qty: 40 TABLET | Refills: 12 | Status: SHIPPED | OUTPATIENT
Start: 2020-01-08 | End: 2021-02-01

## 2020-01-18 DIAGNOSIS — K21.9 GASTROESOPHAGEAL REFLUX DISEASE, ESOPHAGITIS PRESENCE NOT SPECIFIED: ICD-10-CM

## 2020-01-20 RX ORDER — PANTOPRAZOLE SODIUM 40 MG/1
TABLET, DELAYED RELEASE ORAL
Qty: 90 TABLET | Refills: 0 | Status: SHIPPED | OUTPATIENT
Start: 2020-01-20 | End: 2020-04-30

## 2020-01-28 ENCOUNTER — PATIENT MESSAGE (OUTPATIENT)
Dept: ELECTROPHYSIOLOGY | Facility: CLINIC | Age: 80
End: 2020-01-28

## 2020-01-30 DIAGNOSIS — J44.9 CHRONIC OBSTRUCTIVE PULMONARY DISEASE, UNSPECIFIED COPD TYPE: Primary | ICD-10-CM

## 2020-01-30 DIAGNOSIS — I48.0 PAROXYSMAL ATRIAL FIBRILLATION: Chronic | ICD-10-CM

## 2020-01-30 DIAGNOSIS — I49.8 OTHER SPECIFIED CARDIAC ARRHYTHMIAS: Primary | ICD-10-CM

## 2020-02-26 DIAGNOSIS — I48.0 PAF (PAROXYSMAL ATRIAL FIBRILLATION): ICD-10-CM

## 2020-02-26 RX ORDER — DABIGATRAN ETEXILATE MESYLATE 150 MG/1
CAPSULE ORAL
Qty: 180 CAPSULE | Refills: 3 | Status: SHIPPED | OUTPATIENT
Start: 2020-02-26 | End: 2020-03-11

## 2020-03-09 ENCOUNTER — HOSPITAL ENCOUNTER (OUTPATIENT)
Dept: CARDIOLOGY | Facility: CLINIC | Age: 80
Discharge: HOME | End: 2020-03-09
Attending: INTERNAL MEDICINE
Payer: MEDICARE

## 2020-03-09 VITALS
HEART RATE: 60 BPM | SYSTOLIC BLOOD PRESSURE: 130 MMHG | DIASTOLIC BLOOD PRESSURE: 70 MMHG | BODY MASS INDEX: 22.35 KG/M2 | HEIGHT: 72 IN | WEIGHT: 165 LBS

## 2020-03-09 DIAGNOSIS — J44.9 CHRONIC OBSTRUCTIVE PULMONARY DISEASE, UNSPECIFIED COPD TYPE: ICD-10-CM

## 2020-03-09 DIAGNOSIS — I48.0 PAROXYSMAL ATRIAL FIBRILLATION: ICD-10-CM

## 2020-03-09 LAB
ASCENDING AORTA: 3.7 CM
AV INDEX (PROSTH): 0.77
AV MEAN GRADIENT: 2 MMHG
AV PEAK GRADIENT: 5 MMHG
AV VALVE AREA: 2.77 CM2
AV VELOCITY RATIO: 0.75
BSA FOR ECHO PROCEDURE: 1.95 M2
CV ECHO LV RWT: 0.42 CM
DOP CALC AO PEAK VEL: 1.11 M/S
DOP CALC AO VTI: 25.76 CM
DOP CALC LVOT AREA: 3.6 CM2
DOP CALC LVOT DIAMETER: 2.14 CM
DOP CALC LVOT PEAK VEL: 0.83 M/S
DOP CALC LVOT STROKE VOLUME: 71.32 CM3
DOP CALCLVOT PEAK VEL VTI: 19.84 CM
E WAVE DECELERATION TIME: 263.15 MSEC
E/A RATIO: 1.07
E/E' RATIO: 6.2 M/S
ECHO LV POSTERIOR WALL: 1 CM (ref 0.6–1.1)
FRACTIONAL SHORTENING: 30 % (ref 28–44)
INTERVENTRICULAR SEPTUM: 0.97 CM (ref 0.6–1.1)
IVRT: 114.18 MSEC
LA MAJOR: 6.29 CM
LA MINOR: 6.33 CM
LA WIDTH: 5.52 CM
LEFT ATRIUM SIZE: 4.53 CM
LEFT ATRIUM VOLUME INDEX: 68.3 ML/M2
LEFT ATRIUM VOLUME: 134.12 CM3
LEFT INTERNAL DIMENSION IN SYSTOLE: 3.31 CM (ref 2.1–4)
LEFT VENTRICLE DIASTOLIC VOLUME INDEX: 53.52 ML/M2
LEFT VENTRICLE DIASTOLIC VOLUME: 105.06 ML
LEFT VENTRICLE MASS INDEX: 83 G/M2
LEFT VENTRICLE SYSTOLIC VOLUME INDEX: 22.6 ML/M2
LEFT VENTRICLE SYSTOLIC VOLUME: 44.44 ML
LEFT VENTRICULAR INTERNAL DIMENSION IN DIASTOLE: 4.75 CM (ref 3.5–6)
LEFT VENTRICULAR MASS: 163.92 G
LV LATERAL E/E' RATIO: 6.2 M/S
LV SEPTAL E/E' RATIO: 6.2 M/S
MV PEAK A VEL: 0.58 M/S
MV PEAK E VEL: 0.62 M/S
PISA TR MAX VEL: 2.42 M/S
PULM VEIN S/D RATIO: 1.12
PV PEAK D VEL: 0.25 M/S
PV PEAK S VEL: 0.28 M/S
RA MAJOR: 6.35 CM
RA PRESSURE: 3 MMHG
RA WIDTH: 5.29 CM
RIGHT VENTRICULAR END-DIASTOLIC DIMENSION: 4.77 CM
RV TISSUE DOPPLER FREE WALL SYSTOLIC VELOCITY 1 (APICAL 4 CHAMBER VIEW): 12.15 CM/S
SINUS: 3.8 CM
STJ: 3.21 CM
TDI LATERAL: 0.1 M/S
TDI SEPTAL: 0.1 M/S
TDI: 0.1 M/S
TR MAX PG: 23 MMHG
TRICUSPID ANNULAR PLANE SYSTOLIC EXCURSION: 2.76 CM
TV REST PULMONARY ARTERY PRESSURE: 26 MMHG

## 2020-03-09 PROCEDURE — 93306 TTE W/DOPPLER COMPLETE: CPT | Mod: S$GLB,,, | Performed by: INTERNAL MEDICINE

## 2020-03-09 PROCEDURE — 93306 ECHO (CUPID ONLY): ICD-10-PCS | Mod: S$GLB,,, | Performed by: INTERNAL MEDICINE

## 2020-03-09 RX ORDER — DOXEPIN HYDROCHLORIDE 10 MG/1
CAPSULE ORAL
Qty: 60 CAPSULE | Refills: 11 | Status: SHIPPED | OUTPATIENT
Start: 2020-03-09 | End: 2021-04-05

## 2020-03-11 ENCOUNTER — HOSPITAL ENCOUNTER (OUTPATIENT)
Dept: CARDIOLOGY | Facility: CLINIC | Age: 80
Discharge: HOME | End: 2020-03-11
Payer: MEDICARE

## 2020-03-11 ENCOUNTER — HOSPITAL ENCOUNTER (OUTPATIENT)
Dept: PULMONOLOGY | Facility: CLINIC | Age: 80
Discharge: HOME | End: 2020-03-11
Payer: MEDICARE

## 2020-03-11 ENCOUNTER — OFFICE VISIT (OUTPATIENT)
Dept: ELECTROPHYSIOLOGY | Facility: CLINIC | Age: 80
End: 2020-03-11
Payer: MEDICARE

## 2020-03-11 ENCOUNTER — PATIENT OUTREACH (OUTPATIENT)
Dept: ADMINISTRATIVE | Facility: OTHER | Age: 80
End: 2020-03-11

## 2020-03-11 VITALS — SYSTOLIC BLOOD PRESSURE: 130 MMHG | DIASTOLIC BLOOD PRESSURE: 79 MMHG | HEART RATE: 52 BPM

## 2020-03-11 DIAGNOSIS — I48.0 PAROXYSMAL ATRIAL FIBRILLATION: Chronic | ICD-10-CM

## 2020-03-11 DIAGNOSIS — J98.4 OTHER DISORDERS OF LUNG: ICD-10-CM

## 2020-03-11 DIAGNOSIS — J44.9 CHRONIC OBSTRUCTIVE PULMONARY DISEASE, UNSPECIFIED COPD TYPE: ICD-10-CM

## 2020-03-11 DIAGNOSIS — I10 ESSENTIAL HYPERTENSION: Primary | Chronic | ICD-10-CM

## 2020-03-11 DIAGNOSIS — E78.5 HYPERLIPIDEMIA, UNSPECIFIED HYPERLIPIDEMIA TYPE: Chronic | ICD-10-CM

## 2020-03-11 DIAGNOSIS — I49.8 OTHER SPECIFIED CARDIAC ARRHYTHMIAS: ICD-10-CM

## 2020-03-11 LAB
DLCO ADJ PRE: 21.78 ML/(MIN*MMHG) (ref 18.75–32.6)
DLCO SINGLE BREATH LLN: 18.75
DLCO SINGLE BREATH PRE REF: 84.8 %
DLCO SINGLE BREATH REF: 25.67
DLCOC SBVA LLN: 2.46
DLCOC SBVA PRE REF: 95.4 %
DLCOC SBVA REF: 3.59
DLCOC SINGLE BREATH LLN: 18.75
DLCOC SINGLE BREATH PRE REF: 84.8 %
DLCOC SINGLE BREATH REF: 25.67
DLCOCSBVAULN: 4.73
DLCOCSINGLEBREATHULN: 32.6
DLCOSINGLEBREATHULN: 32.6
DLCOVA LLN: 2.46
DLCOVA PRE REF: 95.4 %
DLCOVA PRE: 3.43 ML/(MIN*MMHG*L) (ref 2.46–4.73)
DLCOVA REF: 3.59
DLCOVAULN: 4.73
DLVAADJ PRE: 3.43 ML/(MIN*MMHG*L) (ref 2.46–4.73)
FEF 25 75 LLN: 0.81
FEF 25 75 PRE REF: 107.3 %
FEF 25 75 REF: 2.09
FEV05 LLN: 1.38
FEV05 REF: 2.51
FEV1 FVC LLN: 60
FEV1 FVC PRE REF: 103.6 %
FEV1 FVC REF: 75
FEV1 LLN: 2.05
FEV1 PRE REF: 93.2 %
FEV1 REF: 2.93
FVC LLN: 2.87
FVC PRE REF: 89.3 %
FVC REF: 3.95
IVC PRE: 3.66 L (ref 2.87–5.04)
IVC SINGLE BREATH LLN: 2.87
IVC SINGLE BREATH PRE REF: 92.5 %
IVC SINGLE BREATH REF: 3.95
IVCSINGLEBREATHULN: 5.04
PEF LLN: 5.18
PEF PRE REF: 115.2 %
PEF REF: 7.5
PHYSICIAN COMMENT: ABNORMAL
PRE DLCO: 21.78 ML/(MIN*MMHG) (ref 18.75–32.6)
PRE FEF 25 75: 2.24 L/S (ref 0.81–3.37)
PRE FET 100: 6.41 SEC
PRE FEV05 REF: 84.8 %
PRE FEV1 FVC: 77.49 % (ref 60.28–89.38)
PRE FEV1: 2.74 L (ref 2.05–3.82)
PRE FEV5: 2.13 L (ref 1.38–3.65)
PRE FVC: 3.53 L (ref 2.87–5.04)
PRE PEF: 8.64 L/S (ref 5.18–9.82)
VA PRE: 6.36 L (ref 6.99–6.99)
VA SINGLE BREATH LLN: 6.99
VA SINGLE BREATH PRE REF: 90.9 %
VA SINGLE BREATH REF: 6.99
VASINGLEBREATHULN: 6.99

## 2020-03-11 PROCEDURE — 94729 PR C02/MEMBANE DIFFUSE CAPACITY: ICD-10-PCS | Mod: S$GLB,,, | Performed by: INTERNAL MEDICINE

## 2020-03-11 PROCEDURE — 1101F PR PT FALLS ASSESS DOC 0-1 FALLS W/OUT INJ PAST YR: ICD-10-PCS | Mod: CPTII,S$GLB,, | Performed by: INTERNAL MEDICINE

## 2020-03-11 PROCEDURE — 99999 PR PBB SHADOW E&M-EST. PATIENT-LVL III: ICD-10-PCS | Mod: PBBFAC,,, | Performed by: INTERNAL MEDICINE

## 2020-03-11 PROCEDURE — 1101F PT FALLS ASSESS-DOCD LE1/YR: CPT | Mod: CPTII,S$GLB,, | Performed by: INTERNAL MEDICINE

## 2020-03-11 PROCEDURE — 94010 BREATHING CAPACITY TEST: ICD-10-PCS | Mod: S$GLB,,, | Performed by: INTERNAL MEDICINE

## 2020-03-11 PROCEDURE — 94010 BREATHING CAPACITY TEST: CPT | Mod: S$GLB,,, | Performed by: INTERNAL MEDICINE

## 2020-03-11 PROCEDURE — 1126F AMNT PAIN NOTED NONE PRSNT: CPT | Mod: S$GLB,,, | Performed by: INTERNAL MEDICINE

## 2020-03-11 PROCEDURE — 99214 PR OFFICE/OUTPT VISIT, EST, LEVL IV, 30-39 MIN: ICD-10-PCS | Mod: S$GLB,,, | Performed by: INTERNAL MEDICINE

## 2020-03-11 PROCEDURE — 3075F PR MOST RECENT SYSTOLIC BLOOD PRESS GE 130-139MM HG: ICD-10-PCS | Mod: CPTII,S$GLB,, | Performed by: INTERNAL MEDICINE

## 2020-03-11 PROCEDURE — 3078F DIAST BP <80 MM HG: CPT | Mod: CPTII,S$GLB,, | Performed by: INTERNAL MEDICINE

## 2020-03-11 PROCEDURE — 94729 DIFFUSING CAPACITY: CPT | Mod: S$GLB,,, | Performed by: INTERNAL MEDICINE

## 2020-03-11 PROCEDURE — 93005 RHYTHM STRIP: ICD-10-PCS | Mod: S$GLB,,, | Performed by: INTERNAL MEDICINE

## 2020-03-11 PROCEDURE — 3075F SYST BP GE 130 - 139MM HG: CPT | Mod: CPTII,S$GLB,, | Performed by: INTERNAL MEDICINE

## 2020-03-11 PROCEDURE — 99999 PR PBB SHADOW E&M-EST. PATIENT-LVL III: CPT | Mod: PBBFAC,,, | Performed by: INTERNAL MEDICINE

## 2020-03-11 PROCEDURE — 93010 RHYTHM STRIP: ICD-10-PCS | Mod: S$GLB,,, | Performed by: INTERNAL MEDICINE

## 2020-03-11 PROCEDURE — 99214 OFFICE O/P EST MOD 30 MIN: CPT | Mod: S$GLB,,, | Performed by: INTERNAL MEDICINE

## 2020-03-11 PROCEDURE — 3078F PR MOST RECENT DIASTOLIC BLOOD PRESSURE < 80 MM HG: ICD-10-PCS | Mod: CPTII,S$GLB,, | Performed by: INTERNAL MEDICINE

## 2020-03-11 PROCEDURE — 93010 ELECTROCARDIOGRAM REPORT: CPT | Mod: S$GLB,,, | Performed by: INTERNAL MEDICINE

## 2020-03-11 PROCEDURE — 1126F PR PAIN SEVERITY QUANTIFIED, NO PAIN PRESENT: ICD-10-PCS | Mod: S$GLB,,, | Performed by: INTERNAL MEDICINE

## 2020-03-11 PROCEDURE — 1159F MED LIST DOCD IN RCRD: CPT | Mod: S$GLB,,, | Performed by: INTERNAL MEDICINE

## 2020-03-11 PROCEDURE — 93005 ELECTROCARDIOGRAM TRACING: CPT | Mod: S$GLB,,, | Performed by: INTERNAL MEDICINE

## 2020-03-11 PROCEDURE — 1159F PR MEDICATION LIST DOCUMENTED IN MEDICAL RECORD: ICD-10-PCS | Mod: S$GLB,,, | Performed by: INTERNAL MEDICINE

## 2020-03-11 NOTE — PROGRESS NOTES
Subjective:    Patient ID:  Benitez Cabrales is a 79 y.o. male who presents for follow up of AF    Atrial Fibrillation   Symptoms are negative for chest pain, dizziness, palpitations, shortness of breath, syncope and weakness. Past medical history includes atrial fibrillation.     79 y.o. M  Remote dx of AF (~10 y ago; only Rx was beta blockade)  HTN on meds   HL on meds   RBBB, longstanding     Had event monitor placed for dizzy/palpitations. Turns out dizziness was really vertigo and balance issues -- no LH, no presync/sync.  He remains on amiodarone (normal PFT and TFT/LFT); CHADSVASC 3.  ILR with no events on several interrogations. We removed the ILR 12/2018.    echo 60%. severe LAE.  TSH OK. LFTs <2x ULN. PFT ok.    My interpretation of today's ecg is sinus shraddha at 52 bpm with first deg AVB and RBBB (baseline).    Review of Systems   Constitution: Negative. Negative for malaise/fatigue.   HENT: Negative.  Negative for congestion, ear pain and tinnitus.    Eyes: Negative for blurred vision and double vision.   Cardiovascular: Negative.  Negative for chest pain, dyspnea on exertion, near-syncope, palpitations and syncope.   Respiratory: Negative.  Negative for cough and shortness of breath.    Endocrine: Negative.  Negative for cold intolerance, heat intolerance and polyuria.   Hematologic/Lymphatic: Does not bruise/bleed easily.   Skin: Negative.  Negative for dry skin, flushing and rash.   Musculoskeletal: Negative.  Negative for back pain, falls, joint pain and muscle weakness.   Gastrointestinal: Negative.  Negative for abdominal pain, change in bowel habit, nausea and vomiting.   Genitourinary: Negative for dysuria, flank pain and frequency.   Neurological: Negative.  Negative for dizziness, headaches, light-headedness and weakness.   Psychiatric/Behavioral: Negative.  Negative for altered mental status and depression. The patient is not nervous/anxious.    Allergic/Immunologic: Negative for environmental  allergies.        Objective:    Physical Exam   Constitutional: He is oriented to person, place, and time. He appears well-developed and well-nourished.   HENT:   Head: Normocephalic and atraumatic.   Eyes: Conjunctivae, EOM and lids are normal. No scleral icterus.   Neck: Normal range of motion. Neck supple. No JVD present. No tracheal deviation present. No thyromegaly present.   Cardiovascular: Regular rhythm, normal heart sounds and intact distal pulses.  No extrasystoles are present. Bradycardia present. PMI is not displaced. Exam reveals no gallop and no friction rub.   No murmur heard.  Pulses:       Radial pulses are 2+ on the right side, and 2+ on the left side.   Pulmonary/Chest: Effort normal and breath sounds normal. No accessory muscle usage. No tachypnea. No respiratory distress. He has no wheezes. He has no rales.       Abdominal: Soft. Bowel sounds are normal. He exhibits no distension. There is no hepatosplenomegaly. There is no tenderness.   Musculoskeletal: Normal range of motion. He exhibits no edema.   Neurological: He is alert and oriented to person, place, and time. He has normal reflexes. No cranial nerve deficit. He exhibits normal muscle tone.   Skin: Skin is warm and dry. No rash noted.   Psychiatric: He has a normal mood and affect. His behavior is normal.   Nursing note and vitals reviewed.        Assessment:       1. Essential hypertension    2. Hyperlipidemia, unspecified hyperlipidemia type    3. Paroxysmal atrial fibrillation    4. Other disorders of lung          Plan:       79 y.o. male with pAF on amiodarone.    Change pradaxa to eliquis (insurance requirement).  Return in 1 year with echo and amio tests, or earlier prn.

## 2020-03-13 DIAGNOSIS — I48.0 PAROXYSMAL ATRIAL FIBRILLATION: ICD-10-CM

## 2020-03-13 DIAGNOSIS — I48.91 ATRIAL FIBRILLATION, UNSPECIFIED TYPE: ICD-10-CM

## 2020-03-13 RX ORDER — AMIODARONE HYDROCHLORIDE 200 MG/1
TABLET ORAL
Qty: 90 TABLET | Refills: 3 | Status: SHIPPED | OUTPATIENT
Start: 2020-03-13 | End: 2021-03-15

## 2020-04-29 DIAGNOSIS — K21.9 GASTROESOPHAGEAL REFLUX DISEASE, ESOPHAGITIS PRESENCE NOT SPECIFIED: ICD-10-CM

## 2020-04-30 RX ORDER — PANTOPRAZOLE SODIUM 40 MG/1
TABLET, DELAYED RELEASE ORAL
Qty: 90 TABLET | Refills: 1 | Status: SHIPPED | OUTPATIENT
Start: 2020-04-30 | End: 2020-09-24

## 2020-05-13 RX ORDER — VALSARTAN AND HYDROCHLOROTHIAZIDE 320; 25 MG/1; MG/1
1 TABLET, FILM COATED ORAL DAILY
Qty: 90 TABLET | Refills: 3 | Status: SHIPPED | OUTPATIENT
Start: 2020-05-13 | End: 2020-07-30 | Stop reason: ALTCHOICE

## 2020-05-13 NOTE — TELEPHONE ENCOUNTER
----- Message from Arnav Best MA sent at 5/13/2020 12:58 PM CDT -----  Contact: adriane [phone]       ----- Message -----  From: Jessica Vaughn  Sent: 5/13/2020  11:09 AM CDT  To: Carmen RIVERA Staff    [employer] request 90 day supply also needs auth for     valsartan-hydrochlorothiazide (DIOVAN-HCT) 320-25 mg per tablet 90 tablet 3 3/27/2019 3/26/2020 No  Sig - Route: Take 1 tablet by mouth once daily. - Oral  Sent to pharmacy as: valsartan-hydrochlorothiazide (DIOVAN-HCT) 320-25 mg per tablet  Class: Normal  Order: [id number]    Send to joey [phone]

## 2020-06-08 ENCOUNTER — OFFICE VISIT (OUTPATIENT)
Dept: FAMILY MEDICINE | Facility: CLINIC | Age: 80
End: 2020-06-08
Payer: MEDICARE

## 2020-06-08 VITALS
SYSTOLIC BLOOD PRESSURE: 138 MMHG | RESPIRATION RATE: 18 BRPM | BODY MASS INDEX: 22.75 KG/M2 | HEIGHT: 72 IN | TEMPERATURE: 98 F | HEART RATE: 57 BPM | OXYGEN SATURATION: 97 % | DIASTOLIC BLOOD PRESSURE: 78 MMHG | WEIGHT: 168 LBS

## 2020-06-08 DIAGNOSIS — R42 DIZZINESS: Primary | ICD-10-CM

## 2020-06-08 DIAGNOSIS — I70.0 AORTIC ATHEROSCLEROSIS: ICD-10-CM

## 2020-06-08 DIAGNOSIS — I95.9 HYPOTENSION, UNSPECIFIED HYPOTENSION TYPE: ICD-10-CM

## 2020-06-08 DIAGNOSIS — D64.9 ANEMIA, UNSPECIFIED TYPE: ICD-10-CM

## 2020-06-08 DIAGNOSIS — I48.0 PAF (PAROXYSMAL ATRIAL FIBRILLATION): ICD-10-CM

## 2020-06-08 PROCEDURE — 1159F PR MEDICATION LIST DOCUMENTED IN MEDICAL RECORD: ICD-10-PCS | Mod: S$GLB,,, | Performed by: INTERNAL MEDICINE

## 2020-06-08 PROCEDURE — 1100F PR PT FALLS ASSESS DOC 2+ FALLS/FALL W/INJURY/YR: ICD-10-PCS | Mod: CPTII,S$GLB,, | Performed by: INTERNAL MEDICINE

## 2020-06-08 PROCEDURE — 99215 OFFICE O/P EST HI 40 MIN: CPT | Mod: S$GLB,,, | Performed by: INTERNAL MEDICINE

## 2020-06-08 PROCEDURE — 3288F PR FALLS RISK ASSESSMENT DOCUMENTED: ICD-10-PCS | Mod: CPTII,S$GLB,, | Performed by: INTERNAL MEDICINE

## 2020-06-08 PROCEDURE — 3078F PR MOST RECENT DIASTOLIC BLOOD PRESSURE < 80 MM HG: ICD-10-PCS | Mod: CPTII,S$GLB,, | Performed by: INTERNAL MEDICINE

## 2020-06-08 PROCEDURE — 3288F FALL RISK ASSESSMENT DOCD: CPT | Mod: CPTII,S$GLB,, | Performed by: INTERNAL MEDICINE

## 2020-06-08 PROCEDURE — 99999 PR PBB SHADOW E&M-EST. PATIENT-LVL III: ICD-10-PCS | Mod: PBBFAC,,, | Performed by: INTERNAL MEDICINE

## 2020-06-08 PROCEDURE — 99499 UNLISTED E&M SERVICE: CPT | Mod: S$GLB,,, | Performed by: INTERNAL MEDICINE

## 2020-06-08 PROCEDURE — 1159F MED LIST DOCD IN RCRD: CPT | Mod: S$GLB,,, | Performed by: INTERNAL MEDICINE

## 2020-06-08 PROCEDURE — 3078F DIAST BP <80 MM HG: CPT | Mod: CPTII,S$GLB,, | Performed by: INTERNAL MEDICINE

## 2020-06-08 PROCEDURE — 99215 PR OFFICE/OUTPT VISIT, EST, LEVL V, 40-54 MIN: ICD-10-PCS | Mod: S$GLB,,, | Performed by: INTERNAL MEDICINE

## 2020-06-08 PROCEDURE — 1100F PTFALLS ASSESS-DOCD GE2>/YR: CPT | Mod: CPTII,S$GLB,, | Performed by: INTERNAL MEDICINE

## 2020-06-08 PROCEDURE — 1125F AMNT PAIN NOTED PAIN PRSNT: CPT | Mod: S$GLB,,, | Performed by: INTERNAL MEDICINE

## 2020-06-08 PROCEDURE — 99499 RISK ADDL DX/OHS AUDIT: ICD-10-PCS | Mod: S$GLB,,, | Performed by: INTERNAL MEDICINE

## 2020-06-08 PROCEDURE — 3075F PR MOST RECENT SYSTOLIC BLOOD PRESS GE 130-139MM HG: ICD-10-PCS | Mod: CPTII,S$GLB,, | Performed by: INTERNAL MEDICINE

## 2020-06-08 PROCEDURE — 1125F PR PAIN SEVERITY QUANTIFIED, PAIN PRESENT: ICD-10-PCS | Mod: S$GLB,,, | Performed by: INTERNAL MEDICINE

## 2020-06-08 PROCEDURE — 99999 PR PBB SHADOW E&M-EST. PATIENT-LVL III: CPT | Mod: PBBFAC,,, | Performed by: INTERNAL MEDICINE

## 2020-06-08 PROCEDURE — 3075F SYST BP GE 130 - 139MM HG: CPT | Mod: CPTII,S$GLB,, | Performed by: INTERNAL MEDICINE

## 2020-06-08 NOTE — PROGRESS NOTES
Chief complaint:  Dizziness and low blood pressure     79 -year-old  male  reports some recent dizziness.  He does have meclizine at home and has had some vertigo type symptoms in the past.  It seems to occur after he takes the amlodipine in the evening.  The other day he was sweaty and we can could not get off the floor after he got down there to do something in his blood pressure was 82/50 and then another systolic of 92.  He takes his metoprolol and valsartan in the morning.  He has been on amiodarone for site sometime.  No new palpitations or chest pains.  No syncope or near-syncope.  He does get a little dizziness sometimes when he moves.  He did get an episode of sweating when he had the hypotension.  There was no associated GI issues or need to go to the bathroom, cramps and so forth to suggest something clearly vagal.            ROS:   CONST: weight stable. EYES: no vision change. ENT: no sore throat. CV: no chest pain w/ exertion. RESP: no shortness of breath. GI: no nausea, vomiting, diarrhea. No dysphagia. : no urinary issues. MUSCULOSKELETAL: no new myalgias or arthralgias. SKIN: no new changes. NEURO: no focal deficits. PSYCH: no new issues. ENDOCRINE: no polyuria. HEME: no lymph nodes. ALLERGY: no general pruritis.     PAST MEDICAL HISTORY:  1. Hypertension.  2. Hyperlipidemia.  3. GERD.  4. Vertigo after trauma  5. Colonoscopy was normal around 2009 per [facility], [place], operated by [facility], 9/16  there was a polyp -5 yrs.  6. Atrial fib/flutter -[facility] -cardioverted  7.  Dizziness, seen by neurology    8.peptic ulcer disease on EGDNovember 2016 with GI bleeding  9.  Small bowel obstruction, resolved on its own November 2016  10. anemia    PAST SURGICAL HISTORY:  1. Left leg surgery, sounds like venous surgery -Dr Limon  2. Nasal septal repair.      ALLERGIES: NKDA.    SOCIAL HISTORY: He puffs on a rare cigar, he is not a smoker. Drinks wine on occasion,  with 3 children. He has 7 grandchildren. The patient  is now a / in a [employer]. He is originally from [place]. He is quite active.    FAMILY HISTORY: Sr. had breast cancer. Otherwise negative for prostate or colon cancer, negative for premature coronary disease or diabetes.    HEALTHCARE SCREENING: Colonoscopy was normal around 2009 per [facility], colonoscopy 9/16  there was a polyp -5 yrs , due for PSA , he had eye examination.      Gen: no distress  EYES: conjunctiva clear, non-icteric, PERRL, not pale  ENT: nose clear, nasal mucosa normal, oropharynx clear and moist, teeth good,ears clear  NECK:supple, thyroid non-palpable  RESP: effort is good, lungs clear  CV: heart RRR w/o murmur, gallops or rubs; no carotid bruits, no edema  GI: abdomen soft, non-distended, non-tender, no hepatosplenomegaly  MS: gait normal, no clubbing or cyanosis of the digits,   SKIN: no rashes, warm to touch         Assessment and plan:    Benitez was seen today for dizziness, headache, shoulder pain and neck pain.    Diagnoses and all orders for this visit:    Dizziness, he has had some clear episodes of hypotension and appears to relate to the amlodipine which we will hold and have him monitor.  He is thinking about going back to work as a  and obviously will need to get this hypertension and dizziness issue cleared up.  If indeed it takes on more of a vertigo picture we discussed suppressing with at least one meclizine at bedtime but I would like to keep him to check blood pressure frequently while holding amlodipine to see if hypotension resolved and of course if hypertension returns and needs adjustment.  I do not suspect atrial fibrillation to be a cause but he will monitor for heart rate issues.  He has history of anemia and some blood loss anemia but clinically there has been no obvious blood loss and does not appear to be anemic but if symptoms persist we might update some labs as well.  Last EKG from a few months ago reviewed by me which appears to  "have some bradycardia at 57 by my interpretation but otherwise stable.  Labs and x-ray reports reviewed as well.    Hypotension, unspecified hypotension type    PAF (paroxysmal atrial fibrillation)    Aortic atherosclerosis    Anemia, unspecified type                Clinical no be sensitive so as to not cause any issues regarding coverage of evaluation and management issues.  Patient himself also is not well with access"This note will not be shared with the patient."    Answers for HPI/ROS submitted by the patient on 6/7/2020   Hypertension  Chronicity: recurrent  Onset: more than 1 year ago  Progression since onset: waxing and waning  Condition status: resistant  anxiety: No  blurred vision: No  chest pain: No  headaches: No  malaise/fatigue: No  neck pain: Yes  orthopnea: No  palpitations: No  peripheral edema: No  PND: No  shortness of breath: No  sweats: Yes  Agents associated with hypertension: NSAIDs  CAD risks: stress  Compliance problems: medication side effects  Past treatments: lifestyle changes  Improvement on treatment: mild    "

## 2020-06-09 RX ORDER — MECLIZINE HYDROCHLORIDE 25 MG/1
TABLET ORAL
Qty: 30 TABLET | Refills: 12 | Status: SHIPPED | OUTPATIENT
Start: 2020-06-09 | End: 2021-07-25

## 2020-06-11 ENCOUNTER — TELEPHONE (OUTPATIENT)
Dept: FAMILY MEDICINE | Facility: CLINIC | Age: 80
End: 2020-06-11

## 2020-06-11 NOTE — TELEPHONE ENCOUNTER
----- Message from Brittanie Menezes sent at 6/11/2020  1:26 PM CDT -----  Contact: TOMEKA MARSHALL   Name of Who is Calling: TOMEKA MARSHALL       What is the request in detail:Patient is requesting a call back he would like speak with g he is still having issues with his vertigo and trying to see if he needs to be seen       Can the clinic reply by [facility]: no      What Number to Call Back if not in [facility]: [phone]

## 2020-06-11 NOTE — TELEPHONE ENCOUNTER
In review of the last visit, it looks like he was having episodes of low blood pressure and he was going to hold his amlodipine and so forth.    We really need to know what his blood pressure is doing since I do not think this is an issue that would require any kind of brain scan.    Is still having lightheadedness and low blood pressure, he may need to also cut his valsartan and half or hold altogether if still low      Will be out of the clinic on Friday, please tell him that and document what his blood pressure has been doing this week and what medications he health and we can go from there

## 2020-06-11 NOTE — TELEPHONE ENCOUNTER
Spoke with the patient and he was seen on Monday, for lightlessness.  Patient said he is still not better.  He would like an MRI or Ct scan order.  Patient verbalized understandings.  Please advise

## 2020-06-15 ENCOUNTER — TELEPHONE (OUTPATIENT)
Dept: FAMILY MEDICINE | Facility: CLINIC | Age: 80
End: 2020-06-15

## 2020-06-15 NOTE — TELEPHONE ENCOUNTER
Pt. Is reporting to you with the blood pressure reading as requested. Please see readings below. He states he is feeling better, no dizziness or feeling of being lightheaded. Pt. Wants to know what is his next step.

## 2020-06-15 NOTE — TELEPHONE ENCOUNTER
Readings look good, looks like the blood pressure is coming up so continue what you are currently doing.    Clarify which medication he may have on hold which might be the amlodipine?  If doing well off the amlodipine, please take it off the medication list and so forth

## 2020-06-15 NOTE — TELEPHONE ENCOUNTER
----- Message from Carmelo Luciano sent at 6/15/2020 10:54 AM CDT -----  Regarding: SELF  Type: Patient Call Back    Who called: SELF    What is the request in detail:   06/11/2020 82/46 1:00 PM  09:00 /72   06/12/20 9:30 QM 82/68  11:40 PM 79/47  06/13/20  8:30 /68   11:40 /68  06/14/20  9:30 /63   09:26 /70  06/15/20  9:00 /68                                                          Can the clinic reply by [facility]? NO    Would the patient rather a call back or a response via [facility]? CALL    Best call back number: [phone]

## 2020-06-16 NOTE — TELEPHONE ENCOUNTER
, I did call pt. To give him your message. The Amoldipine is what he was holding. This morning pt. Reports blood pressure reading last night 170/70, this morning before meds. 177/83, and pt. Retook again for this message at 10am 168/87. Pt. Question was should he take the Amoldipine every other day or not at all.

## 2020-06-16 NOTE — TELEPHONE ENCOUNTER
Probably time to restart the amlodipine 5 mg and monitor.  We may need to adjust medications somewhere in the middle but restart the amlodipine for now

## 2020-07-28 ENCOUNTER — PATIENT OUTREACH (OUTPATIENT)
Dept: ADMINISTRATIVE | Facility: OTHER | Age: 80
End: 2020-07-28

## 2020-07-28 NOTE — PROGRESS NOTES
Patient's chart was reviewed for overdue AUSTEN topics.  Immunizations reconciled.    Orders placed:n/a  Labs Linked:n/a

## 2020-07-30 ENCOUNTER — OFFICE VISIT (OUTPATIENT)
Dept: CARDIOLOGY | Facility: CLINIC | Age: 80
End: 2020-07-30
Payer: MEDICARE

## 2020-07-30 VITALS
HEIGHT: 71 IN | OXYGEN SATURATION: 99 % | HEART RATE: 58 BPM | SYSTOLIC BLOOD PRESSURE: 141 MMHG | DIASTOLIC BLOOD PRESSURE: 69 MMHG | BODY MASS INDEX: 24.6 KG/M2 | WEIGHT: 175.69 LBS

## 2020-07-30 DIAGNOSIS — R60.0 PERIPHERAL EDEMA: ICD-10-CM

## 2020-07-30 DIAGNOSIS — I48.0 PAROXYSMAL ATRIAL FIBRILLATION: Chronic | ICD-10-CM

## 2020-07-30 DIAGNOSIS — E78.5 HYPERLIPIDEMIA, UNSPECIFIED HYPERLIPIDEMIA TYPE: Chronic | ICD-10-CM

## 2020-07-30 DIAGNOSIS — I10 ESSENTIAL HYPERTENSION: Primary | Chronic | ICD-10-CM

## 2020-07-30 PROCEDURE — 3078F PR MOST RECENT DIASTOLIC BLOOD PRESSURE < 80 MM HG: ICD-10-PCS | Mod: CPTII,S$GLB,, | Performed by: INTERNAL MEDICINE

## 2020-07-30 PROCEDURE — 1126F AMNT PAIN NOTED NONE PRSNT: CPT | Mod: S$GLB,,, | Performed by: INTERNAL MEDICINE

## 2020-07-30 PROCEDURE — 3288F PR FALLS RISK ASSESSMENT DOCUMENTED: ICD-10-PCS | Mod: CPTII,S$GLB,, | Performed by: INTERNAL MEDICINE

## 2020-07-30 PROCEDURE — 99214 PR OFFICE/OUTPT VISIT, EST, LEVL IV, 30-39 MIN: ICD-10-PCS | Mod: S$GLB,,, | Performed by: INTERNAL MEDICINE

## 2020-07-30 PROCEDURE — 1100F PR PT FALLS ASSESS DOC 2+ FALLS/FALL W/INJURY/YR: ICD-10-PCS | Mod: CPTII,S$GLB,, | Performed by: INTERNAL MEDICINE

## 2020-07-30 PROCEDURE — 1126F PR PAIN SEVERITY QUANTIFIED, NO PAIN PRESENT: ICD-10-PCS | Mod: S$GLB,,, | Performed by: INTERNAL MEDICINE

## 2020-07-30 PROCEDURE — 1100F PTFALLS ASSESS-DOCD GE2>/YR: CPT | Mod: CPTII,S$GLB,, | Performed by: INTERNAL MEDICINE

## 2020-07-30 PROCEDURE — 99999 PR PBB SHADOW E&M-EST. PATIENT-LVL V: CPT | Mod: PBBFAC,,, | Performed by: INTERNAL MEDICINE

## 2020-07-30 PROCEDURE — 1159F MED LIST DOCD IN RCRD: CPT | Mod: S$GLB,,, | Performed by: INTERNAL MEDICINE

## 2020-07-30 PROCEDURE — 3288F FALL RISK ASSESSMENT DOCD: CPT | Mod: CPTII,S$GLB,, | Performed by: INTERNAL MEDICINE

## 2020-07-30 PROCEDURE — 3077F PR MOST RECENT SYSTOLIC BLOOD PRESSURE >= 140 MM HG: ICD-10-PCS | Mod: CPTII,S$GLB,, | Performed by: INTERNAL MEDICINE

## 2020-07-30 PROCEDURE — 99999 PR PBB SHADOW E&M-EST. PATIENT-LVL V: ICD-10-PCS | Mod: PBBFAC,,, | Performed by: INTERNAL MEDICINE

## 2020-07-30 PROCEDURE — 3078F DIAST BP <80 MM HG: CPT | Mod: CPTII,S$GLB,, | Performed by: INTERNAL MEDICINE

## 2020-07-30 PROCEDURE — 3077F SYST BP >= 140 MM HG: CPT | Mod: CPTII,S$GLB,, | Performed by: INTERNAL MEDICINE

## 2020-07-30 PROCEDURE — 99214 OFFICE O/P EST MOD 30 MIN: CPT | Mod: S$GLB,,, | Performed by: INTERNAL MEDICINE

## 2020-07-30 PROCEDURE — 1159F PR MEDICATION LIST DOCUMENTED IN MEDICAL RECORD: ICD-10-PCS | Mod: S$GLB,,, | Performed by: INTERNAL MEDICINE

## 2020-07-30 RX ORDER — CHLORTHALIDONE 25 MG/1
TABLET ORAL
Qty: 45 TABLET | Refills: 3 | Status: SHIPPED | OUTPATIENT
Start: 2020-07-30 | End: 2020-10-27

## 2020-07-30 RX ORDER — VALSARTAN 320 MG/1
TABLET ORAL
Qty: 90 TABLET | Refills: 3 | Status: ON HOLD | OUTPATIENT
Start: 2020-07-30 | End: 2020-08-14 | Stop reason: HOSPADM

## 2020-07-30 NOTE — PATIENT INSTRUCTIONS
STOP amlodipine  Change Valsartan/HCT to Valsartan 320 mg each evening and Chlorthalidone 25 mg 1/2 tablet each morning.  Support hose as we discussed  Graded exercise for 30 minutes a day at least 5 days a week suggested.  Watch sodium ( salt ) intake

## 2020-07-30 NOTE — PROGRESS NOTES
"Subjective:   Patient ID:  Benitez Cabrales is a 79 y.o. male who presents for evaluation of Hypertension (fluctuating), Dizziness, and Leg Swelling      HPI:   Benitez Cabrales presents for management  of hypertension and leg swelling. Benitez Cabrales has hypertension with BP in the A:M usually in the 120s and up to 140s/ in the afternoon. Periodically when he has a sharp pain in his neck and shoulders, his BP will fall into the 80s/ and he will feel " whoozy". Has more chronic pedal edema and is on Amlodipine. Wears support hose. Benitez Cabrales has dyslipidemia  on moderate intensity statin therapy.Benitez Cabrales has paroxysmal atrial fibrillation followed by EP.He does exercises in the A:M. Benitez Cabrales denies chest pain, shortness of breath, palpitations, presyncope , or syncope.  .    Review of Systems   Constitution: Negative for malaise/fatigue, weight gain and weight loss.   Eyes: Negative for blurred vision.   Cardiovascular: Positive for leg swelling. Negative for chest pain, claudication, cyanosis, dyspnea on exertion, irregular heartbeat, near-syncope, orthopnea, palpitations, paroxysmal nocturnal dyspnea and syncope.   Respiratory: Negative for cough, shortness of breath and wheezing.    Musculoskeletal: Positive for back pain and neck pain. Negative for falls and myalgias.   Gastrointestinal: Negative for abdominal pain, heartburn, nausea and vomiting.   Genitourinary: Negative for nocturia.   Neurological: Negative for brief paralysis, dizziness, focal weakness, headaches, numbness, paresthesias and weakness.   Psychiatric/Behavioral: Negative for altered mental status.       Current Outpatient Medications   Medication Sig    amiodarone (PACERONE) 200 MG Tab Take 1 tablet by mouth once daily    apixaban (ELIQUIS) 5 mg Tab Take 1 tablet (5 mg total) by mouth 2 (two) times daily.    cholecalciferol, vitamin D3, (VITAMIN D3) 2,000 unit Cap Take 1 capsule by mouth once daily.    DIABETIC SUPPLIES,MISCELL (BD " "MAGNI-GUIDE SYRINGE MAGNIFI MISC) by Misc.(Non-Drug; Combo Route) route.    diclofenac sodium (VOLTAREN) 1 % Gel Apply 2 g topically 4 (four) times daily.    doxepin (SINEQUAN) 10 MG capsule 1-2 HS prn itching    fish oil-omega-3 fatty acids 300-1,000 mg capsule Take 2 g by mouth once daily.      magnesium 200 mg Tab Take by mouth once daily.    meclizine (ANTIVERT) 25 mg tablet Half - one pill at least HS for vertigo    metoprolol succinate (TOPROL-XL) 25 MG 24 hr tablet Take 1 tablet (25 mg total) by mouth once daily.    MV-MN/FA/LYCOPENE/LUT/HB#178 (NEHEMIAH MULTIVITAMIN FOR MEN ORAL) Take 1 tablet by mouth once daily.      pantoprazole (PROTONIX) 40 MG tablet Take 1 tablet by mouth once daily    rosuvastatin (CRESTOR) 10 MG tablet TAKE 1 TABLET BY MOUTH ONCE DAILY IN THE EVENING    tiZANidine (ZANAFLEX) 4 MG tablet TAKE 1 TO 2 TABLETS BY MOUTH AT BEDTIME AS NEEDED FOR  SPASMS    chlorthalidone (HYGROTEN) 25 MG Tab 1/2 tablet daily    valsartan (DIOVAN) 320 MG tablet Take one each evening     No current facility-administered medications for this visit.      Objective:   Physical Exam   Constitutional: He is oriented to person, place, and time. He appears well-developed. No distress.   BP (!) 141/69 (BP Location: Left arm, Patient Position: Sitting, BP Method: Medium (Automatic))   Pulse (!) 58   Ht 5' 11" (1.803 m)   Wt 79.7 kg (175 lb 11.3 oz)   SpO2 99%   BMI 24.51 kg/m²    HENT:   Head: Normocephalic.   Right Ear: External ear normal.   Left Ear: External ear normal.   Eyes: Pupils are equal, round, and reactive to light. EOM are normal. No scleral icterus.   Neck: Neck supple. No JVD present. No thyromegaly present.   Cardiovascular: Normal rate, regular rhythm, normal heart sounds and intact distal pulses. PMI is not displaced. Exam reveals no gallop and no friction rub.   No murmur heard.  2+ bilateral pedal edema to mid calf with stasis changes.   Pulmonary/Chest: Effort normal and breath " sounds normal. No respiratory distress. He has no wheezes. He has no rales.   Abdominal: Soft. He exhibits no distension. There is no hepatosplenomegaly. There is no abdominal tenderness.   Musculoskeletal:         General: No tenderness or edema.      Comments: Gait normal   Neurological: He is alert and oriented to person, place, and time.   Skin: Skin is warm and dry. No rash noted.   Psychiatric: He has a normal mood and affect. His behavior is normal.       Lab Results   Component Value Date     (L) 11/05/2019    K 4.3 11/05/2019     11/05/2019    CO2 28 11/05/2019    BUN 32 (H) 11/05/2019    CREATININE 1.5 (H) 11/05/2019    GLU 66 (L) 11/05/2019    HGBA1C 5.9 06/13/2013    MG 1.9 12/27/2018    AST 47 (H) 11/05/2019    ALT 44 11/05/2019    ALBUMIN 3.4 (L) 11/05/2019    PROT 6.9 11/05/2019    BILITOT 0.7 11/05/2019    WBC 7.81 11/05/2019    HGB 12.3 (L) 11/05/2019    HCT 38.7 (L) 11/05/2019    MCV 94 11/05/2019     11/05/2019    TSH 0.449 10/15/2019    CHOL 137 07/11/2016    HDL 58 07/11/2016    LDLCALC 64.6 07/11/2016    TRIG 72 07/11/2016   Echocardiogram 3-9-20  · Normal left ventricular systolic function. The estimated ejection fraction is 60%.  · Mild right ventricular enlargement.  · Normal right ventricular systolic function.  · Normal LV diastolic function.  · Severe biatrial enlargement.  · Mild-to-moderate mitral regurgitation.  · Mild tricuspid regurgitation.  · The estimated PA systolic pressure is 26 mmHg.  · Normal central venous pressure (3 mmHg).    Assessment:     1. Essential hypertension : mild periodic afternoon elevations and intermittent low readings provoked by pain   2. Paroxysmal atrial fibrillation : Currently sinus   3. Hyperlipidemia, unspecified hyperlipidemia type:  on moderate intensity statin therapy.    4. Peripheral edema ; Venous stasis plus Amlodipine       Plan:     Benitez was seen today for hypertension, dizziness and leg swelling.    Diagnoses and all  orders for this visit:    Essential hypertension  -     valsartan (DIOVAN) 320 MG tablet; Take one each evening  -     chlorthalidone (HYGROTEN) 25 MG Tab; 1/2 tablet daily in place of HCTZ  -     Basic metabolic panel; Future; Expected date: 08/11/2020  -     Magnesium; Future; Expected date: 08/11/2020  Stop Amlodipine  Paroxysmal atrial fibrillation  Per EP  Hyperlipidemia, unspecified hyperlipidemia type  -     Lipid Panel; Future; Expected date: 08/11/2020    Peripheral edema  Discussed a better brand of support hose.

## 2020-08-11 ENCOUNTER — TELEPHONE (OUTPATIENT)
Dept: FAMILY MEDICINE | Facility: CLINIC | Age: 80
End: 2020-08-11

## 2020-08-11 ENCOUNTER — OFFICE VISIT (OUTPATIENT)
Dept: FAMILY MEDICINE | Facility: CLINIC | Age: 80
End: 2020-08-11
Payer: MEDICARE

## 2020-08-11 ENCOUNTER — HOSPITAL ENCOUNTER (OUTPATIENT)
Dept: RADIOLOGY | Facility: HOSPITAL | Age: 80
Discharge: HOME | End: 2020-08-11
Attending: FAMILY MEDICINE
Payer: MEDICARE

## 2020-08-11 VITALS
SYSTOLIC BLOOD PRESSURE: 124 MMHG | BODY MASS INDEX: 23.86 KG/M2 | TEMPERATURE: 97 F | HEART RATE: 60 BPM | DIASTOLIC BLOOD PRESSURE: 58 MMHG | WEIGHT: 171.06 LBS | OXYGEN SATURATION: 95 %
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
## 2020-08-11 DIAGNOSIS — I48.0 PAF (PAROXYSMAL ATRIAL FIBRILLATION): ICD-10-CM

## 2020-08-11 DIAGNOSIS — I10 ESSENTIAL HYPERTENSION: ICD-10-CM

## 2020-08-11 DIAGNOSIS — S20.212A CONTUSION OF RIB ON LEFT SIDE, INITIAL ENCOUNTER: Primary | ICD-10-CM

## 2020-08-11 DIAGNOSIS — I70.0 AORTIC ATHEROSCLEROSIS: ICD-10-CM

## 2020-08-11 DIAGNOSIS — N18.30 CKD (CHRONIC KIDNEY DISEASE) STAGE 3, GFR 30-59 ML/MIN: ICD-10-CM

## 2020-08-11 DIAGNOSIS — J44.9 CHRONIC OBSTRUCTIVE PULMONARY DISEASE, UNSPECIFIED COPD TYPE: ICD-10-CM

## 2020-08-11 PROCEDURE — 99214 PR OFFICE/OUTPT VISIT, EST, LEVL IV, 30-39 MIN: ICD-10-PCS | Mod: S$GLB,,, | Performed by: FAMILY MEDICINE

## 2020-08-11 PROCEDURE — 3074F PR MOST RECENT SYSTOLIC BLOOD PRESSURE < 130 MM HG: ICD-10-PCS | Mod: CPTII,S$GLB,, | Performed by: FAMILY MEDICINE

## 2020-08-11 PROCEDURE — 1125F AMNT PAIN NOTED PAIN PRSNT: CPT | Mod: S$GLB,,, | Performed by: FAMILY MEDICINE

## 2020-08-11 PROCEDURE — 3288F PR FALLS RISK ASSESSMENT DOCUMENTED: ICD-10-PCS | Mod: CPTII,S$GLB,, | Performed by: FAMILY MEDICINE

## 2020-08-11 PROCEDURE — 1125F PR PAIN SEVERITY QUANTIFIED, PAIN PRESENT: ICD-10-PCS | Mod: S$GLB,,, | Performed by: FAMILY MEDICINE

## 2020-08-11 PROCEDURE — 99999 PR PBB SHADOW E&M-EST. PATIENT-LVL V: CPT | Mod: PBBFAC,,, | Performed by: FAMILY MEDICINE

## 2020-08-11 PROCEDURE — 1100F PTFALLS ASSESS-DOCD GE2>/YR: CPT | Mod: CPTII,S$GLB,, | Performed by: FAMILY MEDICINE

## 2020-08-11 PROCEDURE — 71100 X-RAY EXAM RIBS UNI 2 VIEWS: CPT | Mod: TC,FY,PO,LT

## 2020-08-11 PROCEDURE — 99999 PR PBB SHADOW E&M-EST. PATIENT-LVL V: ICD-10-PCS | Mod: PBBFAC,,, | Performed by: FAMILY MEDICINE

## 2020-08-11 PROCEDURE — 99499 RISK ADDL DX/OHS AUDIT: ICD-10-PCS | Mod: S$GLB,,, | Performed by: FAMILY MEDICINE

## 2020-08-11 PROCEDURE — 99499 UNLISTED E&M SERVICE: CPT | Mod: S$GLB,,, | Performed by: FAMILY MEDICINE

## 2020-08-11 PROCEDURE — 3074F SYST BP LT 130 MM HG: CPT | Mod: CPTII,S$GLB,, | Performed by: FAMILY MEDICINE

## 2020-08-11 PROCEDURE — 3078F PR MOST RECENT DIASTOLIC BLOOD PRESSURE < 80 MM HG: ICD-10-PCS | Mod: CPTII,S$GLB,, | Performed by: FAMILY MEDICINE

## 2020-08-11 PROCEDURE — 71100 XR RIBS 2 VIEW LEFT: ICD-10-PCS | Mod: 26,LT,, | Performed by: RADIOLOGY

## 2020-08-11 PROCEDURE — 71100 X-RAY EXAM RIBS UNI 2 VIEWS: CPT | Mod: 26,LT,, | Performed by: RADIOLOGY

## 2020-08-11 PROCEDURE — 1159F MED LIST DOCD IN RCRD: CPT | Mod: S$GLB,,, | Performed by: FAMILY MEDICINE

## 2020-08-11 PROCEDURE — 99214 OFFICE O/P EST MOD 30 MIN: CPT | Mod: S$GLB,,, | Performed by: FAMILY MEDICINE

## 2020-08-11 PROCEDURE — 1159F PR MEDICATION LIST DOCUMENTED IN MEDICAL RECORD: ICD-10-PCS | Mod: S$GLB,,, | Performed by: FAMILY MEDICINE

## 2020-08-11 PROCEDURE — 3078F DIAST BP <80 MM HG: CPT | Mod: CPTII,S$GLB,, | Performed by: FAMILY MEDICINE

## 2020-08-11 PROCEDURE — 1100F PR PT FALLS ASSESS DOC 2+ FALLS/FALL W/INJURY/YR: ICD-10-PCS | Mod: CPTII,S$GLB,, | Performed by: FAMILY MEDICINE

## 2020-08-11 PROCEDURE — 3288F FALL RISK ASSESSMENT DOCD: CPT | Mod: CPTII,S$GLB,, | Performed by: FAMILY MEDICINE

## 2020-08-11 RX ORDER — HYDROCODONE BITARTRATE AND ACETAMINOPHEN 7.5; 325 MG/1; MG/1
1 TABLET ORAL EVERY 6 HOURS PRN
Qty: 21 TABLET | Refills: 0 | Status: SHIPPED | OUTPATIENT
Start: 2020-08-11 | End: 2023-03-15

## 2020-08-11 NOTE — PROGRESS NOTES
No rib fracture or dislocation     You likely have a rib bruise.   Please remember to do deep breathing exercises as discussed in clinic   Take pain medication only as needed for severe pain

## 2020-08-11 NOTE — PROGRESS NOTES
Patient, Benitez Cabrales (MRN #[id number]), presented with a recent Estimated Glumerular Filtration Rate (EGFR) between 30 and 45 consistent with the definition of chronic kidney disease stage 3 - moderate (ICD10 - N18.3).    eGFR if non    Date Value Ref Range Status   11/05/2019 44.0 (A) >60 mL/min/1.73 m^2 Final     Comment:     Calculation used to obtain the estimated glomerular filtration  rate (eGFR) is the CKD-EPI equation.          The patient's chronic kidney disease stage 3 was monitored, evaluated, addressed and/or treated. This addendum to the medical record is made on 08/11/2020.

## 2020-08-11 NOTE — TELEPHONE ENCOUNTER
----- Message from Yin Olmedo MD sent at 8/11/2020  1:36 PM CDT -----  No rib fracture or dislocation     You likely have a rib bruise.   Please remember to do deep breathing exercises as discussed in clinic   Take pain medication only as needed for severe pain

## 2020-08-11 NOTE — PATIENT INSTRUCTIONS
Rib Contusion     A rib contusion is a bruise to one or more rib bones. It may cause pain, tenderness, swelling and a purplish discoloration. There may be a sharp pain while breathing.  You will be assessed for other injuries. You will likely be given pain medicine. Rib contusions heal on their own, without further treatment. However, pain may take weeks to months to go away.   Note that a small crack (fracture) in the rib may cause the same symptoms as a rib contusion. The small crack may not be seen on a chest X-ray. However, the conditions are managed in the same way.  Home care  · Rest. Avoid heavy lifting, strenuous exertion, or any activity that causes pain.  · Ice the area to reduce pain and swelling. Put ice cubes in a plastic bag or use a cold pack. (Wrap the cold source in a thin towel. Do not place it directly on your skin.) Ice the injured area for 20 minutes every 1 to 2 hours the first day. Continue with ice packs 3 to 4 times a day for the next 2 days, then as needed for the relief of pain and swelling.  · Take any prescribed pain medicine as directed by your healthcare provider. If none was prescribed, take acetaminophen, ibuprofen, or naproxen to control pain.  · If you have a significant injury, you may be given a device called an incentive spirometer to keep your lungs healthy. Use as directed.  Follow-up care  Follow up with your healthcare provider during the next week or as directed.  When to seek medical advice  Call your healthcare provider for any of the following:  · Shortness of breath or trouble breathing  · Increasing chest pain with breathing  · Coughing  · Dizziness, weakness, or fainting  · New or worsening pain  · Fever of 100.4°F (38ºC) or higher, or as directed by your healthcare provider  Date Last Reviewed: 2/1/2017  © 8732-7934 [employer]. [address] [place] [address], [place] [address]. All rights reserved. This information is not intended as a substitute for  professional medical care. Always follow your healthcare professional's instructions.

## 2020-08-11 NOTE — PROGRESS NOTES
Routine Office Visit    Patient Name: Benitez Cabrales    : 1940  MRN: [id number]    Subjective:  Benitez is a 79 y.o. male who presents today for     1. Fall at home - occurred 4 nights ago. Patient was in the bathroom. Patient was recently taken off of amlodipine. He had a dizzy spell and was in the shower. He got out of the shower and sat down on the toilet. Patient felt like he was sweating. He slid down the toilet and hit the left side of his rib. Now he has pain in his left rib, and left hip. He has some difficulty with deep breathing and coughing. He also has pain with movement such as turning in bed.     Review of Systems   Constitutional: Negative for chills and fever.   HENT: Negative for congestion.    Eyes: Negative for blurred vision.   Respiratory: Negative for cough.    Cardiovascular: Positive for chest pain.   Gastrointestinal: Negative for abdominal pain, constipation, diarrhea, heartburn, nausea and vomiting.   Genitourinary: Negative for dysuria.   Musculoskeletal: Negative for myalgias.   Skin: Negative for itching and rash.   Neurological: Negative for dizziness and headaches.   Psychiatric/Behavioral: Negative for depression.       Active Problem List  Patient Active Problem List   Diagnosis    Hypertension    Hyperlipidemia    GERD (gastroesophageal reflux disease)    Chronic nasal congestion    RBBB    AF (atrial fibrillation)    Rhinitis medicamentosa    Dizziness    Neck pain    Aortic atherosclerosis    Imbalance    Acute blood loss anemia    COPD (chronic obstructive pulmonary disease)    Gastric ulcer    Esophagitis    Decreased range of motion of neck    Decreased strength    Posture imbalance    Nuclear sclerosis, bilateral    Refractive error    Encounter for loop recorder at end of battery life    Peripheral edema    Dyslipidemia       Past Surgical History  Past Surgical History:   Procedure Laterality Date    ICM implant      NOSE SURGERY      Septal Repair     REMOVAL OF IMPLANTABLE LOOP RECORDER N/A 12/10/2018    Procedure: REMOVAL, IMPLANTABLE LOOP RECORDER;  Surgeon: Mickey Morales MD;  Location: [facility];  Service: Cardiology;  Laterality: N/A;    VASCULAR SURGERY      left leg       Family History  Family History   Problem Relation Age of Onset    Cancer Maternal Aunt     No Known Problems Mother     No Known Problems Father     No Known Problems Sister     No Known Problems Brother     No Known Problems Maternal Uncle     No Known Problems Paternal Aunt     No Known Problems Paternal Uncle     No Known Problems Maternal Grandmother     No Known Problems Maternal Grandfather     No Known Problems Paternal Grandmother     No Known Problems Paternal Grandfather     Asthma Neg Hx     Emphysema Neg Hx     Amblyopia Neg Hx     Blindness Neg Hx     Cataracts Neg Hx     Diabetes Neg Hx     Glaucoma Neg Hx     Hypertension Neg Hx     Macular degeneration Neg Hx     Retinal detachment Neg Hx     Strabismus Neg Hx     Stroke Neg Hx     Thyroid disease Neg Hx        Social History  Social History     Socioeconomic History    Marital status:      Spouse name: Not on file    Number of children: Not on file    Years of education: Not on file    Highest education level: Not on file   Occupational History     Employer: Retired    Social Needs    Financial resource strain: Somewhat hard    Food insecurity     Worry: Not on file     Inability: Never true    Transportation needs     Medical: No     Non-medical: No   Tobacco Use    Smoking status: Never Smoker    Smokeless tobacco: Never Used    Tobacco comment: .  Three kids.  Occup:   at [employer].     Substance and Sexual Activity    Alcohol use: Yes     Alcohol/week: 1.0 standard drinks     Types: 1 Glasses of wine per week     Frequency: 2-3 times a week     Drinks per session: 1 or 2     Binge frequency: Never     Comment: occasional    Drug use: No    Sexual  activity: Not Currently   Lifestyle    Physical activity     Days per week: 2 days     Minutes per session: 60 min    Stress: Only a little   Relationships    Social connections     Talks on phone: Twice a week     Gets together: Twice a week     Attends Sikhism service: Not on file     Active member of club or organization: Yes     Attends meetings of clubs or organizations: Never     Relationship status:    Other Topics Concern    Not on file   Social History Narrative    Not on file       Medications and Allergies  Reviewed and updated.   Current Outpatient Medications   Medication Sig    apixaban (ELIQUIS) 5 mg Tab Take 1 tablet (5 mg total) by mouth 2 (two) times daily.    chlorthalidone (HYGROTEN) 25 MG Tab 1/2 tablet daily    cholecalciferol, vitamin D3, (VITAMIN D3) 2,000 unit Cap Take 1 capsule by mouth once daily.    diclofenac sodium (VOLTAREN) 1 % Gel Apply 2 g topically 4 (four) times daily.    doxepin (SINEQUAN) 10 MG capsule 1-2 HS prn itching    magnesium 200 mg Tab Take by mouth once daily.    meclizine (ANTIVERT) 25 mg tablet Half - one pill at least HS for vertigo    metoprolol succinate (TOPROL-XL) 25 MG 24 hr tablet Take 1 tablet by mouth once daily    MV-MN/FA/LYCOPENE/LUT/HB#178 (NEHEMIAH MULTIVITAMIN FOR MEN ORAL) Take 1 tablet by mouth once daily.      pantoprazole (PROTONIX) 40 MG tablet Take 1 tablet by mouth once daily    rosuvastatin (CRESTOR) 10 MG tablet TAKE 1 TABLET BY MOUTH ONCE DAILY IN THE EVENING    tiZANidine (ZANAFLEX) 4 MG tablet TAKE 1 TO 2 TABLETS BY MOUTH AT BEDTIME AS NEEDED FOR  SPASMS    valsartan (DIOVAN) 320 MG tablet Take one each evening    amiodarone (PACERONE) 200 MG Tab Take 1 tablet by mouth once daily (Patient not taking: Reported on 8/11/2020)    DIABETIC SUPPLIES,MISCELL (BD MAGNI-GUIDE SYRINGE MAGNIFI MISC) by Misc.(Non-Drug; Combo Route) route.    fish oil-omega-3 fatty acids 300-1,000 mg capsule Take 2 g by mouth once daily.       HYDROcodone-acetaminophen (NORCO) 7.5-325 mg per tablet Take 1 tablet by mouth every 6 (six) hours as needed for Pain.     No current facility-administered medications for this visit.        Physical Exam  BP (!) 124/58 (BP Location: Right arm)   Pulse 60   Temp 97.3 °F (36.3 °C)   Wt 77.6 kg (171 lb 1.2 oz)   SpO2 95%   BMI 23.86 kg/m²   Physical Exam  Constitutional:       Appearance: He is well-developed.   HENT:      Head: Normocephalic and atraumatic.   Eyes:      Conjunctiva/sclera: Conjunctivae normal.      Pupils: Pupils are equal, round, and reactive to light.   Neck:      Musculoskeletal: Normal range of motion and neck supple.      Thyroid: No thyromegaly.      Vascular: No JVD.   Cardiovascular:      Rate and Rhythm: Normal rate and regular rhythm.      Heart sounds: Normal heart sounds.   Pulmonary:      Effort: Pulmonary effort is normal.      Breath sounds: Normal breath sounds. No wheezing.   Chest:      Chest wall: Tenderness present.       Abdominal:      General: Bowel sounds are normal. There is no distension.      Palpations: Abdomen is soft.      Tenderness: There is no abdominal tenderness. There is no guarding.   Musculoskeletal: Normal range of motion.   Lymphadenopathy:      Cervical: No cervical adenopathy.   Skin:     General: Skin is warm and dry.   Neurological:      Mental Status: He is alert and oriented to person, place, and time.   Psychiatric:         Behavior: Behavior normal.           Assessment/Plan:  Benitez Cabrales is a 79 y.o. male who presents today for :    Problem List Items Addressed This Visit        Pulmonary    COPD (chronic obstructive pulmonary disease)  Noted in chart  The current medical regimen is effective;  continue present plan and medications.         Cardiac/Vascular    Aortic atherosclerosis  Patient with Atherosclerosis of the Aorta.  Stable/asymptomatic. Currently stable on lipid lowering treatment and b/p monitoring.      Hypertension (Chronic)  The  current medical regimen is effective;  continue present plan and medications.        Other Visit Diagnoses     Contusion of rib on left side, initial encounter    -  Primary / Fall in home, subsequent encounter       Relevant Medications   HYDROcodone-acetaminophen (NORCO) 7.5-325 mg per tablet  Common side effects of this medication were discussed with the patient. Questions regarding medications were discussed during this visit.   Avoid driving with medication   Recommend increase breathing exercises to prevent atelectasis   F/u with x-ray and treat as indicated  Other Relevant Orders   X-Ray Ribs 2 View Left        PAF (paroxysmal atrial fibrillation)      The current medical regimen is effective;  continue present plan and medications.                                 Follow up if symptoms worsen or fail to improve.

## 2020-08-11 NOTE — PROGRESS NOTES
Routine Office Visit    Patient Name: Benitez Cabrales    : 1940  MRN: [id number]    Subjective:  Benitez is a 79 y.o. male who presents today for     1.     ROS    Active Problem List  Patient Active Problem List   Diagnosis    Hypertension    Hyperlipidemia    GERD (gastroesophageal reflux disease)    Chronic nasal congestion    RBBB    AF (atrial fibrillation)    Rhinitis medicamentosa    Dizziness    Neck pain    Aortic atherosclerosis    Imbalance    Acute blood loss anemia    COPD (chronic obstructive pulmonary disease)    Gastric ulcer    Esophagitis    Decreased range of motion of neck    Decreased strength    Posture imbalance    Nuclear sclerosis, bilateral    Refractive error    Encounter for loop recorder at end of battery life    Peripheral edema    Dyslipidemia       Past Surgical History  Past Surgical History:   Procedure Laterality Date    ICM implant      NOSE SURGERY      Septal Repair    REMOVAL OF IMPLANTABLE LOOP RECORDER N/A 12/10/2018    Procedure: REMOVAL, IMPLANTABLE LOOP RECORDER;  Surgeon: Mickey Morales MD;  Location: [facility];  Service: Cardiology;  Laterality: N/A;    VASCULAR SURGERY      left leg       Family History  Family History   Problem Relation Age of Onset    Cancer Maternal Aunt     No Known Problems Mother     No Known Problems Father     No Known Problems Sister     No Known Problems Brother     No Known Problems Maternal Uncle     No Known Problems Paternal Aunt     No Known Problems Paternal Uncle     No Known Problems Maternal Grandmother     No Known Problems Maternal Grandfather     No Known Problems Paternal Grandmother     No Known Problems Paternal Grandfather     Asthma Neg Hx     Emphysema Neg Hx     Amblyopia Neg Hx     Blindness Neg Hx     Cataracts Neg Hx     Diabetes Neg Hx     Glaucoma Neg Hx     Hypertension Neg Hx     Macular degeneration Neg Hx     Retinal detachment Neg Hx     Strabismus Neg Hx      Stroke Neg Hx     Thyroid disease Neg Hx        Social History  Social History     Socioeconomic History    Marital status:      Spouse name: Not on file    Number of children: Not on file    Years of education: Not on file    Highest education level: Not on file   Occupational History     Employer: Retired    Social Needs    Financial resource strain: Somewhat hard    Food insecurity     Worry: Not on file     Inability: Never true    Transportation needs     Medical: No     Non-medical: No   Tobacco Use    Smoking status: Never Smoker    Smokeless tobacco: Never Used    Tobacco comment: .  Three kids.  Occup:   at [employer].     Substance and Sexual Activity    Alcohol use: Yes     Alcohol/week: 1.0 standard drinks     Types: 1 Glasses of wine per week     Frequency: 2-3 times a week     Drinks per session: 1 or 2     Binge frequency: Never     Comment: occasional    Drug use: No    Sexual activity: Not Currently   Lifestyle    Physical activity     Days per week: 2 days     Minutes per session: 60 min    Stress: Only a little   Relationships    Social connections     Talks on phone: Twice a week     Gets together: Twice a week     Attends Shinto service: Not on file     Active member of club or organization: Yes     Attends meetings of clubs or organizations: Never     Relationship status:    Other Topics Concern    Not on file   Social History Narrative    Not on file       Medications and Allergies  Reviewed and updated.   Current Outpatient Medications   Medication Sig    apixaban (ELIQUIS) 5 mg Tab Take 1 tablet (5 mg total) by mouth 2 (two) times daily.    chlorthalidone (HYGROTEN) 25 MG Tab 1/2 tablet daily    cholecalciferol, vitamin D3, (VITAMIN D3) 2,000 unit Cap Take 1 capsule by mouth once daily.    diclofenac sodium (VOLTAREN) 1 % Gel Apply 2 g topically 4 (four) times daily.    doxepin (SINEQUAN) 10 MG capsule 1-2 HS prn itching    magnesium  200 mg Tab Take by mouth once daily.    meclizine (ANTIVERT) 25 mg tablet Half - one pill at least HS for vertigo    metoprolol succinate (TOPROL-XL) 25 MG 24 hr tablet Take 1 tablet by mouth once daily    MV-MN/FA/LYCOPENE/LUT/HB#178 (NEHEMIAH MULTIVITAMIN FOR MEN ORAL) Take 1 tablet by mouth once daily.      pantoprazole (PROTONIX) 40 MG tablet Take 1 tablet by mouth once daily    rosuvastatin (CRESTOR) 10 MG tablet TAKE 1 TABLET BY MOUTH ONCE DAILY IN THE EVENING    tiZANidine (ZANAFLEX) 4 MG tablet TAKE 1 TO 2 TABLETS BY MOUTH AT BEDTIME AS NEEDED FOR  SPASMS    valsartan (DIOVAN) 320 MG tablet Take one each evening    amiodarone (PACERONE) 200 MG Tab Take 1 tablet by mouth once daily (Patient not taking: Reported on 8/11/2020)    DIABETIC SUPPLIES,MISCELL (BD MAGNI-GUIDE SYRINGE MAGNIFI MISC) by Misc.(Non-Drug; Combo Route) route.    fish oil-omega-3 fatty acids 300-1,000 mg capsule Take 2 g by mouth once daily.       No current facility-administered medications for this visit.        Physical Exam  BP (!) 124/58 (BP Location: Right arm)   Pulse 60   Temp 97.3 °F (36.3 °C)   Wt 77.6 kg (171 lb 1.2 oz)   SpO2 95%   BMI 23.86 kg/m²   Physical Exam      Assessment/Plan:  Benitez Cabrales is a 79 y.o. male who presents today for :    Problem List Items Addressed This Visit     None          No follow-ups on file.

## 2020-08-12 ENCOUNTER — HOSPITAL ENCOUNTER (OUTPATIENT)
Facility: HOSPITAL | Age: 80
Discharge: HOME | End: 2020-08-14
Attending: EMERGENCY MEDICINE | Admitting: EMERGENCY MEDICINE
Payer: MEDICARE
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
## 2020-08-12 DIAGNOSIS — R00.1 BRADYCARDIA: ICD-10-CM

## 2020-08-12 DIAGNOSIS — S22.42XA CLOSED FRACTURE OF MULTIPLE RIBS OF LEFT SIDE, INITIAL ENCOUNTER: Primary | ICD-10-CM

## 2020-08-12 DIAGNOSIS — R55 SYNCOPE: ICD-10-CM

## 2020-08-12 PROCEDURE — 93010 EKG 12-LEAD: ICD-10-PCS | Mod: ,,, | Performed by: INTERNAL MEDICINE

## 2020-08-12 PROCEDURE — 84484 ASSAY OF TROPONIN QUANT: CPT

## 2020-08-12 PROCEDURE — 83735 ASSAY OF MAGNESIUM: CPT

## 2020-08-12 PROCEDURE — 85025 COMPLETE CBC W/AUTO DIFF WBC: CPT

## 2020-08-12 PROCEDURE — 93005 ELECTROCARDIOGRAM TRACING: CPT

## 2020-08-12 PROCEDURE — 83880 ASSAY OF NATRIURETIC PEPTIDE: CPT

## 2020-08-12 PROCEDURE — 80053 COMPREHEN METABOLIC PANEL: CPT

## 2020-08-12 PROCEDURE — 93010 ELECTROCARDIOGRAM REPORT: CPT | Mod: ,,, | Performed by: INTERNAL MEDICINE

## 2020-08-12 PROCEDURE — 99285 EMERGENCY DEPT VISIT HI MDM: CPT | Mod: 25

## 2020-08-13 PROBLEM — R55 SYNCOPE: Status: ACTIVE | Noted: 2020-08-13

## 2020-08-13 LAB
ALBUMIN SERPL BCP-MCNC: 3.4 G/DL (ref 3.5–5.2)
ALP SERPL-CCNC: 72 U/L (ref 55–135)
ALT SERPL W/O P-5'-P-CCNC: 41 U/L (ref 10–44)
ANION GAP SERPL CALC-SCNC: 8 MMOL/L (ref 8–16)
ANION GAP SERPL CALC-SCNC: 8 MMOL/L (ref 8–16)
AORTIC ROOT ANNULUS: 3.67 CM
AORTIC VALVE CUSP SEPERATION: 1.67 CM
AST SERPL-CCNC: 54 U/L (ref 10–40)
AV INDEX (PROSTH): 0.89
AV MEAN GRADIENT: 3 MMHG
AV PEAK GRADIENT: 4 MMHG
AV VALVE AREA: 2.95 CM2
AV VELOCITY RATIO: 0.94
BASOPHILS # BLD AUTO: 0.05 K/UL (ref 0–0.2)
BASOPHILS # BLD AUTO: 0.05 K/UL (ref 0–0.2)
BASOPHILS NFR BLD: 0.4 % (ref 0–1.9)
BASOPHILS NFR BLD: 0.8 % (ref 0–1.9)
BILIRUB SERPL-MCNC: 0.4 MG/DL (ref 0.1–1)
BILIRUB UR QL STRIP: NEGATIVE
BNP SERPL-MCNC: 396 PG/ML (ref 0–99)
BSA FOR ECHO PROCEDURE: 1.95 M2
BUN SERPL-MCNC: 28 MG/DL (ref 8–23)
BUN SERPL-MCNC: 31 MG/DL (ref 8–23)
CALCIUM SERPL-MCNC: 9 MG/DL (ref 8.7–10.5)
CALCIUM SERPL-MCNC: 9 MG/DL (ref 8.7–10.5)
CHLORIDE SERPL-SCNC: 101 MMOL/L (ref 95–110)
CHLORIDE SERPL-SCNC: 103 MMOL/L (ref 95–110)
CLARITY UR: CLEAR
CO2 SERPL-SCNC: 25 MMOL/L (ref 23–29)
CO2 SERPL-SCNC: 26 MMOL/L (ref 23–29)
COLOR UR: ABNORMAL
CREAT SERPL-MCNC: 1.4 MG/DL (ref 0.5–1.4)
CREAT SERPL-MCNC: 1.7 MG/DL (ref 0.5–1.4)
CV ECHO LV RWT: 0.37 CM
DIFFERENTIAL METHOD: ABNORMAL
DIFFERENTIAL METHOD: ABNORMAL
DOP CALC AO PEAK VEL: 1.06 M/S
DOP CALC AO VTI: 25.5 CM
DOP CALC LVOT AREA: 3.3 CM2
DOP CALC LVOT DIAMETER: 2.06 CM
DOP CALC LVOT PEAK VEL: 1 M/S
DOP CALC LVOT STROKE VOLUME: 75.35 CM3
DOP CALCLVOT PEAK VEL VTI: 22.62 CM
E WAVE DECELERATION TIME: 300.29 MSEC
E/A RATIO: 1.08
ECHO LV POSTERIOR WALL: 0.94 CM (ref 0.6–1.1)
EOSINOPHIL # BLD AUTO: 0 K/UL (ref 0–0.5)
EOSINOPHIL # BLD AUTO: 0.1 K/UL (ref 0–0.5)
EOSINOPHIL NFR BLD: 0.2 % (ref 0–8)
EOSINOPHIL NFR BLD: 1.9 % (ref 0–8)
ERYTHROCYTE [DISTWIDTH] IN BLOOD BY AUTOMATED COUNT: 14.4 % (ref 11.5–14.5)
ERYTHROCYTE [DISTWIDTH] IN BLOOD BY AUTOMATED COUNT: 14.5 % (ref 11.5–14.5)
EST. GFR  (AFRICAN AMERICAN): 44 ML/MIN/1.73 M^2
EST. GFR  (AFRICAN AMERICAN): 55 ML/MIN/1.73 M^2
EST. GFR  (NON AFRICAN AMERICAN): 38 ML/MIN/1.73 M^2
EST. GFR  (NON AFRICAN AMERICAN): 48 ML/MIN/1.73 M^2
FRACTIONAL SHORTENING: 28 % (ref 28–44)
GLUCOSE SERPL-MCNC: 105 MG/DL (ref 70–110)
GLUCOSE SERPL-MCNC: 95 MG/DL (ref 70–110)
GLUCOSE UR QL STRIP: NEGATIVE
HCT VFR BLD AUTO: 36.9 % (ref 40–54)
HCT VFR BLD AUTO: 37.2 % (ref 40–54)
HGB BLD-MCNC: 12.1 G/DL (ref 14–18)
HGB BLD-MCNC: 12.6 G/DL (ref 14–18)
HGB UR QL STRIP: NEGATIVE
IMM GRANULOCYTES # BLD AUTO: 0.03 K/UL (ref 0–0.04)
IMM GRANULOCYTES # BLD AUTO: 0.06 K/UL (ref 0–0.04)
IMM GRANULOCYTES NFR BLD AUTO: 0.5 % (ref 0–0.5)
IMM GRANULOCYTES NFR BLD AUTO: 0.5 % (ref 0–0.5)
INTERVENTRICULAR SEPTUM: 0.99 CM (ref 0.6–1.1)
IVRT: 110.73 MSEC
KETONES UR QL STRIP: NEGATIVE
LA MAJOR: 5.61 CM
LA MINOR: 5.72 CM
LA WIDTH: 5.09 CM
LEFT ATRIUM SIZE: 4.15 CM
LEFT ATRIUM VOLUME INDEX: 51.9 ML/M2
LEFT ATRIUM VOLUME: 101.71 CM3
LEFT INTERNAL DIMENSION IN SYSTOLE: 3.68 CM (ref 2.1–4)
LEFT VENTRICLE DIASTOLIC VOLUME INDEX: 63 ML/M2
LEFT VENTRICLE DIASTOLIC VOLUME: 123.37 ML
LEFT VENTRICLE MASS INDEX: 91 G/M2
LEFT VENTRICLE SYSTOLIC VOLUME INDEX: 29.4 ML/M2
LEFT VENTRICLE SYSTOLIC VOLUME: 57.5 ML
LEFT VENTRICULAR INTERNAL DIMENSION IN DIASTOLE: 5.09 CM (ref 3.5–6)
LEFT VENTRICULAR MASS: 178.71 G
LEUKOCYTE ESTERASE UR QL STRIP: NEGATIVE
LYMPHOCYTES # BLD AUTO: 0.7 K/UL (ref 1–4.8)
LYMPHOCYTES # BLD AUTO: 1.3 K/UL (ref 1–4.8)
LYMPHOCYTES NFR BLD: 10.5 % (ref 18–48)
LYMPHOCYTES NFR BLD: 10.9 % (ref 18–48)
MAGNESIUM SERPL-MCNC: 2.3 MG/DL (ref 1.6–2.6)
MCH RBC QN AUTO: 30.3 PG (ref 27–31)
MCH RBC QN AUTO: 31 PG (ref 27–31)
MCHC RBC AUTO-ENTMCNC: 32.8 G/DL (ref 32–36)
MCHC RBC AUTO-ENTMCNC: 33.9 G/DL (ref 32–36)
MCV RBC AUTO: 92 FL (ref 82–98)
MCV RBC AUTO: 92 FL (ref 82–98)
MONOCYTES # BLD AUTO: 0.7 K/UL (ref 0.3–1)
MONOCYTES # BLD AUTO: 0.9 K/UL (ref 0.3–1)
MONOCYTES NFR BLD: 10.3 % (ref 4–15)
MONOCYTES NFR BLD: 7.3 % (ref 4–15)
MV PEAK A VEL: 0.78 M/S
MV PEAK E VEL: 0.84 M/S
NEUTROPHILS # BLD AUTO: 10.2 K/UL (ref 1.8–7.7)
NEUTROPHILS # BLD AUTO: 4.9 K/UL (ref 1.8–7.7)
NEUTROPHILS NFR BLD: 75.6 % (ref 38–73)
NEUTROPHILS NFR BLD: 81.1 % (ref 38–73)
NITRITE UR QL STRIP: NEGATIVE
NRBC BLD-RTO: 0 /100 WBC
NRBC BLD-RTO: 0 /100 WBC
PH UR STRIP: 5 [PH] (ref 5–8)
PISA TR MAX VEL: 2.57 M/S
PLATELET # BLD AUTO: 183 K/UL (ref 150–350)
PLATELET # BLD AUTO: 194 K/UL (ref 150–350)
PMV BLD AUTO: 11.6 FL (ref 9.2–12.9)
PMV BLD AUTO: 11.9 FL (ref 9.2–12.9)
POTASSIUM SERPL-SCNC: 3.8 MMOL/L (ref 3.5–5.1)
POTASSIUM SERPL-SCNC: 4.1 MMOL/L (ref 3.5–5.1)
PROT SERPL-MCNC: 7.1 G/DL (ref 6–8.4)
PROT UR QL STRIP: NEGATIVE
PV PEAK VELOCITY: 0.99 CM/S
RA MAJOR: 6.24 CM
RA PRESSURE: 3 MMHG
RA WIDTH: 3.68 CM
RBC # BLD AUTO: 4 M/UL (ref 4.6–6.2)
RBC # BLD AUTO: 4.06 M/UL (ref 4.6–6.2)
RIGHT VENTRICULAR END-DIASTOLIC DIMENSION: 3.48 CM
RV TISSUE DOPPLER FREE WALL SYSTOLIC VELOCITY 1 (APICAL 4 CHAMBER VIEW): 10.23 CM/S
SARS-COV-2 RDRP RESP QL NAA+PROBE: NEGATIVE
SINUS: 3.63 CM
SODIUM SERPL-SCNC: 134 MMOL/L (ref 136–145)
SODIUM SERPL-SCNC: 137 MMOL/L (ref 136–145)
SP GR UR STRIP: 1.02 (ref 1–1.03)
STJ: 2.66 CM
TR MAX PG: 26 MMHG
TRICUSPID ANNULAR PLANE SYSTOLIC EXCURSION: 2.93 CM
TROPONIN I SERPL DL<=0.01 NG/ML-MCNC: 0.01 NG/ML (ref 0–0.03)
TROPONIN I SERPL DL<=0.01 NG/ML-MCNC: 0.01 NG/ML (ref 0–0.03)
TROPONIN I SERPL DL<=0.01 NG/ML-MCNC: <0.006 NG/ML (ref 0–0.03)
TV REST PULMONARY ARTERY PRESSURE: 29 MMHG
URN SPEC COLLECT METH UR: ABNORMAL
UROBILINOGEN UR STRIP-ACNC: NEGATIVE EU/DL
WBC # BLD AUTO: 12.53 K/UL (ref 3.9–12.7)
WBC # BLD AUTO: 6.42 K/UL (ref 3.9–12.7)

## 2020-08-13 PROCEDURE — 25500020 PHARM REV CODE 255: Performed by: EMERGENCY MEDICINE

## 2020-08-13 PROCEDURE — G0378 HOSPITAL OBSERVATION PER HR: HCPCS

## 2020-08-13 PROCEDURE — 85025 COMPLETE CBC W/AUTO DIFF WBC: CPT

## 2020-08-13 PROCEDURE — 25000003 PHARM REV CODE 250: Performed by: HOSPITALIST

## 2020-08-13 PROCEDURE — 84484 ASSAY OF TROPONIN QUANT: CPT | Mod: 91

## 2020-08-13 PROCEDURE — 81003 URINALYSIS AUTO W/O SCOPE: CPT

## 2020-08-13 PROCEDURE — 63600175 PHARM REV CODE 636 W HCPCS: Performed by: HOSPITALIST

## 2020-08-13 PROCEDURE — 36415 COLL VENOUS BLD VENIPUNCTURE: CPT

## 2020-08-13 PROCEDURE — 96375 TX/PRO/DX INJ NEW DRUG ADDON: CPT | Mod: 59

## 2020-08-13 PROCEDURE — S0028 INJECTION, FAMOTIDINE, 20 MG: HCPCS | Performed by: HOSPITALIST

## 2020-08-13 PROCEDURE — 96374 THER/PROPH/DIAG INJ IV PUSH: CPT | Mod: 59

## 2020-08-13 PROCEDURE — 99220 PR INITIAL OBSERVATION CARE,LEVL III: CPT | Mod: ,,, | Performed by: INTERNAL MEDICINE

## 2020-08-13 PROCEDURE — 99220 PR INITIAL OBSERVATION CARE,LEVL III: ICD-10-PCS | Mod: ,,, | Performed by: INTERNAL MEDICINE

## 2020-08-13 PROCEDURE — 80048 BASIC METABOLIC PNL TOTAL CA: CPT

## 2020-08-13 PROCEDURE — U0002 COVID-19 LAB TEST NON-CDC: HCPCS

## 2020-08-13 RX ORDER — ONDANSETRON 2 MG/ML
4 INJECTION INTRAMUSCULAR; INTRAVENOUS EVERY 8 HOURS PRN
Status: DISCONTINUED | OUTPATIENT
Start: 2020-08-13 | End: 2020-08-14 | Stop reason: HOSPADM

## 2020-08-13 RX ORDER — METOPROLOL SUCCINATE 25 MG/1
25 TABLET, EXTENDED RELEASE ORAL DAILY
Status: DISCONTINUED | OUTPATIENT
Start: 2020-08-13 | End: 2020-08-13

## 2020-08-13 RX ORDER — ACETAMINOPHEN 325 MG/1
650 TABLET ORAL EVERY 8 HOURS PRN
Status: DISCONTINUED | OUTPATIENT
Start: 2020-08-13 | End: 2020-08-14 | Stop reason: HOSPADM

## 2020-08-13 RX ORDER — AMLODIPINE BESYLATE 5 MG/1
10 TABLET ORAL DAILY
Status: DISCONTINUED | OUTPATIENT
Start: 2020-08-13 | End: 2020-08-14 | Stop reason: HOSPADM

## 2020-08-13 RX ORDER — HYDRALAZINE HYDROCHLORIDE 20 MG/ML
10 INJECTION INTRAMUSCULAR; INTRAVENOUS EVERY 4 HOURS PRN
Status: DISCONTINUED | OUTPATIENT
Start: 2020-08-13 | End: 2020-08-14 | Stop reason: HOSPADM

## 2020-08-13 RX ORDER — AMOXICILLIN 250 MG
1 CAPSULE ORAL 2 TIMES DAILY
Status: DISCONTINUED | OUTPATIENT
Start: 2020-08-13 | End: 2020-08-14 | Stop reason: HOSPADM

## 2020-08-13 RX ORDER — SODIUM CHLORIDE 0.9 % (FLUSH) 0.9 %
10 SYRINGE (ML) INJECTION
Status: DISCONTINUED | OUTPATIENT
Start: 2020-08-13 | End: 2020-08-14 | Stop reason: HOSPADM

## 2020-08-13 RX ORDER — FAMOTIDINE 10 MG/ML
20 INJECTION INTRAVENOUS DAILY
Status: DISCONTINUED | OUTPATIENT
Start: 2020-08-13 | End: 2020-08-14 | Stop reason: HOSPADM

## 2020-08-13 RX ORDER — ROSUVASTATIN CALCIUM 10 MG/1
10 TABLET, COATED ORAL NIGHTLY
Status: DISCONTINUED | OUTPATIENT
Start: 2020-08-13 | End: 2020-08-14 | Stop reason: HOSPADM

## 2020-08-13 RX ADMIN — ROSUVASTATIN CALCIUM 10 MG: 10 TABLET, COATED ORAL at 05:08

## 2020-08-13 RX ADMIN — ROSUVASTATIN CALCIUM 10 MG: 10 TABLET, COATED ORAL at 09:08

## 2020-08-13 RX ADMIN — APIXABAN 5 MG: 5 TABLET, FILM COATED ORAL at 09:08

## 2020-08-13 RX ADMIN — ACETAMINOPHEN 650 MG: 325 TABLET ORAL at 09:08

## 2020-08-13 RX ADMIN — FAMOTIDINE 20 MG: 10 INJECTION INTRAVENOUS at 09:08

## 2020-08-13 RX ADMIN — HYDRALAZINE HYDROCHLORIDE 10 MG: 20 INJECTION INTRAMUSCULAR; INTRAVENOUS at 04:08

## 2020-08-13 RX ADMIN — DOCUSATE SODIUM 50 MG AND SENNOSIDES 8.6 MG 1 TABLET: 8.6; 5 TABLET, FILM COATED ORAL at 09:08

## 2020-08-13 RX ADMIN — IOHEXOL 85 ML: 350 INJECTION, SOLUTION INTRAVENOUS at 01:08

## 2020-08-13 RX ADMIN — ACETAMINOPHEN 650 MG: 325 TABLET ORAL at 11:08

## 2020-08-13 NOTE — H&P
[facility] Hospital Medicine  History & Physical    Patient Name: Benitez Cabrales  MRN: [id number]  Admission Date: 8/12/2020  Attending Physician: Jesus Blank MD   Primary Care Provider: Diomedes Ayoub MD         Patient information was obtained from patient and ER records.     Subjective:     Principal Problem:Syncope    Chief Complaint:   Chief Complaint   Patient presents with    Loss of Consciousness     Arrived via EMS with c/o syncopal episode after having bowel movement. Pt c/o L flank pain and back pain. Per EMS, initial HR 44. Given 0.5 mg atropine with HR increase to 56.        HPI: Benitez Cabrales 79 y.o. male.  PMHX: HTN,DM,COPD,CAD,CKD,CVA presents via EMS complaining of syncopal episode.  He reports x2 syncopal episodes on 08/12/20 resulting after straining to have a bowel movement approximately 2 hr PTA associated with neck pain,diaphoresis and lightheadedness. Additionally endorses worsening intermittent syncopal episode for past 3 months, primary doctor recently discontinue his amlodipine in being followed is followed by Dr. Ontiveros ([facility]).  He denies fever/chills,N/V/D,CP/palpitations,abd pain,dysuria or any other associated symptoms. Also denies Heavy ETOH/Smoking/Illicit drug use.    Placed in observation to hospital medicine for further evaluation and treatment.      Past Medical History:   Diagnosis Date    Anticoagulant long-term use     Atrial fibrillation     COPD (chronic obstructive pulmonary disease)     GERD (gastroesophageal reflux disease)     Hyperlipidemia     Hypertension     Nuclear sclerosis, bilateral 10/9/2017    Rhinitis medicamentosa 6/30/2014    Small bowel obstruction     Vertigo 6/13/2013       Past Surgical History:   Procedure Laterality Date    ICM implant      NOSE SURGERY      Septal Repair    REMOVAL OF IMPLANTABLE LOOP RECORDER N/A 12/10/2018    Procedure: REMOVAL, IMPLANTABLE LOOP RECORDER;  Surgeon: Mickey ROSENBERG  MD Carmen;  Location: [facility];  Service: Cardiology;  Laterality: N/A;    VASCULAR SURGERY      left leg       Review of patient's allergies indicates:  No Known Allergies    No current facility-administered medications on file prior to encounter.      Current Outpatient Medications on File Prior to Encounter   Medication Sig    amiodarone (PACERONE) 200 MG Tab Take 1 tablet by mouth once daily (Patient not taking: Reported on 8/11/2020)    apixaban (ELIQUIS) 5 mg Tab Take 1 tablet (5 mg total) by mouth 2 (two) times daily.    chlorthalidone (HYGROTEN) 25 MG Tab 1/2 tablet daily    cholecalciferol, vitamin D3, (VITAMIN D3) 2,000 unit Cap Take 1 capsule by mouth once daily.    DIABETIC SUPPLIES,MISCELL (BD MAGNI-GUIDE SYRINGE MAGNIFI MISC) by Misc.(Non-Drug; Combo Route) route.    diclofenac sodium (VOLTAREN) 1 % Gel Apply 2 g topically 4 (four) times daily.    doxepin (SINEQUAN) 10 MG capsule 1-2 HS prn itching    fish oil-omega-3 fatty acids 300-1,000 mg capsule Take 2 g by mouth once daily.      HYDROcodone-acetaminophen (NORCO) 7.5-325 mg per tablet Take 1 tablet by mouth every 6 (six) hours as needed for Pain.    magnesium 200 mg Tab Take by mouth once daily.    meclizine (ANTIVERT) 25 mg tablet Half - one pill at least HS for vertigo    metoprolol succinate (TOPROL-XL) 25 MG 24 hr tablet Take 1 tablet by mouth once daily    MV-MN/FA/LYCOPENE/LUT/HB#178 (NEHEMIAH MULTIVITAMIN FOR MEN ORAL) Take 1 tablet by mouth once daily.      pantoprazole (PROTONIX) 40 MG tablet Take 1 tablet by mouth once daily    rosuvastatin (CRESTOR) 10 MG tablet TAKE 1 TABLET BY MOUTH ONCE DAILY IN THE EVENING    tiZANidine (ZANAFLEX) 4 MG tablet TAKE 1 TO 2 TABLETS BY MOUTH AT BEDTIME AS NEEDED FOR  SPASMS    valsartan (DIOVAN) 320 MG tablet Take one each evening     Family History     Problem Relation (Age of Onset)    Cancer Maternal Aunt    No Known Problems Mother, Father, Sister, Brother, Maternal Uncle, Paternal  Aunt, Paternal Uncle, Maternal Grandmother, Maternal Grandfather, Paternal Grandmother, Paternal Grandfather        Tobacco Use    Smoking status: Never Smoker    Smokeless tobacco: Never Used    Tobacco comment: .  Three kids.  Occup:   at [employer].     Substance and Sexual Activity    Alcohol use: Yes     Alcohol/week: 1.0 standard drinks     Types: 1 Glasses of wine per week     Frequency: 2-3 times a week     Drinks per session: 1 or 2     Binge frequency: Never     Comment: occasional    Drug use: No    Sexual activity: Not Currently     Review of Systems   Constitutional: Positive for diaphoresis. Negative for chills and fever.   HENT: Negative for congestion, rhinorrhea and sore throat.    Respiratory: Negative for cough and shortness of breath.    Cardiovascular: Negative for chest pain, palpitations and leg swelling.   Gastrointestinal: Negative for abdominal pain, nausea and vomiting.   Genitourinary: Negative for dysuria and hematuria.   Musculoskeletal: Positive for back pain, neck pain and neck stiffness.   Neurological: Positive for dizziness, syncope and light-headedness.   Psychiatric/Behavioral: Negative for confusion.     Objective:     Vital Signs (Most Recent):  Temp: 98.3 °F (36.8 °C) (08/13/20 0441)  Pulse: (!) 58 (08/13/20 0441)  Resp: 16 (08/13/20 0441)  BP: (!) 169/81 (08/13/20 0441)  SpO2: 98 % (08/13/20 0441) Vital Signs (24h Range):  Temp:  [97.5 °F (36.4 °C)-98.3 °F (36.8 °C)] 98.3 °F (36.8 °C)  Pulse:  [46-62] 58  Resp:  [10-26] 16  SpO2:  [98 %-100 %] 98 %  BP: (110-169)/(60-81) 169/81     Weight: 76.5 kg (168 lb 10.4 oz)  Body mass index is 23.52 kg/m².    Physical Exam  Vitals signs and nursing note reviewed.   Constitutional:       Appearance: Normal appearance.   HENT:      Head: Normocephalic and atraumatic.      Nose: Nose normal.      Mouth/Throat:      Mouth: Mucous membranes are moist.   Eyes:      Conjunctiva/sclera: Conjunctivae normal.   Neck:       Musculoskeletal: Normal range of motion and neck supple.   Cardiovascular:      Rate and Rhythm: Bradycardia present.      Pulses: Normal pulses.   Pulmonary:      Effort: Pulmonary effort is normal.      Breath sounds: Normal breath sounds. No wheezing, rhonchi or rales.   Chest:      Chest wall: No tenderness.   Abdominal:      General: Bowel sounds are normal.      Palpations: Abdomen is soft.      Tenderness: There is no abdominal tenderness. There is no guarding or rebound.   Musculoskeletal:      Right lower leg: No edema.      Left lower leg: No edema.   Skin:     General: Skin is warm and dry.      Capillary Refill: Capillary refill takes less than 2 seconds.      Findings: Bruising (BUE) present.   Neurological:      Mental Status: He is alert and oriented to person, place, and time.   Psychiatric:         Mood and Affect: Mood normal.         Thought Content: Thought content normal.         Judgment: Judgment normal.             Significant Labs:   CBC:   Recent Labs   Lab 08/12/20 2346 08/13/20  0446   WBC 6.42 12.53   HGB 12.1* 12.6*   HCT 36.9* 37.2*    194     CMP:   Recent Labs   Lab 08/12/20 2346 08/13/20  0446    134*   K 4.1 3.8    101   CO2 26 25    95   BUN 31* 28*   CREATININE 1.7* 1.4   CALCIUM 9.0 9.0   PROT 7.1  --    ALBUMIN 3.4*  --    BILITOT 0.4  --    ALKPHOS 72  --    AST 54*  --    ALT 41  --    ANIONGAP 8 8   EGFRNONAA 38* 48*     Cardiac Markers:   Recent Labs   Lab 08/12/20 2346   *     All pertinent labs within the past 24 hours have been reviewed.    Significant Imaging: I have reviewed all pertinent imaging results/findings within the past 24 hours.    Assessment/Plan:     * Syncope  Suspected 2/2 Arrhythmia or carotid insufficiency or somatization disorder or CNS disorder.No evidence of acute ischemia on EKG,physical assessment non-focal, no sign of infectious process,negative urinalysis,no leukocytosis, CXR without infectious process, no  substance use or recent medication changes. Initial troponin 1 level negative.  -Telemetry  -Serial cardiac enzymes  -2D Echo  -Cardiology consult; appreciate recs.  Results for orders placed during the hospital encounter of 03/09/20   Echo Color Flow Doppler? Yes    Narrative · Normal left ventricular systolic function. The estimated ejection   fraction is 60%.  · Mild right ventricular enlargement.  · Normal right ventricular systolic function.  · Normal LV diastolic function.  · Severe biatrial enlargement.  · Mild-to-moderate mitral regurgitation.  · Mild tricuspid regurgitation.  · The estimated PA systolic pressure is 26 mmHg.  · Normal central venous pressure (3 mmHg).            CKD (chronic kidney disease) stage 3, GFR 30-59 ml/min  Creatinine slightly bumped from patients baseline.  -IVF's  -I&0's  -BMP  -Avoid Nephrotoxins.           AF (atrial fibrillation)  Rate control on apixaban and metoprolol,continue home medications treatment regimen.      GERD (gastroesophageal reflux disease)  No acute issues, continue home medication treatment regimen.      Hyperlipidemia  No current available laboratory values.  -Lipid panel  -Continue home Rosuvastatin.  -Cardiac/Low cholesterol diet.          Hypertension  Systolic blood pressure 110-169.  -Telemetry  -Vitals  -BMP  -Continue home medication treatment regimen.         VTE Risk Mitigation (From admission, onward)         Ordered     apixaban tablet 5 mg  2 times daily      08/13/20 0244     IP VTE HIGH RISK PATIENT  Once      08/13/20 0237     Place sequential compression device  Until discontinued      08/13/20 0237                   SHA Grajeda, FNP-C  Hospitalist - Department of Hospital Medicine  [address], [place] [address]  Office [phone]; Pager [phone]

## 2020-08-13 NOTE — ED TRIAGE NOTES
Patient arrived via EMS with c/o near syncopal episode after experiencing posterior neck pain x 1 hour and then attempting to have a bowel movement.  Patient reports several episodes of same occurrence where he actually experienced LOC.  Patient arrived A&O x 4 with GCS of 15.  Bruising with pain on movement and palpitation to left side secondary to recent fall in which he has been seen in ED for.  Patient reports episodes of diaphoresis with episodes of syncope or near syncopal episodes.  Denies chest pain, sob, dizziness, weakness, numbness, tingling, blurred vision, or n/v with episodes.  No acute distress noted at this time.  Will continue to monitor.

## 2020-08-13 NOTE — SUBJECTIVE & OBJECTIVE
Past Medical History:   Diagnosis Date    Anticoagulant long-term use     Atrial fibrillation     COPD (chronic obstructive pulmonary disease)     GERD (gastroesophageal reflux disease)     Hyperlipidemia     Hypertension     Nuclear sclerosis, bilateral 10/9/2017    Rhinitis medicamentosa 6/30/2014    Small bowel obstruction     Vertigo 6/13/2013       Past Surgical History:   Procedure Laterality Date    ICM implant      NOSE SURGERY      Septal Repair    REMOVAL OF IMPLANTABLE LOOP RECORDER N/A 12/10/2018    Procedure: REMOVAL, IMPLANTABLE LOOP RECORDER;  Surgeon: Mickey Morales MD;  [facility];  Service: Cardiology;  Laterality: N/A;    VASCULAR SURGERY      left leg       Review of patient's allergies indicates:  No Known Allergies    No current facility-administered medications on file prior to encounter.      Current Outpatient Medications on File Prior to Encounter   Medication Sig    amiodarone (PACERONE) 200 MG Tab Take 1 tablet by mouth once daily (Patient not taking: Reported on 8/11/2020)    apixaban (ELIQUIS) 5 mg Tab Take 1 tablet (5 mg total) by mouth 2 (two) times daily.    chlorthalidone (HYGROTEN) 25 MG Tab 1/2 tablet daily    cholecalciferol, vitamin D3, (VITAMIN D3) 2,000 unit Cap Take 1 capsule by mouth once daily.    DIABETIC SUPPLIES,MISCELL (BD MAGNI-GUIDE SYRINGE MAGNIFI MISC) by Misc.(Non-Drug; Combo Route) route.    diclofenac sodium (VOLTAREN) 1 % Gel Apply 2 g topically 4 (four) times daily.    doxepin (SINEQUAN) 10 MG capsule 1-2 HS prn itching    fish oil-omega-3 fatty acids 300-1,000 mg capsule Take 2 g by mouth once daily.      HYDROcodone-acetaminophen (NORCO) 7.5-325 mg per tablet Take 1 tablet by mouth every 6 (six) hours as needed for Pain.    magnesium 200 mg Tab Take by mouth once daily.    meclizine (ANTIVERT) 25 mg tablet Half - one pill at least HS for vertigo    metoprolol succinate (TOPROL-XL) 25 MG 24 hr tablet Take 1 tablet by  mouth once daily    MV-MN/FA/LYCOPENE/LUT/HB#178 (NEHEMIAH MULTIVITAMIN FOR MEN ORAL) Take 1 tablet by mouth once daily.      pantoprazole (PROTONIX) 40 MG tablet Take 1 tablet by mouth once daily    rosuvastatin (CRESTOR) 10 MG tablet TAKE 1 TABLET BY MOUTH ONCE DAILY IN THE EVENING    tiZANidine (ZANAFLEX) 4 MG tablet TAKE 1 TO 2 TABLETS BY MOUTH AT BEDTIME AS NEEDED FOR  SPASMS    valsartan (DIOVAN) 320 MG tablet Take one each evening     Family History     Problem Relation (Age of Onset)    Cancer Maternal Aunt    No Known Problems Mother, Father, Sister, Brother, Maternal Uncle, Paternal Aunt, Paternal Uncle, Maternal Grandmother, Maternal Grandfather, Paternal Grandmother, Paternal Grandfather        Tobacco Use    Smoking status: Never Smoker    Smokeless tobacco: Never Used    Tobacco comment: .  Three kids.  Occup:   at [employer].     Substance and Sexual Activity    Alcohol use: Yes     Alcohol/week: 1.0 standard drinks     Types: 1 Glasses of wine per week     Frequency: 2-3 times a week     Drinks per session: 1 or 2     Binge frequency: Never     Comment: occasional    Drug use: No    Sexual activity: Not Currently     Review of Systems   Constitutional: Positive for diaphoresis. Negative for chills and fever.   HENT: Negative for congestion, rhinorrhea and sore throat.    Respiratory: Negative for cough and shortness of breath.    Cardiovascular: Negative for chest pain, palpitations and leg swelling.   Gastrointestinal: Negative for abdominal pain, nausea and vomiting.   Genitourinary: Negative for dysuria and hematuria.   Musculoskeletal: Positive for back pain, neck pain and neck stiffness.   Neurological: Positive for dizziness, syncope and light-headedness.   Psychiatric/Behavioral: Negative for confusion.     Objective:     Vital Signs (Most Recent):  Temp: 98.3 °F (36.8 °C) (08/13/20 0441)  Pulse: (!) 58 (08/13/20 0441)  Resp: 16 (08/13/20 0441)  BP: (!) 169/81 (08/13/20  0441)  SpO2: 98 % (08/13/20 0441) Vital Signs (24h Range):  Temp:  [97.5 °F (36.4 °C)-98.3 °F (36.8 °C)] 98.3 °F (36.8 °C)  Pulse:  [46-62] 58  Resp:  [10-26] 16  SpO2:  [98 %-100 %] 98 %  BP: (110-169)/(60-81) 169/81     Weight: 76.5 kg (168 lb 10.4 oz)  Body mass index is 23.52 kg/m².    Physical Exam  Vitals signs and nursing note reviewed.   Constitutional:       Appearance: Normal appearance.   HENT:      Head: Normocephalic and atraumatic.      Nose: Nose normal.      Mouth/Throat:      Mouth: Mucous membranes are moist.   Eyes:      Conjunctiva/sclera: Conjunctivae normal.   Neck:      Musculoskeletal: Normal range of motion and neck supple.   Cardiovascular:      Rate and Rhythm: Bradycardia present.      Pulses: Normal pulses.   Pulmonary:      Effort: Pulmonary effort is normal.      Breath sounds: Normal breath sounds. No wheezing, rhonchi or rales.   Chest:      Chest wall: No tenderness.   Abdominal:      General: Bowel sounds are normal.      Palpations: Abdomen is soft.      Tenderness: There is no abdominal tenderness. There is no guarding or rebound.   Musculoskeletal:      Right lower leg: No edema.      Left lower leg: No edema.   Skin:     General: Skin is warm and dry.      Capillary Refill: Capillary refill takes less than 2 seconds.      Findings: Bruising (BUE) present.   Neurological:      Mental Status: He is alert and oriented to person, place, and time.   Psychiatric:         Mood and Affect: Mood normal.         Thought Content: Thought content normal.         Judgment: Judgment normal.             Significant Labs:   CBC:   Recent Labs   Lab 08/12/20 2346 08/13/20 0446   WBC 6.42 12.53   HGB 12.1* 12.6*   HCT 36.9* 37.2*    194     CMP:   Recent Labs   Lab 08/12/20 2346 08/13/20 0446    134*   K 4.1 3.8    101   CO2 26 25    95   BUN 31* 28*   CREATININE 1.7* 1.4   CALCIUM 9.0 9.0   PROT 7.1  --    ALBUMIN 3.4*  --    BILITOT 0.4  --    ALKPHOS 72  --     AST 54*  --    ALT 41  --    ANIONGAP 8 8   EGFRNONAA 38* 48*     Cardiac Markers:   Recent Labs   Lab 08/12/20  2346   *     All pertinent labs within the past 24 hours have been reviewed.    Significant Imaging: I have reviewed all pertinent imaging results/findings within the past 24 hours.

## 2020-08-13 NOTE — CONSULTS
[facility]  Cardiology  Consult Note    Patient Name: Benitez Cabrales  MRN: [id number]  Admission Date: 8/12/2020  Hospital Length of Stay: 0 days  Code Status: Full Code   Attending Provider: Jesus Blank MD   Consulting Provider: Deion Sorenson MD  Primary Care Physician: Diomedes Ayoub MD  Principal Problem:Syncope    Patient information was obtained from patient and ER records.     Consults  Subjective:     Chief Complaint:  Syncope, bradycardia     HPI: Benitez Cabrales 79 y.o. male.  PMHX: HTN,DM,COPD,CAD,CKD,CVA presents via EMS complaining of syncopal episode.  He reports x2 syncopal episodes on 08/12/20 resulting after straining to have a bowel movement approximately 2 hr PTA associated with neck pain,diaphoresis and lightheadedness. Additionally endorses worsening intermittent syncopal episode for past 3 months, primary doctor recently discontinue his amlodipine in being followed is followed by Dr. Ontiveros ([facility]).  He denies fever/chills,N/V/D,CP/palpitations,abd pain,dysuria or any other associated symptoms. Also denies Heavy ETOH/Smoking/Illicit drug use.    Denies CP or SOB  Per son patient recently had BP medications adjusted. BP very labile at home  EKG sinus bradycardia 52  1st degree AVB RBBB  Troponin negative     HR 50-60s on telemetry - no pauses seen    Echo 3/9/20  · Normal left ventricular systolic function. The estimated ejection fraction is 60%.  · Mild right ventricular enlargement.  · Normal right ventricular systolic function.  · Normal LV diastolic function.  · Severe biatrial enlargement.  · Mild-to-moderate mitral regurgitation.  · Mild tricuspid regurgitation.  · The estimated PA systolic pressure is 26 mmHg.  · Normal central venous pressure (3 mmHg).    [place] [facility] [place] and Dr Fuentes    Last saw Dr Morales 3/11/20  79 y.o. M  Remote dx of AF (~10 y ago; only Rx was beta blockade)  HTN on meds   HL on meds   RBBB, longstanding     Had  "event monitor placed for dizzy/palpitations. Turns out dizziness was really vertigo and balance issues -- no LH, no presync/sync.  He remains on amiodarone (normal PFT and TFT/LFT); CHADSVASC 3.  ILR with no events on several interrogations. We removed the ILR 12/2018.     echo 60%. severe LAE.  TSH OK. LFTs <2x ULN. PFT ok.     Janice Dr Fuentes 7/30/20  Benitez Cabrales presents for management  of hypertension and leg swelling. Benitez Cabrales has hypertension with BP in the A:M usually in the 120s and up to 140s/ in the afternoon. Periodically when he has a sharp pain in his neck and shoulders, his BP will fall into the 80s/ and he will feel " whoozy". Has more chronic pedal edema and is on Amlodipine. Wears support hose. Benitez Cabrales has dyslipidemia  on moderate intensity statin therapy.Benitez Cabrales has paroxysmal atrial fibrillation followed by EP.He does exercises in the A:M. Benitez Cabrales denies chest pain, shortness of breath, palpitations, presyncope , or syncope.  .    Past Medical History:   Diagnosis Date    Anticoagulant long-term use     Atrial fibrillation     COPD (chronic obstructive pulmonary disease)     GERD (gastroesophageal reflux disease)     Hyperlipidemia     Hypertension     Nuclear sclerosis, bilateral 10/9/2017    Rhinitis medicamentosa 6/30/2014    Small bowel obstruction     Vertigo 6/13/2013       Past Surgical History:   Procedure Laterality Date    ICM implant      NOSE SURGERY      Septal Repair    REMOVAL OF IMPLANTABLE LOOP RECORDER N/A 12/10/2018    Procedure: REMOVAL, IMPLANTABLE LOOP RECORDER;  Surgeon: Mickey Morales MD;  Location: [facility] EP LAB;  Service: Cardiology;  Laterality: N/A;    VASCULAR SURGERY      left leg       Review of patient's allergies indicates:  No Known Allergies    No current facility-administered medications on file prior to encounter.      Current Outpatient Medications on File Prior to Encounter   Medication Sig    amiodarone (PACERONE) 200 MG " Tab Take 1 tablet by mouth once daily (Patient not taking: Reported on 8/11/2020)    apixaban (ELIQUIS) 5 mg Tab Take 1 tablet (5 mg total) by mouth 2 (two) times daily.    chlorthalidone (HYGROTEN) 25 MG Tab 1/2 tablet daily    cholecalciferol, vitamin D3, (VITAMIN D3) 2,000 unit Cap Take 1 capsule by mouth once daily.    DIABETIC SUPPLIES,MISCELL (BD MAGNI-GUIDE SYRINGE MAGNIFI MISC) by Misc.(Non-Drug; Combo Route) route.    diclofenac sodium (VOLTAREN) 1 % Gel Apply 2 g topically 4 (four) times daily.    doxepin (SINEQUAN) 10 MG capsule 1-2 HS prn itching    fish oil-omega-3 fatty acids 300-1,000 mg capsule Take 2 g by mouth once daily.      HYDROcodone-acetaminophen (NORCO) 7.5-325 mg per tablet Take 1 tablet by mouth every 6 (six) hours as needed for Pain.    magnesium 200 mg Tab Take by mouth once daily.    meclizine (ANTIVERT) 25 mg tablet Half - one pill at least HS for vertigo    metoprolol succinate (TOPROL-XL) 25 MG 24 hr tablet Take 1 tablet by mouth once daily    MV-MN/FA/LYCOPENE/LUT/HB#178 (NEHEMIAH MULTIVITAMIN FOR MEN ORAL) Take 1 tablet by mouth once daily.      pantoprazole (PROTONIX) 40 MG tablet Take 1 tablet by mouth once daily    rosuvastatin (CRESTOR) 10 MG tablet TAKE 1 TABLET BY MOUTH ONCE DAILY IN THE EVENING    tiZANidine (ZANAFLEX) 4 MG tablet TAKE 1 TO 2 TABLETS BY MOUTH AT BEDTIME AS NEEDED FOR  SPASMS    valsartan (DIOVAN) 320 MG tablet Take one each evening     Family History     Problem Relation (Age of Onset)    Cancer Maternal Aunt    No Known Problems Mother, Father, Sister, Brother, Maternal Uncle, Paternal Aunt, Paternal Uncle, Maternal Grandmother, Maternal Grandfather, Paternal Grandmother, Paternal Grandfather        Tobacco Use    Smoking status: Never Smoker    Smokeless tobacco: Never Used    Tobacco comment: .  Three kids.  Occup:   at [employer].     Substance and Sexual Activity    Alcohol use: Yes     Alcohol/week: 1.0 standard drinks      Types: 1 Glasses of wine per week     Frequency: 2-3 times a week     Drinks per session: 1 or 2     Binge frequency: Never     Comment: occasional    Drug use: No    Sexual activity: Not Currently     Review of Systems   Constitution: Negative for decreased appetite.   HENT: Negative for ear discharge.    Eyes: Negative for blurred vision.   Endocrine: Negative for polyphagia.   Skin: Negative for nail changes.   Genitourinary: Negative for bladder incontinence.   Neurological: Negative for aphonia.   Psychiatric/Behavioral: Negative for hallucinations.   Allergic/Immunologic: Negative for hives.     Objective:     Vital Signs (Most Recent):  Temp: 97.5 °F (36.4 °C) (08/13/20 0744)  Pulse: (!) 55 (08/13/20 1117)  Resp: 17 (08/13/20 1117)  BP: (!) 175/75 (08/13/20 1117)  SpO2: 98 % (08/13/20 1117) Vital Signs (24h Range):  Temp:  [97.5 °F (36.4 °C)-98.3 °F (36.8 °C)] 97.5 °F (36.4 °C)  Pulse:  [46-66] 55  Resp:  [10-26] 17  SpO2:  [96 %-100 %] 98 %  BP: (110-175)/(60-81) 175/75     Weight: 76.2 kg (168 lb)  Body mass index is 23.43 kg/m².    SpO2: 98 %  O2 Device (Oxygen Therapy): room air    No intake or output data in the 24 hours ending 08/13/20 1211    Lines/Drains/Airways     Peripheral Intravenous Line                 Peripheral IV - Single Lumen 08/12/20 2315 20 G Left Forearm less than 1 day                Physical Exam   Constitutional: He is oriented to person, place, and time. He appears well-developed and well-nourished.   HENT:   Head: Normocephalic and atraumatic.   Eyes: Pupils are equal, round, and reactive to light. Conjunctivae are normal.   Neck: Normal range of motion. Neck supple.   Cardiovascular: Normal rate, normal heart sounds and intact distal pulses.   Pulmonary/Chest: Effort normal and breath sounds normal.   Abdominal: Soft. Bowel sounds are normal.   Musculoskeletal: Normal range of motion.   Neurological: He is alert and oriented to person, place, and time.   Skin: Skin is warm  and dry.       Significant Labs: All pertinent lab results from the last 24 hours have been reviewed.    Significant Imaging: Echocardiogram:   2D echo with color flow doppler:   Results for orders placed or performed during the hospital encounter of 03/23/18   2D echo with color flow doppler   Result Value Ref Range    QEF 55 55 - 65    Mitral Valve Regurgitation MILD     Diastolic Dysfunction No     Aortic Valve Regurgitation TRIVIAL     Est. PA Systolic Pressure 24.53     Tricuspid Valve Regurgitation TRIVIAL     Narrative    Date of Procedure: 03/23/2018        TEST DESCRIPTION   Technical Quality: This is a technically adequate study.     Aorta: The aortic root is normal in size, measuring 2.9 cm at sinotubular junction and 3.6 cm at Sinuses of Valsalva. The proximal ascending aorta is normal in size, measuring 3.4 cm across.     Left Atrium: The left atrial volume index is severely enlarged, measuring 50.37 cc/m2.     Left Ventricle: The left ventricle is normal in size, with an end-diastolic diameter of 5.0 cm, and an end-systolic diameter of 3.4 cm. LV wall thickness is normal, with the septum measuring 0.6 cm and the posterior wall measuring 0.7 cm across. Relative   wall thickness was normal at 0.28, and the LV mass index was 56.8 g/m2 consistent with normal left ventricular mass. There are no regional wall motion abnormalities. Left ventricular systolic function appears normal. Visually estimated ejection fraction   is 55-60%. The LV Doppler derived stroke volume equals 67.0 ccs.     Diastolic indices: E wave velocity 0.6 m/s, E/A ratio 1.2,  msec., E/e' ratio(avg) 6. Diastolic function is normal.     Right Atrium: The right atrium is enlarged, measuring 5.7 cm in length and 4.6 cm in width in the apical view.     Right Ventricle: The right ventricle is normal in size measuring 3.5 cm at the base in the apical right ventricle-focused view. Global right ventricular systolic function appears normal.  Tricuspid annular plane systolic excursion (TAPSE) is 2.5 cm.   Tissue Doppler-derived tricuspid annular peak systolic velocity (S prime) is 11.4 cm/s. The estimated PA systolic pressure is 25 mmHg.     Aortic Valve:  The aortic valve is mildly sclerotic with normal leaflet mobility. The aortic valve is tri-leaflet in structure. Additionally, there is trivial aortic regurgitation.     Mitral Valve:  There is mild mitral regurgitation. Mitral valve is normal in structure with normal leaflet mobility.     Tricuspid Valve:  There is trivial tricuspid regurgitation. Tricuspid valve is normal in structure with normal leaflet mobility.     Pulmonary Valve:  The pulmonic valve is not well seen.     IVC: IVC is normal in size and collapses > 50% with a sniff, suggesting normal right atrial pressure of 3 mmHg.     Intracavitary: There is no evidence of pericardial effusion, intracavity mass, thrombi, or vegetation.         CONCLUSIONS     1 - Normal left ventricular systolic function (EF 55-60%).     2 - Normal right ventricular systolic function .     3 - Normal left ventricular diastolic function.     4 - Biatrial enlargement.     5 - Mild mitral regurgitation.     6 - The estimated PA systolic pressure is 25 mmHg.             This document has been electronically    SIGNED BY: Carlos Barahona MD On: 03/23/2018 10:47     Assessment and Plan:     * Syncope  Suspect vasovagal. Will need to allow resting HTN give labile BP. Mild resting bradycardia - stable - doubt this is the etiology for his syncope    AF (atrial fibrillation)  Remote Hx of PAF on amiodarone. Mild sinus bradycardia - not new. Rate lowering medications on hold for now. No urgent indication for PPM. Had ILR in the past that was unrevealing and removed 12/2018. On eliquis. Repeat echo    Hyperlipidemia  On statin    Hypertension  Labile. Placed back on norvasc. Monitor        VTE Risk Mitigation (From admission, onward)         Ordered     apixaban tablet 5 mg   2 times daily      08/13/20 0244     IP VTE HIGH RISK PATIENT  Once      08/13/20 0237     Place sequential compression device  Until discontinued      08/13/20 0237                Thank you for your consult. I will follow-up with patient. Please contact us if you have any additional questions.    Deion Sorenson MD  Cardiology   [facility]

## 2020-08-13 NOTE — ED NOTES
Patient transport here to transfer patient to floor.  Patient A&O x 4 with GCS 15.  Stable at time of transfer.

## 2020-08-13 NOTE — PLAN OF CARE
Problem: Fall Injury Risk  Goal: Absence of Fall and Fall-Related Injury  Outcome: Ongoing, Progressing  Intervention: Identify and Manage Contributors to Fall Injury Risk  Flowsheets (Taken 8/13/2020 1842)  Self-Care Promotion:   BADL personal objects within reach   safe use of adaptive equipment encouraged  Medication Review/Management:   medications reviewed   high risk medications identified  Intervention: Promote Injury-Free Environment  Flowsheets (Taken 8/13/2020 1842)  Safety Promotion/Fall Prevention:   nonskid shoes/socks when out of bed   assistive device/personal item within reach   bed alarm set   medications reviewed   Fall Risk reviewed with patient/family   /camera at bedside

## 2020-08-13 NOTE — ED NOTES
Attempted to call in report to floor, but nurse accepting report in with another patient and will call back for report.

## 2020-08-13 NOTE — ASSESSMENT & PLAN NOTE
No current available laboratory values.  -Lipid panel  -Continue home Rosuvastatin.  -Cardiac/Low cholesterol diet.

## 2020-08-13 NOTE — NURSING
Note that patient, in for syncope, up out of bed without use of call light despite being asked to call staff for assistance.    Placed A[employer]Sys for patient safety.    Will continue to f/u.

## 2020-08-13 NOTE — HPI
Benitez Cabrales 79 y.o. male.  PMHX: HTN,DM,COPD,CAD,CKD,CVA presents via EMS complaining of syncopal episode.  He reports x2 syncopal episodes on 08/12/20 resulting after straining to have a bowel movement approximately 2 hr PTA associated with neck pain,diaphoresis and lightheadedness. Additionally endorses worsening intermittent syncopal episode for past 3 months, primary doctor recently discontinue his amlodipine in being followed is followed by Dr. Ontiveros ([facility]).  He denies fever/chills,N/V/D,CP/palpitations,abd pain,dysuria or any other associated symptoms. Also denies Heavy ETOH/Smoking/Illicit drug use.    Placed in observation to hospital medicine for further evaluation and treatment.

## 2020-08-13 NOTE — CARE UPDATE
Patient has bene seen and examainated,patient is admitted for presyncope,likely vasovagal,and bradycardia,he is still bradycardic,will DD BB and monitor,cardiology is consulted,will have Echo.

## 2020-08-13 NOTE — ED PROVIDER NOTES
Encounter Date: 8/12/2020    SCRIBE #1 NOTE: I, Beth Collins, am scribing for, and in the presence of,  Aguilar Gil MD. I have scribed the entire note.       History     Chief Complaint   Patient presents with    Loss of Consciousness     Arrived via EMS with c/o syncopal episode after having bowel movement. Pt c/o L flank pain and back pain. Per EMS, initial HR 44. Given 0.5 mg atropine with HR increase to 56.     This is a 78 y/o male with a PMHx of Hypertension, Hyperlipidemia, Afib, and COPD. He presents to the ED via EMS after a near syncopal episode that happened 2 hours PTA.  Symptoms occurred while he was sitting on the toilet straining have a bowel movement.  He admits to having abdominal cramping prior to this.  This was his second near syncopal episode today.  The 1st episode was not associated with a bowel movement or abdominal cramping.  Patient had a syncopal episode 3 days ago resulting in a fall.  Patient was taking a shower when he felt pain in his neck. He got light-headed, dizzy, diaphoretic, and fell on his left side.  Patient is complaining of left sided flank/back pain. He is unable to recall if he hit his head. The patient reports these episodes have been happening intermittently for the past 3 months and are getting worse. He has seen primary care and Cardiology who recommended stopping his Amlodipine possible orthostasis cause of his near syncopal episodes. Symptoms have continued since stopping the medication. Sometimes the patient is able to notice the symptoms prior to losing consciousness, and symptoms are then alleviated by sitting down and drinking water. He denies any chest pain, palpitations, abdominal pain, tingling, numbness, coughing, dysuria, hematuria, n/v/d, or hematochezia. Patient is not experiencing any of the symptoms currently. NKDA.  He is currently pain-free.  He feels back to baseline.  EMS reports the patient's heart rate was in the 40s.  He was given  atropine with improvement heart rate in the 50s.    The history is provided by the patient and the EMS personnel.     Review of patient's allergies indicates:  No Known Allergies  Past Medical History:   Diagnosis Date    Anticoagulant long-term use     Atrial fibrillation     COPD (chronic obstructive pulmonary disease)     GERD (gastroesophageal reflux disease)     Hyperlipidemia     Hypertension     Nuclear sclerosis, bilateral 10/9/2017    Rhinitis medicamentosa 6/30/2014    Small bowel obstruction     Vertigo 6/13/2013     Past Surgical History:   Procedure Laterality Date    ICM implant      NOSE SURGERY      Septal Repair    REMOVAL OF IMPLANTABLE LOOP RECORDER N/A 12/10/2018    Procedure: REMOVAL, IMPLANTABLE LOOP RECORDER;  Surgeon: Mickey Morales MD;  Location: [facility] EP LAB;  Service: Cardiology;  Laterality: N/A;    VASCULAR SURGERY      left leg     Family History   Problem Relation Age of Onset    Cancer Maternal Aunt     No Known Problems Mother     No Known Problems Father     No Known Problems Sister     No Known Problems Brother     No Known Problems Maternal Uncle     No Known Problems Paternal Aunt     No Known Problems Paternal Uncle     No Known Problems Maternal Grandmother     No Known Problems Maternal Grandfather     No Known Problems Paternal Grandmother     No Known Problems Paternal Grandfather     Asthma Neg Hx     Emphysema Neg Hx     Amblyopia Neg Hx     Blindness Neg Hx     Cataracts Neg Hx     Diabetes Neg Hx     Glaucoma Neg Hx     Hypertension Neg Hx     Macular degeneration Neg Hx     Retinal detachment Neg Hx     Strabismus Neg Hx     Stroke Neg Hx     Thyroid disease Neg Hx      Social History     Tobacco Use    Smoking status: Never Smoker    Smokeless tobacco: Never Used    Tobacco comment: .  Three kids.  Occup:   at [employer].     Substance Use Topics    Alcohol use: Yes     Alcohol/week: 1.0 standard drinks      Types: 1 Glasses of wine per week     Frequency: 2-3 times a week     Drinks per session: 1 or 2     Binge frequency: Never     Comment: occasional    Drug use: No     Review of Systems   Constitutional: Positive for diaphoresis. Negative for chills, fatigue and fever.   HENT: Negative for congestion, sinus pain and sore throat.    Respiratory: Negative for cough, chest tightness and shortness of breath.    Cardiovascular: Positive for chest pain. Negative for palpitations and leg swelling.   Gastrointestinal: Positive for constipation. Negative for abdominal pain, blood in stool, diarrhea, nausea and vomiting.   Genitourinary: Positive for flank pain. Negative for dysuria, frequency and hematuria.   Musculoskeletal: Positive for back pain, neck pain and neck stiffness. Negative for joint swelling.   Neurological: Positive for dizziness, syncope and light-headedness. Negative for facial asymmetry, speech difficulty, weakness, numbness and headaches.   Psychiatric/Behavioral: Negative for confusion.       Physical Exam     Initial Vitals [08/12/20 2314]   BP Pulse Resp Temp SpO2   110/60 (!) 56 18 97.5 °F (36.4 °C) 99 %      MAP       --         Physical Exam    Nursing note and vitals reviewed.  Constitutional: He appears well-developed and well-nourished. He is not diaphoretic. No distress.   HENT:   Head: Normocephalic and atraumatic.   Right Ear: External ear normal.   Left Ear: External ear normal.   Nose: Nose normal.   Mouth/Throat: Oropharynx is clear and moist.   Eyes: Conjunctivae and EOM are normal. Pupils are equal, round, and reactive to light. Right eye exhibits no discharge. Left eye exhibits no discharge. No scleral icterus.   Neck: Neck supple. No tracheal deviation present. No JVD present.   No cervical spine tenderness.   Cardiovascular: Regular rhythm and normal heart sounds.   No murmur heard.  Bradycardic   Pulmonary/Chest: Breath sounds normal. No stridor. No respiratory distress. He has no  wheezes. He exhibits tenderness (Left anterior lateral lower chest wall.).   Tenderness to left ribcage   Abdominal: Soft. Bowel sounds are normal. He exhibits no distension. There is abdominal tenderness in the left upper quadrant. There is guarding. There is no rebound.   Musculoskeletal: Normal range of motion. No tenderness or edema.      Comments: No musculoskeletal deformities or joint effusions.  No pain with range of motion bilateral upper and lower extremities.  No thoracic or lumbar spine tenderness.   Neurological: He is alert and oriented to person, place, and time. He has normal strength. No cranial nerve deficit or sensory deficit. GCS score is 15. GCS eye subscore is 4. GCS verbal subscore is 5. GCS motor subscore is 6.   Skin: Skin is warm and dry. Bruising (Left flank) noted. No rash noted. No pallor.   Psychiatric: He has a normal mood and affect. His behavior is normal. Judgment and thought content normal.         ED Course   Procedures  Labs Reviewed   CBC W/ AUTO DIFFERENTIAL - Abnormal; Notable for the following components:       Result Value    RBC 4.00 (*)     Hemoglobin 12.1 (*)     Hematocrit 36.9 (*)     Lymph # 0.7 (*)     Gran% 75.6 (*)     Lymph% 10.9 (*)     All other components within normal limits   COMPREHENSIVE METABOLIC PANEL - Abnormal; Notable for the following components:    BUN, Bld 31 (*)     Creatinine 1.7 (*)     Albumin 3.4 (*)     AST 54 (*)     eGFR if  44 (*)     eGFR if non  38 (*)     All other components within normal limits   B-TYPE NATRIURETIC PEPTIDE - Abnormal; Notable for the following components:     (*)     All other components within normal limits   TROPONIN I   MAGNESIUM   URINALYSIS, REFLEX TO URINE CULTURE   SARS-COV-2 RNA AMPLIFICATION, QUAL     EKG Readings: (Independently Interpreted)   Initial Reading: No STEMI. Rhythm: Normal Sinus Rhythm. Heart Rate: 52. Ectopy: No Ectopy. Conduction: 1st Degree AV Block. ST  Segments: Non-Specific ST Segment Depression. T Waves: Normal. Axis: Normal. Other Findings: Prolonged QT Interval.   First-degree AV block right bundle-branch block.  No significant changes from 03/11/2020.  No acute ischemic changes.       Imaging Results          CT Abdomen Pelvis With Contrast (Final result)  Result time 08/13/20 01:37:30    Final result by Perico Rosas MD (08/13/20 01:37:30)                 Impression:      No acute intra-abdominal abnormalities identified.    Additional findings as detailed above.      Electronically signed by: Perico Rosas MD  Date:    08/13/2020  Time:    01:37             Narrative:    EXAMINATION:  CT ABDOMEN PELVIS WITH CONTRAST    CLINICAL HISTORY:  Abdominal trauma, blunt;LUQ abdominal pain;    TECHNIQUE:  Low dose axial images, sagittal and coronal reformations were obtained from the lung bases to the pubic symphysis following the IV administration of 85 mL of Omnipaque 350 .  Oral contrast was not given.    COMPARISON:  CT abdomen and pelvis from November 2016.    FINDINGS:  The visualized portion of the heart is unremarkable.  There is mild bibasilar atelectasis or scarring.    No significant hepatic abnormalities are identified.  There is no intra-or extrahepatic biliary ductal dilatation.  The gallbladder is unremarkable.  The stomach, pancreas, spleen, and adrenal glands are unremarkable.    Kidneys enhance normally with no evidence of hydronephrosis.  Small nonobstructing left renal stone is seen.  There is a small right renal cyst.  No significant abnormalities are seen along the ureteral courses.  Urinary bladder and prostate are unremarkable.    Appendix is visualized and is unremarkable.  The visualized loops of small and large bowel show no evidence of obstruction or inflammation.  There is sigmoid diverticulosis.  No free air or free fluid.    Aorta tapers normally with mild atherosclerosis.    No acute osseous abnormality identified. Subcutaneous  soft tissue show no significant abnormalities.                               X-Ray Ribs 2 View Left (Final result)  Result time 08/13/20 00:31:39    Final result by Perico Rosas MD (08/13/20 00:31:39)                 Impression:      Suspected acute nondisplaced left lateral 9th and 10th rib fractures.      Electronically signed by: Perico Rosas MD  Date:    08/13/2020  Time:    00:31             Narrative:    EXAMINATION:  XR RIBS 2 VIEW LEFT    CLINICAL HISTORY:  Pleurodynia    TECHNIQUE:  Two views of the left ribs were performed.    COMPARISON:  08/11/2020.    FINDINGS:  Suspected acute nondisplaced fractures are seen involving the left lateral 9th and 10th ribs.  No additional acute displaced fractures or identified.  There is minimal left basilar atelectasis or scarring.  Left lung is otherwise clear.                                 Medical Decision Making:   Initial Assessment:   This is a 78 y/o male with a PMHx of Hypertension, Hyperlipidemia, Afib, and COPD. He presents to the ED via EMS after a syncopal episode that happened 2 hours PTA.  He has had recurrent near syncopal episodes associated with posterior neck pain over the last 3 months.  His amlodipine was stopped with no improvement.  Concerned this was related to cardiac arrhythmia or possible anginal equivalent.  Will do cardiac workup.  Plan to admit for further evaluation.  Patient had echocardiogram in March that showed normal ejection fraction and valve function.  Labs, EKG, and Abdominal CT will be ordered. Patient will be treated for symptoms and reevaluated.   Differential Diagnosis:   Cardiac arrhythmia, acute coronary syndrome, dehydration, renal failure, vasovagal episode, medication side effect  Independently Interpreted Test(s):   I have ordered and independently interpreted X-rays - see prior notes.  I have ordered and independently interpreted EKG Reading(s) - see prior notes  Clinical Tests:   Lab Tests: Ordered and  Reviewed  Radiological Study: Ordered and Reviewed  Medical Tests: Ordered and Reviewed  ED Management:  0145:  Troponin normal.  EKG shows bradycardia with first-degree AV block and right bundle-branch block.  This is unchanged from previous.  No ischemic changes.  No change in renal function.  Electrolytes are unremarkable.  No changes CBC.  Chest x-ray is unremarkable except for to left-sided rib fractures that explain his persistent discomfort.  CT abdomen/pelvis negative for intra-abdominal injury..  Patient is currently asymptomatic.  I am concerned that he is having severe bradycardia for possible heart block causing his recurrent pain in near-syncope.  Will admit for observation for cardiology consult further evaluation.  Will perform serial cardiac enzymes.  Heart score is 5.    Additional MDM:   Heart Score:    History:          Moderately suspicious.  ECG:             Nonspecific repolarisation disturbance  Age:               >65 years  Risk factors: 1-2 risk factors  Troponin:       Less than or equal to normal limit  Final Score: 5             Scribe Attestation:   Scribe #1: I performed the above scribed service and the documentation accurately describes the services I performed. I attest to the accuracy of the note.                          Clinical Impression:       ICD-10-CM ICD-9-CM   1. Closed fracture of multiple ribs of left side, initial encounter  S22.42XA 807.09   2. Syncope  R55 780.2   3. Bradycardia  R00.1 427.89   4. Fall, initial encounter  W19.XXXA E888.9         Disposition:   Disposition: Placed in Observation  Condition: Stable     ED Disposition Condition    Observation        I, Aguilar Gil MD, personally performed the services described in this documentation. All medical record entries made by the scribe were at my direction and in my presence.  I have reviewed the chart and agree that the record reflects my personal performance and is accurate and complete.                       Aguilar Gil MD  08/13/20 0152

## 2020-08-13 NOTE — ASSESSMENT & PLAN NOTE
Creatinine slightly bumped from patients baseline.  -IVF's  -I&0's  -BMP  -Avoid Nephrotoxins.

## 2020-08-13 NOTE — ED NOTES
2nd attempt at calling patient report to floor, but nurse receiving patient in another patient's room.

## 2020-08-13 NOTE — PLAN OF CARE
08/13/20 1008   Discharge Assessment   Assessment Type Discharge Planning Assessment   Confirmed/corrected address and phone number on facesheet? Yes   Assessment information obtained from? Caregiver  (Pt out of room for procedure. Assessment completed with pt's son, Benitez )   Communicated expected length of stay with patient/caregiver yes   Prior to hospitilization cognitive status: Alert/Oriented   Prior to hospitalization functional status: Independent   Current cognitive status: Alert/Oriented   Current Functional Status: Independent   Facility Arrived From: Home   Lives With spouse   Able to Return to Prior Arrangements yes   Is patient able to care for self after discharge? Yes   Who are your caregiver(s) and their phone number(s)? Nae, pt's spouse,  [phone]   Patient's perception of discharge disposition home or selfcare   Readmission Within the Last 30 Days no previous admission in last 30 days   Patient currently being followed by outpatient case management? No   Patient currently receives any other outside agency services? No   Equipment Currently Used at Home none   Do you have any problems affording any of your prescribed medications? No   Is the patient taking medications as prescribed? yes   Does the patient have transportation home? Yes   Transportation Anticipated family or friend will provide   Dialysis Name and Scheduled days N/A   Does the patient receive services at the Coumadin Clinic? No  (Pt takes Eliquis.)   Discharge Plan A Home with family   Discharge Plan B   (TBD.)   DME Needed Upon Discharge    (TBD)   Patient/Family in Agreement with Plan yes     Assessment completed with pt's son via phone. Pt out of room for a procedure. According to Benitez Sierra, pt lives with spouse, independent, and spouse helps at home. Benitez confirmed pt does not have any medical equipment at home and was fairly independent.  PRAVIN confirmed, address, PCP, and pharmacy listed in chart.       [facility] - [place] - [address]  [address]  [place] [address]  Phone: [phone] Fax: [phone]    [facility] - [place] - [address]  [address]  [place] [address]  Phone: [phone] Fax: [phone]     Diomedes Ayoub MD

## 2020-08-13 NOTE — ASSESSMENT & PLAN NOTE
Remote Hx of PAF on amiodarone. Mild sinus bradycardia - not new. Rate lowering medications on hold for now. No urgent indication for PPM. Had ILR in the past that was unrevealing and removed 12/2018. On eliquis. Repeat echo

## 2020-08-13 NOTE — ASSESSMENT & PLAN NOTE
Systolic blood pressure 110-169.  -Telemetry  -Vitals  -BMP  -Continue home medication treatment regimen.

## 2020-08-13 NOTE — SUBJECTIVE & OBJECTIVE
Past Medical History:   Diagnosis Date    Anticoagulant long-term use     Atrial fibrillation     COPD (chronic obstructive pulmonary disease)     GERD (gastroesophageal reflux disease)     Hyperlipidemia     Hypertension     Nuclear sclerosis, bilateral 10/9/2017    Rhinitis medicamentosa 6/30/2014    Small bowel obstruction     Vertigo 6/13/2013       Past Surgical History:   Procedure Laterality Date    ICM implant      NOSE SURGERY      Septal Repair    REMOVAL OF IMPLANTABLE LOOP RECORDER N/A 12/10/2018    Procedure: REMOVAL, IMPLANTABLE LOOP RECORDER;  Surgeon: Mickey Morales MD;  Location: [facility];  Service: Cardiology;  Laterality: N/A;    VASCULAR SURGERY      left leg       Review of patient's allergies indicates:  No Known Allergies    No current facility-administered medications on file prior to encounter.      Current Outpatient Medications on File Prior to Encounter   Medication Sig    amiodarone (PACERONE) 200 MG Tab Take 1 tablet by mouth once daily (Patient not taking: Reported on 8/11/2020)    apixaban (ELIQUIS) 5 mg Tab Take 1 tablet (5 mg total) by mouth 2 (two) times daily.    chlorthalidone (HYGROTEN) 25 MG Tab 1/2 tablet daily    cholecalciferol, vitamin D3, (VITAMIN D3) 2,000 unit Cap Take 1 capsule by mouth once daily.    DIABETIC SUPPLIES,MISCELL (BD MAGNI-GUIDE SYRINGE MAGNIFI MISC) by Misc.(Non-Drug; Combo Route) route.    diclofenac sodium (VOLTAREN) 1 % Gel Apply 2 g topically 4 (four) times daily.    doxepin (SINEQUAN) 10 MG capsule 1-2 HS prn itching    fish oil-omega-3 fatty acids 300-1,000 mg capsule Take 2 g by mouth once daily.      HYDROcodone-acetaminophen (NORCO) 7.5-325 mg per tablet Take 1 tablet by mouth every 6 (six) hours as needed for Pain.    magnesium 200 mg Tab Take by mouth once daily.    meclizine (ANTIVERT) 25 mg tablet Half - one pill at least HS for vertigo    metoprolol succinate (TOPROL-XL) 25 MG 24 hr tablet Take 1 tablet by  mouth once daily    MV-MN/FA/LYCOPENE/LUT/HB#178 (NEHEMIAH MULTIVITAMIN FOR MEN ORAL) Take 1 tablet by mouth once daily.      pantoprazole (PROTONIX) 40 MG tablet Take 1 tablet by mouth once daily    rosuvastatin (CRESTOR) 10 MG tablet TAKE 1 TABLET BY MOUTH ONCE DAILY IN THE EVENING    tiZANidine (ZANAFLEX) 4 MG tablet TAKE 1 TO 2 TABLETS BY MOUTH AT BEDTIME AS NEEDED FOR  SPASMS    valsartan (DIOVAN) 320 MG tablet Take one each evening     Family History     Problem Relation (Age of Onset)    Cancer Maternal Aunt    No Known Problems Mother, Father, Sister, Brother, Maternal Uncle, Paternal Aunt, Paternal Uncle, Maternal Grandmother, Maternal Grandfather, Paternal Grandmother, Paternal Grandfather        Tobacco Use    Smoking status: Never Smoker    Smokeless tobacco: Never Used    Tobacco comment: .  Three kids.  Occup:   at [employer].     Substance and Sexual Activity    Alcohol use: Yes     Alcohol/week: 1.0 standard drinks     Types: 1 Glasses of wine per week     Frequency: 2-3 times a week     Drinks per session: 1 or 2     Binge frequency: Never     Comment: occasional    Drug use: No    Sexual activity: Not Currently     Review of Systems   Constitution: Negative for decreased appetite.   HENT: Negative for ear discharge.    Eyes: Negative for blurred vision.   Endocrine: Negative for polyphagia.   Skin: Negative for nail changes.   Genitourinary: Negative for bladder incontinence.   Neurological: Negative for aphonia.   Psychiatric/Behavioral: Negative for hallucinations.   Allergic/Immunologic: Negative for hives.     Objective:     Vital Signs (Most Recent):  Temp: 97.5 °F (36.4 °C) (08/13/20 0744)  Pulse: (!) 55 (08/13/20 1117)  Resp: 17 (08/13/20 1117)  BP: (!) 175/75 (08/13/20 1117)  SpO2: 98 % (08/13/20 1117) Vital Signs (24h Range):  Temp:  [97.5 °F (36.4 °C)-98.3 °F (36.8 °C)] 97.5 °F (36.4 °C)  Pulse:  [46-66] 55  Resp:  [10-26] 17  SpO2:  [96 %-100 %] 98 %  BP:  (110-175)/(60-81) 175/75     Weight: 76.2 kg (168 lb)  Body mass index is 23.43 kg/m².    SpO2: 98 %  O2 Device (Oxygen Therapy): room air    No intake or output data in the 24 hours ending 08/13/20 1211    Lines/Drains/Airways     Peripheral Intravenous Line                 Peripheral IV - Single Lumen 08/12/20 2315 20 G Left Forearm less than 1 day                Physical Exam   Constitutional: He is oriented to person, place, and time. He appears well-developed and well-nourished.   HENT:   Head: Normocephalic and atraumatic.   Eyes: Pupils are equal, round, and reactive to light. Conjunctivae are normal.   Neck: Normal range of motion. Neck supple.   Cardiovascular: Normal rate, normal heart sounds and intact distal pulses.   Pulmonary/Chest: Effort normal and breath sounds normal.   Abdominal: Soft. Bowel sounds are normal.   Musculoskeletal: Normal range of motion.   Neurological: He is alert and oriented to person, place, and time.   Skin: Skin is warm and dry.       Significant Labs: All pertinent lab results from the last 24 hours have been reviewed.    Significant Imaging: Echocardiogram:   2D echo with color flow doppler:   Results for orders placed or performed during the hospital encounter of 03/23/18   2D echo with color flow doppler   Result Value Ref Range    QEF 55 55 - 65    Mitral Valve Regurgitation MILD     Diastolic Dysfunction No     Aortic Valve Regurgitation TRIVIAL     Est. PA Systolic Pressure 24.53     Tricuspid Valve Regurgitation TRIVIAL     Narrative    Date of Procedure: 03/23/2018        TEST DESCRIPTION   Technical Quality: This is a technically adequate study.     Aorta: The aortic root is normal in size, measuring 2.9 cm at sinotubular junction and 3.6 cm at Sinuses of Valsalva. The proximal ascending aorta is normal in size, measuring 3.4 cm across.     Left Atrium: The left atrial volume index is severely enlarged, measuring 50.37 cc/m2.     Left Ventricle: The left ventricle is  normal in size, with an end-diastolic diameter of 5.0 cm, and an end-systolic diameter of 3.4 cm. LV wall thickness is normal, with the septum measuring 0.6 cm and the posterior wall measuring 0.7 cm across. Relative   wall thickness was normal at 0.28, and the LV mass index was 56.8 g/m2 consistent with normal left ventricular mass. There are no regional wall motion abnormalities. Left ventricular systolic function appears normal. Visually estimated ejection fraction   is 55-60%. The LV Doppler derived stroke volume equals 67.0 ccs.     Diastolic indices: E wave velocity 0.6 m/s, E/A ratio 1.2,  msec., E/e' ratio(avg) 6. Diastolic function is normal.     Right Atrium: The right atrium is enlarged, measuring 5.7 cm in length and 4.6 cm in width in the apical view.     Right Ventricle: The right ventricle is normal in size measuring 3.5 cm at the base in the apical right ventricle-focused view. Global right ventricular systolic function appears normal. Tricuspid annular plane systolic excursion (TAPSE) is 2.5 cm.   Tissue Doppler-derived tricuspid annular peak systolic velocity (S prime) is 11.4 cm/s. The estimated PA systolic pressure is 25 mmHg.     Aortic Valve:  The aortic valve is mildly sclerotic with normal leaflet mobility. The aortic valve is tri-leaflet in structure. Additionally, there is trivial aortic regurgitation.     Mitral Valve:  There is mild mitral regurgitation. Mitral valve is normal in structure with normal leaflet mobility.     Tricuspid Valve:  There is trivial tricuspid regurgitation. Tricuspid valve is normal in structure with normal leaflet mobility.     Pulmonary Valve:  The pulmonic valve is not well seen.     IVC: IVC is normal in size and collapses > 50% with a sniff, suggesting normal right atrial pressure of 3 mmHg.     Intracavitary: There is no evidence of pericardial effusion, intracavity mass, thrombi, or vegetation.         CONCLUSIONS     1 - Normal left ventricular  systolic function (EF 55-60%).     2 - Normal right ventricular systolic function .     3 - Normal left ventricular diastolic function.     4 - Biatrial enlargement.     5 - Mild mitral regurgitation.     6 - The estimated PA systolic pressure is 25 mmHg.             This document has been electronically    SIGNED BY: Carlos Barahona MD On: 03/23/2018 10:47

## 2020-08-13 NOTE — NURSING
Pt arrive to the unit by transport.  Assisted pt to bed. Tele monitoring initiated.  Admit assessment initiated.  Pt is AAO.  NO distress noted.

## 2020-08-13 NOTE — ASSESSMENT & PLAN NOTE
Suspected 2/2 Arrhythmia or carotid insufficiency or somatization disorder or CNS disorder.No evidence of acute ischemia on EKG,physical assessment non-focal, no sign of infectious process,negative urinalysis,no leukocytosis, CXR without infectious process, no substance use or recent medication changes. Initial troponin 1 level negative.  -Telemetry  -Serial cardiac enzymes  -2D Echo  -Cardiology consult; appreciate recs.  Results for orders placed during the hospital encounter of 03/09/20   Echo Color Flow Doppler? Yes    Narrative · Normal left ventricular systolic function. The estimated ejection   fraction is 60%.  · Mild right ventricular enlargement.  · Normal right ventricular systolic function.  · Normal LV diastolic function.  · Severe biatrial enlargement.  · Mild-to-moderate mitral regurgitation.  · Mild tricuspid regurgitation.  · The estimated PA systolic pressure is 26 mmHg.  · Normal central venous pressure (3 mmHg).

## 2020-08-13 NOTE — HPI
HPI: Benitez Cabrales 79 y.o. male.  PMHX: HTN,DM,COPD,CAD,CKD,CVA presents via EMS complaining of syncopal episode.  He reports x2 syncopal episodes on 08/12/20 resulting after straining to have a bowel movement approximately 2 hr PTA associated with neck pain,diaphoresis and lightheadedness. Additionally endorses worsening intermittent syncopal episode for past 3 months, primary doctor recently discontinue his amlodipine in being followed is followed by Dr. Ontiveros ([facility]).  He denies fever/chills,N/V/D,CP/palpitations,abd pain,dysuria or any other associated symptoms. Also denies Heavy ETOH/Smoking/Illicit drug use.    Denies CP or SOB  Per son patient recently had BP medications adjusted. BP very labile at home  EKG sinus bradycardia 52  1st degree AVB RBBB  Troponin negative     HR 50-60s on telemetry - no pauses seen    Echo 3/9/20  · Normal left ventricular systolic function. The estimated ejection fraction is 60%.  · Mild right ventricular enlargement.  · Normal right ventricular systolic function.  · Normal LV diastolic function.  · Severe biatrial enlargement.  · Mild-to-moderate mitral regurgitation.  · Mild tricuspid regurgitation.  · The estimated PA systolic pressure is 26 mmHg.  · Normal central venous pressure (3 mmHg).    Followed at [facility] by Dr Morales and Dr Fuentes    Last saw Dr Morales 3/11/20  79 y.o. M  Remote dx of AF (~10 y ago; only Rx was beta blockade)  HTN on meds   HL on meds   RBBB, longstanding     Had event monitor placed for dizzy/palpitations. Turns out dizziness was really vertigo and balance issues -- no LH, no presync/sync.  He remains on amiodarone (normal PFT and TFT/LFT); CHADSVASC 3.  ILR with no events on several interrogations. We removed the ILR 12/2018.     echo 60%. severe LAE.  TSH OK. LFTs <2x ULN. PFT ok.     Janice Dr Fuentes 7/30/20  Benitez Cabrales presents for management  of hypertension and leg swelling. Benitez Cabrales has hypertension with BP in the A:M usually in  "the 120s and up to 140s/ in the afternoon. Periodically when he has a sharp pain in his neck and shoulders, his BP will fall into the 80s/ and he will feel " whoozy". Has more chronic pedal edema and is on Amlodipine. Wears support hose. Benitez Cabrales has dyslipidemia  on moderate intensity statin therapy.Benitez Cabrales has paroxysmal atrial fibrillation followed by EP.He does exercises in the A:M. Benitez Cabrales denies chest pain, shortness of breath, palpitations, presyncope , or syncope.  .  "

## 2020-08-14 ENCOUNTER — TELEPHONE (OUTPATIENT)
Dept: FAMILY MEDICINE | Facility: CLINIC | Age: 80
End: 2020-08-14

## 2020-08-14 VITALS
BODY MASS INDEX: 23.46 KG/M2 | DIASTOLIC BLOOD PRESSURE: 72 MMHG | OXYGEN SATURATION: 96 % | TEMPERATURE: 98 F | SYSTOLIC BLOOD PRESSURE: 159 MMHG | HEIGHT: 71 IN | WEIGHT: 167.56 LBS | HEART RATE: 61 BPM | RESPIRATION RATE: 18 BRPM

## 2020-08-14 DIAGNOSIS — Z11.59 NEED FOR HEPATITIS C SCREENING TEST: ICD-10-CM

## 2020-08-14 LAB
ANION GAP SERPL CALC-SCNC: 9 MMOL/L (ref 8–16)
BASOPHILS # BLD AUTO: 0.06 K/UL (ref 0–0.2)
BASOPHILS NFR BLD: 0.7 % (ref 0–1.9)
BUN SERPL-MCNC: 21 MG/DL (ref 8–23)
CALCIUM SERPL-MCNC: 9.3 MG/DL (ref 8.7–10.5)
CHLORIDE SERPL-SCNC: 101 MMOL/L (ref 95–110)
CO2 SERPL-SCNC: 23 MMOL/L (ref 23–29)
CREAT SERPL-MCNC: 1.2 MG/DL (ref 0.5–1.4)
DIFFERENTIAL METHOD: ABNORMAL
EOSINOPHIL # BLD AUTO: 0.2 K/UL (ref 0–0.5)
EOSINOPHIL NFR BLD: 2.8 % (ref 0–8)
ERYTHROCYTE [DISTWIDTH] IN BLOOD BY AUTOMATED COUNT: 14.2 % (ref 11.5–14.5)
EST. GFR  (AFRICAN AMERICAN): >60 ML/MIN/1.73 M^2
EST. GFR  (NON AFRICAN AMERICAN): 58 ML/MIN/1.73 M^2
GLUCOSE SERPL-MCNC: 84 MG/DL (ref 70–110)
HCT VFR BLD AUTO: 35.4 % (ref 40–54)
HGB BLD-MCNC: 12 G/DL (ref 14–18)
IMM GRANULOCYTES # BLD AUTO: 0.04 K/UL (ref 0–0.04)
IMM GRANULOCYTES NFR BLD AUTO: 0.5 % (ref 0–0.5)
LYMPHOCYTES # BLD AUTO: 1.5 K/UL (ref 1–4.8)
LYMPHOCYTES NFR BLD: 18.5 % (ref 18–48)
MCH RBC QN AUTO: 30.2 PG (ref 27–31)
MCHC RBC AUTO-ENTMCNC: 33.9 G/DL (ref 32–36)
MCV RBC AUTO: 89 FL (ref 82–98)
MONOCYTES # BLD AUTO: 0.8 K/UL (ref 0.3–1)
MONOCYTES NFR BLD: 9.2 % (ref 4–15)
NEUTROPHILS # BLD AUTO: 5.6 K/UL (ref 1.8–7.7)
NEUTROPHILS NFR BLD: 68.3 % (ref 38–73)
NRBC BLD-RTO: 0 /100 WBC
PLATELET # BLD AUTO: 183 K/UL (ref 150–350)
PMV BLD AUTO: 11 FL (ref 9.2–12.9)
POTASSIUM SERPL-SCNC: 3.5 MMOL/L (ref 3.5–5.1)
RBC # BLD AUTO: 3.97 M/UL (ref 4.6–6.2)
SODIUM SERPL-SCNC: 133 MMOL/L (ref 136–145)
WBC # BLD AUTO: 8.18 K/UL (ref 3.9–12.7)

## 2020-08-14 PROCEDURE — 80048 BASIC METABOLIC PNL TOTAL CA: CPT

## 2020-08-14 PROCEDURE — 96376 TX/PRO/DX INJ SAME DRUG ADON: CPT

## 2020-08-14 PROCEDURE — S0028 INJECTION, FAMOTIDINE, 20 MG: HCPCS | Performed by: HOSPITALIST

## 2020-08-14 PROCEDURE — 25000003 PHARM REV CODE 250: Performed by: HOSPITALIST

## 2020-08-14 PROCEDURE — 36415 COLL VENOUS BLD VENIPUNCTURE: CPT

## 2020-08-14 PROCEDURE — G0378 HOSPITAL OBSERVATION PER HR: HCPCS

## 2020-08-14 PROCEDURE — 85025 COMPLETE CBC W/AUTO DIFF WBC: CPT

## 2020-08-14 RX ORDER — AMLODIPINE BESYLATE 10 MG/1
10 TABLET ORAL DAILY
Qty: 30 TABLET | Refills: 0 | Status: SHIPPED | OUTPATIENT
Start: 2020-08-14 | End: 2020-10-27 | Stop reason: SDUPTHER

## 2020-08-14 RX ADMIN — ACETAMINOPHEN 650 MG: 325 TABLET ORAL at 06:08

## 2020-08-14 RX ADMIN — AMLODIPINE BESYLATE 10 MG: 5 TABLET ORAL at 09:08

## 2020-08-14 RX ADMIN — DOCUSATE SODIUM 50 MG AND SENNOSIDES 8.6 MG 1 TABLET: 8.6; 5 TABLET, FILM COATED ORAL at 09:08

## 2020-08-14 RX ADMIN — FAMOTIDINE 20 MG: 10 INJECTION INTRAVENOUS at 09:08

## 2020-08-14 RX ADMIN — APIXABAN 5 MG: 5 TABLET, FILM COATED ORAL at 09:08

## 2020-08-14 NOTE — HOSPITAL COURSE
Benitez Cabrales 79 y.o. male.  PMHX: HTN,DM,COPD,CAD,CKD,CVA presents via EMS complaining of syncopal episode.  He reports x2 syncopal episodes on 08/12/20 resulting after straining to have a bowel movement approximately 2 hr PTA associated with neck pain,diaphoresis and lightheadedness. Additionally endorses worsening intermittent syncopal episode for past 3 months, primary doctor recently discontinue his amlodipine in being followed is followed by Dr. Ontiveros ([facility]).  He denies fever/chills,N/V/D,CP/palpitations,abd pain,dysuria or any other associated symptoms. Also denies Heavy ETOH/Smoking/Illicit drug use.  patient was admitted for presyncope,likely vasovagal,and bradycardia,he was still bradycardic,I DC  BB and monitored,cardiology was  consulted, Echo was unremarkable,presymncope liley duo to vasovagal and bradycardia,his symptoms improved,bradycardia is improved.patient was discharged home with NO BB ad follow up with PCP and his cardiology as out patient.

## 2020-08-14 NOTE — PLAN OF CARE
Problem: Fall Injury Risk  Goal: Absence of Fall and Fall-Related Injury  Intervention: Identify and Manage Contributors to Fall Injury Risk  Flowsheets (Taken 8/14/2020 0548)  Self-Care Promotion:   independence encouraged   BADL personal objects within reach   BADL personal routines maintained  Medication Review/Management: medications reviewed  Intervention: Promote Injury-Free Environment  Flowsheets (Taken 8/14/2020 0548)  Safety Promotion/Fall Prevention:   assistive device/personal item within reach   bed alarm set   /camera at bedside   side rails raised x 2   nonskid shoes/socks when out of bed   high risk medications identified   Fall Risk reviewed with patient/family   instructed to call staff for mobility  Environmental Safety Modification: assistive device/personal items within reach

## 2020-08-14 NOTE — NURSING
Report given to receiving nurse Eva. Pt awake in bed. Walking rounds completed. Pt assessed, NAD noted.     24hr chart check completed.

## 2020-08-14 NOTE — PROGRESS NOTES
WRITTEN HEALTHCARE DISCHARGE INFORMATION     Things that YOU are RESPONSIBLE for to Manage Your Care At Home:    1. Getting your prescriptions filled.  2. Taking you medications as directed. DO NOT MISS ANY DOSES!  3. Going to your follow-up doctor appointments. This is important because it allows the doctor to monitor your progress and to determine if any changes need to be made to your treatment plan.    If you are unable to make your follow up appointments, please call the number listed and reschedule this appointment.     ____________HELP AT HOME____________________    Experiencing any SIGNS or SYMPTOMS: YOU CAN    Schedule a same day appopintment with your Primary Care Doctor or  you can call [facility] On Call Nurse Care Line for 24/7 assistance at [phone]    If you are experience any signs or symptoms that have become severe, Call 911 and come to your nearest Emergency Room.    Thank you for choosing [facility] and allowing us to care for you.   From your care management team: Jennifer VELASQUEZ [facility] [phone]    You should receive a call from [facility] Discharge Department within 48-72 hours to help manage your care after discharge. Please try to make sure that you answer your phone for this important phone call.  Follow-up Information     Diomedes Ayoub MD On 9/9/2020.    Specialty: Internal Medicine  Why: Outpatient Services, PCP, follow-up appointment at 1:40pm.   Contact information:  [address] [address]  [phone]             Mickey Morales MD On 9/18/2020.    Specialties: Electrophysiology, Cardiology  Why: Outpatient Services, Cardiology, follow-up appointment at 11:40am  Contact information:  [address]  [facility] [address]  [phone]

## 2020-08-14 NOTE — NURSING
Received handoff from SHERRILL Villavicencio      Pt resting in bed quietly. NAD noted. No c/o pain.     Fall and safety precautions maintained. Bed alarm activated and audible.. Bed locked in lowest position, with side rails up x2. Call bell and personal items within reach

## 2020-08-14 NOTE — DISCHARGE SUMMARY
[facility] Hospital Medicine  Discharge Summary      Patient Name: Benitez Cabrales  MRN: [id number]  Admission Date: 8/12/2020  Hospital Length of Stay: 1 day  Discharge Date and Time:  08/14/2020 12:14 PM  Attending Physician: Jesus Blank MD   Discharging Provider: Jesus Blank MD  Primary Care Provider: Diomedes Ayoub MD      HPI:   Benitez Cabrales 79 y.o. male.  PMHX: HTN,DM,COPD,CAD,CKD,CVA presents via EMS complaining of syncopal episode.  He reports x2 syncopal episodes on 08/12/20 resulting after straining to have a bowel movement approximately 2 hr PTA associated with neck pain,diaphoresis and lightheadedness. Additionally endorses worsening intermittent syncopal episode for past 3 months, primary doctor recently discontinue his amlodipine in being followed is followed by Dr. Ontiveros ([facility]).  He denies fever/chills,N/V/D,CP/palpitations,abd pain,dysuria or any other associated symptoms. Also denies Heavy ETOH/Smoking/Illicit drug use.    Placed in observation to hospital medicine for further evaluation and treatment.      * No surgery found *      Hospital Course:   Benitez Cabrales 79 y.o. male.  PMHX: HTN,DM,COPD,CAD,CKD,CVA presents via EMS complaining of syncopal episode.  He reports x2 syncopal episodes on 08/12/20 resulting after straining to have a bowel movement approximately 2 hr PTA associated with neck pain,diaphoresis and lightheadedness. Additionally endorses worsening intermittent syncopal episode for past 3 months, primary doctor recently discontinue his amlodipine in being followed is followed by Dr. Ontiveros ([facility]).  He denies fever/chills,N/V/D,CP/palpitations,abd pain,dysuria or any other associated symptoms. Also denies Heavy ETOH/Smoking/Illicit drug use.  patient was admitted for presyncope,likely vasovagal,and bradycardia,he was still bradycardic,I DC  BB and monitored,cardiology was  consulted, Echo was unremarkable,presymceasar bui  duo to vasovagal and bradycardia,his symptoms improved,bradycardia is improved.patient was discharged home with NO BB ad follow up with PCP and his cardiology as out patient.       Consults:   Consults (From admission, onward)        Status Ordering Provider     Inpatient consult to Cardiology  Once     Provider:  Deion Sorenson MD    Completed JAMAAL BAUTISTA          No new Assessment & Plan notes have been filed under this hospital service since the last note was generated.  Service: Hospital Medicine    Final Active Diagnoses:    Diagnosis Date Noted POA    PRINCIPAL PROBLEM:  Syncope [R55] 08/13/2020 Yes    Hyperlipidemia [E78.5] 09/06/2012 Yes     Chronic    GERD (gastroesophageal reflux disease) [K21.9] 09/06/2012 Yes    CKD (chronic kidney disease) stage 3, GFR 30-59 ml/min [N18.3] 08/11/2020 Yes    AF (atrial fibrillation) [I48.91] 04/14/2014 Yes     Chronic    Hypertension [I10] 09/06/2012 Yes     Chronic      Problems Resolved During this Admission:    Diagnosis Date Noted Date Resolved POA    Chronic nasal congestion [R09.81] 01/22/2013 08/13/2020 Yes       Discharged Condition: stable    Disposition: Home or Self Care    Follow Up:  Follow-up Information     Diomedes Ayoub MD On 9/9/2020.    Specialty: Internal Medicine  Why: Outpatient Services, PCP, follow-up appointment at 1:40pm.   Contact information:  [address] [place] [address] [phone]             Mickey Morales MD On 9/18/2020.    Specialties: Electrophysiology, Cardiology  Why: Outpatient Services, Cardiology, follow-up appointment at 11:40am  Contact information:  [address] [facility] [address] [phone]                 Patient Instructions:      Activity as tolerated       Significant Diagnostic Studies: Labs:   BMP:   Recent Labs   Lab 08/12/20  2346 08/13/20  0446 08/14/20  0329    95 84    134* 133*   K 4.1 3.8 3.5    101 101   CO2 26 25 23   BUN 31* 28* 21   CREATININE  1.7* 1.4 1.2   CALCIUM 9.0 9.0 9.3   MG 2.3  --   --    , CMP   Recent Labs   Lab 08/12/20  2346 08/13/20  0446 08/14/20  0329    134* 133*   K 4.1 3.8 3.5    101 101   CO2 26 25 23    95 84   BUN 31* 28* 21   CREATININE 1.7* 1.4 1.2   CALCIUM 9.0 9.0 9.3   PROT 7.1  --   --    ALBUMIN 3.4*  --   --    BILITOT 0.4  --   --    ALKPHOS 72  --   --    AST 54*  --   --    ALT 41  --   --    ANIONGAP 8 8 9   ESTGFRAFRICA 44* 55* >60   EGFRNONAA 38* 48* 58*    and CBC   Recent Labs   Lab 08/12/20  2346 08/13/20 0446 08/14/20  0329   WBC 6.42 12.53 8.18   HGB 12.1* 12.6* 12.0*   HCT 36.9* 37.2* 35.4*    194 183     Radiology: X-Ray: CXR: X-Ray Chest 1 View (CXR): No results found for this visit on 08/12/20. and X-Ray Chest PA and Lateral (CXR): No results found for this visit on 08/12/20.  Cardiac Graphics: Echocardiogram:   2D echo with color flow doppler:   Results for orders placed or performed during the hospital encounter of 03/23/18   2D echo with color flow doppler   Result Value Ref Range    QEF 55 55 - 65    Mitral Valve Regurgitation MILD     Diastolic Dysfunction No     Aortic Valve Regurgitation TRIVIAL     Est. PA Systolic Pressure 24.53     Tricuspid Valve Regurgitation TRIVIAL     Narrative    Date of Procedure: 03/23/2018        TEST DESCRIPTION   Technical Quality: This is a technically adequate study.     Aorta: The aortic root is normal in size, measuring 2.9 cm at sinotubular junction and 3.6 cm at Sinuses of Valsalva. The proximal ascending aorta is normal in size, measuring 3.4 cm across.     Left Atrium: The left atrial volume index is severely enlarged, measuring 50.37 cc/m2.     Left Ventricle: The left ventricle is normal in size, with an end-diastolic diameter of 5.0 cm, and an end-systolic diameter of 3.4 cm. LV wall thickness is normal, with the septum measuring 0.6 cm and the posterior wall measuring 0.7 cm across. Relative   wall thickness was normal at 0.28, and  the LV mass index was 56.8 g/m2 consistent with normal left ventricular mass. There are no regional wall motion abnormalities. Left ventricular systolic function appears normal. Visually estimated ejection fraction   is 55-60%. The LV Doppler derived stroke volume equals 67.0 ccs.     Diastolic indices: E wave velocity 0.6 m/s, E/A ratio 1.2,  msec., E/e' ratio(avg) 6. Diastolic function is normal.     Right Atrium: The right atrium is enlarged, measuring 5.7 cm in length and 4.6 cm in width in the apical view.     Right Ventricle: The right ventricle is normal in size measuring 3.5 cm at the base in the apical right ventricle-focused view. Global right ventricular systolic function appears normal. Tricuspid annular plane systolic excursion (TAPSE) is 2.5 cm.   Tissue Doppler-derived tricuspid annular peak systolic velocity (S prime) is 11.4 cm/s. The estimated PA systolic pressure is 25 mmHg.     Aortic Valve:  The aortic valve is mildly sclerotic with normal leaflet mobility. The aortic valve is tri-leaflet in structure. Additionally, there is trivial aortic regurgitation.     Mitral Valve:  There is mild mitral regurgitation. Mitral valve is normal in structure with normal leaflet mobility.     Tricuspid Valve:  There is trivial tricuspid regurgitation. Tricuspid valve is normal in structure with normal leaflet mobility.     Pulmonary Valve:  The pulmonic valve is not well seen.     IVC: IVC is normal in size and collapses > 50% with a sniff, suggesting normal right atrial pressure of 3 mmHg.     Intracavitary: There is no evidence of pericardial effusion, intracavity mass, thrombi, or vegetation.         CONCLUSIONS     1 - Normal left ventricular systolic function (EF 55-60%).     2 - Normal right ventricular systolic function .     3 - Normal left ventricular diastolic function.     4 - Biatrial enlargement.     5 - Mild mitral regurgitation.     6 - The estimated PA systolic pressure is 25 mmHg.              This document has been electronically    SIGNED BY: Carlos Barahona MD On: 03/23/2018 10:47    and Transthoracic echo (TTE) complete (Cupid Only):   Results for orders placed or performed during the hospital encounter of 08/12/20   Echo   Result Value Ref Range    BSA 1.95 m2    LA WIDTH 5.09 cm    AORTIC VALVE CUSP SEPERATION 1.67 cm    PV PEAK VELOCITY 0.99 cm/s    LVIDD 5.09 3.5 - 6.0 cm    IVS 0.99 0.6 - 1.1 cm    PW 0.94 0.6 - 1.1 cm    Ao root annulus 3.67 cm    LVIDS 3.68 2.1 - 4.0 cm    FS 28 28 - 44 %    LA volume 101.71 cm3    Sinus 3.63 cm    STJ 2.66 cm    LV mass 178.71 g    LA size 4.15 cm    RVDD 3.48 cm    TAPSE 2.93 cm    RV S' 10.23 cm/s    Left Ventricle Relative Wall Thickness 0.37 cm    AV mean gradient 3 mmHg    AV valve area 2.95 cm2    AV Velocity Ratio 0.94     AV index (prosthetic) 0.89     E/A ratio 1.08     E wave decelartion time 300.29 msec    IVRT 110.73 msec    LVOT diameter 2.06 cm    LVOT area 3.3 cm2    LVOT peak karl 1.00 m/s    LVOT peak VTI 22.62 cm    Ao peak karl 1.06 m/s    Ao VTI 25.50 cm    LVOT stroke volume 75.35 cm3    AV peak gradient 4 mmHg    MV Peak E Karl 0.84 m/s    TR Max Karl 2.57 m/s    MV Peak A Karl 0.78 m/s    LV Systolic Volume 57.50 mL    LV Systolic Volume Index 29.4 mL/m2    LV Diastolic Volume 123.37 mL    LV Diastolic Volume Index 63.00 mL/m2    LA Volume Index 51.9 mL/m2    LV Mass Index 91 g/m2    RA Major Axis 6.24 cm    Left Atrium Minor Axis 5.72 cm    Left Atrium Major Axis 5.61 cm    Triscuspid Valve Regurgitation Peak Gradient 26 mmHg    RA Width 3.68 cm    Right Atrial Pressure (from IVC) 3 mmHg    TV rest pulmonary artery pressure 29 mmHg    Narrative    · Normal left ventricular systolic function. The estimated ejection   fraction is 60%.  · Normal LV diastolic function.  · Normal right ventricular systolic function.  · Mild left atrial enlargement.  · Moderate mitral regurgitation.  · The estimated PA systolic pressure is 29 mmHg.           Pending Diagnostic Studies:     None         Medications:  Reconciled Home Medications:      Medication List      START taking these medications    amLODIPine 10 MG tablet  Commonly known as: NORVASC  Take 1 tablet (10 mg total) by mouth once daily.        CONTINUE taking these medications    amiodarone 200 MG Tab  Commonly known as: PACERONE  Take 1 tablet by mouth once daily     apixaban 5 mg Tab  Commonly known as: ELIQUIS  Take 1 tablet (5 mg total) by mouth 2 (two) times daily.     BD MAGNI-GUIDE SYRINGE MAGNIFI MISC  by Misc.(Non-Drug; Combo Route) route.     chlorthalidone 25 MG Tab  Commonly known as: HYGROTEN  1/2 tablet daily     diclofenac sodium 1 % Gel  Commonly known as: VOLTAREN  Apply 2 g topically 4 (four) times daily.     doxepin 10 MG capsule  Commonly known as: SINEQUAN  1-2 HS prn itching     fish oil-omega-3 fatty acids 300-1,000 mg capsule  Take 2 g by mouth once daily.     HYDROcodone-acetaminophen 7.5-325 mg per tablet  Commonly known as: NORCO  Take 1 tablet by mouth every 6 (six) hours as needed for Pain.     magnesium 200 mg Tab  Take by mouth once daily.     meclizine 25 mg tablet  Commonly known as: ANTIVERT  Half - one pill at least HS for vertigo     NEHEMIAH MULTIVITAMIN FOR MEN ORAL  Take 1 tablet by mouth once daily.     pantoprazole 40 MG tablet  Commonly known as: PROTONIX  Take 1 tablet by mouth once daily     rosuvastatin 10 MG tablet  Commonly known as: CRESTOR  TAKE 1 TABLET BY MOUTH ONCE DAILY IN THE EVENING     tiZANidine 4 MG tablet  Commonly known as: ZANAFLEX  TAKE 1 TO 2 TABLETS BY MOUTH AT BEDTIME AS NEEDED FOR  SPASMS     VITAMIN D3 50 mcg (2,000 unit) Cap  Generic drug: cholecalciferol (vitamin D3)  Take 1 capsule by mouth once daily.        STOP taking these medications    metoprolol succinate 25 MG 24 hr tablet  Commonly known as: TOPROL-XL     valsartan 320 MG tablet  Commonly known as: DIOVAN            Indwelling Lines/Drains at time of discharge:    Lines/Drains/Airways     None                 Time spent on the discharge of patient: over 30  minutes  Patient was seen and examined on the date of discharge and determined to be suitable for discharge.         Jesus Blank MD  Department of Hospital Medicine  [facility]

## 2020-08-14 NOTE — PLAN OF CARE
08/14/20 1228   Final Note   Assessment Type Final Discharge Note   Anticipated Discharge Disposition Home   What phone number can be called within the next 1-3 days to see how you are doing after discharge? [phone]   Hospital Follow Up  Appt(s) scheduled? Yes   Discharge plans and expectations educations in teach back method with documentation complete? Yes   Right Care Referral Info   Post Acute Recommendation No Care   Post-Acute Status   Post-Acute Authorization   (Home)   Discharge Delays None known at this time

## 2020-08-14 NOTE — TELEPHONE ENCOUNTER
----- Message from Verona Ferguson sent at 8/14/2020  1:49 PM CDT -----  Contact: Dea Dogster [phone] or Pt at [phone]  .Name of Caller Dea eliecer/Kiana Red  Reason for Visit/Symptoms 1 week Hospital Follow Up. Discharged from hosp on today, 08-14-20.  Best Contact Number or Confirm if Mychart Preferred Dea [phone] or Pt at [phone]  Preferred Date/Time of Appointment Anywhere between now and 08-21-20  Interested in Virtual Visit (yes/no) No  Additional Information Patient was placed on the wait list to get in before appointment that's scheduled for 09-09-20 for the Hospital Follow Up. But need to get in anywhere between now and 08-21-20. Please call.

## 2020-08-25 ENCOUNTER — PATIENT OUTREACH (OUTPATIENT)
Dept: ADMINISTRATIVE | Facility: HOSPITAL | Age: 80
End: 2020-08-25

## 2020-08-28 ENCOUNTER — PATIENT OUTREACH (OUTPATIENT)
Dept: ADMINISTRATIVE | Facility: OTHER | Age: 80
End: 2020-08-28

## 2020-08-28 NOTE — PROGRESS NOTES
Patient's chart was reviewed for overdue AUSTEN topics.  Immunizations reconciled.    Orders placed:n/a  Tasked appts:n/a  Labs Linked:n/a

## 2020-09-01 ENCOUNTER — TELEPHONE (OUTPATIENT)
Dept: NEUROLOGY | Facility: CLINIC | Age: 80
End: 2020-09-01

## 2020-09-01 ENCOUNTER — OFFICE VISIT (OUTPATIENT)
Dept: CARDIOLOGY | Facility: CLINIC | Age: 80
End: 2020-09-01
Payer: MEDICARE

## 2020-09-01 VITALS
DIASTOLIC BLOOD PRESSURE: 64 MMHG | BODY MASS INDEX: 23.52 KG/M2 | HEART RATE: 55 BPM | OXYGEN SATURATION: 98 % | HEIGHT: 71 IN | WEIGHT: 168 LBS | SYSTOLIC BLOOD PRESSURE: 142 MMHG

## 2020-09-01 DIAGNOSIS — Z79.899 ON AMIODARONE THERAPY: ICD-10-CM

## 2020-09-01 DIAGNOSIS — R29.898 LEG WEAKNESS, BILATERAL: ICD-10-CM

## 2020-09-01 DIAGNOSIS — I10 ESSENTIAL HYPERTENSION: ICD-10-CM

## 2020-09-01 DIAGNOSIS — R55 SYNCOPE AND COLLAPSE: Primary | ICD-10-CM

## 2020-09-01 DIAGNOSIS — E78.5 HYPERLIPIDEMIA, UNSPECIFIED HYPERLIPIDEMIA TYPE: ICD-10-CM

## 2020-09-01 DIAGNOSIS — I48.0 PAROXYSMAL ATRIAL FIBRILLATION: ICD-10-CM

## 2020-09-01 DIAGNOSIS — R26.81 UNSTEADINESS ON FEET: ICD-10-CM

## 2020-09-01 DIAGNOSIS — R29.898 WEAKNESS OF BOTH LOWER EXTREMITIES: ICD-10-CM

## 2020-09-01 DIAGNOSIS — Z79.01 CURRENT USE OF LONG TERM ANTICOAGULATION: ICD-10-CM

## 2020-09-01 PROCEDURE — 3288F FALL RISK ASSESSMENT DOCD: CPT | Mod: CPTII,S$GLB,, | Performed by: NURSE PRACTITIONER

## 2020-09-01 PROCEDURE — 1159F PR MEDICATION LIST DOCUMENTED IN MEDICAL RECORD: ICD-10-PCS | Mod: S$GLB,,, | Performed by: NURSE PRACTITIONER

## 2020-09-01 PROCEDURE — 3077F SYST BP >= 140 MM HG: CPT | Mod: CPTII,S$GLB,, | Performed by: NURSE PRACTITIONER

## 2020-09-01 PROCEDURE — 3078F DIAST BP <80 MM HG: CPT | Mod: CPTII,S$GLB,, | Performed by: NURSE PRACTITIONER

## 2020-09-01 PROCEDURE — 3288F PR FALLS RISK ASSESSMENT DOCUMENTED: ICD-10-PCS | Mod: CPTII,S$GLB,, | Performed by: NURSE PRACTITIONER

## 2020-09-01 PROCEDURE — 3078F PR MOST RECENT DIASTOLIC BLOOD PRESSURE < 80 MM HG: ICD-10-PCS | Mod: CPTII,S$GLB,, | Performed by: NURSE PRACTITIONER

## 2020-09-01 PROCEDURE — 99214 PR OFFICE/OUTPT VISIT, EST, LEVL IV, 30-39 MIN: ICD-10-PCS | Mod: S$GLB,,, | Performed by: NURSE PRACTITIONER

## 2020-09-01 PROCEDURE — 1100F PTFALLS ASSESS-DOCD GE2>/YR: CPT | Mod: CPTII,S$GLB,, | Performed by: NURSE PRACTITIONER

## 2020-09-01 PROCEDURE — 1125F PR PAIN SEVERITY QUANTIFIED, PAIN PRESENT: ICD-10-PCS | Mod: S$GLB,,, | Performed by: NURSE PRACTITIONER

## 2020-09-01 PROCEDURE — 99999 PR PBB SHADOW E&M-EST. PATIENT-LVL V: CPT | Mod: PBBFAC,,, | Performed by: NURSE PRACTITIONER

## 2020-09-01 PROCEDURE — 1100F PR PT FALLS ASSESS DOC 2+ FALLS/FALL W/INJURY/YR: ICD-10-PCS | Mod: CPTII,S$GLB,, | Performed by: NURSE PRACTITIONER

## 2020-09-01 PROCEDURE — 99999 PR PBB SHADOW E&M-EST. PATIENT-LVL V: ICD-10-PCS | Mod: PBBFAC,,, | Performed by: NURSE PRACTITIONER

## 2020-09-01 PROCEDURE — 1159F MED LIST DOCD IN RCRD: CPT | Mod: S$GLB,,, | Performed by: NURSE PRACTITIONER

## 2020-09-01 PROCEDURE — 99214 OFFICE O/P EST MOD 30 MIN: CPT | Mod: S$GLB,,, | Performed by: NURSE PRACTITIONER

## 2020-09-01 PROCEDURE — 1125F AMNT PAIN NOTED PAIN PRSNT: CPT | Mod: S$GLB,,, | Performed by: NURSE PRACTITIONER

## 2020-09-01 PROCEDURE — 3077F PR MOST RECENT SYSTOLIC BLOOD PRESSURE >= 140 MM HG: ICD-10-PCS | Mod: CPTII,S$GLB,, | Performed by: NURSE PRACTITIONER

## 2020-09-01 NOTE — TELEPHONE ENCOUNTER
----- Message from Carmelo Slater sent at 9/1/2020 12:29 PM CDT -----  Contact: @[phone]  Caller calling to schedule a the NP appt from the referral, ivone call

## 2020-09-01 NOTE — PROGRESS NOTES
Subjective:   Chief Complaint: Essential hypertension      Problem List:   HTN  HLD  pAF  Chronic neck/back pain  Syncope        History of Present Illness: Benitez Cabrales is a 79 y.o. male patient who presents for f/u Essential hypertension and f/u recent ER visit for syncope and bradycardia.  He brought in home BP readings from 8/25-today.  BP systolic 103-148 mostly controlled.  HR 51-71.  Since his ER visit, he has experienced lightheadedness once on 8/26/2020 at 11:40pm with /56 and HR 55. He and his wife walk a hour several times a week without any symptoms.  He denies any symptoms of chest pain, shortness of breath, cough, peripheral edema, palpitations, or claudication.   He has been seen previously by EP Dr. Morales for pAF (remote diagnosis over 10 years ago). He had a loop implanted from 2017-12/2018 for dizziness.  After implant, dizziness was attributed to vertigo.      His first syncopal event occurred Sal[place] day of 2018 (after loop recorder removed).  He felt neck throbbing and back pain, became sweaty, placed head down on kitchen table and passed out. Second occurrence was around 8/10/2020 about 10pm after taking warm shower he felt neck throbbing and back pain and passed out hitting his left rib area on toilet.  3rd episode was 8/12/2020 again at 10pm, he felt neck back pain, tried to take shower to feel better.  After shower he urinated, dressed and felt lightheaded and diaphoretic, sat on toilet seat and wife found him nonresponsive and called EMS.  Systolic BP 89.       Hospital note reports syncopal episodes x 2 resulting after straining to have a bowel movement approximately 2 hr PTA associated with neck pain,diaphoresis and lightheadedness. Echo was done- unremarkable, with presyncope likely due to vasovagal and bradycardia, his symptoms improved,bradycardia is improved and was discharged home.        Previous ECHO: 8/2020  · Normal left ventricular systolic function. The estimated  ejection fraction is 60%.  · Normal LV diastolic function.  · Normal right ventricular systolic function.  · Mild left atrial enlargement.  · Moderate mitral regurgitation.  · The estimated PA systolic pressure is 29 mmHg.      Review of Systems   Constitution: Negative for chills, decreased appetite, diaphoresis, fever, weight gain and weight loss.        Denies change in activity level   HENT: Negative for nosebleeds.    Eyes: Negative for visual disturbance.        No amaurosis fugax   Cardiovascular: Positive for syncope. Negative for chest pain, claudication, cyanosis, dyspnea on exertion, irregular heartbeat, leg swelling, orthopnea, palpitations and paroxysmal nocturnal dyspnea.        As per HPI above; wears compression stockings which have decreased leg swelling   Respiratory: Negative for cough, shortness of breath, sleep disturbances due to breathing and wheezing.         No AVELINO symptoms   Hematologic/Lymphatic: Negative for bleeding problem.   Skin: Negative for color change, nail changes and rash.   Musculoskeletal: Positive for back pain (chronic-intermittent-shoulders and upper back) and neck pain (chronic-intermittent). Negative for falls.        Denies muscle aches   Gastrointestinal: Negative for abdominal pain, constipation, diarrhea, hematemesis, hematochezia, melena, nausea and vomiting.   Genitourinary: Negative for dysuria and hematuria.        No change in urinary output   Neurological: Positive for light-headedness. Negative for excessive daytime sleepiness, dizziness, headaches, loss of balance, vertigo and weakness.   Psychiatric/Behavioral: Negative for altered mental status. The patient is not nervous/anxious.    Allergic/Immunologic:        Drug allergies listed elsewhere if present       Social History:  Benitez reports that he has never smoked. He has never used smokeless tobacco. He reports current alcohol use of about 1.0 standard drinks of alcohol per week. He reports that he does not  use drugs.      The ASCVD Risk score (Nishaebenezer THOMASON Jr., et al., 2013) failed to calculate for the following reasons:    Cannot find a previous HDL lab    Cannot find a previous total cholesterol lab       Medications:  Outpatient Encounter Medications as of 9/1/2020   Medication Sig Dispense Refill    amiodarone (PACERONE) 200 MG Tab Take 1 tablet by mouth once daily 90 tablet 3    amLODIPine (NORVASC) 10 MG tablet Take 1 tablet (10 mg total) by mouth once daily. 30 tablet 0    apixaban (ELIQUIS) 5 mg Tab Take 1 tablet (5 mg total) by mouth 2 (two) times daily. 180 tablet 3    chlorthalidone (HYGROTEN) 25 MG Tab 1/2 tablet daily 45 tablet 3    cholecalciferol, vitamin D3, (VITAMIN D3) 2,000 unit Cap Take 1 capsule by mouth once daily.      doxepin (SINEQUAN) 10 MG capsule 1-2 HS prn itching 60 capsule 11    HYDROcodone-acetaminophen (NORCO) 7.5-325 mg per tablet Take 1 tablet by mouth every 6 (six) hours as needed for Pain. 21 tablet 0    magnesium 200 mg Tab Take by mouth once daily.      meclizine (ANTIVERT) 25 mg tablet Half - one pill at least HS for vertigo 30 tablet 12    MV-MN/FA/LYCOPENE/LUT/HB#178 (NEHEMIAH MULTIVITAMIN FOR MEN ORAL) Take 1 tablet by mouth once daily.        pantoprazole (PROTONIX) 40 MG tablet Take 1 tablet by mouth once daily 90 tablet 1    rosuvastatin (CRESTOR) 10 MG tablet TAKE 1 TABLET BY MOUTH ONCE DAILY IN THE EVENING 90 tablet 0    tiZANidine (ZANAFLEX) 4 MG tablet TAKE 1 TO 2 TABLETS BY MOUTH AT BEDTIME AS NEEDED FOR  SPASMS 40 tablet 12    [DISCONTINUED] DIABETIC SUPPLIES,MISCELL (BD MAGNI-GUIDE SYRINGE MAGNIFI MISC) by Misc.(Non-Drug; Combo Route) route.      [DISCONTINUED] diclofenac sodium (VOLTAREN) 1 % Gel Apply 2 g topically 4 (four) times daily. 1 Tube 3    [DISCONTINUED] fish oil-omega-3 fatty acids 300-1,000 mg capsule Take 2 g by mouth once daily.         No facility-administered encounter medications on file as of 9/1/2020.      Family History:    Benitez's family  "history includes Cancer in his maternal aunt; No Known Problems in his brother, father, maternal grandfather, maternal grandmother, maternal uncle, mother, paternal aunt, paternal grandfather, paternal grandmother, paternal uncle, and sister.    Objective:   Right Arm BP - Sittin/67 (20 1051)  Left Arm BP - Sittin/64 (20 1051)      BP (!) 142/64 (BP Location: Left arm, Patient Position: Sitting, BP Method: Large (Automatic))   Pulse (!) 55   Ht 5' 11" (1.803 m)   Wt 76.2 kg (167 lb 15.9 oz)   SpO2 98%   BMI 23.43 kg/m²       Physical Exam  Vitals signs reviewed.   Constitutional:       General: He is not in acute distress.     Appearance: Normal appearance. He is well-developed. He is not ill-appearing, toxic-appearing or diaphoretic.   HENT:      Head: Normocephalic and atraumatic.   Eyes:      General: No scleral icterus.     Extraocular Movements: Extraocular movements intact.      Conjunctiva/sclera: Conjunctivae normal.      Pupils: Pupils are equal, round, and reactive to light.   Neck:      Musculoskeletal: Normal range of motion and neck supple. No muscular tenderness.      Thyroid: No thyromegaly.      Vascular: No carotid bruit or JVD.      Trachea: No tracheal deviation.   Cardiovascular:      Rate and Rhythm: Normal rate and regular rhythm.      Chest Wall: PMI is not displaced. No thrill.      Pulses:           Carotid pulses are 2+ on the right side and 2+ on the left side.       Radial pulses are 2+ on the right side and 2+ on the left side.        Dorsalis pedis pulses are 2+ on the right side and 2+ on the left side.      Heart sounds: No murmur. No friction rub. No gallop.       Comments: Currently wearing knee high compression stockings  Pulmonary:      Effort: Pulmonary effort is normal. No respiratory distress.      Breath sounds: Normal breath sounds. No stridor. No wheezing, rhonchi or rales.   Chest:      Chest wall: No tenderness.   Abdominal:      General: Bowel " sounds are normal. There is no distension or abdominal bruit.      Palpations: Abdomen is soft.      Tenderness: There is no abdominal tenderness. There is no guarding.   Musculoskeletal: Normal range of motion.      Right lower leg: No edema.      Left lower leg: No edema.   Lymphadenopathy:      Cervical: No cervical adenopathy.   Skin:     General: Skin is warm and dry.      Findings: No rash.   Neurological:      Mental Status: He is alert and oriented to person, place, and time.      Gait: Gait normal.   Psychiatric:         Mood and Affect: Mood normal.         Behavior: Behavior normal.         Thought Content: Thought content normal.         Judgment: Judgment normal.             Lab Results   Component Value Date     (L) 08/14/2020    K 3.5 08/14/2020     08/14/2020    CO2 23 08/14/2020    BUN 21 08/14/2020    CREATININE 1.2 08/14/2020    GLU 84 08/14/2020     (H) 08/12/2020    HGBA1C 5.9 06/13/2013    MG 2.3 08/12/2020    AST 54 (H) 08/12/2020    ALT 41 08/12/2020    ALKPHOS 72 08/12/2020    ALBUMIN 3.4 (L) 08/12/2020    PROT 7.1 08/12/2020    BILITOT 0.4 08/12/2020    WBC 8.18 08/14/2020    HGB 12.0 (L) 08/14/2020    HCT 35.4 (L) 08/14/2020    MCV 89 08/14/2020     08/14/2020    INR 1.0 11/24/2016    PSA 1.1 09/15/2015    TSH 0.449 10/15/2019    CHOL 137 07/11/2016    HDL 58 07/11/2016    LDLCALC 64.6 07/11/2016    TRIG 72 07/11/2016         Reviewed:   []  Stress test   []  Angiography   []  Echocardiogram   [x]  Labs   [x]  Other:  Old records           Assessment/Plan:     Benitez Cabrales was seen today for Essential hypertension      Syncope and collapse-likely vasovagal  -     CV Ultrasound Bilateral Doppler Carotid; Future  -     Cardiac event monitor; Future; Expected date: 09/01/2020    Essential hypertension-controlled  Continue CCB, chlorthalidone  -BP goal <130/80   -Encouraged Low Na  -Regular exercise regimen at least 30minutes 5 days a week  -Encouraged home BP  monitoring using log sheet provided and report if >130/80   -Not eligible for Digital HTN program      Hyperlipidemia, unspecified hyperlipidemia type-overdue lipids, 2016-LDL 64  -Emphasized Mediterranean diet  -continue moderate intensity Statin therapy  -LDL goal <70  -Recommended regular exercise      Paroxysmal atrial fibrillation-currently SR  -continue amiodarone  -continue OAC for thromboembolic prophylaxis  -continue f/u EP    Leg weakness, bilateral/Unsteadiness on feet   -     Vitamin B12; Future; Expected date: 09/01/2020  -     Ambulatory referral/consult to Neurology; Future; Expected date: 09/08/2020    Current use of OAC-denies any bleeding episodes  -continue OAC for thromboembolic prophylaxis   -Reviewed signs/symptoms of bleeding to report    Amiodarone therapy-TSH and LFT WNL  -continue to f/u with EP  -continue routine TSH, LFT and PFT/CXR    This patient was discussed and examined with SHA Alcazar, FNP-BC   Cardiology Consult    Unless there are intervening problems, patient should return for re-evaluation in Follow up in about 2 months (around 11/1/2020).

## 2020-09-05 ENCOUNTER — PATIENT MESSAGE (OUTPATIENT)
Dept: FAMILY MEDICINE | Facility: CLINIC | Age: 80
End: 2020-09-05

## 2020-09-08 ENCOUNTER — OFFICE VISIT (OUTPATIENT)
Dept: FAMILY MEDICINE | Facility: CLINIC | Age: 80
End: 2020-09-08
Payer: MEDICARE

## 2020-09-08 VITALS
SYSTOLIC BLOOD PRESSURE: 140 MMHG | BODY MASS INDEX: 23.64 KG/M2 | RESPIRATION RATE: 18 BRPM | OXYGEN SATURATION: 97 % | DIASTOLIC BLOOD PRESSURE: 65 MMHG | HEART RATE: 71 BPM | HEIGHT: 71 IN | WEIGHT: 168.88 LBS

## 2020-09-08 DIAGNOSIS — S20.212A CONTUSION OF RIB ON LEFT SIDE, INITIAL ENCOUNTER: ICD-10-CM

## 2020-09-08 DIAGNOSIS — I48.0 PAF (PAROXYSMAL ATRIAL FIBRILLATION): ICD-10-CM

## 2020-09-08 DIAGNOSIS — I10 ESSENTIAL HYPERTENSION: ICD-10-CM

## 2020-09-08 DIAGNOSIS — R55 SYNCOPE AND COLLAPSE: Primary | ICD-10-CM

## 2020-09-08 DIAGNOSIS — R00.1 BRADYCARDIA: ICD-10-CM

## 2020-09-08 PROCEDURE — 99214 PR OFFICE/OUTPT VISIT, EST, LEVL IV, 30-39 MIN: ICD-10-PCS | Mod: S$GLB,,, | Performed by: INTERNAL MEDICINE

## 2020-09-08 PROCEDURE — 1125F PR PAIN SEVERITY QUANTIFIED, PAIN PRESENT: ICD-10-PCS | Mod: S$GLB,,, | Performed by: INTERNAL MEDICINE

## 2020-09-08 PROCEDURE — 3078F DIAST BP <80 MM HG: CPT | Mod: CPTII,S$GLB,, | Performed by: INTERNAL MEDICINE

## 2020-09-08 PROCEDURE — 1101F PR PT FALLS ASSESS DOC 0-1 FALLS W/OUT INJ PAST YR: ICD-10-PCS | Mod: CPTII,S$GLB,, | Performed by: INTERNAL MEDICINE

## 2020-09-08 PROCEDURE — 1159F MED LIST DOCD IN RCRD: CPT | Mod: S$GLB,,, | Performed by: INTERNAL MEDICINE

## 2020-09-08 PROCEDURE — 1159F PR MEDICATION LIST DOCUMENTED IN MEDICAL RECORD: ICD-10-PCS | Mod: S$GLB,,, | Performed by: INTERNAL MEDICINE

## 2020-09-08 PROCEDURE — 3078F PR MOST RECENT DIASTOLIC BLOOD PRESSURE < 80 MM HG: ICD-10-PCS | Mod: CPTII,S$GLB,, | Performed by: INTERNAL MEDICINE

## 2020-09-08 PROCEDURE — 1101F PT FALLS ASSESS-DOCD LE1/YR: CPT | Mod: CPTII,S$GLB,, | Performed by: INTERNAL MEDICINE

## 2020-09-08 PROCEDURE — 3077F PR MOST RECENT SYSTOLIC BLOOD PRESSURE >= 140 MM HG: ICD-10-PCS | Mod: CPTII,S$GLB,, | Performed by: INTERNAL MEDICINE

## 2020-09-08 PROCEDURE — 3077F SYST BP >= 140 MM HG: CPT | Mod: CPTII,S$GLB,, | Performed by: INTERNAL MEDICINE

## 2020-09-08 PROCEDURE — 99999 PR PBB SHADOW E&M-EST. PATIENT-LVL III: ICD-10-PCS | Mod: PBBFAC,,, | Performed by: INTERNAL MEDICINE

## 2020-09-08 PROCEDURE — 1125F AMNT PAIN NOTED PAIN PRSNT: CPT | Mod: S$GLB,,, | Performed by: INTERNAL MEDICINE

## 2020-09-08 PROCEDURE — 99214 OFFICE O/P EST MOD 30 MIN: CPT | Mod: S$GLB,,, | Performed by: INTERNAL MEDICINE

## 2020-09-08 PROCEDURE — 99999 PR PBB SHADOW E&M-EST. PATIENT-LVL III: CPT | Mod: PBBFAC,,, | Performed by: INTERNAL MEDICINE

## 2020-09-08 NOTE — PROGRESS NOTES
Chief complaint:   Follow-up from the hospital     79 -year-old  male   Reviewed recent episode.  Records indicate he might have been straining to have a bowel movement but patient says he was in the shower and felt himself getting lightheaded then hurried to go sit down and slipped on some water and fell and broke his ribs and he is pretty sure he passed out but not quite clear.  History is not that clear.  He has not had any further symptoms.  He has not recorded any low blood pressures except sometimes his mouth gets dry any checks his blood pressure and it is in the 90s.  He was given some pain medication after the rib fractures and thought might have been apps sometimes he gets the dry mouth and low blood pressure even without use of the pain meds.  He is only using Tylenol now.  Pain is tolerable on the left.  He is set to see Cardiology and have monitoring again.  He did have some implanted monitoring in the past.  He is going to get a carotid ultrasound it sounds like.    Hospital Course:   Benitez Cabrales 79 y.o. male.  PMHX: HTN,DM,COPD,CAD,CKD,CVA presents via EMS complaining of syncopal episode.  He reports x2 syncopal episodes on 08/12/20 resulting after straining to have a bowel movement approximately 2 hr PTA associated with neck pain,diaphoresis and lightheadedness. Additionally endorses worsening intermittent syncopal episode for past 3 months, primary doctor recently discontinue his amlodipine in being followed is followed by Dr. Ontiveros ([facility]).  He denies fever/chills,N/V/D,CP/palpitations,abd pain,dysuria or any other associated symptoms. Also denies Heavy ETOH/Smoking/Illicit drug use.  patient was admitted for presyncope,likely vasovagal,and bradycardia,he was still bradycardic,I DC  BB and monitored,cardiology was  consulted, Echo was unremarkable,presymncope liley duo to vasovagal and bradycardia,his symptoms improved,bradycardia is improved.patient was discharged home with NO  GAYE ad follow up with PCP and his cardiology as out patient.        ROS:   CONST: weight stable. EYES: no vision change. ENT: no sore throat. CV: no chest pain w/ exertion. RESP: no shortness of breath. GI: no nausea, vomiting, diarrhea. No dysphagia. : no urinary issues. MUSCULOSKELETAL: no new myalgias or arthralgias. SKIN: no new changes. NEURO: no focal deficits. PSYCH: no new issues. ENDOCRINE: no polyuria. HEME: no lymph nodes. ALLERGY: no general pruritis.     PAST MEDICAL HISTORY:  1. Hypertension.  2. Hyperlipidemia.  3. GERD.  4. Vertigo after trauma  5. Colonoscopy was normal around 2009 per [facility], 9/16  there was a polyp -5 yrs.  6. Atrial fib/flutter -[facility] -cardioverted  7.  Dizziness, seen by neurology    8.peptic ulcer disease on EGDNovember 2016 with GI bleeding  9.  Small bowel obstruction, resolved on its own November 2016  10. anemia    PAST SURGICAL HISTORY:  1. Left leg surgery, sounds like venous surgery -Dr Limon  2. Nasal septal repair.      ALLERGIES: NKDA.    SOCIAL HISTORY: He puffs on a rare cigar, he is not a smoker. Drinks wine on occasion,  with 3 children. He has 7 grandchildren. 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 He is originally from [facility]. He is quite active.    FAMILY HISTORY: Sr. had breast cancer. Otherwise negative for prostate or colon cancer, negative for premature coronary disease or diabetes.    HEALTHCARE SCREENING: Colonoscopy was normal around 2009 per [facility], colonoscopy 9/16  there was a polyp -5 yrs , due for PSA , he had eye examination.      Gen: no distress  EYES: conjunctiva clear, non-icteric, PERRL, not pale  ENT: nose clear, nasal mucosa normal, oropharynx clear and moist, teeth good,ears clear  NECK:supple, thyroid non-palpable  RESP: effort is good, lungs clear  CV: heart RRR w/o murmur, gallops or rubs; no carotid bruits, no edema  GI: abdomen soft, non-distended, non-tender, no hepatosplenomegaly  MS:  "gait normal, no clubbing or cyanosis of the digits,   SKIN: no rashes, warm to touch         Assessment and plan:  Benitez was seen today for patient hit side on tub 2 weeks ago.    Diagnoses and all orders for this visit:    Syncope and collapse , no recurrence, does have vagal features but history I am not sure is that reliable as apparently he now says he really was not straining and on the toilet when it happened.  He is going to get his syncope workup.  He has some bradycardia and apparently was taken off a beta-blocker.  Agree with monitoring to help clarify these issues.    Essential hypertension    PAF (paroxysmal atrial fibrillation)    Contusion of rib on left side, initial encounter    Bradycardia           Patient himself is not one with access."This note will not be shared with the patient."    "

## 2020-09-11 ENCOUNTER — CLINICAL SUPPORT (OUTPATIENT)
Dept: CARDIOLOGY | Facility: HOSPITAL | Age: 80
End: 2020-09-11
Attending: NURSE PRACTITIONER
Payer: MEDICARE

## 2020-09-11 ENCOUNTER — HOSPITAL ENCOUNTER (OUTPATIENT)
Dept: CARDIOLOGY | Facility: HOSPITAL | Age: 80
Discharge: HOME | End: 2020-09-11
Attending: NURSE PRACTITIONER
Payer: MEDICARE

## 2020-09-11 DIAGNOSIS — R55 SYNCOPE AND COLLAPSE: ICD-10-CM

## 2020-09-11 LAB
LEFT ARM DIASTOLIC BLOOD PRESSURE: 64 MMHG
LEFT ARM SYSTOLIC BLOOD PRESSURE: 142 MMHG
LEFT CBA DIAS: 14 CM/S
LEFT CBA SYS: 64 CM/S
LEFT CCA DIST DIAS: 13 CM/S
LEFT CCA DIST SYS: 73 CM/S
LEFT CCA MID DIAS: 10 CM/S
LEFT CCA MID SYS: 64 CM/S
LEFT CCA PROX DIAS: 10 CM/S
LEFT CCA PROX SYS: 62 CM/S
LEFT ECA DIAS: 10 CM/S
LEFT ECA SYS: 58 CM/S
LEFT ICA DIST DIAS: 16 CM/S
LEFT ICA DIST SYS: 65 CM/S
LEFT ICA MID DIAS: 13 CM/S
LEFT ICA MID SYS: 64 CM/S
LEFT ICA PROX DIAS: 8 CM/S
LEFT ICA PROX SYS: 32 CM/S
LEFT VERTEBRAL DIAS: 9 CM/S
LEFT VERTEBRAL SYS: 54 CM/S
OHS CV CAROTID RIGHT ICA EDV HIGHEST: 15
OHS CV CAROTID ULTRASOUND LEFT ICA/CCA RATIO: 0.89
OHS CV CAROTID ULTRASOUND RIGHT ICA/CCA RATIO: 0.99
OHS CV PV CAROTID LEFT HIGHEST CCA: 73
OHS CV PV CAROTID LEFT HIGHEST ICA: 65
OHS CV PV CAROTID RIGHT HIGHEST CCA: 85
OHS CV PV CAROTID RIGHT HIGHEST ICA: 67
OHS CV US CAROTID LEFT HIGHEST EDV: 16
RIGHT ARM DIASTOLIC BLOOD PRESSURE: 67 MMHG
RIGHT ARM SYSTOLIC BLOOD PRESSURE: 137 MMHG
RIGHT CBA DIAS: 12 CM/S
RIGHT CBA SYS: 60 CM/S
RIGHT CCA DIST DIAS: 12 CM/S
RIGHT CCA DIST SYS: 68 CM/S
RIGHT CCA MID DIAS: 14 CM/S
RIGHT CCA MID SYS: 82 CM/S
RIGHT CCA PROX DIAS: 15 CM/S
RIGHT CCA PROX SYS: 85 CM/S
RIGHT ECA DIAS: 8 CM/S
RIGHT ECA SYS: 83 CM/S
RIGHT ICA DIST DIAS: 9 CM/S
RIGHT ICA DIST SYS: 45 CM/S
RIGHT ICA MID DIAS: 15 CM/S
RIGHT ICA MID SYS: 67 CM/S
RIGHT ICA PROX DIAS: 8 CM/S
RIGHT ICA PROX SYS: 49 CM/S
RIGHT VERTEBRAL DIAS: 8 CM/S
RIGHT VERTEBRAL SYS: 51 CM/S

## 2020-09-11 PROCEDURE — 93272 CARDIAC EVENT MONITOR (CUPID ONLY): ICD-10-PCS | Mod: ,,, | Performed by: INTERNAL MEDICINE

## 2020-09-11 PROCEDURE — 93880 CV US DOPPLER CAROTID (CUPID ONLY): ICD-10-PCS | Mod: 26,,, | Performed by: INTERNAL MEDICINE

## 2020-09-11 PROCEDURE — 93880 EXTRACRANIAL BILAT STUDY: CPT

## 2020-09-11 PROCEDURE — 93880 EXTRACRANIAL BILAT STUDY: CPT | Mod: 26,,, | Performed by: INTERNAL MEDICINE

## 2020-09-11 PROCEDURE — 93271 ECG/MONITORING AND ANALYSIS: CPT

## 2020-09-11 PROCEDURE — 93272 ECG/REVIEW INTERPRET ONLY: CPT | Mod: ,,, | Performed by: INTERNAL MEDICINE
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
## 2020-09-24 ENCOUNTER — HOSPITAL ENCOUNTER (OUTPATIENT)
Dept: RADIOLOGY | Facility: HOSPITAL | Age: 80
Discharge: HOME | End: 2020-09-24
Attending: PHYSICAL MEDICINE & REHABILITATION
Payer: MEDICARE

## 2020-09-24 ENCOUNTER — OFFICE VISIT (OUTPATIENT)
Dept: SPINE | Facility: CLINIC | Age: 80
End: 2020-09-24
Attending: PHYSICAL MEDICINE & REHABILITATION
Payer: MEDICARE

## 2020-09-24 VITALS
BODY MASS INDEX: 23.64 KG/M2 | DIASTOLIC BLOOD PRESSURE: 69 MMHG | HEIGHT: 71 IN | HEART RATE: 78 BPM | WEIGHT: 168.88 LBS | SYSTOLIC BLOOD PRESSURE: 150 MMHG

## 2020-09-24 DIAGNOSIS — M54.2 NECK PAIN: ICD-10-CM

## 2020-09-24 DIAGNOSIS — R55 SYNCOPE AND COLLAPSE: ICD-10-CM

## 2020-09-24 DIAGNOSIS — R29.898 LEG WEAKNESS, BILATERAL: ICD-10-CM

## 2020-09-24 DIAGNOSIS — R42 DIZZINESS: Primary | ICD-10-CM

## 2020-09-24 PROCEDURE — 1126F AMNT PAIN NOTED NONE PRSNT: CPT | Mod: S$GLB,,, | Performed by: PHYSICAL MEDICINE & REHABILITATION

## 2020-09-24 PROCEDURE — 1126F PR PAIN SEVERITY QUANTIFIED, NO PAIN PRESENT: ICD-10-PCS | Mod: S$GLB,,, | Performed by: PHYSICAL MEDICINE & REHABILITATION

## 2020-09-24 PROCEDURE — 72050 X-RAY EXAM NECK SPINE 4/5VWS: CPT | Mod: 26,,, | Performed by: RADIOLOGY

## 2020-09-24 PROCEDURE — 1159F PR MEDICATION LIST DOCUMENTED IN MEDICAL RECORD: ICD-10-PCS | Mod: S$GLB,,, | Performed by: PHYSICAL MEDICINE & REHABILITATION

## 2020-09-24 PROCEDURE — 3078F DIAST BP <80 MM HG: CPT | Mod: CPTII,S$GLB,, | Performed by: PHYSICAL MEDICINE & REHABILITATION

## 2020-09-24 PROCEDURE — 3078F PR MOST RECENT DIASTOLIC BLOOD PRESSURE < 80 MM HG: ICD-10-PCS | Mod: CPTII,S$GLB,, | Performed by: PHYSICAL MEDICINE & REHABILITATION

## 2020-09-24 PROCEDURE — 3077F SYST BP >= 140 MM HG: CPT | Mod: CPTII,S$GLB,, | Performed by: PHYSICAL MEDICINE & REHABILITATION

## 2020-09-24 PROCEDURE — 99204 PR OFFICE/OUTPT VISIT, NEW, LEVL IV, 45-59 MIN: ICD-10-PCS | Mod: S$GLB,,, | Performed by: PHYSICAL MEDICINE & REHABILITATION

## 2020-09-24 PROCEDURE — 99999 PR PBB SHADOW E&M-EST. PATIENT-LVL V: ICD-10-PCS | Mod: PBBFAC,,, | Performed by: PHYSICAL MEDICINE & REHABILITATION

## 2020-09-24 PROCEDURE — 3077F PR MOST RECENT SYSTOLIC BLOOD PRESSURE >= 140 MM HG: ICD-10-PCS | Mod: CPTII,S$GLB,, | Performed by: PHYSICAL MEDICINE & REHABILITATION

## 2020-09-24 PROCEDURE — 72050 X-RAY EXAM NECK SPINE 4/5VWS: CPT | Mod: TC,FY

## 2020-09-24 PROCEDURE — 72050 XR CERVICAL SPINE AP LAT WITH FLEX EXTEN: ICD-10-PCS | Mod: 26,,, | Performed by: RADIOLOGY

## 2020-09-24 PROCEDURE — 1101F PT FALLS ASSESS-DOCD LE1/YR: CPT | Mod: CPTII,S$GLB,, | Performed by: PHYSICAL MEDICINE & REHABILITATION

## 2020-09-24 PROCEDURE — 99999 PR PBB SHADOW E&M-EST. PATIENT-LVL V: CPT | Mod: PBBFAC,,, | Performed by: PHYSICAL MEDICINE & REHABILITATION

## 2020-09-24 PROCEDURE — 99204 OFFICE O/P NEW MOD 45 MIN: CPT | Mod: S$GLB,,, | Performed by: PHYSICAL MEDICINE & REHABILITATION

## 2020-09-24 PROCEDURE — 1159F MED LIST DOCD IN RCRD: CPT | Mod: S$GLB,,, | Performed by: PHYSICAL MEDICINE & REHABILITATION

## 2020-09-24 PROCEDURE — 1101F PR PT FALLS ASSESS DOC 0-1 FALLS W/OUT INJ PAST YR: ICD-10-PCS | Mod: CPTII,S$GLB,, | Performed by: PHYSICAL MEDICINE & REHABILITATION

## 2020-09-24 NOTE — Clinical Note
Good morning.  Neurology sent his referral to back and spine.  I saw him and I am sending him to neurology.  I do not think the sudden neck pain with low blood pressure and syncope would be from the spine.      Thank you

## 2020-09-24 NOTE — LETTER
September 24, 2020      Lizbet Morillo, JAZ  [address]  [facility] [address]           [facility] [address]  [address], [address]  [facility] [address]  Phone: [phone]  Fax: [phone]          Patient: Benitez Cabrales   MR Number: [id number]   YOB: 1940   Date of Visit: 9/24/2020       Dear Lizbet Morillo:    Thank you for referring Benitez Cabrales to me for evaluation. Attached you will find relevant portions of my assessment and plan of care.    If you have questions, please do not hesitate to call me. I look forward to following Benitez Cabrales along with you.    Sincerely,    Kassandra Dutta MD    Enclosure  CC:  No Recipients    If you would like to receive this communication electronically, please contact [email or web address] or [phone] to request more information on [facility] access.    For providers and/or their staff who would like to refer a patient to [facility], please contact us through our one-stop-shop provider referral line, [facility], at [phone].    If you feel you have received this communication in error or would no longer like to receive these types of communications, please e-mail [email or web address]

## 2020-10-08 ENCOUNTER — IMMUNIZATION (OUTPATIENT)
Dept: FAMILY MEDICINE | Facility: CLINIC | Age: 80
End: 2020-10-08
Payer: MEDICARE

## 2020-10-08 DIAGNOSIS — Z23 NEED FOR IMMUNIZATION AGAINST INFLUENZA: Primary | ICD-10-CM

## 2020-10-08 PROCEDURE — 99999 PR PBB SHADOW E&M-EST. PATIENT-LVL I: ICD-10-PCS | Mod: PBBFAC,,,

## 2020-10-08 PROCEDURE — 99499 NO LOS: ICD-10-PCS | Mod: S$GLB,,, | Performed by: INTERNAL MEDICINE

## 2020-10-08 PROCEDURE — 99999 PR PBB SHADOW E&M-EST. PATIENT-LVL I: CPT | Mod: PBBFAC,,,

## 2020-10-08 PROCEDURE — 99499 UNLISTED E&M SERVICE: CPT | Mod: S$GLB,,, | Performed by: INTERNAL MEDICINE

## 2020-10-08 PROCEDURE — 90694 VACC AIIV4 NO PRSRV 0.5ML IM: CPT | Mod: S$GLB,,, | Performed by: INTERNAL MEDICINE

## 2020-10-08 PROCEDURE — G0008 FLU VACCINE - QUADRIVALENT - ADJUVANTED: ICD-10-PCS | Mod: S$GLB,,, | Performed by: INTERNAL MEDICINE

## 2020-10-08 PROCEDURE — 90694 FLU VACCINE - QUADRIVALENT - ADJUVANTED: ICD-10-PCS | Mod: S$GLB,,, | Performed by: INTERNAL MEDICINE

## 2020-10-08 PROCEDURE — G0008 ADMIN INFLUENZA VIRUS VAC: HCPCS | Mod: S$GLB,,, | Performed by: INTERNAL MEDICINE

## 2020-10-27 ENCOUNTER — OFFICE VISIT (OUTPATIENT)
Dept: FAMILY MEDICINE | Facility: CLINIC | Age: 80
End: 2020-10-27
Payer: MEDICARE

## 2020-10-27 ENCOUNTER — LAB VISIT (OUTPATIENT)
Dept: LAB | Facility: HOSPITAL | Age: 80
End: 2020-10-27
Attending: INTERNAL MEDICINE
Payer: MEDICARE

## 2020-10-27 VITALS
HEART RATE: 58 BPM | TEMPERATURE: 99 F | OXYGEN SATURATION: 99 % | SYSTOLIC BLOOD PRESSURE: 132 MMHG | DIASTOLIC BLOOD PRESSURE: 78 MMHG | BODY MASS INDEX: 22.72 KG/M2 | HEIGHT: 72 IN | WEIGHT: 167.75 LBS

## 2020-10-27 DIAGNOSIS — E87.1 HYPONATREMIA: ICD-10-CM

## 2020-10-27 DIAGNOSIS — I10 ESSENTIAL HYPERTENSION: ICD-10-CM

## 2020-10-27 DIAGNOSIS — I70.0 AORTIC ATHEROSCLEROSIS: ICD-10-CM

## 2020-10-27 DIAGNOSIS — R55 SYNCOPE AND COLLAPSE: ICD-10-CM

## 2020-10-27 DIAGNOSIS — J44.9 CHRONIC OBSTRUCTIVE PULMONARY DISEASE, UNSPECIFIED COPD TYPE: ICD-10-CM

## 2020-10-27 DIAGNOSIS — K62.5 RECTAL BLEEDING: ICD-10-CM

## 2020-10-27 DIAGNOSIS — Z12.5 SCREENING FOR PROSTATE CANCER: ICD-10-CM

## 2020-10-27 DIAGNOSIS — Z00.00 ROUTINE MEDICAL EXAM: Primary | ICD-10-CM

## 2020-10-27 DIAGNOSIS — I48.0 PAF (PAROXYSMAL ATRIAL FIBRILLATION): ICD-10-CM

## 2020-10-27 DIAGNOSIS — N40.0 BENIGN PROSTATIC HYPERPLASIA, UNSPECIFIED WHETHER LOWER URINARY TRACT SYMPTOMS PRESENT: ICD-10-CM

## 2020-10-27 DIAGNOSIS — Z86.010 HISTORY OF COLONIC POLYPS: ICD-10-CM

## 2020-10-27 DIAGNOSIS — D64.9 ANEMIA, UNSPECIFIED TYPE: ICD-10-CM

## 2020-10-27 DIAGNOSIS — E78.5 HYPERLIPIDEMIA, UNSPECIFIED HYPERLIPIDEMIA TYPE: ICD-10-CM

## 2020-10-27 DIAGNOSIS — N18.30 STAGE 3 CHRONIC KIDNEY DISEASE, UNSPECIFIED WHETHER STAGE 3A OR 3B CKD: ICD-10-CM

## 2020-10-27 DIAGNOSIS — R00.1 BRADYCARDIA: ICD-10-CM

## 2020-10-27 LAB
ALBUMIN SERPL BCP-MCNC: 3.8 G/DL (ref 3.5–5.2)
ALP SERPL-CCNC: 84 U/L (ref 55–135)
ALT SERPL W/O P-5'-P-CCNC: 67 U/L (ref 10–44)
ANION GAP SERPL CALC-SCNC: 12 MMOL/L (ref 8–16)
AST SERPL-CCNC: 83 U/L (ref 10–40)
BILIRUB SERPL-MCNC: 0.4 MG/DL (ref 0.1–1)
BUN SERPL-MCNC: 30 MG/DL (ref 8–23)
CALCIUM SERPL-MCNC: 10 MG/DL (ref 8.7–10.5)
CHLORIDE SERPL-SCNC: 101 MMOL/L (ref 95–110)
CO2 SERPL-SCNC: 28 MMOL/L (ref 23–29)
COMPLEXED PSA SERPL-MCNC: 0.51 NG/ML (ref 0–4)
CREAT SERPL-MCNC: 1.4 MG/DL (ref 0.5–1.4)
EST. GFR  (AFRICAN AMERICAN): 54.9 ML/MIN/1.73 M^2
EST. GFR  (NON AFRICAN AMERICAN): 47.4 ML/MIN/1.73 M^2
GLUCOSE SERPL-MCNC: 98 MG/DL (ref 70–110)
POTASSIUM SERPL-SCNC: 4.1 MMOL/L (ref 3.5–5.1)
PROT SERPL-MCNC: 7.8 G/DL (ref 6–8.4)
SODIUM SERPL-SCNC: 141 MMOL/L (ref 136–145)
TSH SERPL DL<=0.005 MIU/L-ACNC: 0.81 UIU/ML (ref 0.4–4)

## 2020-10-27 PROCEDURE — 3078F PR MOST RECENT DIASTOLIC BLOOD PRESSURE < 80 MM HG: ICD-10-PCS | Mod: CPTII,S$GLB,, | Performed by: INTERNAL MEDICINE

## 2020-10-27 PROCEDURE — 99999 PR PBB SHADOW E&M-EST. PATIENT-LVL III: CPT | Mod: PBBFAC,,, | Performed by: INTERNAL MEDICINE

## 2020-10-27 PROCEDURE — 3075F SYST BP GE 130 - 139MM HG: CPT | Mod: CPTII,S$GLB,, | Performed by: INTERNAL MEDICINE

## 2020-10-27 PROCEDURE — 99999 PR PBB SHADOW E&M-EST. PATIENT-LVL III: ICD-10-PCS | Mod: PBBFAC,,, | Performed by: INTERNAL MEDICINE

## 2020-10-27 PROCEDURE — 1159F PR MEDICATION LIST DOCUMENTED IN MEDICAL RECORD: ICD-10-PCS | Mod: S$GLB,,, | Performed by: INTERNAL MEDICINE

## 2020-10-27 PROCEDURE — 3078F DIAST BP <80 MM HG: CPT | Mod: CPTII,S$GLB,, | Performed by: INTERNAL MEDICINE

## 2020-10-27 PROCEDURE — 99499 RISK ADDL DX/OHS AUDIT: ICD-10-PCS | Mod: S$GLB,,, | Performed by: INTERNAL MEDICINE

## 2020-10-27 PROCEDURE — 1101F PR PT FALLS ASSESS DOC 0-1 FALLS W/OUT INJ PAST YR: ICD-10-PCS | Mod: CPTII,S$GLB,, | Performed by: INTERNAL MEDICINE

## 2020-10-27 PROCEDURE — 84153 ASSAY OF PSA TOTAL: CPT

## 2020-10-27 PROCEDURE — 1159F MED LIST DOCD IN RCRD: CPT | Mod: S$GLB,,, | Performed by: INTERNAL MEDICINE

## 2020-10-27 PROCEDURE — 3075F PR MOST RECENT SYSTOLIC BLOOD PRESS GE 130-139MM HG: ICD-10-PCS | Mod: CPTII,S$GLB,, | Performed by: INTERNAL MEDICINE

## 2020-10-27 PROCEDURE — 80053 COMPREHEN METABOLIC PANEL: CPT

## 2020-10-27 PROCEDURE — 99499 UNLISTED E&M SERVICE: CPT | Mod: S$GLB,,, | Performed by: INTERNAL MEDICINE

## 2020-10-27 PROCEDURE — 99214 PR OFFICE/OUTPT VISIT, EST, LEVL IV, 30-39 MIN: ICD-10-PCS | Mod: S$GLB,,, | Performed by: INTERNAL MEDICINE

## 2020-10-27 PROCEDURE — 99214 OFFICE O/P EST MOD 30 MIN: CPT | Mod: S$GLB,,, | Performed by: INTERNAL MEDICINE

## 2020-10-27 PROCEDURE — 84443 ASSAY THYROID STIM HORMONE: CPT

## 2020-10-27 PROCEDURE — 1101F PT FALLS ASSESS-DOCD LE1/YR: CPT | Mod: CPTII,S$GLB,, | Performed by: INTERNAL MEDICINE

## 2020-10-27 PROCEDURE — 36415 COLL VENOUS BLD VENIPUNCTURE: CPT | Mod: PO

## 2020-10-27 RX ORDER — AMLODIPINE BESYLATE 5 MG/1
5 TABLET ORAL DAILY
Qty: 90 TABLET | Refills: 90 | Status: SHIPPED | OUTPATIENT
Start: 2020-10-27 | End: 2022-01-26

## 2020-10-27 RX ORDER — CLOTRIMAZOLE AND BETAMETHASONE DIPROPIONATE 10; .64 MG/G; MG/G
CREAM TOPICAL 2 TIMES DAILY
Qty: 45 G | Refills: 3 | Status: SHIPPED | OUTPATIENT
Start: 2020-10-27 | End: 2023-03-15

## 2020-10-27 NOTE — PROGRESS NOTES
Chief complaint: Physical exam,      80 -year-old  male   He is due for PSA and appears to be up-to-date on colonoscopy 9/16  there was a polyp -5 yrs..  Outside records reviewed.  He is followed by cardiology.  Still working as a .  No other increased risk based upon social history or family history.  He is reduced overall his alcohol intake.    [facility], colonoscopy 9/16  there was a polyp -5 yrs ,     PSA was November 5, 2019    ROS:   CONST: weight stable. EYES: no vision change. ENT: no sore throat. CV: no chest pain w/ exertion. RESP: no shortness of breath. GI: no nausea, vomiting, diarrhea. No dysphagia. : no urinary issues. MUSCULOSKELETAL: no new myalgias or arthralgias. SKIN: no new changes. NEURO: no focal deficits. PSYCH: no new issues. ENDOCRINE: no polyuria. HEME: no lymph nodes. ALLERGY: no general pruritis.     PAST MEDICAL HISTORY:  1. Hypertension.  2. Hyperlipidemia.  3. GERD.  4. Vertigo after trauma  5. Colonoscopy was normal around 2009 per [facility], 9/16  there was a polyp -5 yrs.  6. Atrial fib/flutter -[facility] -cardioverted  7.  Dizziness, seen by neurology    8.peptic ulcer disease on EGDNovember 2016 with GI bleeding  9.  Small bowel obstruction, resolved on its own November 2016  10. anemia    PAST SURGICAL HISTORY:  1. Left leg surgery, sounds like venous surgery -Dr Limon  2. Nasal septal repair.      ALLERGIES: NKDA.    SOCIAL HISTORY: He puffs on a rare cigar, he is not a smoker. Drinks wine on occasion,  with 3 children. He has 7 grandchildren. The patient is now a / in a [employer] -[employer]. He is originally from [place]. He is quite active.    FAMILY HISTORY: Sr. had breast cancer. Otherwise negative for prostate or colon cancer, negative for premature coronary disease or diabetes.    HEALTHCARE SCREENING: Colonoscopy was normal around 2009 per [facility], colonoscopy 9/16  there was a polyp -5 yrs ,       Gen: no distress  EYES:  conjunctiva clear, non-icteric, PERRL  ENT: nose clear, nasal mucosa normal, oropharynx clear and moist, teeth good,ears clear  NECK:supple, thyroid non-palpable  RESP: effort is good, lungs clear  CV: heart RRR w/o murmur, gallops or rubs; no carotid bruits, no edema  GI: abdomen soft, non-distended, non-tender, no hepatosplenomegaly  MS: gait normal, no clubbing or cyanosis of the digits,   SKIN: no rashes, warm to touch           Benitez was seen today for annual exam.    Diagnoses and associated orders for this visit:    Routine medical exam--- .  We will update all his lab work including PSA.  Continue exercise.    Screening for prostate cancer  - PSA, overall in good health    Screening for colorectal cancer--- done                                           Additional evaluation and management issues:    Additionally patient has other medical issues and complaints to address. Reviewed the last visit where he had some syncope.  Reviewed the interval cardiac testing which was all unrevealing.        Seen by MD 10/15/19 -Bright red blood per rectum this morning after having a bowel movement  Two episodes - second occurred this morning as well  He does note some pain in his lower abdominal region - mild - 'comes and goes'  He believes it might be gas   Patient called GI/Dr Harding - states could get an appointment as early as 10/30/19   Did not take his Pradaxa today because he is concerned     Saw Dmitriy  and given pills for constipation. No bleeding    Regarding hypertension isn't a good control.  He was on Diovan HCTZ and his most recent sodium level was 127 about two weeks ago we will repeat.  He did increase his salt intake.  He was on the HCTZ  And then chlorthalidione which we did remove.      Regarding hyperlipidemia he is on medication but is been a year or 2 since he's had a lipid profile.  He is not fasting today.  He is previously well controlled on current statin so we will defer.      He previously  had  a moderate amount of pain that is been chronic in his feet.  It looks like in review podiatry no from a few years ago he has a bunion.  We discussed the condition will not improve and looks like podiatry said conservative therapies might work but only definitive would be surgery.  He has an appointment with Dr. Langley in Orthopedics  and he will consider seeing Podiatry again.  Better with WIDER shoes    He has atrial fibrillation which apparently is under control.      He has COPD without any problems. He has atherosclerosis of the aorta noted in prior records.      He also did have some bleeding.  I reviewed his last CBC which was improving but still not back to normal.  Iron was a year ago and will repeat.    He is due for his PSA as well.      Counseled at length to review prior records.  All these issues reviewed and patient counseled in evaluation and management will be based upon time counseling. Total time over 25 minutes with over 50% counseling.    30 day monitor  At baseline, in the absence of symptoms, the rhythm was e sinus with heart rates in the 50s.  No symptom was reported over the course of the month.  There were auto trigger events for bradycardia.  The slowest of these occurred on 09/26 at 12:35 a.m..  The slowest heart rate recorded was 36 beats per minute this occurred during sinus bradycardia.  The recording also included an atrial supraventricular run perhaps with some nonconducted P waves.  No symptom was reported; this was during typical sleeping hours.  No profound bradycardia was detected during typical waking hours.     Impression:  Sinus rhythm with bradycardic episodes during typical sleeping hours (which is normal).  No symptom was reported        Assessment and plan:        Essential hypertension- better    Syncope and collapse- resolved    PAF (paroxysmal atrial fibrillation)    Chronic obstructive pulmonary disease, unspecified COPD type ,chronic condition and  "stable.    Aortic atherosclerosis    Bradycardia    Anemia, unspecified type ,chronic condition and stable.    Stage 3 chronic kidney disease, unspecified whether stage 3a or 3b CKD    Rectal bleeding, gone    Hyperlipidemia, unspecified hyperlipidemia type    Benign prostatic hyperplasia, unspecified whether lower urinary tract symptoms present    Hyponatremia- check off diuretics            Clinical no be sensitive so as to not cause any issues regarding coverage of evaluation and management issues.  Patient himself also is not well with access""This note will not be shared with the patient."    "

## 2020-11-10 ENCOUNTER — PATIENT OUTREACH (OUTPATIENT)
Dept: ADMINISTRATIVE | Facility: OTHER | Age: 80
End: 2020-11-10

## 2020-11-11 ENCOUNTER — OFFICE VISIT (OUTPATIENT)
Dept: CARDIOLOGY | Facility: CLINIC | Age: 80
End: 2020-11-11
Payer: MEDICARE

## 2020-11-11 VITALS
WEIGHT: 167.56 LBS | BODY MASS INDEX: 22.69 KG/M2 | SYSTOLIC BLOOD PRESSURE: 167 MMHG | DIASTOLIC BLOOD PRESSURE: 79 MMHG | HEIGHT: 72 IN | HEART RATE: 60 BPM

## 2020-11-11 DIAGNOSIS — E78.5 HYPERLIPIDEMIA, UNSPECIFIED HYPERLIPIDEMIA TYPE: Chronic | ICD-10-CM

## 2020-11-11 DIAGNOSIS — R60.0 PERIPHERAL EDEMA: ICD-10-CM

## 2020-11-11 DIAGNOSIS — I48.0 PAROXYSMAL ATRIAL FIBRILLATION: Chronic | ICD-10-CM

## 2020-11-11 DIAGNOSIS — I10 ESSENTIAL HYPERTENSION: Primary | Chronic | ICD-10-CM

## 2020-11-11 DIAGNOSIS — R55 SYNCOPE AND COLLAPSE: ICD-10-CM

## 2020-11-11 PROCEDURE — 99214 PR OFFICE/OUTPT VISIT, EST, LEVL IV, 30-39 MIN: ICD-10-PCS | Mod: S$GLB,,, | Performed by: INTERNAL MEDICINE

## 2020-11-11 PROCEDURE — 3078F DIAST BP <80 MM HG: CPT | Mod: CPTII,S$GLB,, | Performed by: INTERNAL MEDICINE

## 2020-11-11 PROCEDURE — 1126F AMNT PAIN NOTED NONE PRSNT: CPT | Mod: S$GLB,,, | Performed by: INTERNAL MEDICINE

## 2020-11-11 PROCEDURE — 99999 PR PBB SHADOW E&M-EST. PATIENT-LVL III: CPT | Mod: PBBFAC,,, | Performed by: INTERNAL MEDICINE

## 2020-11-11 PROCEDURE — 3077F SYST BP >= 140 MM HG: CPT | Mod: CPTII,S$GLB,, | Performed by: INTERNAL MEDICINE

## 2020-11-11 PROCEDURE — 3077F PR MOST RECENT SYSTOLIC BLOOD PRESSURE >= 140 MM HG: ICD-10-PCS | Mod: CPTII,S$GLB,, | Performed by: INTERNAL MEDICINE

## 2020-11-11 PROCEDURE — 3078F PR MOST RECENT DIASTOLIC BLOOD PRESSURE < 80 MM HG: ICD-10-PCS | Mod: CPTII,S$GLB,, | Performed by: INTERNAL MEDICINE

## 2020-11-11 PROCEDURE — 99999 PR PBB SHADOW E&M-EST. PATIENT-LVL III: ICD-10-PCS | Mod: PBBFAC,,, | Performed by: INTERNAL MEDICINE

## 2020-11-11 PROCEDURE — 1159F MED LIST DOCD IN RCRD: CPT | Mod: S$GLB,,, | Performed by: INTERNAL MEDICINE

## 2020-11-11 PROCEDURE — 1126F PR PAIN SEVERITY QUANTIFIED, NO PAIN PRESENT: ICD-10-PCS | Mod: S$GLB,,, | Performed by: INTERNAL MEDICINE

## 2020-11-11 PROCEDURE — 99214 OFFICE O/P EST MOD 30 MIN: CPT | Mod: S$GLB,,, | Performed by: INTERNAL MEDICINE

## 2020-11-11 PROCEDURE — 1159F PR MEDICATION LIST DOCUMENTED IN MEDICAL RECORD: ICD-10-PCS | Mod: S$GLB,,, | Performed by: INTERNAL MEDICINE

## 2020-11-11 RX ORDER — CHLORTHALIDONE 25 MG/1
TABLET ORAL
Qty: 45 TABLET | Refills: 3
Start: 2020-11-11 | End: 2021-04-16 | Stop reason: SDUPTHER

## 2020-11-11 NOTE — PROGRESS NOTES
Subjective:   Patient ID:  Benitez Cabrales is a 80 y.o. male who presents for follow-up of Syncope and collapse (2 months fu)      HPI:   Benitez Cabrales presents for follow up of hypertension and syncope.  The patient had been admitted with a syncopal episode thought to be vasovagal.  His beta-blocker and ARB were discontinued.  The patient states that his blood pressures at home have been mostly in the 130s.  He has had no further syncope.Benitez Cabrales denies chest pain, shortness of breath, or palpitations.  Has pedal edema is much better on the reduced dose of amlodipine.  He has paroxysmal atrial fibrillation that is followed by EP.  He has had no symptomatic episode.  Patient has dyslipidemia on moderate intensity statin therapy with his LDL in the 60s.  Review of Systems   Constitution: Negative for malaise/fatigue, weight gain and weight loss.   Eyes: Negative for blurred vision.   Cardiovascular: Negative for chest pain, claudication, cyanosis, dyspnea on exertion, irregular heartbeat, leg swelling, near-syncope, orthopnea, palpitations, paroxysmal nocturnal dyspnea and syncope.   Respiratory: Negative for cough, shortness of breath and wheezing.    Musculoskeletal: Negative for falls and myalgias.   Gastrointestinal: Negative for abdominal pain, heartburn, nausea and vomiting.   Genitourinary: Positive for frequency and nocturia.   Neurological: Negative for brief paralysis, dizziness, focal weakness, headaches, numbness, paresthesias and weakness.   Psychiatric/Behavioral: Negative for altered mental status.       Current Outpatient Medications   Medication Sig    amiodarone (PACERONE) 200 MG Tab Take 1 tablet by mouth once daily    amLODIPine (NORVASC) 5 MG tablet Take 1 tablet (5 mg total) by mouth once daily.    apixaban (ELIQUIS) 5 mg Tab Take 1 tablet (5 mg total) by mouth 2 (two) times daily.    chlorthalidone (HYGROTEN) 25 MG Tab 1/2 tablet daily    cholecalciferol, vitamin D3, (VITAMIN D3) 2,000  unit Cap Take 1 capsule by mouth once daily.    clotrimazole-betamethasone 1-0.05% (LOTRISONE) cream Apply topically 2 (two) times daily. Use only a week or 2 at a time    doxepin (SINEQUAN) 10 MG capsule 1-2 HS prn itching    HYDROcodone-acetaminophen (NORCO) 7.5-325 mg per tablet Take 1 tablet by mouth every 6 (six) hours as needed for Pain.    magnesium 200 mg Tab Take by mouth once daily.    meclizine (ANTIVERT) 25 mg tablet Half - one pill at least HS for vertigo    MV-MN/FA/LYCOPENE/LUT/HB#178 (NEHEMIAH MULTIVITAMIN FOR MEN ORAL) Take 1 tablet by mouth once daily.      rosuvastatin (CRESTOR) 10 MG tablet TAKE 1 TABLET BY MOUTH ONCE DAILY IN THE EVENING    tiZANidine (ZANAFLEX) 4 MG tablet TAKE 1 TO 2 TABLETS BY MOUTH AT BEDTIME AS NEEDED FOR  SPASMS     No current facility-administered medications for this visit.      Objective:   Physical Exam   Constitutional: He is oriented to person, place, and time. He appears well-developed. No distress.   BP (!) 167/79 (BP Location: Left arm, Patient Position: Sitting, BP Method: Large (Automatic))   Pulse 60   Ht 6' (1.829 m)   Wt 76 kg (167 lb 8.8 oz)   BMI 22.72 kg/m²    HENT:   Head: Normocephalic.   Right Ear: External ear normal.   Left Ear: External ear normal.   Eyes: Pupils are equal, round, and reactive to light. EOM are normal. No scleral icterus.   Neck: Neck supple. No JVD present. No thyromegaly present.   Cardiovascular: Normal rate, regular rhythm, normal heart sounds and intact distal pulses. PMI is not displaced. Exam reveals no gallop and no friction rub.   No murmur heard.  Pulmonary/Chest: Effort normal and breath sounds normal. No respiratory distress. He has no wheezes. He has no rales.   Abdominal: Soft. He exhibits no distension. There is no hepatosplenomegaly. There is no abdominal tenderness.   Musculoskeletal:         General: No tenderness or edema.      Comments: Gait normal   Neurological: He is alert and oriented to person, place,  and time.   Skin: Skin is warm and dry. No rash noted.   Psychiatric: He has a normal mood and affect. His behavior is normal.       Lab Results   Component Value Date     10/27/2020    K 4.1 10/27/2020     10/27/2020    CO2 28 10/27/2020    BUN 30 (H) 10/27/2020    CREATININE 1.4 10/27/2020    GLU 98 10/27/2020    HGBA1C 5.9 06/13/2013    MG 2.3 08/12/2020    AST 83 (H) 10/27/2020    ALT 67 (H) 10/27/2020    ALBUMIN 3.8 10/27/2020    PROT 7.8 10/27/2020    BILITOT 0.4 10/27/2020    WBC 8.18 08/14/2020    HGB 12.0 (L) 08/14/2020    HCT 35.4 (L) 08/14/2020    MCV 89 08/14/2020     08/14/2020    TSH 0.815 10/27/2020    CHOL 158 09/11/2020    HDL 69 09/11/2020    LDLCALC 69.6 09/11/2020    TRIG 97 09/11/2020       Assessment:     1. Essential hypertension :  Adequate control   2. Paroxysmal atrial fibrillation :  No symptomatic episodes   3. Hyperlipidemia, unspecified hyperlipidemia type :  At LDL goal   4. Peripheral edema :  Improved       Plan:     Benitez was seen today for syncope and collapse.    Diagnoses and all orders for this visit:    Essential hypertension  -     chlorthalidone (HYGROTEN) 25 MG Tab; 1/2 tablet daily as well as continuing the amlodipine.  Patient has had hyponatremia in the past but was cautioned about excess use of salt.  Paroxysmal atrial fibrillation  Continue current regimen with follow-up by EP.  Hyperlipidemia, unspecified hyperlipidemia type  Continue current regimen.  Peripheral edema    He has moderate mitral regurgitation on his most recent echocardiogram.  This will need periodic of follow-up.

## 2020-11-18 ENCOUNTER — OFFICE VISIT (OUTPATIENT)
Dept: NEUROLOGY | Facility: CLINIC | Age: 80
End: 2020-11-18
Attending: PHYSICAL MEDICINE & REHABILITATION
Payer: MEDICARE

## 2020-11-18 VITALS
WEIGHT: 170.44 LBS | HEART RATE: 54 BPM | HEIGHT: 72 IN | BODY MASS INDEX: 23.09 KG/M2 | DIASTOLIC BLOOD PRESSURE: 67 MMHG | SYSTOLIC BLOOD PRESSURE: 139 MMHG

## 2020-11-18 DIAGNOSIS — I48.0 PAROXYSMAL ATRIAL FIBRILLATION: Chronic | ICD-10-CM

## 2020-11-18 DIAGNOSIS — R26.81 UNSTEADINESS ON FEET: ICD-10-CM

## 2020-11-18 DIAGNOSIS — Z79.899 ON AMIODARONE THERAPY: ICD-10-CM

## 2020-11-18 DIAGNOSIS — R55 POSTURAL DIZZINESS WITH PRESYNCOPE: Primary | ICD-10-CM

## 2020-11-18 DIAGNOSIS — R55 SYNCOPE AND COLLAPSE: ICD-10-CM

## 2020-11-18 DIAGNOSIS — R42 POSTURAL DIZZINESS WITH PRESYNCOPE: Primary | ICD-10-CM

## 2020-11-18 DIAGNOSIS — R29.3 POSTURE IMBALANCE: ICD-10-CM

## 2020-11-18 DIAGNOSIS — I10 ESSENTIAL HYPERTENSION: Chronic | ICD-10-CM

## 2020-11-18 DIAGNOSIS — R26.89 IMBALANCE: ICD-10-CM

## 2020-11-18 DIAGNOSIS — E78.5 HYPERLIPIDEMIA, UNSPECIFIED HYPERLIPIDEMIA TYPE: Chronic | ICD-10-CM

## 2020-11-18 PROCEDURE — 3288F FALL RISK ASSESSMENT DOCD: CPT | Mod: CPTII,S$GLB,, | Performed by: PSYCHIATRY & NEUROLOGY

## 2020-11-18 PROCEDURE — 99204 OFFICE O/P NEW MOD 45 MIN: CPT | Mod: S$GLB,,, | Performed by: PSYCHIATRY & NEUROLOGY

## 2020-11-18 PROCEDURE — 3075F PR MOST RECENT SYSTOLIC BLOOD PRESS GE 130-139MM HG: ICD-10-PCS | Mod: CPTII,S$GLB,, | Performed by: PSYCHIATRY & NEUROLOGY

## 2020-11-18 PROCEDURE — 1100F PR PT FALLS ASSESS DOC 2+ FALLS/FALL W/INJURY/YR: ICD-10-PCS | Mod: CPTII,S$GLB,, | Performed by: PSYCHIATRY & NEUROLOGY

## 2020-11-18 PROCEDURE — 1126F AMNT PAIN NOTED NONE PRSNT: CPT | Mod: S$GLB,,, | Performed by: PSYCHIATRY & NEUROLOGY

## 2020-11-18 PROCEDURE — 3075F SYST BP GE 130 - 139MM HG: CPT | Mod: CPTII,S$GLB,, | Performed by: PSYCHIATRY & NEUROLOGY

## 2020-11-18 PROCEDURE — 1159F PR MEDICATION LIST DOCUMENTED IN MEDICAL RECORD: ICD-10-PCS | Mod: S$GLB,,, | Performed by: PSYCHIATRY & NEUROLOGY

## 2020-11-18 PROCEDURE — 1100F PTFALLS ASSESS-DOCD GE2>/YR: CPT | Mod: CPTII,S$GLB,, | Performed by: PSYCHIATRY & NEUROLOGY

## 2020-11-18 PROCEDURE — 1126F PR PAIN SEVERITY QUANTIFIED, NO PAIN PRESENT: ICD-10-PCS | Mod: S$GLB,,, | Performed by: PSYCHIATRY & NEUROLOGY

## 2020-11-18 PROCEDURE — 3288F PR FALLS RISK ASSESSMENT DOCUMENTED: ICD-10-PCS | Mod: CPTII,S$GLB,, | Performed by: PSYCHIATRY & NEUROLOGY

## 2020-11-18 PROCEDURE — 99999 PR PBB SHADOW E&M-EST. PATIENT-LVL IV: CPT | Mod: PBBFAC,,, | Performed by: PSYCHIATRY & NEUROLOGY

## 2020-11-18 PROCEDURE — 3078F PR MOST RECENT DIASTOLIC BLOOD PRESSURE < 80 MM HG: ICD-10-PCS | Mod: CPTII,S$GLB,, | Performed by: PSYCHIATRY & NEUROLOGY

## 2020-11-18 PROCEDURE — 99999 PR PBB SHADOW E&M-EST. PATIENT-LVL IV: ICD-10-PCS | Mod: PBBFAC,,, | Performed by: PSYCHIATRY & NEUROLOGY

## 2020-11-18 PROCEDURE — 99204 PR OFFICE/OUTPT VISIT, NEW, LEVL IV, 45-59 MIN: ICD-10-PCS | Mod: S$GLB,,, | Performed by: PSYCHIATRY & NEUROLOGY

## 2020-11-18 PROCEDURE — 3078F DIAST BP <80 MM HG: CPT | Mod: CPTII,S$GLB,, | Performed by: PSYCHIATRY & NEUROLOGY

## 2020-11-18 PROCEDURE — 1159F MED LIST DOCD IN RCRD: CPT | Mod: S$GLB,,, | Performed by: PSYCHIATRY & NEUROLOGY

## 2020-11-18 RX ORDER — ASCORBIC ACID 250 MG
250 TABLET ORAL
COMMUNITY
End: 2023-03-15

## 2020-11-18 RX ORDER — FLUCONAZOLE 200 MG/1
TABLET ORAL
COMMUNITY
Start: 2020-09-08 | End: 2023-03-15

## 2020-11-18 NOTE — LETTER
November 18, 2020      Kassandra Dutta MD  [address]  [facility]  [facility] [address]           [facility] [address]  [address]  [facility] [address]  Phone: [phone]  Fax: [phone]          Patient: Benitez Cabrales   MR Number: [id number]   YOB: 1940   Date of Visit: 11/18/2020       Dear Dr. Kassandra Dutta:    Thank you for referring Benitez Cabrales to me for evaluation. Attached you will find relevant portions of my assessment and plan of care.    If you have questions, please do not hesitate to call me. I look forward to following Benitez Cabrales along with you.    Sincerely,    Gaviota Childs MD PhD    Enclosure  CC:  No Recipients    If you would like to receive this communication electronically, please contact [email or web address] or [phone] to request more information on One Public Link access.    For providers and/or their staff who would like to refer a patient to [facility], please contact us through our one-stop-shop provider referral line, [facility], at [phone].    If you feel you have received this communication in error or would no longer like to receive these types of communications, please e-mail [email or web address]

## 2020-11-18 NOTE — PATIENT INSTRUCTIONS
You came to neurology clinic because sometimes you feel lightheaded which has resulted in fainting and you have some balance issues. Your lightheadedness has gotten better with a decrease in the blood pressure medications. I recommend you continue to take your blood pressure at home and review the results with your PCP and your cardiologist to make sure you stay in the right range. I also recommend that you continue to exercise regularly. This will keep your nervous system and the rest of your body strong. It takes a coordinated effort across multiple organ systems to regulate blood pressure especially with changes in position. This process becomes less efficient as we age. The best way to keep your body able to tolerate going from lying to siting to standing, is to exercise regularly. As you know, alcohol does injure the nerves over time. I suspect that your balance issues are a result of this. On exam today, you have issues standing with your feet together and your eyes closed, you almost fell over! This is because your nerves have some injury disrupting your ability to know and control where you are in space. Your symptoms are mild-moderate and you are at increased risk for falls. To avoid falls, wear good shoes in the house and when you are out. Lights on when walking around. No clutter in the walkways. Continue to spread your feet out when walking to make your gait more stable. You should also continue to exercise for strength, flexibility, and endurance. I recommend against meclizine. It will make you sleepy, it does not treat any underlying issues, and will put you at an increased risk for falls. Avoid this medication if possible. Continue to drink alcohol only in moderation.     Return to clinic with any worsening of your symptoms or any new issues.     Gaviota Childs MD PhD  Neurology-Epilepsy  [facility].  [facility]
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
## 2021-01-09 ENCOUNTER — IMMUNIZATION (OUTPATIENT)
Dept: OBSTETRICS AND GYNECOLOGY | Facility: CLINIC | Age: 81
End: 2021-01-09
Payer: MEDICARE

## 2021-01-09 DIAGNOSIS — Z23 NEED FOR VACCINATION: ICD-10-CM

## 2021-01-09 PROCEDURE — 91300 COVID-19, MRNA, LNP-S, PF, 30 MCG/0.3 ML DOSE VACCINE: CPT | Mod: PBBFAC | Performed by: FAMILY MEDICINE

## 2021-01-30 ENCOUNTER — IMMUNIZATION (OUTPATIENT)
Dept: OBSTETRICS AND GYNECOLOGY | Facility: CLINIC | Age: 81
End: 2021-01-30
Payer: MEDICARE

## 2021-01-30 DIAGNOSIS — Z23 NEED FOR VACCINATION: Primary | ICD-10-CM

## 2021-01-30 PROCEDURE — 0002A COVID-19, MRNA, LNP-S, PF, 30 MCG/0.3 ML DOSE VACCINE: CPT | Mod: PBBFAC | Performed by: FAMILY MEDICINE

## 2021-01-30 PROCEDURE — 91300 COVID-19, MRNA, LNP-S, PF, 30 MCG/0.3 ML DOSE VACCINE: CPT | Mod: PBBFAC | Performed by: FAMILY MEDICINE

## 2021-02-01 RX ORDER — TIZANIDINE 4 MG/1
TABLET ORAL
Qty: 40 TABLET | Refills: 12 | Status: SHIPPED | OUTPATIENT
Start: 2021-02-01 | End: 2022-03-05

## 2021-04-06 ENCOUNTER — PATIENT OUTREACH (OUTPATIENT)
Dept: ADMINISTRATIVE | Facility: OTHER | Age: 81
End: 2021-04-06

## 2021-04-08 ENCOUNTER — TELEPHONE (OUTPATIENT)
Dept: ELECTROPHYSIOLOGY | Facility: CLINIC | Age: 81
End: 2021-04-08

## 2021-04-08 DIAGNOSIS — Z79.899 LONG TERM CURRENT USE OF AMIODARONE: Primary | ICD-10-CM

## 2021-04-13 ENCOUNTER — HOSPITAL ENCOUNTER (OUTPATIENT)
Dept: CARDIOLOGY | Facility: HOSPITAL | Age: 81
Discharge: HOME | End: 2021-04-13
Attending: INTERNAL MEDICINE
Payer: MEDICARE

## 2021-04-13 ENCOUNTER — PATIENT MESSAGE (OUTPATIENT)
Dept: CARDIOLOGY | Facility: HOSPITAL | Age: 81
End: 2021-04-13

## 2021-04-13 DIAGNOSIS — Z79.899 LONG TERM CURRENT USE OF AMIODARONE: ICD-10-CM

## 2021-04-13 LAB
ASCENDING AORTA: 4.07 CM
AV INDEX (PROSTH): 0.55
AV MEAN GRADIENT: 4 MMHG
AV PEAK GRADIENT: 6 MMHG
AV VALVE AREA: 2.04 CM2
AV VELOCITY RATIO: 0.6
CV ECHO LV RWT: 0.54 CM
DOP CALC AO PEAK VEL: 1.22 M/S
DOP CALC AO VTI: 30.04 CM
DOP CALC LVOT AREA: 3.7 CM2
DOP CALC LVOT DIAMETER: 2.18 CM
DOP CALC LVOT PEAK VEL: 0.73 M/S
DOP CALC LVOT STROKE VOLUME: 61.33 CM3
DOP CALCLVOT PEAK VEL VTI: 16.44 CM
E WAVE DECELERATION TIME: 307.97 MSEC
E/A RATIO: 0.82
E/E' RATIO: 6.19 M/S
ECHO LV POSTERIOR WALL: 1.22 CM (ref 0.6–1.1)
EJECTION FRACTION: 60 %
FRACTIONAL SHORTENING: 39 % (ref 28–44)
INTERVENTRICULAR SEPTUM: 1.11 CM (ref 0.6–1.1)
IVRT: 182.68 MSEC
LA MAJOR: 6.21 CM
LA MINOR: 6.61 CM
LA WIDTH: 5.56 CM
LEFT ATRIUM SIZE: 6.01 CM
LEFT ATRIUM VOLUME: 181.89 CM3
LEFT INTERNAL DIMENSION IN SYSTOLE: 2.75 CM (ref 2.1–4)
LEFT VENTRICLE DIASTOLIC VOLUME: 92.64 ML
LEFT VENTRICLE SYSTOLIC VOLUME: 28.28 ML
LEFT VENTRICULAR INTERNAL DIMENSION IN DIASTOLE: 4.5 CM (ref 3.5–6)
LEFT VENTRICULAR MASS: 189.87 G
LV LATERAL E/E' RATIO: 5.91 M/S
LV SEPTAL E/E' RATIO: 6.5 M/S
MV MEAN GRADIENT: 1 MMHG
MV PEAK A VEL: 0.79 M/S
MV PEAK E VEL: 0.65 M/S
MV PEAK GRADIENT: 3 MMHG
MV STENOSIS PRESSURE HALF TIME: 89.31 MS
MV VALVE AREA P 1/2 METHOD: 2.46 CM2
PISA MRMAX VEL: 0.05 M/S
PISA TR MAX VEL: 2.12 M/S
PV PEAK VELOCITY: 0.98 CM/S
RA MAJOR: 6.45 CM
RA PRESSURE: 3 MMHG
RA WIDTH: 4.56 CM
RIGHT VENTRICULAR END-DIASTOLIC DIMENSION: 4.89 CM
RV TISSUE DOPPLER FREE WALL SYSTOLIC VELOCITY 1 (APICAL 4 CHAMBER VIEW): 11.85 CM/S
STJ: 3.63 CM
TDI LATERAL: 0.11 M/S
TDI SEPTAL: 0.1 M/S
TDI: 0.11 M/S
TR MAX PG: 18 MMHG
TRICUSPID ANNULAR PLANE SYSTOLIC EXCURSION: 2.46 CM
TV REST PULMONARY ARTERY PRESSURE: 21 MMHG

## 2021-04-13 PROCEDURE — 93306 ECHO (CUPID ONLY): ICD-10-PCS | Mod: 26,,, | Performed by: INTERNAL MEDICINE

## 2021-04-13 PROCEDURE — 93306 TTE W/DOPPLER COMPLETE: CPT

## 2021-04-13 PROCEDURE — 93306 TTE W/DOPPLER COMPLETE: CPT | Mod: 26,,, | Performed by: INTERNAL MEDICINE

## 2021-04-16 ENCOUNTER — HOSPITAL ENCOUNTER (OUTPATIENT)
Dept: CARDIOLOGY | Facility: CLINIC | Age: 81
Discharge: HOME | End: 2021-04-16
Payer: MEDICARE

## 2021-04-16 ENCOUNTER — OFFICE VISIT (OUTPATIENT)
Dept: ELECTROPHYSIOLOGY | Facility: CLINIC | Age: 81
End: 2021-04-16
Payer: MEDICARE

## 2021-04-16 ENCOUNTER — PATIENT MESSAGE (OUTPATIENT)
Dept: UROLOGY | Facility: CLINIC | Age: 81
End: 2021-04-16

## 2021-04-16 VITALS
HEIGHT: 69 IN | SYSTOLIC BLOOD PRESSURE: 166 MMHG | WEIGHT: 171.94 LBS | DIASTOLIC BLOOD PRESSURE: 96 MMHG | BODY MASS INDEX: 25.47 KG/M2 | HEART RATE: 55 BPM

## 2021-04-16 DIAGNOSIS — J41.0 SIMPLE CHRONIC BRONCHITIS: Primary | ICD-10-CM

## 2021-04-16 DIAGNOSIS — I10 ESSENTIAL HYPERTENSION: Chronic | ICD-10-CM

## 2021-04-16 DIAGNOSIS — I48.0 PAROXYSMAL ATRIAL FIBRILLATION: Chronic | ICD-10-CM

## 2021-04-16 DIAGNOSIS — Z79.899 ON AMIODARONE THERAPY: ICD-10-CM

## 2021-04-16 DIAGNOSIS — I49.8 OTHER SPECIFIED CARDIAC ARRHYTHMIAS: ICD-10-CM

## 2021-04-16 DIAGNOSIS — E78.00 PURE HYPERCHOLESTEROLEMIA: Chronic | ICD-10-CM

## 2021-04-16 PROCEDURE — 3288F FALL RISK ASSESSMENT DOCD: CPT | Mod: CPTII,S$GLB,, | Performed by: INTERNAL MEDICINE

## 2021-04-16 PROCEDURE — 99499 RISK ADDL DX/OHS AUDIT: ICD-10-PCS | Mod: S$GLB,,, | Performed by: INTERNAL MEDICINE

## 2021-04-16 PROCEDURE — 1126F AMNT PAIN NOTED NONE PRSNT: CPT | Mod: S$GLB,,, | Performed by: INTERNAL MEDICINE

## 2021-04-16 PROCEDURE — 93005 RHYTHM STRIP: ICD-10-PCS | Mod: S$GLB,,, | Performed by: INTERNAL MEDICINE

## 2021-04-16 PROCEDURE — 99499 UNLISTED E&M SERVICE: CPT | Mod: S$GLB,,, | Performed by: INTERNAL MEDICINE

## 2021-04-16 PROCEDURE — 1101F PR PT FALLS ASSESS DOC 0-1 FALLS W/OUT INJ PAST YR: ICD-10-PCS | Mod: CPTII,S$GLB,, | Performed by: INTERNAL MEDICINE

## 2021-04-16 PROCEDURE — 93010 RHYTHM STRIP: ICD-10-PCS | Mod: S$GLB,,, | Performed by: INTERNAL MEDICINE

## 2021-04-16 PROCEDURE — 99214 OFFICE O/P EST MOD 30 MIN: CPT | Mod: S$GLB,,, | Performed by: INTERNAL MEDICINE

## 2021-04-16 PROCEDURE — 1159F MED LIST DOCD IN RCRD: CPT | Mod: S$GLB,,, | Performed by: INTERNAL MEDICINE

## 2021-04-16 PROCEDURE — 93005 ELECTROCARDIOGRAM TRACING: CPT | Mod: S$GLB,,, | Performed by: INTERNAL MEDICINE

## 2021-04-16 PROCEDURE — 99214 PR OFFICE/OUTPT VISIT, EST, LEVL IV, 30-39 MIN: ICD-10-PCS | Mod: S$GLB,,, | Performed by: INTERNAL MEDICINE

## 2021-04-16 PROCEDURE — 1159F PR MEDICATION LIST DOCUMENTED IN MEDICAL RECORD: ICD-10-PCS | Mod: S$GLB,,, | Performed by: INTERNAL MEDICINE

## 2021-04-16 PROCEDURE — 93010 ELECTROCARDIOGRAM REPORT: CPT | Mod: S$GLB,,, | Performed by: INTERNAL MEDICINE

## 2021-04-16 PROCEDURE — 1101F PT FALLS ASSESS-DOCD LE1/YR: CPT | Mod: CPTII,S$GLB,, | Performed by: INTERNAL MEDICINE

## 2021-04-16 PROCEDURE — 99999 PR PBB SHADOW E&M-EST. PATIENT-LVL IV: ICD-10-PCS | Mod: PBBFAC,,, | Performed by: INTERNAL MEDICINE

## 2021-04-16 PROCEDURE — 3288F PR FALLS RISK ASSESSMENT DOCUMENTED: ICD-10-PCS | Mod: CPTII,S$GLB,, | Performed by: INTERNAL MEDICINE

## 2021-04-16 PROCEDURE — 99999 PR PBB SHADOW E&M-EST. PATIENT-LVL IV: CPT | Mod: PBBFAC,,, | Performed by: INTERNAL MEDICINE

## 2021-04-16 PROCEDURE — 1126F PR PAIN SEVERITY QUANTIFIED, NO PAIN PRESENT: ICD-10-PCS | Mod: S$GLB,,, | Performed by: INTERNAL MEDICINE

## 2021-04-16 RX ORDER — CHLORTHALIDONE 25 MG/1
TABLET ORAL
Qty: 90 TABLET | Refills: 3
Start: 2021-04-16 | End: 2021-06-23 | Stop reason: SDUPTHER

## 2021-05-04 ENCOUNTER — OFFICE VISIT (OUTPATIENT)
Dept: UROLOGY | Facility: CLINIC | Age: 81
End: 2021-05-04
Payer: MEDICARE

## 2021-05-04 VITALS — BODY MASS INDEX: 25.49 KG/M2 | HEIGHT: 69 IN | WEIGHT: 172.06 LBS

## 2021-05-04 DIAGNOSIS — R35.1 NOCTURIA MORE THAN TWICE PER NIGHT: ICD-10-CM

## 2021-05-04 DIAGNOSIS — N13.8 BPH WITH OBSTRUCTION/LOWER URINARY TRACT SYMPTOMS: Primary | ICD-10-CM

## 2021-05-04 DIAGNOSIS — R33.9 INCOMPLETE EMPTYING OF BLADDER: ICD-10-CM

## 2021-05-04 DIAGNOSIS — N40.1 BPH WITH OBSTRUCTION/LOWER URINARY TRACT SYMPTOMS: Primary | ICD-10-CM

## 2021-05-04 PROCEDURE — 1101F PR PT FALLS ASSESS DOC 0-1 FALLS W/OUT INJ PAST YR: ICD-10-PCS | Mod: CPTII,S$GLB,, | Performed by: UROLOGY

## 2021-05-04 PROCEDURE — 1126F AMNT PAIN NOTED NONE PRSNT: CPT | Mod: S$GLB,,, | Performed by: UROLOGY

## 2021-05-04 PROCEDURE — 1101F PT FALLS ASSESS-DOCD LE1/YR: CPT | Mod: CPTII,S$GLB,, | Performed by: UROLOGY

## 2021-05-04 PROCEDURE — 3288F FALL RISK ASSESSMENT DOCD: CPT | Mod: CPTII,S$GLB,, | Performed by: UROLOGY

## 2021-05-04 PROCEDURE — 99204 PR OFFICE/OUTPT VISIT, NEW, LEVL IV, 45-59 MIN: ICD-10-PCS | Mod: S$GLB,,, | Performed by: UROLOGY

## 2021-05-04 PROCEDURE — 1126F PR PAIN SEVERITY QUANTIFIED, NO PAIN PRESENT: ICD-10-PCS | Mod: S$GLB,,, | Performed by: UROLOGY

## 2021-05-04 PROCEDURE — 3288F PR FALLS RISK ASSESSMENT DOCUMENTED: ICD-10-PCS | Mod: CPTII,S$GLB,, | Performed by: UROLOGY

## 2021-05-04 PROCEDURE — 99999 PR PBB SHADOW E&M-EST. PATIENT-LVL III: CPT | Mod: PBBFAC,,, | Performed by: UROLOGY

## 2021-05-04 PROCEDURE — 1159F PR MEDICATION LIST DOCUMENTED IN MEDICAL RECORD: ICD-10-PCS | Mod: S$GLB,,, | Performed by: UROLOGY

## 2021-05-04 PROCEDURE — 1159F MED LIST DOCD IN RCRD: CPT | Mod: S$GLB,,, | Performed by: UROLOGY

## 2021-05-04 PROCEDURE — 99999 PR PBB SHADOW E&M-EST. PATIENT-LVL III: ICD-10-PCS | Mod: PBBFAC,,, | Performed by: UROLOGY

## 2021-05-04 PROCEDURE — 99204 OFFICE O/P NEW MOD 45 MIN: CPT | Mod: S$GLB,,, | Performed by: UROLOGY

## 2021-05-04 RX ORDER — TAMSULOSIN HYDROCHLORIDE 0.4 MG/1
0.4 CAPSULE ORAL DAILY
Qty: 30 CAPSULE | Refills: 11 | Status: SHIPPED | OUTPATIENT
Start: 2021-05-04 | End: 2022-05-11

## 2021-06-01 ENCOUNTER — HOSPITAL ENCOUNTER (OUTPATIENT)
Dept: RADIOLOGY | Facility: HOSPITAL | Age: 81
Discharge: HOME | End: 2021-06-01
Attending: UROLOGY
Payer: MEDICARE

## 2021-06-01 DIAGNOSIS — R33.9 INCOMPLETE EMPTYING OF BLADDER: ICD-10-CM

## 2021-06-01 PROCEDURE — 76770 US EXAM ABDO BACK WALL COMP: CPT | Mod: 26,,, | Performed by: RADIOLOGY

## 2021-06-01 PROCEDURE — 76770 US EXAM ABDO BACK WALL COMP: CPT | Mod: TC

## 2021-06-01 PROCEDURE — 76770 US RETROPERITONEAL COMPLETE: ICD-10-PCS | Mod: 26,,, | Performed by: RADIOLOGY

## 2021-06-02 ENCOUNTER — PATIENT OUTREACH (OUTPATIENT)
Dept: ADMINISTRATIVE | Facility: OTHER | Age: 81
End: 2021-06-02

## 2021-06-07 ENCOUNTER — OFFICE VISIT (OUTPATIENT)
Dept: UROLOGY | Facility: CLINIC | Age: 81
End: 2021-06-07
Payer: MEDICARE

## 2021-06-07 VITALS — HEIGHT: 69 IN | WEIGHT: 171.94 LBS | BODY MASS INDEX: 25.47 KG/M2

## 2021-06-07 DIAGNOSIS — R33.9 INCOMPLETE EMPTYING OF BLADDER: ICD-10-CM

## 2021-06-07 DIAGNOSIS — J41.0 SIMPLE CHRONIC BRONCHITIS: ICD-10-CM

## 2021-06-07 DIAGNOSIS — I10 ESSENTIAL HYPERTENSION: Chronic | ICD-10-CM

## 2021-06-07 DIAGNOSIS — N13.8 BPH WITH OBSTRUCTION/LOWER URINARY TRACT SYMPTOMS: Primary | ICD-10-CM

## 2021-06-07 DIAGNOSIS — I48.0 PAROXYSMAL ATRIAL FIBRILLATION: Chronic | ICD-10-CM

## 2021-06-07 DIAGNOSIS — N40.1 BPH WITH OBSTRUCTION/LOWER URINARY TRACT SYMPTOMS: Primary | ICD-10-CM

## 2021-06-07 DIAGNOSIS — R35.1 NOCTURIA MORE THAN TWICE PER NIGHT: ICD-10-CM

## 2021-06-07 LAB
BILIRUB SERPL-MCNC: NORMAL MG/DL
BLOOD URINE, POC: NORMAL
COLOR, POC UA: YELLOW
GLUCOSE UR QL STRIP: NORMAL
KETONES UR QL STRIP: NORMAL
LEUKOCYTE ESTERASE URINE, POC: NORMAL
NITRITE, POC UA: NORMAL
PH, POC UA: 6
PROTEIN, POC: NORMAL
SPECIFIC GRAVITY, POC UA: 1020
UROBILINOGEN, POC UA: NORMAL

## 2021-06-07 PROCEDURE — 99213 OFFICE O/P EST LOW 20 MIN: CPT | Mod: S$GLB,,, | Performed by: UROLOGY

## 2021-06-07 PROCEDURE — 99213 PR OFFICE/OUTPT VISIT, EST, LEVL III, 20-29 MIN: ICD-10-PCS | Mod: S$GLB,,, | Performed by: UROLOGY

## 2021-06-07 PROCEDURE — 1159F PR MEDICATION LIST DOCUMENTED IN MEDICAL RECORD: ICD-10-PCS | Mod: S$GLB,,, | Performed by: UROLOGY

## 2021-06-07 PROCEDURE — 1101F PT FALLS ASSESS-DOCD LE1/YR: CPT | Mod: CPTII,S$GLB,, | Performed by: UROLOGY

## 2021-06-07 PROCEDURE — 3288F PR FALLS RISK ASSESSMENT DOCUMENTED: ICD-10-PCS | Mod: CPTII,S$GLB,, | Performed by: UROLOGY

## 2021-06-07 PROCEDURE — 1126F PR PAIN SEVERITY QUANTIFIED, NO PAIN PRESENT: ICD-10-PCS | Mod: S$GLB,,, | Performed by: UROLOGY

## 2021-06-07 PROCEDURE — 1101F PR PT FALLS ASSESS DOC 0-1 FALLS W/OUT INJ PAST YR: ICD-10-PCS | Mod: CPTII,S$GLB,, | Performed by: UROLOGY

## 2021-06-07 PROCEDURE — 1126F AMNT PAIN NOTED NONE PRSNT: CPT | Mod: S$GLB,,, | Performed by: UROLOGY

## 2021-06-07 PROCEDURE — 99999 PR PBB SHADOW E&M-EST. PATIENT-LVL III: CPT | Mod: PBBFAC,,, | Performed by: UROLOGY

## 2021-06-07 PROCEDURE — 81001 URINALYSIS AUTO W/SCOPE: CPT | Mod: S$GLB,,, | Performed by: UROLOGY

## 2021-06-07 PROCEDURE — 99999 PR PBB SHADOW E&M-EST. PATIENT-LVL III: ICD-10-PCS | Mod: PBBFAC,,, | Performed by: UROLOGY

## 2021-06-07 PROCEDURE — 3288F FALL RISK ASSESSMENT DOCD: CPT | Mod: CPTII,S$GLB,, | Performed by: UROLOGY

## 2021-06-07 PROCEDURE — 1159F MED LIST DOCD IN RCRD: CPT | Mod: S$GLB,,, | Performed by: UROLOGY

## 2021-06-07 PROCEDURE — 81001 PR  URINALYSIS, AUTO, W/SCOPE: ICD-10-PCS | Mod: S$GLB,,, | Performed by: UROLOGY

## 2021-06-23 DIAGNOSIS — I10 ESSENTIAL HYPERTENSION: Chronic | ICD-10-CM

## 2021-06-28 RX ORDER — CHLORTHALIDONE 25 MG/1
TABLET ORAL
Qty: 90 TABLET | Refills: 12 | Status: SHIPPED | OUTPATIENT
Start: 2021-06-28 | End: 2022-05-13 | Stop reason: SDUPTHER

## 2021-07-06 RX ORDER — DOXEPIN HYDROCHLORIDE 10 MG/1
CAPSULE ORAL
Qty: 60 CAPSULE | Refills: 12 | Status: SHIPPED | OUTPATIENT
Start: 2021-07-06 | End: 2022-07-08

## 2021-08-25 ENCOUNTER — IMMUNIZATION (OUTPATIENT)
Dept: OBSTETRICS AND GYNECOLOGY | Facility: CLINIC | Age: 81
End: 2021-08-25
Payer: MEDICARE

## 2021-08-25 DIAGNOSIS — Z23 NEED FOR VACCINATION: Primary | ICD-10-CM

## 2021-08-25 PROCEDURE — 91300 COVID-19, MRNA, LNP-S, PF, 30 MCG/0.3 ML DOSE VACCINE: ICD-10-PCS | Mod: ,,, | Performed by: FAMILY MEDICINE

## 2021-08-25 PROCEDURE — 0003A COVID-19, MRNA, LNP-S, PF, 30 MCG/0.3 ML DOSE VACCINE: ICD-10-PCS | Mod: CV19,,, | Performed by: FAMILY MEDICINE

## 2021-08-25 PROCEDURE — 91300 COVID-19, MRNA, LNP-S, PF, 30 MCG/0.3 ML DOSE VACCINE: CPT | Mod: ,,, | Performed by: FAMILY MEDICINE

## 2021-08-25 PROCEDURE — 0003A COVID-19, MRNA, LNP-S, PF, 30 MCG/0.3 ML DOSE VACCINE: CPT | Mod: CV19,,, | Performed by: FAMILY MEDICINE

## 2021-08-26 ENCOUNTER — TELEPHONE (OUTPATIENT)
Dept: ELECTROPHYSIOLOGY | Facility: CLINIC | Age: 81
End: 2021-08-26

## 2021-10-28 ENCOUNTER — CLINICAL SUPPORT (OUTPATIENT)
Dept: FAMILY MEDICINE | Facility: CLINIC | Age: 81
End: 2021-10-28
Payer: MEDICARE

## 2021-10-28 DIAGNOSIS — Z23 NEED FOR IMMUNIZATION AGAINST INFLUENZA: Primary | ICD-10-CM

## 2021-10-28 PROCEDURE — 90694 VACC AIIV4 NO PRSRV 0.5ML IM: CPT | Mod: S$GLB,,, | Performed by: INTERNAL MEDICINE

## 2021-10-28 PROCEDURE — 99499 UNLISTED E&M SERVICE: CPT | Mod: S$GLB,,, | Performed by: INTERNAL MEDICINE

## 2021-10-28 PROCEDURE — G0008 ADMIN INFLUENZA VIRUS VAC: HCPCS | Mod: S$GLB,,, | Performed by: INTERNAL MEDICINE

## 2021-10-28 PROCEDURE — G0008 FLU VACCINE - QUADRIVALENT - ADJUVANTED: ICD-10-PCS | Mod: S$GLB,,, | Performed by: INTERNAL MEDICINE

## 2021-10-28 PROCEDURE — 90694 FLU VACCINE - QUADRIVALENT - ADJUVANTED: ICD-10-PCS | Mod: S$GLB,,, | Performed by: INTERNAL MEDICINE

## 2021-10-28 PROCEDURE — 99499 NO LOS: ICD-10-PCS | Mod: S$GLB,,, | Performed by: INTERNAL MEDICINE

## 2021-11-08 ENCOUNTER — OFFICE VISIT (OUTPATIENT)
Dept: FAMILY MEDICINE | Facility: CLINIC | Age: 81
End: 2021-11-08
Payer: MEDICARE

## 2021-11-08 VITALS
DIASTOLIC BLOOD PRESSURE: 58 MMHG | TEMPERATURE: 98 F | HEART RATE: 57 BPM | OXYGEN SATURATION: 98 % | BODY MASS INDEX: 26.48 KG/M2 | SYSTOLIC BLOOD PRESSURE: 118 MMHG | WEIGHT: 178.81 LBS | HEIGHT: 69 IN

## 2021-11-08 DIAGNOSIS — Z00.00 ROUTINE MEDICAL EXAM: Primary | ICD-10-CM

## 2021-11-08 DIAGNOSIS — N18.30 STAGE 3 CHRONIC KIDNEY DISEASE, UNSPECIFIED WHETHER STAGE 3A OR 3B CKD: ICD-10-CM

## 2021-11-08 DIAGNOSIS — E78.5 HYPERLIPIDEMIA, UNSPECIFIED HYPERLIPIDEMIA TYPE: ICD-10-CM

## 2021-11-08 DIAGNOSIS — D64.9 ANEMIA, UNSPECIFIED TYPE: ICD-10-CM

## 2021-11-08 DIAGNOSIS — Z12.5 SCREENING FOR PROSTATE CANCER: ICD-10-CM

## 2021-11-08 DIAGNOSIS — I48.0 PAF (PAROXYSMAL ATRIAL FIBRILLATION): ICD-10-CM

## 2021-11-08 DIAGNOSIS — I10 ESSENTIAL HYPERTENSION: ICD-10-CM

## 2021-11-08 DIAGNOSIS — I70.0 AORTIC ATHEROSCLEROSIS: ICD-10-CM

## 2021-11-08 DIAGNOSIS — J44.9 CHRONIC OBSTRUCTIVE PULMONARY DISEASE, UNSPECIFIED COPD TYPE: ICD-10-CM

## 2021-11-08 DIAGNOSIS — Z86.010 HISTORY OF COLONIC POLYPS: ICD-10-CM

## 2021-11-08 DIAGNOSIS — N40.0 BENIGN PROSTATIC HYPERPLASIA, UNSPECIFIED WHETHER LOWER URINARY TRACT SYMPTOMS PRESENT: ICD-10-CM

## 2021-11-08 PROCEDURE — 99214 OFFICE O/P EST MOD 30 MIN: CPT | Mod: 25,S$GLB,, | Performed by: INTERNAL MEDICINE

## 2021-11-08 PROCEDURE — 3078F DIAST BP <80 MM HG: CPT | Mod: CPTII,S$GLB,, | Performed by: INTERNAL MEDICINE

## 2021-11-08 PROCEDURE — 99999 PR PBB SHADOW E&M-EST. PATIENT-LVL IV: ICD-10-PCS | Mod: PBBFAC,,, | Performed by: INTERNAL MEDICINE

## 2021-11-08 PROCEDURE — 1159F PR MEDICATION LIST DOCUMENTED IN MEDICAL RECORD: ICD-10-PCS | Mod: CPTII,S$GLB,, | Performed by: INTERNAL MEDICINE

## 2021-11-08 PROCEDURE — 3288F PR FALLS RISK ASSESSMENT DOCUMENTED: ICD-10-PCS | Mod: CPTII,S$GLB,, | Performed by: INTERNAL MEDICINE

## 2021-11-08 PROCEDURE — 1159F MED LIST DOCD IN RCRD: CPT | Mod: CPTII,S$GLB,, | Performed by: INTERNAL MEDICINE

## 2021-11-08 PROCEDURE — 1126F AMNT PAIN NOTED NONE PRSNT: CPT | Mod: CPTII,S$GLB,, | Performed by: INTERNAL MEDICINE

## 2021-11-08 PROCEDURE — 1101F PR PT FALLS ASSESS DOC 0-1 FALLS W/OUT INJ PAST YR: ICD-10-PCS | Mod: CPTII,S$GLB,, | Performed by: INTERNAL MEDICINE

## 2021-11-08 PROCEDURE — 99214 PR OFFICE/OUTPT VISIT, EST, LEVL IV, 30-39 MIN: ICD-10-PCS | Mod: 25,S$GLB,, | Performed by: INTERNAL MEDICINE

## 2021-11-08 PROCEDURE — 1126F PR PAIN SEVERITY QUANTIFIED, NO PAIN PRESENT: ICD-10-PCS | Mod: CPTII,S$GLB,, | Performed by: INTERNAL MEDICINE

## 2021-11-08 PROCEDURE — 3288F FALL RISK ASSESSMENT DOCD: CPT | Mod: CPTII,S$GLB,, | Performed by: INTERNAL MEDICINE

## 2021-11-08 PROCEDURE — 1101F PT FALLS ASSESS-DOCD LE1/YR: CPT | Mod: CPTII,S$GLB,, | Performed by: INTERNAL MEDICINE

## 2021-11-08 PROCEDURE — 99999 PR PBB SHADOW E&M-EST. PATIENT-LVL IV: CPT | Mod: PBBFAC,,, | Performed by: INTERNAL MEDICINE

## 2021-11-08 PROCEDURE — 3078F PR MOST RECENT DIASTOLIC BLOOD PRESSURE < 80 MM HG: ICD-10-PCS | Mod: CPTII,S$GLB,, | Performed by: INTERNAL MEDICINE

## 2021-11-08 PROCEDURE — 3074F SYST BP LT 130 MM HG: CPT | Mod: CPTII,S$GLB,, | Performed by: INTERNAL MEDICINE

## 2021-11-08 PROCEDURE — 3074F PR MOST RECENT SYSTOLIC BLOOD PRESSURE < 130 MM HG: ICD-10-PCS | Mod: CPTII,S$GLB,, | Performed by: INTERNAL MEDICINE

## 2021-11-08 RX ORDER — ACETAMINOPHEN 500 MG
500 TABLET ORAL EVERY 8 HOURS PRN
COMMUNITY

## 2021-11-10 ENCOUNTER — LAB VISIT (OUTPATIENT)
Dept: LAB | Facility: HOSPITAL | Age: 81
End: 2021-11-10
Attending: INTERNAL MEDICINE
Payer: MEDICARE

## 2021-11-10 DIAGNOSIS — I10 ESSENTIAL HYPERTENSION: Chronic | ICD-10-CM

## 2021-11-10 DIAGNOSIS — N40.1 BPH WITH OBSTRUCTION/LOWER URINARY TRACT SYMPTOMS: ICD-10-CM

## 2021-11-10 DIAGNOSIS — Z00.00 ROUTINE MEDICAL EXAM: ICD-10-CM

## 2021-11-10 DIAGNOSIS — E78.5 HYPERLIPIDEMIA, UNSPECIFIED HYPERLIPIDEMIA TYPE: ICD-10-CM

## 2021-11-10 DIAGNOSIS — N13.8 BPH WITH OBSTRUCTION/LOWER URINARY TRACT SYMPTOMS: ICD-10-CM

## 2021-11-10 DIAGNOSIS — Z79.899 ON AMIODARONE THERAPY: ICD-10-CM

## 2021-11-10 DIAGNOSIS — Z12.5 SCREENING FOR PROSTATE CANCER: ICD-10-CM

## 2021-11-10 DIAGNOSIS — N40.0 BENIGN PROSTATIC HYPERPLASIA, UNSPECIFIED WHETHER LOWER URINARY TRACT SYMPTOMS PRESENT: ICD-10-CM

## 2021-11-10 LAB
ALBUMIN SERPL BCP-MCNC: 3.7 G/DL (ref 3.5–5.2)
ALBUMIN SERPL BCP-MCNC: 3.7 G/DL (ref 3.5–5.2)
ALP SERPL-CCNC: 66 U/L (ref 55–135)
ALP SERPL-CCNC: 66 U/L (ref 55–135)
ALT SERPL W/O P-5'-P-CCNC: 26 U/L (ref 10–44)
ALT SERPL W/O P-5'-P-CCNC: 26 U/L (ref 10–44)
ANION GAP SERPL CALC-SCNC: 8 MMOL/L (ref 8–16)
AST SERPL-CCNC: 34 U/L (ref 10–40)
AST SERPL-CCNC: 34 U/L (ref 10–40)
BASOPHILS # BLD AUTO: 0.04 K/UL (ref 0–0.2)
BASOPHILS NFR BLD: 0.6 % (ref 0–1.9)
BILIRUB DIRECT SERPL-MCNC: 0.2 MG/DL (ref 0.1–0.3)
BILIRUB SERPL-MCNC: 0.4 MG/DL (ref 0.1–1)
BILIRUB SERPL-MCNC: 0.4 MG/DL (ref 0.1–1)
BUN SERPL-MCNC: 20 MG/DL (ref 8–23)
CALCIUM SERPL-MCNC: 9.7 MG/DL (ref 8.7–10.5)
CHLORIDE SERPL-SCNC: 106 MMOL/L (ref 95–110)
CHOLEST SERPL-MCNC: 145 MG/DL (ref 120–199)
CHOLEST/HDLC SERPL: 2.3 {RATIO} (ref 2–5)
CO2 SERPL-SCNC: 25 MMOL/L (ref 23–29)
COMPLEXED PSA SERPL-MCNC: 0.92 NG/ML (ref 0–4)
COMPLEXED PSA SERPL-MCNC: 0.92 NG/ML (ref 0–4)
CREAT SERPL-MCNC: 1.4 MG/DL (ref 0.5–1.4)
DIFFERENTIAL METHOD: ABNORMAL
EOSINOPHIL # BLD AUTO: 0.2 K/UL (ref 0–0.5)
EOSINOPHIL NFR BLD: 3.2 % (ref 0–8)
ERYTHROCYTE [DISTWIDTH] IN BLOOD BY AUTOMATED COUNT: 15.9 % (ref 11.5–14.5)
EST. GFR  (AFRICAN AMERICAN): 54.5 ML/MIN/1.73 M^2
EST. GFR  (NON AFRICAN AMERICAN): 47.1 ML/MIN/1.73 M^2
GLUCOSE SERPL-MCNC: 81 MG/DL (ref 70–110)
HCT VFR BLD AUTO: 35.7 % (ref 40–54)
HDLC SERPL-MCNC: 63 MG/DL (ref 40–75)
HDLC SERPL: 43.4 % (ref 20–50)
HGB BLD-MCNC: 11.3 G/DL (ref 14–18)
IMM GRANULOCYTES # BLD AUTO: 0.01 K/UL (ref 0–0.04)
IMM GRANULOCYTES NFR BLD AUTO: 0.2 % (ref 0–0.5)
LDLC SERPL CALC-MCNC: 63.6 MG/DL (ref 63–159)
LYMPHOCYTES # BLD AUTO: 1.9 K/UL (ref 1–4.8)
LYMPHOCYTES NFR BLD: 27.9 % (ref 18–48)
MCH RBC QN AUTO: 29.2 PG (ref 27–31)
MCHC RBC AUTO-ENTMCNC: 31.7 G/DL (ref 32–36)
MCV RBC AUTO: 92 FL (ref 82–98)
MONOCYTES # BLD AUTO: 0.7 K/UL (ref 0.3–1)
MONOCYTES NFR BLD: 11 % (ref 4–15)
NEUTROPHILS # BLD AUTO: 3.8 K/UL (ref 1.8–7.7)
NEUTROPHILS NFR BLD: 57.1 % (ref 38–73)
NONHDLC SERPL-MCNC: 82 MG/DL
NRBC BLD-RTO: 0 /100 WBC
PLATELET # BLD AUTO: 158 K/UL (ref 150–450)
PMV BLD AUTO: 12.7 FL (ref 9.2–12.9)
POTASSIUM SERPL-SCNC: 4.7 MMOL/L (ref 3.5–5.1)
PROT SERPL-MCNC: 6.8 G/DL (ref 6–8.4)
PROT SERPL-MCNC: 6.8 G/DL (ref 6–8.4)
RBC # BLD AUTO: 3.87 M/UL (ref 4.6–6.2)
SODIUM SERPL-SCNC: 139 MMOL/L (ref 136–145)
TRIGL SERPL-MCNC: 92 MG/DL (ref 30–150)
TSH SERPL DL<=0.005 MIU/L-ACNC: 0.69 UIU/ML (ref 0.4–4)
TSH SERPL DL<=0.005 MIU/L-ACNC: 0.69 UIU/ML (ref 0.4–4)
WBC # BLD AUTO: 6.64 K/UL (ref 3.9–12.7)

## 2021-11-10 PROCEDURE — 85025 COMPLETE CBC W/AUTO DIFF WBC: CPT | Performed by: INTERNAL MEDICINE

## 2021-11-10 PROCEDURE — 84153 ASSAY OF PSA TOTAL: CPT | Performed by: UROLOGY

## 2021-11-10 PROCEDURE — 80076 HEPATIC FUNCTION PANEL: CPT | Performed by: INTERNAL MEDICINE

## 2021-11-10 PROCEDURE — 80061 LIPID PANEL: CPT | Performed by: INTERNAL MEDICINE

## 2021-11-10 PROCEDURE — 36415 COLL VENOUS BLD VENIPUNCTURE: CPT | Mod: PO | Performed by: UROLOGY

## 2021-11-10 PROCEDURE — 84443 ASSAY THYROID STIM HORMONE: CPT | Performed by: INTERNAL MEDICINE

## 2021-11-10 PROCEDURE — 80053 COMPREHEN METABOLIC PANEL: CPT | Performed by: INTERNAL MEDICINE

## 2022-01-05 DIAGNOSIS — E78.5 HYPERLIPIDEMIA, UNSPECIFIED HYPERLIPIDEMIA TYPE: ICD-10-CM

## 2022-01-06 RX ORDER — ROSUVASTATIN CALCIUM 10 MG/1
10 TABLET, COATED ORAL NIGHTLY
Qty: 90 TABLET | Refills: 3 | Status: SHIPPED | OUTPATIENT
Start: 2022-01-06 | End: 2023-01-04

## 2022-01-26 DIAGNOSIS — I10 PRIMARY HYPERTENSION: Primary | ICD-10-CM

## 2022-01-26 RX ORDER — AMLODIPINE BESYLATE 5 MG/1
5 TABLET ORAL DAILY
Qty: 90 TABLET | Refills: 90 | Status: SHIPPED | OUTPATIENT
Start: 2022-01-26 | End: 2022-02-25

## 2022-01-26 NOTE — TELEPHONE ENCOUNTER
----- Message from Maria A Abbasi sent at 1/26/2022  2:38 PM CST -----  Regarding: Refill  Who Called:TOMEKA MARSHALL [[id number]]        Refill or New Rx Refill        RX Name and Strength:amLODIPine (NORVASC) 5 MG tablet        How is the patient currently taking it? (ex. 1XDay): 1xday        Is this a 30 day or 90 day RX: 90         Preferred Pharmacy with phone number:[address] [facility]              Local or Mail Order: Local        Ordering Provider:        Would the patient rather a call back or a response via [facility]? Call back         Best Call Back Number:[phone]        Additional Information Pt wants a call once it is fillled

## 2022-01-26 NOTE — TELEPHONE ENCOUNTER
No new care gaps identified.  Powered by [employer]. Reference number: [id number].   1/26/2022 2:43:54 PM CST

## 2022-01-27 NOTE — PROGRESS NOTES
Chief Complaint  Chief Complaint   Patient presents with    Cough     Symptoms started about a week ago.     Nasal Congestion       HPI    HPI   Mr. Benitez Cabrales is a 81 y.o male with multiple problems as listed below. The patient presents with cough and nasal congestion. He states that he has been having yellowish sputum. States that symptoms started a bout a week ago.     PAST MEDICAL HISTORY:  Past Medical History:   Diagnosis Date    Anticoagulant long-term use     Atrial fibrillation     COPD (chronic obstructive pulmonary disease)     GERD (gastroesophageal reflux disease)     Hyperlipidemia     Hypertension     Nuclear sclerosis, bilateral 10/9/2017    Rhinitis medicamentosa 6/30/2014    Small bowel obstruction     Vertigo 6/13/2013       PAST SURGICAL HISTORY:  Past Surgical History:   Procedure Laterality Date    ICM implant      NOSE SURGERY      Septal Repair    REMOVAL OF IMPLANTABLE LOOP RECORDER N/A 12/10/2018    Procedure: REMOVAL, IMPLANTABLE LOOP RECORDER;  Surgeon: Mickey Morales MD;  Location: [facility] EP LAB;  Service: Cardiology;  Laterality: N/A;    VASCULAR SURGERY      left leg       SOCIAL HISTORY:  Social History     Socioeconomic History    Marital status:    Occupational History     Employer: Retired    Tobacco Use    Smoking status: Never Smoker    Smokeless tobacco: Never Used    Tobacco comment: .  Three kids.  Occup:   at [employer].     Substance and Sexual Activity    Alcohol use: Yes     Alcohol/week: 1.0 standard drink     Types: 1 Glasses of wine per week     Comment: occasional    Drug use: No    Sexual activity: Not Currently     Social Determinants of Health     Food Insecurity: No Food Insecurity    Worried About Running Out of Food in the Last Year: Never true    Ran Out of Food in the Last Year: Never true   Transportation Needs: No Transportation Needs    Lack of Transportation (Medical): No    Lack of Transportation  (Non-Medical): No   Physical Activity: Sufficiently Active    Days of Exercise per Week: 3 days    Minutes of Exercise per Session: 60 min   Stress: Stress Concern Present    Feeling of Stress : To some extent   Social Connections: Unknown    Frequency of Communication with Friends and Family: Twice a week    Frequency of Social Gatherings with Friends and Family: Once a week    Active Member of Clubs or Organizations: No    Marital Status:    Housing Stability: Low Risk     Unable to Pay for Housing in the Last Year: No    Number of Places Lived in the Last Year: 1    Unstable Housing in the Last Year: No       FAMILY HISTORY:  Family History   Problem Relation Age of Onset    Cancer Maternal Aunt     No Known Problems Mother     No Known Problems Father     No Known Problems Sister     No Known Problems Brother     No Known Problems Maternal Uncle     No Known Problems Paternal Aunt     No Known Problems Paternal Uncle     No Known Problems Maternal Grandmother     No Known Problems Maternal Grandfather     No Known Problems Paternal Grandmother     No Known Problems Paternal Grandfather     Asthma Neg Hx     Emphysema Neg Hx     Amblyopia Neg Hx     Blindness Neg Hx     Cataracts Neg Hx     Diabetes Neg Hx     Glaucoma Neg Hx     Hypertension Neg Hx     Macular degeneration Neg Hx     Retinal detachment Neg Hx     Strabismus Neg Hx     Stroke Neg Hx     Thyroid disease Neg Hx        ALLERGIES AND MEDICATIONS: updated and reviewed.  Review of patient's allergies indicates:  No Known Allergies  Current Outpatient Medications   Medication Sig Dispense Refill    acetaminophen (TYLENOL) 500 MG tablet Take 500 mg by mouth every 8 (eight) hours as needed for Pain.      amLODIPine (NORVASC) 5 MG tablet Take 1 tablet by mouth once daily 90 tablet 0    amLODIPine (NORVASC) 5 MG tablet Take 1 tablet (5 mg total) by mouth once daily. 90 tablet 90    ascorbic acid, vitamin C,  (VITAMIN C) 250 MG tablet Take 250 mg by mouth 3 (three) times a week.      chlorthalidone (HYGROTEN) 25 MG Tab 1 tablet daily 90 tablet 12    cholecalciferol, vitamin D3, (VITAMIN D3) 50 mcg (2,000 unit) Cap Take 1 capsule by mouth once daily.      doxepin (SINEQUAN) 10 MG capsule TAKE 1 TO 2 CAPSULES BY MOUTH AT BEDTIME AS NEEDED FOR ITCHING 60 capsule 12    ELIQUIS 5 mg Tab Take 1 tablet by mouth twice daily 180 tablet 3    magnesium 200 mg Tab Take by mouth once daily.      MV-MN/FA/LYCOPENE/LUT/HB#178 (NEHEMIAH MULTIVITAMIN FOR MEN ORAL) Take 1 tablet by mouth once daily.      PACERONE 200 mg Tab Take 1 tablet by mouth once daily 90 tablet 3    rosuvastatin (CRESTOR) 10 MG tablet Take 1 tablet (10 mg total) by mouth every evening. 90 tablet 3    tiZANidine (ZANAFLEX) 4 MG tablet TAKE 1 TO 2 TABLETS BY MOUTH AT BEDTIME AS NEEDED FOR  SPASMS 40 tablet 12    azelastine (ASTELIN) 137 mcg (0.1 %) nasal spray 1 spray (137 mcg total) by Nasal route 2 (two) times daily. 30 mL 3    azithromycin (Z-ASHLEY) 250 MG tablet Take 2 tablets by mouth on day 1; Take 1 tablet by mouth on days 2-5 6 tablet 0    clotrimazole-betamethasone 1-0.05% (LOTRISONE) cream Apply topically 2 (two) times daily. Use only a week or 2 at a time (Patient not taking: Reported on 1/28/2022) 45 g 3    fluconazole (DIFLUCAN) 200 MG Tab TAKE 1 TABLET BY MOUTH IN THE MORNING ON FRIDAY. TAKE WITH FOOD.      fluticasone propionate (FLONASE) 50 mcg/actuation nasal spray 1 spray (50 mcg total) by Each Nostril route once daily. 9.9 mL 0    HYDROcodone-acetaminophen (NORCO) 7.5-325 mg per tablet Take 1 tablet by mouth every 6 (six) hours as needed for Pain. (Patient not taking: No sig reported) 21 tablet 0    loratadine (CLARITIN) 10 mg tablet Take 1 tablet (10 mg total) by mouth once daily. 90 tablet 3    meclizine (ANTIVERT) 25 mg tablet TAKE 1/2 TO 1 (ONE-HALF TO ONE) TABLET AT LEAST BY MOUTH AT BEDTIME FOR VERTIGO (Patient not taking: No sig  "reported) 30 tablet 0    tamsulosin (FLOMAX) 0.4 mg Cap Take 1 capsule (0.4 mg total) by mouth once daily. (Patient not taking: Reported on 11/8/2021) 30 capsule 11     No current facility-administered medications for this visit.       Patient Care Team:  Diomedes Ayoub MD as PCP - General (Internal Medicine)  Macarena Hernandez LPN as Licensed Practical Nurse  Soniya Hayden MA as Care Coordinator    ROS  Review of Systems   Constitutional: Negative for chills, fatigue, fever and unexpected weight change.   HENT: Positive for congestion and sinus pressure. Negative for ear pain, sore throat and voice change.    Eyes: Negative for photophobia, pain, discharge and visual disturbance.   Respiratory: Positive for cough. Negative for shortness of breath and wheezing.    Cardiovascular: Negative for chest pain, palpitations and leg swelling.   Gastrointestinal: Negative for abdominal pain, blood in stool, constipation, diarrhea, nausea and vomiting.   Genitourinary: Negative for dysuria and frequency.   Musculoskeletal: Negative for gait problem, joint swelling and neck stiffness.   Skin: Negative for color change and rash.   Neurological: Negative for seizures, weakness and headaches.   Hematological: Negative for adenopathy. Does not bruise/bleed easily.   Psychiatric/Behavioral: Negative for behavioral problems and dysphoric mood. The patient is not nervous/anxious.        Physical Exam  Vitals:    01/28/22 1417   BP: 130/60   Pulse: (!) 58   Temp: 98.7 °F (37.1 °C)   TempSrc: Oral   SpO2: 98%   Weight: 81.1 kg (178 lb 12.7 oz)   Height: 5' 9" (1.753 m)    Body mass index is 26.4 kg/m².  Weight: 81.1 kg (178 lb 12.7 oz)   Height: 5' 9" (175.3 cm)     Physical Exam  Constitutional:       Appearance: He is well-developed and well-nourished.   HENT:      Head: Normocephalic and atraumatic.      Salivary Glands: Right salivary gland is not diffusely enlarged or tender. Left salivary gland is not diffusely " enlarged or tender.      Right Ear: A middle ear effusion is present. Tympanic membrane is retracted.      Left Ear: A middle ear effusion is present. Tympanic membrane is retracted.      Nose: Congestion and rhinorrhea present.      Right Turbinates: Enlarged and swollen.      Left Turbinates: Enlarged and swollen.      Right Sinus: No maxillary sinus tenderness or frontal sinus tenderness.      Left Sinus: No maxillary sinus tenderness or frontal sinus tenderness.      Mouth/Throat:      Pharynx: Posterior oropharyngeal erythema present.   Eyes:      Extraocular Movements: EOM normal.      Conjunctiva/sclera: Conjunctivae normal.      Pupils: Pupils are equal, round, and reactive to light.   Neck:      Thyroid: No thyromegaly.   Cardiovascular:      Rate and Rhythm: Normal rate. Rhythm regularly irregular.      Pulses: Intact distal pulses.      Heart sounds: No murmur heard.      Pulmonary:      Effort: Pulmonary effort is normal.      Breath sounds: Normal breath sounds. No wheezing.   Musculoskeletal:         General: No edema. Normal range of motion.      Cervical back: Normal range of motion and neck supple.   Lymphadenopathy:      Cervical: No cervical adenopathy.   Skin:     General: Skin is warm and dry.      Findings: No rash.   Neurological:      Mental Status: He is alert and oriented to person, place, and time.      Cranial Nerves: No cranial nerve deficit.   Psychiatric:         Mood and Affect: Mood and affect normal.         Behavior: Behavior normal.         Thought Content: Thought content normal.         Judgment: Judgment normal.         Health Maintenance       Date Due Completion Date    TETANUS VACCINE Never done ---      Health maintenance reviewed at this time.    Assessment & Plan  Bacterial URI  -     azelastine (ASTELIN) 137 mcg (0.1 %) nasal spray; 1 spray (137 mcg total) by Nasal route 2 (two) times daily.  Dispense: 30 mL; Refill: 3  -     fluticasone propionate (FLONASE) 50  mcg/actuation nasal spray; 1 spray (50 mcg total) by Each Nostril route once daily.  Dispense: 9.9 mL; Refill: 0  -     loratadine (CLARITIN) 10 mg tablet; Take 1 tablet (10 mg total) by mouth once daily.  Dispense: 90 tablet; Refill: 3  -     azithromycin (Z-ASHLEY) 250 MG tablet; Take 2 tablets by mouth on day 1; Take 1 tablet by mouth on days 2-5  Dispense: 6 tablet; Refill: 0    Acute otitis media with effusion  -     azelastine (ASTELIN) 137 mcg (0.1 %) nasal spray; 1 spray (137 mcg total) by Nasal route 2 (two) times daily.  Dispense: 30 mL; Refill: 3  -     fluticasone propionate (FLONASE) 50 mcg/actuation nasal spray; 1 spray (50 mcg total) by Each Nostril route once daily.  Dispense: 9.9 mL; Refill: 0  -     azithromycin (Z-ASHLEY) 250 MG tablet; Take 2 tablets by mouth on day 1; Take 1 tablet by mouth on days 2-5  Dispense: 6 tablet; Refill: 0    Cough  -     COVID-19 Routine Screening  OTC cough medicine  Warm salt water rinse    PAF (paroxysmal atrial fibrillation)  Stable. Monitor. The current medical regimen is effective;  continue present plan and medications.    Aortic atherosclerosis  Stable. Monitor. The current medical regimen is effective;  continue present plan and medications.    Chronic obstructive pulmonary disease, unspecified COPD type  Stable. Monitor. The current medical regimen is effective;  continue present plan and medications.    Stage 3 chronic kidney disease, unspecified whether stage 3a or 3b CKD  Stable. Monitor.          Follow-up: Follow up if symptoms worsen or fail to improve.

## 2022-01-28 ENCOUNTER — OFFICE VISIT (OUTPATIENT)
Dept: FAMILY MEDICINE | Facility: CLINIC | Age: 82
End: 2022-01-28
Payer: MEDICARE

## 2022-01-28 VITALS
HEIGHT: 69 IN | HEART RATE: 58 BPM | DIASTOLIC BLOOD PRESSURE: 60 MMHG | WEIGHT: 178.81 LBS | BODY MASS INDEX: 26.48 KG/M2 | TEMPERATURE: 99 F | OXYGEN SATURATION: 98 % | SYSTOLIC BLOOD PRESSURE: 130 MMHG

## 2022-01-28 DIAGNOSIS — N18.30 STAGE 3 CHRONIC KIDNEY DISEASE, UNSPECIFIED WHETHER STAGE 3A OR 3B CKD: ICD-10-CM

## 2022-01-28 DIAGNOSIS — H65.199 ACUTE OTITIS MEDIA WITH EFFUSION: ICD-10-CM

## 2022-01-28 DIAGNOSIS — J44.9 CHRONIC OBSTRUCTIVE PULMONARY DISEASE, UNSPECIFIED COPD TYPE: ICD-10-CM

## 2022-01-28 DIAGNOSIS — I48.0 PAF (PAROXYSMAL ATRIAL FIBRILLATION): ICD-10-CM

## 2022-01-28 DIAGNOSIS — J06.9 BACTERIAL URI: Primary | ICD-10-CM

## 2022-01-28 DIAGNOSIS — I70.0 AORTIC ATHEROSCLEROSIS: ICD-10-CM

## 2022-01-28 DIAGNOSIS — R05.9 COUGH: ICD-10-CM

## 2022-01-28 DIAGNOSIS — B96.89 BACTERIAL URI: Primary | ICD-10-CM

## 2022-01-28 PROCEDURE — 1101F PT FALLS ASSESS-DOCD LE1/YR: CPT | Mod: CPTII,S$GLB,, | Performed by: NURSE PRACTITIONER

## 2022-01-28 PROCEDURE — 1125F AMNT PAIN NOTED PAIN PRSNT: CPT | Mod: CPTII,S$GLB,, | Performed by: NURSE PRACTITIONER

## 2022-01-28 PROCEDURE — 1125F PR PAIN SEVERITY QUANTIFIED, PAIN PRESENT: ICD-10-PCS | Mod: CPTII,S$GLB,, | Performed by: NURSE PRACTITIONER

## 2022-01-28 PROCEDURE — 3075F PR MOST RECENT SYSTOLIC BLOOD PRESS GE 130-139MM HG: ICD-10-PCS | Mod: CPTII,S$GLB,, | Performed by: NURSE PRACTITIONER

## 2022-01-28 PROCEDURE — 3288F PR FALLS RISK ASSESSMENT DOCUMENTED: ICD-10-PCS | Mod: CPTII,S$GLB,, | Performed by: NURSE PRACTITIONER

## 2022-01-28 PROCEDURE — 3078F DIAST BP <80 MM HG: CPT | Mod: CPTII,S$GLB,, | Performed by: NURSE PRACTITIONER

## 2022-01-28 PROCEDURE — 99999 PR PBB SHADOW E&M-EST. PATIENT-LVL V: CPT | Mod: PBBFAC,,, | Performed by: NURSE PRACTITIONER

## 2022-01-28 PROCEDURE — 99499 UNLISTED E&M SERVICE: CPT | Mod: S$GLB,,, | Performed by: NURSE PRACTITIONER

## 2022-01-28 PROCEDURE — 99214 PR OFFICE/OUTPT VISIT, EST, LEVL IV, 30-39 MIN: ICD-10-PCS | Mod: S$GLB,,, | Performed by: NURSE PRACTITIONER

## 2022-01-28 PROCEDURE — 1159F MED LIST DOCD IN RCRD: CPT | Mod: CPTII,S$GLB,, | Performed by: NURSE PRACTITIONER

## 2022-01-28 PROCEDURE — 1101F PR PT FALLS ASSESS DOC 0-1 FALLS W/OUT INJ PAST YR: ICD-10-PCS | Mod: CPTII,S$GLB,, | Performed by: NURSE PRACTITIONER

## 2022-01-28 PROCEDURE — U0003 INFECTIOUS AGENT DETECTION BY NUCLEIC ACID (DNA OR RNA); SEVERE ACUTE RESPIRATORY SYNDROME CORONAVIRUS 2 (SARS-COV-2) (CORONAVIRUS DISEASE [COVID-19]), AMPLIFIED PROBE TECHNIQUE, MAKING USE OF HIGH THROUGHPUT TECHNOLOGIES AS DESCRIBED BY CMS-2020-01-R: HCPCS | Performed by: NURSE PRACTITIONER

## 2022-01-28 PROCEDURE — 1160F RVW MEDS BY RX/DR IN RCRD: CPT | Mod: CPTII,S$GLB,, | Performed by: NURSE PRACTITIONER

## 2022-01-28 PROCEDURE — 99214 OFFICE O/P EST MOD 30 MIN: CPT | Mod: S$GLB,,, | Performed by: NURSE PRACTITIONER

## 2022-01-28 PROCEDURE — 3075F SYST BP GE 130 - 139MM HG: CPT | Mod: CPTII,S$GLB,, | Performed by: NURSE PRACTITIONER

## 2022-01-28 PROCEDURE — 99999 PR PBB SHADOW E&M-EST. PATIENT-LVL V: ICD-10-PCS | Mod: PBBFAC,,, | Performed by: NURSE PRACTITIONER

## 2022-01-28 PROCEDURE — 99499 RISK ADDL DX/OHS AUDIT: ICD-10-PCS | Mod: S$GLB,,, | Performed by: NURSE PRACTITIONER

## 2022-01-28 PROCEDURE — 1160F PR REVIEW ALL MEDS BY PRESCRIBER/CLIN PHARMACIST DOCUMENTED: ICD-10-PCS | Mod: CPTII,S$GLB,, | Performed by: NURSE PRACTITIONER

## 2022-01-28 PROCEDURE — 1159F PR MEDICATION LIST DOCUMENTED IN MEDICAL RECORD: ICD-10-PCS | Mod: CPTII,S$GLB,, | Performed by: NURSE PRACTITIONER

## 2022-01-28 PROCEDURE — 3078F PR MOST RECENT DIASTOLIC BLOOD PRESSURE < 80 MM HG: ICD-10-PCS | Mod: CPTII,S$GLB,, | Performed by: NURSE PRACTITIONER

## 2022-01-28 PROCEDURE — U0005 INFEC AGEN DETEC AMPLI PROBE: HCPCS | Performed by: NURSE PRACTITIONER

## 2022-01-28 PROCEDURE — 3288F FALL RISK ASSESSMENT DOCD: CPT | Mod: CPTII,S$GLB,, | Performed by: NURSE PRACTITIONER

## 2022-01-28 RX ORDER — FLUTICASONE PROPIONATE 50 MCG
1 SPRAY, SUSPENSION (ML) NASAL DAILY
Qty: 9.9 ML | Refills: 0 | Status: SHIPPED | OUTPATIENT
Start: 2022-01-28

## 2022-01-28 RX ORDER — AZELASTINE 1 MG/ML
1 SPRAY, METERED NASAL 2 TIMES DAILY
Qty: 30 ML | Refills: 3 | Status: SHIPPED | OUTPATIENT
Start: 2022-01-28 | End: 2023-03-15

## 2022-01-28 RX ORDER — AZITHROMYCIN 250 MG/1
TABLET, FILM COATED ORAL
Qty: 6 TABLET | Refills: 0 | Status: SHIPPED | OUTPATIENT
Start: 2022-01-28 | End: 2022-02-02

## 2022-01-28 RX ORDER — LORATADINE 10 MG/1
10 TABLET ORAL DAILY
Qty: 90 TABLET | Refills: 3 | Status: SHIPPED | OUTPATIENT
Start: 2022-01-28 | End: 2023-02-01

## 2022-01-29 LAB
SARS-COV-2 RNA RESP QL NAA+PROBE: NOT DETECTED
SARS-COV-2- CYCLE NUMBER: NORMAL

## 2022-01-31 ENCOUNTER — TELEPHONE (OUTPATIENT)
Dept: FAMILY MEDICINE | Facility: CLINIC | Age: 82
End: 2022-01-31
Payer: MEDICARE

## 2022-01-31 NOTE — TELEPHONE ENCOUNTER
Spoke with pt wife and informed her pt is COVID neg and is still to quarantine due to close contact

## 2022-01-31 NOTE — TELEPHONE ENCOUNTER
----- Message from Brenda Campos NP sent at 1/31/2022  7:50 AM CST -----  Please call patient with their attached lab results. The patient is COIVD negative. He should still quarantine due to close contact and symptoms.     Thanks   Brenda

## 2022-03-03 NOTE — TELEPHONE ENCOUNTER
No new care gaps identified.  Powered by [employer]. Reference number: [id number].   3/03/2022 5:32:24 AM CST

## 2022-03-04 ENCOUNTER — TELEPHONE (OUTPATIENT)
Dept: ELECTROPHYSIOLOGY | Facility: CLINIC | Age: 82
End: 2022-03-04
Payer: MEDICARE

## 2022-03-04 DIAGNOSIS — Z79.899 ON AMIODARONE THERAPY: Primary | ICD-10-CM

## 2022-03-04 DIAGNOSIS — I48.0 PAROXYSMAL ATRIAL FIBRILLATION: Primary | ICD-10-CM

## 2022-03-04 NOTE — TELEPHONE ENCOUNTER
Left vm for pt's wife to give us a call back regarding her .  ----- Message -----  To: Carmen RIVERA Staff    Type:  Sooner Apoointment Request    Caller is requesting a sooner appointment.  Caller declined first available appointment listed below.  Caller will not accept being placed on the waitlist and is requesting a message be sent to doctor.  Name of Caller:nuria Cabrales  When is the first available appointmentn/a  Symptoms:annual check up  Would the patient rather a call back or a response via [facility]? Call back  Best Call Back Number:[phone]  Additional Information: Patient's wife would like a call back to discuss her 's health.

## 2022-03-04 NOTE — TELEPHONE ENCOUNTER
Spoke to pt's wife to schedule amio tests, ekg and appt w/ DM all on 5/13. She confirmed appts and will look on portal for reminder.     ----- Message from Sera Conley sent at 3/4/2022  2:47 PM CST -----  Regarding: returning a call  The pt is returning a  call. Please call him back @ [phone]. Thanks, Sera

## 2022-03-05 RX ORDER — TIZANIDINE 4 MG/1
TABLET ORAL
Qty: 40 TABLET | Refills: 12 | Status: SHIPPED | OUTPATIENT
Start: 2022-03-05 | End: 2023-03-13

## 2022-03-07 NOTE — TELEPHONE ENCOUNTER
Left a voicemail message to inform the Patient, Rx refill has been approved and sent to preferred pharmacy.  Sent a message thru [facility] as well.

## 2022-03-25 ENCOUNTER — TELEPHONE (OUTPATIENT)
Dept: FAMILY MEDICINE | Facility: CLINIC | Age: 82
End: 2022-03-25
Payer: MEDICARE

## 2022-03-25 ENCOUNTER — TELEPHONE (OUTPATIENT)
Dept: SLEEP MEDICINE | Facility: CLINIC | Age: 82
End: 2022-03-25
Payer: MEDICARE

## 2022-03-25 NOTE — TELEPHONE ENCOUNTER
----- Message from Bay Downing LPN sent at 3/25/2022  9:26 AM CDT -----  Regarding: FW: Drug interaction  Contact: [facility]  [facility] about [facility] interaction. Please advise.  ----- Message -----  From: Jonas Rock  Sent: 3/25/2022   9:18 AM CDT  To: Anitra EDMONDS Staff  Subject: Drug interaction                                 Name of Who is Calling: Cl[facility] Mitchell)      What is the request in detail: Would like to speak with staff in regards to reporting drug interaction between amiodarone (PACERONE) 200 MG Tab and doxepin (SINEQUAN) 10 MG capsule. Message also sent to Dr. Diop as requested by pharmacist.       Can the clinic reply by [facility]: no      What Number to Call Back if not in [facility]: [phone]

## 2022-03-25 NOTE — TELEPHONE ENCOUNTER
Pharmacy, looks like patient has been on both medications for quite some time without problems so okay to fill both

## 2022-05-13 ENCOUNTER — HOSPITAL ENCOUNTER (OUTPATIENT)
Dept: PULMONOLOGY | Facility: CLINIC | Age: 82
Discharge: HOME | End: 2022-05-13
Payer: MEDICARE

## 2022-05-13 ENCOUNTER — LAB VISIT (OUTPATIENT)
Dept: LAB | Facility: HOSPITAL | Age: 82
End: 2022-05-13
Attending: INTERNAL MEDICINE
Payer: MEDICARE

## 2022-05-13 ENCOUNTER — OFFICE VISIT (OUTPATIENT)
Dept: ELECTROPHYSIOLOGY | Facility: CLINIC | Age: 82
End: 2022-05-13
Payer: MEDICARE

## 2022-05-13 ENCOUNTER — HOSPITAL ENCOUNTER (OUTPATIENT)
Dept: CARDIOLOGY | Facility: CLINIC | Age: 82
Discharge: HOME | End: 2022-05-13
Payer: MEDICARE

## 2022-05-13 ENCOUNTER — TELEPHONE (OUTPATIENT)
Dept: ELECTROPHYSIOLOGY | Facility: CLINIC | Age: 82
End: 2022-05-13
Payer: MEDICARE

## 2022-05-13 VITALS
BODY MASS INDEX: 27.11 KG/M2 | SYSTOLIC BLOOD PRESSURE: 130 MMHG | HEIGHT: 69 IN | DIASTOLIC BLOOD PRESSURE: 70 MMHG | WEIGHT: 183 LBS | HEART RATE: 52 BPM

## 2022-05-13 DIAGNOSIS — Z79.899 ON AMIODARONE THERAPY: ICD-10-CM

## 2022-05-13 DIAGNOSIS — I10 PRIMARY HYPERTENSION: Chronic | ICD-10-CM

## 2022-05-13 DIAGNOSIS — I10 ESSENTIAL HYPERTENSION: Chronic | ICD-10-CM

## 2022-05-13 DIAGNOSIS — I70.0 AORTIC ATHEROSCLEROSIS: ICD-10-CM

## 2022-05-13 DIAGNOSIS — E78.00 PURE HYPERCHOLESTEROLEMIA: Chronic | ICD-10-CM

## 2022-05-13 DIAGNOSIS — E78.5 DYSLIPIDEMIA: ICD-10-CM

## 2022-05-13 DIAGNOSIS — R06.02 SHORTNESS OF BREATH: ICD-10-CM

## 2022-05-13 DIAGNOSIS — I48.0 PAROXYSMAL ATRIAL FIBRILLATION: Primary | Chronic | ICD-10-CM

## 2022-05-13 DIAGNOSIS — I48.0 PAROXYSMAL ATRIAL FIBRILLATION: ICD-10-CM

## 2022-05-13 PROBLEM — Z45.09 ENCOUNTER FOR LOOP RECORDER AT END OF BATTERY LIFE: Status: RESOLVED | Noted: 2018-12-10 | Resolved: 2022-05-13

## 2022-05-13 LAB
ALBUMIN SERPL BCP-MCNC: 3.7 G/DL (ref 3.5–5.2)
ALP SERPL-CCNC: 65 U/L (ref 55–135)
ALT SERPL W/O P-5'-P-CCNC: 20 U/L (ref 10–44)
AST SERPL-CCNC: 25 U/L (ref 10–40)
BILIRUB DIRECT SERPL-MCNC: 0.2 MG/DL (ref 0.1–0.3)
BILIRUB SERPL-MCNC: 0.4 MG/DL (ref 0.1–1)
DLCO SINGLE BREATH LLN: 18.29
DLCO SINGLE BREATH PRE REF: 64.5 %
DLCO SINGLE BREATH REF: 25.21
DLCOC SBVA LLN: 2.41
DLCOC SBVA REF: 3.54
DLCOC SINGLE BREATH LLN: 18.29
DLCOC SINGLE BREATH REF: 25.21
DLCOCSBVAULN: 4.67
DLCOCSINGLEBREATHULN: 32.14
DLCOSINGLEBREATHULN: 32.14
DLCOVA LLN: 2.41
DLCOVA PRE REF: 78.5 %
DLCOVA PRE: 2.78 ML/(MIN*MMHG*L) (ref 2.41–4.67)
DLCOVA REF: 3.54
DLCOVAULN: 4.67
FEF 25 75 LLN: 0.75
FEF 25 75 PRE REF: 130 %
FEF 25 75 REF: 2.01
FEV05 LLN: 1.34
FEV05 REF: 2.47
FEV1 FVC LLN: 60
FEV1 FVC PRE REF: 106.4 %
FEV1 FVC REF: 75
FEV1 LLN: 1.98
FEV1 PRE REF: 101.3 %
FEV1 REF: 2.86
FVC LLN: 2.79
FVC PRE REF: 94.3 %
FVC REF: 3.87
IVC PRE: 3.71 L (ref 2.79–4.97)
IVC SINGLE BREATH LLN: 2.79
IVC SINGLE BREATH PRE REF: 95.8 %
IVC SINGLE BREATH REF: 3.87
IVCSINGLEBREATHULN: 4.97
PEF LLN: 4.92
PEF PRE REF: 118.6 %
PEF REF: 7.23
PHYSICIAN COMMENT: ABNORMAL
PRE DLCO: 16.26 ML/(MIN*MMHG) (ref 18.29–32.14)
PRE FEF 25 75: 2.61 L/S (ref 0.75–3.86)
PRE FET 100: 6.93 SEC
PRE FEV05 REF: 91.6 %
PRE FEV1 FVC: 79.37 % (ref 59.73–88.03)
PRE FEV1: 2.9 L (ref 1.98–3.68)
PRE FEV5: 2.26 L (ref 1.34–3.61)
PRE FVC: 3.65 L (ref 2.79–4.97)
PRE PEF: 8.58 L/S (ref 4.92–9.55)
PROT SERPL-MCNC: 6.7 G/DL (ref 6–8.4)
TSH SERPL DL<=0.005 MIU/L-ACNC: 0.91 UIU/ML (ref 0.4–4)
VA PRE: 5.86 L (ref 6.98–6.98)
VA SINGLE BREATH PRE REF: 84 %
VA SINGLE BREATH REF: 6.98

## 2022-05-13 PROCEDURE — 93010 ELECTROCARDIOGRAM REPORT: CPT | Mod: S$GLB,,, | Performed by: INTERNAL MEDICINE

## 2022-05-13 PROCEDURE — 1101F PT FALLS ASSESS-DOCD LE1/YR: CPT | Mod: CPTII,S$GLB,, | Performed by: INTERNAL MEDICINE

## 2022-05-13 PROCEDURE — 1126F PR PAIN SEVERITY QUANTIFIED, NO PAIN PRESENT: ICD-10-PCS | Mod: CPTII,S$GLB,, | Performed by: INTERNAL MEDICINE

## 2022-05-13 PROCEDURE — 94729 PR C02/MEMBANE DIFFUSE CAPACITY: ICD-10-PCS | Mod: S$GLB,,, | Performed by: INTERNAL MEDICINE

## 2022-05-13 PROCEDURE — 93010 RHYTHM STRIP: ICD-10-PCS | Mod: S$GLB,,, | Performed by: INTERNAL MEDICINE

## 2022-05-13 PROCEDURE — 99214 PR OFFICE/OUTPT VISIT, EST, LEVL IV, 30-39 MIN: ICD-10-PCS | Mod: S$GLB,,, | Performed by: INTERNAL MEDICINE

## 2022-05-13 PROCEDURE — 99499 RISK ADDL DX/OHS AUDIT: ICD-10-PCS | Mod: S$GLB,,, | Performed by: INTERNAL MEDICINE

## 2022-05-13 PROCEDURE — 3288F PR FALLS RISK ASSESSMENT DOCUMENTED: ICD-10-PCS | Mod: CPTII,S$GLB,, | Performed by: INTERNAL MEDICINE

## 2022-05-13 PROCEDURE — 1126F AMNT PAIN NOTED NONE PRSNT: CPT | Mod: CPTII,S$GLB,, | Performed by: INTERNAL MEDICINE

## 2022-05-13 PROCEDURE — 1160F RVW MEDS BY RX/DR IN RCRD: CPT | Mod: CPTII,S$GLB,, | Performed by: INTERNAL MEDICINE

## 2022-05-13 PROCEDURE — 3288F FALL RISK ASSESSMENT DOCD: CPT | Mod: CPTII,S$GLB,, | Performed by: INTERNAL MEDICINE

## 2022-05-13 PROCEDURE — 84443 ASSAY THYROID STIM HORMONE: CPT | Performed by: INTERNAL MEDICINE

## 2022-05-13 PROCEDURE — 1159F MED LIST DOCD IN RCRD: CPT | Mod: CPTII,S$GLB,, | Performed by: INTERNAL MEDICINE

## 2022-05-13 PROCEDURE — 1101F PR PT FALLS ASSESS DOC 0-1 FALLS W/OUT INJ PAST YR: ICD-10-PCS | Mod: CPTII,S$GLB,, | Performed by: INTERNAL MEDICINE

## 2022-05-13 PROCEDURE — 3078F PR MOST RECENT DIASTOLIC BLOOD PRESSURE < 80 MM HG: ICD-10-PCS | Mod: CPTII,S$GLB,, | Performed by: INTERNAL MEDICINE

## 2022-05-13 PROCEDURE — 94010 BREATHING CAPACITY TEST: CPT | Mod: S$GLB,,, | Performed by: INTERNAL MEDICINE

## 2022-05-13 PROCEDURE — 3075F SYST BP GE 130 - 139MM HG: CPT | Mod: CPTII,S$GLB,, | Performed by: INTERNAL MEDICINE

## 2022-05-13 PROCEDURE — 99214 OFFICE O/P EST MOD 30 MIN: CPT | Mod: S$GLB,,, | Performed by: INTERNAL MEDICINE

## 2022-05-13 PROCEDURE — 80076 HEPATIC FUNCTION PANEL: CPT | Performed by: INTERNAL MEDICINE

## 2022-05-13 PROCEDURE — 94729 DIFFUSING CAPACITY: CPT | Mod: S$GLB,,, | Performed by: INTERNAL MEDICINE

## 2022-05-13 PROCEDURE — 99499 UNLISTED E&M SERVICE: CPT | Mod: S$GLB,,, | Performed by: INTERNAL MEDICINE

## 2022-05-13 PROCEDURE — 3078F DIAST BP <80 MM HG: CPT | Mod: CPTII,S$GLB,, | Performed by: INTERNAL MEDICINE

## 2022-05-13 PROCEDURE — 99999 PR PBB SHADOW E&M-EST. PATIENT-LVL IV: ICD-10-PCS | Mod: PBBFAC,,, | Performed by: INTERNAL MEDICINE

## 2022-05-13 PROCEDURE — 93005 RHYTHM STRIP: ICD-10-PCS | Mod: S$GLB,,, | Performed by: INTERNAL MEDICINE

## 2022-05-13 PROCEDURE — 36415 COLL VENOUS BLD VENIPUNCTURE: CPT | Performed by: INTERNAL MEDICINE

## 2022-05-13 PROCEDURE — 1160F PR REVIEW ALL MEDS BY PRESCRIBER/CLIN PHARMACIST DOCUMENTED: ICD-10-PCS | Mod: CPTII,S$GLB,, | Performed by: INTERNAL MEDICINE

## 2022-05-13 PROCEDURE — 93005 ELECTROCARDIOGRAM TRACING: CPT | Mod: S$GLB,,, | Performed by: INTERNAL MEDICINE

## 2022-05-13 PROCEDURE — 3075F PR MOST RECENT SYSTOLIC BLOOD PRESS GE 130-139MM HG: ICD-10-PCS | Mod: CPTII,S$GLB,, | Performed by: INTERNAL MEDICINE

## 2022-05-13 PROCEDURE — 1159F PR MEDICATION LIST DOCUMENTED IN MEDICAL RECORD: ICD-10-PCS | Mod: CPTII,S$GLB,, | Performed by: INTERNAL MEDICINE

## 2022-05-13 PROCEDURE — 94010 BREATHING CAPACITY TEST: ICD-10-PCS | Mod: S$GLB,,, | Performed by: INTERNAL MEDICINE

## 2022-05-13 PROCEDURE — 99999 PR PBB SHADOW E&M-EST. PATIENT-LVL IV: CPT | Mod: PBBFAC,,, | Performed by: INTERNAL MEDICINE

## 2022-05-13 RX ORDER — CHLORTHALIDONE 25 MG/1
TABLET ORAL
Qty: 90 TABLET | Refills: 3 | Status: SHIPPED | OUTPATIENT
Start: 2022-05-13 | End: 2023-05-11

## 2022-05-13 NOTE — PROGRESS NOTES
Subjective:    Patient ID:  Benitez Cabrales is a 81 y.o. male who presents for follow up of AF    Atrial Fibrillation  Symptoms are negative for chest pain, dizziness, palpitations, shortness of breath, syncope and weakness. Past medical history includes atrial fibrillation.     81 y.o. M  Remote dx of AF (~10 y ago; only Rx was beta blockade)  HTN on meds   HL on meds   RBBB, longstanding     Had event monitor placed for dizzy/palpitations. Turns out dizziness was really vertigo and balance issues -- no LH, no presync/sync.  He remains on amiodarone (normal PFT and TFT/LFT); CHADSVASC 3.  ILR with no events on several interrogations. We removed the ILR 12/2018.    He's been feeling well except for frequent nocturia.   He has retired. Works in yard and works out at gym w/o problems.    echo 60%. severe LAE.  TSH OK. LFTs OK. PFT mildly low DLCO    My interpretation of today's ecg is sinus shraddha at 52 bpm with first deg AVB and RBBB (baseline).    Review of Systems   Constitutional: Negative. Negative for malaise/fatigue.   HENT: Negative.  Negative for congestion, ear pain and tinnitus.    Eyes: Negative for blurred vision and double vision.   Cardiovascular: Negative.  Negative for chest pain, dyspnea on exertion, near-syncope, palpitations and syncope.   Respiratory: Negative.  Negative for cough and shortness of breath.    Endocrine: Negative.  Negative for cold intolerance, heat intolerance and polyuria.   Hematologic/Lymphatic: Does not bruise/bleed easily.   Skin: Negative.  Negative for dry skin, flushing and rash.   Musculoskeletal: Negative.  Negative for back pain, falls, joint pain and muscle weakness.   Gastrointestinal: Negative.  Negative for abdominal pain, change in bowel habit, nausea and vomiting.   Genitourinary: Positive for frequency. Negative for dysuria and flank pain.   Neurological: Negative.  Negative for dizziness, headaches, light-headedness and weakness.   Psychiatric/Behavioral: Negative.   Negative for altered mental status and depression. The patient is not nervous/anxious.    Allergic/Immunologic: Negative for environmental allergies.        Objective:    Physical Exam  Vitals and nursing note reviewed.   Constitutional:       Appearance: He is well-developed.   HENT:      Head: Normocephalic and atraumatic.   Eyes:      General: Lids are normal. No scleral icterus.     Conjunctiva/sclera: Conjunctivae normal.   Neck:      Thyroid: No thyromegaly.      Vascular: No JVD.      Trachea: No tracheal deviation.   Cardiovascular:      Rate and Rhythm: Regular rhythm. Bradycardia present.      Pulses:           Radial pulses are 2+ on the right side and 2+ on the left side.   Pulmonary:      Effort: Pulmonary effort is normal. No tachypnea, accessory muscle usage or respiratory distress.      Breath sounds: Normal breath sounds. No wheezing.   Chest:       Abdominal:      General: Bowel sounds are normal. There is no distension.   Musculoskeletal:         General: Normal range of motion.      Cervical back: Normal range of motion and neck supple.   Skin:     General: Skin is warm and dry.      Findings: No rash.   Neurological:      Mental Status: He is alert and oriented to person, place, and time.      Cranial Nerves: No cranial nerve deficit.      Motor: No abnormal muscle tone.      Deep Tendon Reflexes: Reflexes are normal and symmetric.   Psychiatric:         Behavior: Behavior normal.           Assessment:       1. Paroxysmal atrial fibrillation    2. Aortic atherosclerosis    3. Dyslipidemia    4. Pure hypercholesterolemia    5. Primary hypertension    6. On amiodarone therapy    7. Essential hypertension    8. Shortness of breath          Plan:       81 y.o. male with pAF on amiodarone.    Continue eliquis  f/u TSH, LFTs today  For HTN, restart chlorthalidone (he declined higher dose of norvasc due to dizziness in past). Take it in AM.    Return in 1 year with amio tests, or earlier prn.

## 2022-05-24 ENCOUNTER — PES CALL (OUTPATIENT)
Dept: ADMINISTRATIVE | Facility: CLINIC | Age: 82
End: 2022-05-24
Payer: MEDICARE

## 2022-06-14 ENCOUNTER — TELEPHONE (OUTPATIENT)
Dept: FAMILY MEDICINE | Facility: CLINIC | Age: 82
End: 2022-06-14
Payer: MEDICARE

## 2022-06-14 NOTE — TELEPHONE ENCOUNTER
----- Message from Suad Yang sent at 6/14/2022  3:56 PM CDT -----  Type: Patient Call Back    Who called:pt    What is the request in detail:pt requesting to get shingles vaccination and pneumonia vaccination. Call pt     Can the clinic reply by [facility]?    Would the patient rather a call back or a response via [facility]? call    Best call back number:[phone] (home)       Additional Information:

## 2022-06-16 ENCOUNTER — TELEPHONE (OUTPATIENT)
Dept: FAMILY MEDICINE | Facility: CLINIC | Age: 82
End: 2022-06-16

## 2022-06-16 ENCOUNTER — CLINICAL SUPPORT (OUTPATIENT)
Dept: FAMILY MEDICINE | Facility: CLINIC | Age: 82
End: 2022-06-16
Payer: MEDICARE

## 2022-06-16 DIAGNOSIS — Z23 NEED FOR PNEUMOCOCCAL VACCINATION: Primary | ICD-10-CM

## 2022-06-16 DIAGNOSIS — Z23 NEED FOR PNEUMOCOCCAL VACCINE: Primary | ICD-10-CM

## 2022-06-16 PROCEDURE — 90677 PCV20 VACCINE IM: CPT | Mod: S$GLB,,, | Performed by: INTERNAL MEDICINE

## 2022-06-16 PROCEDURE — G0009 ADMIN PNEUMOCOCCAL VACCINE: HCPCS | Mod: S$GLB,,, | Performed by: INTERNAL MEDICINE

## 2022-06-16 PROCEDURE — G0009 PNEUMOCOCCAL CONJUGATE VACCINE 20-VALENT: ICD-10-PCS | Mod: S$GLB,,, | Performed by: INTERNAL MEDICINE

## 2022-06-16 PROCEDURE — 90677 PNEUMOCOCCAL CONJUGATE VACCINE 20-VALENT: ICD-10-PCS | Mod: S$GLB,,, | Performed by: INTERNAL MEDICINE

## 2022-07-08 ENCOUNTER — PES CALL (OUTPATIENT)
Dept: ADMINISTRATIVE | Facility: CLINIC | Age: 82
End: 2022-07-08
Payer: MEDICARE

## 2022-07-12 ENCOUNTER — OFFICE VISIT (OUTPATIENT)
Dept: FAMILY MEDICINE | Facility: CLINIC | Age: 82
End: 2022-07-12
Payer: MEDICARE

## 2022-07-12 VITALS
OXYGEN SATURATION: 99 % | DIASTOLIC BLOOD PRESSURE: 62 MMHG | SYSTOLIC BLOOD PRESSURE: 128 MMHG | HEART RATE: 60 BPM | HEIGHT: 69 IN | BODY MASS INDEX: 26.15 KG/M2 | WEIGHT: 176.56 LBS

## 2022-07-12 DIAGNOSIS — I70.0 AORTIC ATHEROSCLEROSIS: ICD-10-CM

## 2022-07-12 DIAGNOSIS — E78.5 DYSLIPIDEMIA: ICD-10-CM

## 2022-07-12 DIAGNOSIS — N18.31 STAGE 3A CHRONIC KIDNEY DISEASE: ICD-10-CM

## 2022-07-12 DIAGNOSIS — J44.9 CHRONIC OBSTRUCTIVE PULMONARY DISEASE, UNSPECIFIED COPD TYPE: ICD-10-CM

## 2022-07-12 DIAGNOSIS — Z00.00 ENCOUNTER FOR PREVENTIVE HEALTH EXAMINATION: Primary | ICD-10-CM

## 2022-07-12 DIAGNOSIS — H25.13 NUCLEAR SCLEROSIS, BILATERAL: ICD-10-CM

## 2022-07-12 DIAGNOSIS — I48.0 PAROXYSMAL ATRIAL FIBRILLATION: ICD-10-CM

## 2022-07-12 DIAGNOSIS — H52.7 REFRACTIVE ERROR: ICD-10-CM

## 2022-07-12 DIAGNOSIS — K21.9 GASTROESOPHAGEAL REFLUX DISEASE, UNSPECIFIED WHETHER ESOPHAGITIS PRESENT: ICD-10-CM

## 2022-07-12 DIAGNOSIS — I10 HYPERTENSION, UNSPECIFIED TYPE: Chronic | ICD-10-CM

## 2022-07-12 PROCEDURE — 3078F DIAST BP <80 MM HG: CPT | Mod: CPTII,S$GLB,, | Performed by: NURSE PRACTITIONER

## 2022-07-12 PROCEDURE — 1126F AMNT PAIN NOTED NONE PRSNT: CPT | Mod: CPTII,S$GLB,, | Performed by: NURSE PRACTITIONER

## 2022-07-12 PROCEDURE — 1126F PR PAIN SEVERITY QUANTIFIED, NO PAIN PRESENT: ICD-10-PCS | Mod: CPTII,S$GLB,, | Performed by: NURSE PRACTITIONER

## 2022-07-12 PROCEDURE — 1100F PTFALLS ASSESS-DOCD GE2>/YR: CPT | Mod: CPTII,S$GLB,, | Performed by: NURSE PRACTITIONER

## 2022-07-12 PROCEDURE — 1160F PR REVIEW ALL MEDS BY PRESCRIBER/CLIN PHARMACIST DOCUMENTED: ICD-10-PCS | Mod: CPTII,S$GLB,, | Performed by: NURSE PRACTITIONER

## 2022-07-12 PROCEDURE — 1159F PR MEDICATION LIST DOCUMENTED IN MEDICAL RECORD: ICD-10-PCS | Mod: CPTII,S$GLB,, | Performed by: NURSE PRACTITIONER

## 2022-07-12 PROCEDURE — 3078F PR MOST RECENT DIASTOLIC BLOOD PRESSURE < 80 MM HG: ICD-10-PCS | Mod: CPTII,S$GLB,, | Performed by: NURSE PRACTITIONER

## 2022-07-12 PROCEDURE — 1170F FXNL STATUS ASSESSED: CPT | Mod: CPTII,S$GLB,, | Performed by: NURSE PRACTITIONER

## 2022-07-12 PROCEDURE — 1100F PR PT FALLS ASSESS DOC 2+ FALLS/FALL W/INJURY/YR: ICD-10-PCS | Mod: CPTII,S$GLB,, | Performed by: NURSE PRACTITIONER

## 2022-07-12 PROCEDURE — 3074F PR MOST RECENT SYSTOLIC BLOOD PRESSURE < 130 MM HG: ICD-10-PCS | Mod: CPTII,S$GLB,, | Performed by: NURSE PRACTITIONER

## 2022-07-12 PROCEDURE — 99999 PR PBB SHADOW E&M-EST. PATIENT-LVL V: CPT | Mod: PBBFAC,,, | Performed by: NURSE PRACTITIONER

## 2022-07-12 PROCEDURE — G0439 PR MEDICARE ANNUAL WELLNESS SUBSEQUENT VISIT: ICD-10-PCS | Mod: S$GLB,,, | Performed by: NURSE PRACTITIONER

## 2022-07-12 PROCEDURE — 1160F RVW MEDS BY RX/DR IN RCRD: CPT | Mod: CPTII,S$GLB,, | Performed by: NURSE PRACTITIONER

## 2022-07-12 PROCEDURE — G0439 PPPS, SUBSEQ VISIT: HCPCS | Mod: S$GLB,,, | Performed by: NURSE PRACTITIONER

## 2022-07-12 PROCEDURE — 99999 PR PBB SHADOW E&M-EST. PATIENT-LVL V: ICD-10-PCS | Mod: PBBFAC,,, | Performed by: NURSE PRACTITIONER

## 2022-07-12 PROCEDURE — 3288F FALL RISK ASSESSMENT DOCD: CPT | Mod: CPTII,S$GLB,, | Performed by: NURSE PRACTITIONER

## 2022-07-12 PROCEDURE — 1159F MED LIST DOCD IN RCRD: CPT | Mod: CPTII,S$GLB,, | Performed by: NURSE PRACTITIONER

## 2022-07-12 PROCEDURE — 1170F PR FUNCTIONAL STATUS ASSESSED: ICD-10-PCS | Mod: CPTII,S$GLB,, | Performed by: NURSE PRACTITIONER

## 2022-07-12 PROCEDURE — 3288F PR FALLS RISK ASSESSMENT DOCUMENTED: ICD-10-PCS | Mod: CPTII,S$GLB,, | Performed by: NURSE PRACTITIONER

## 2022-07-12 PROCEDURE — 3074F SYST BP LT 130 MM HG: CPT | Mod: CPTII,S$GLB,, | Performed by: NURSE PRACTITIONER

## 2022-07-12 NOTE — PATIENT INSTRUCTIONS
Counseling and Referral of Other Preventative  (Italic type indicates deductible and co-insurance are waived)    Patient Name: Benitez Cabrales  Today's Date: 7/12/2022    Health Maintenance       Date Due Completion Date    TETANUS VACCINE Never done ---    COVID-19 Vaccine (4 - Booster for Pfizer series) 11/25/2021 8/25/2021    Influenza Vaccine (1) 09/01/2022 10/28/2021        No orders of the defined types were placed in this encounter.    The following information is provided to all patients.  This information is to help you find resources for any of the problems found today that may be affecting your health:                Living healthy guide: www.[facility].[place].[facility]      Understanding Diabetes: www.diabetes.org      Eating healthy: www.cdc.gov/healthyweight      [place] home safety checklist: www.cdc.gov/steadi/patient.html      Agency on Aging: www.goea.[place].[facility]      Alcoholics anonymous (AA): www.aa.org      Physical Activity: www.jesus.nih.gov/bq8kmab      Tobacco use: www.quitwithusla.org

## 2022-07-12 NOTE — PROGRESS NOTES
"  Benitez Cabrales presented for a  Medicare AWV and comprehensive Health Risk Assessment today. The following components were reviewed and updated:    · Medical history  · Family History  · Social history  · Allergies and Current Medications  · Health Risk Assessment  · Health Maintenance  · Care Team       ** See Completed Assessments for Annual Wellness Visit within the encounter summary.**       The following assessments were completed:  · Living Situation  · CAGE  · Depression Screening  · Timed Get Up and Go  · Whisper Test  · Cognitive Function Screening  · Nutrition Screening  · ADL Screening  · PAQ Screening          Vitals:    07/12/22 1344   BP: 128/62   BP Location: Left arm   Patient Position: Sitting   BP Method: Medium (Manual)   Pulse: 60   SpO2: 99%   Weight: 80.1 kg (176 lb 9.4 oz)   Height: 5' 9" (1.753 m)     Body mass index is 26.08 kg/m².  Physical Exam  Vitals and nursing note reviewed.   Constitutional:       Appearance: Normal appearance.   Cardiovascular:      Rate and Rhythm: Normal rate.      Pulses: Normal pulses.      Heart sounds: Normal heart sounds.   Pulmonary:      Effort: Pulmonary effort is normal.      Breath sounds: Normal breath sounds.   Abdominal:      General: Bowel sounds are normal.      Palpations: Abdomen is soft.   Musculoskeletal:         General: Normal range of motion.   Neurological:      Mental Status: He is alert and oriented to person, place, and time.   Psychiatric:         Mood and Affect: Mood normal.         Behavior: Behavior normal.             Diagnoses and health risks identified today and associated recommendations/orders:    1. Encounter for preventive health examination  Pt, accompanied by his wife, was seen today for an Annual Wellness visit. Healthcare maintenance and screening recommendations were discussed and updated as indicated. Return in one year for AWV.    Review current opioid prescriptions: yes  Screen for potential Substance Use Disorders: " yes    2. Paroxysmal atrial fibrillation  Chronic. Stable. The current medical regimen is effective;  continue present plan and medications.    3. Aortic atherosclerosis  Chronic. CT Abdomen Pelvis 7/1/15. The current medical regimen is effective;  continue present plan and medications.    4. Chronic obstructive pulmonary disease, unspecified COPD type  Stable. The current medical regimen is effective;  continue present plan and medications.    5. Stage 3a chronic kidney disease  The current medical regimen is effective;  continue present plan and medications.    6. Hypertension, unspecified type  The current medical regimen is effective;  continue present plan and medications.    7. Dyslipidemia  The current medical regimen is effective;  continue present plan and medications.    8. Gastroesophageal reflux disease, unspecified whether esophagitis present  The current medical regimen is effective;  continue present plan and medications.    9. Nuclear sclerosis, bilateral  The current medical regimen is effective;  continue present plan and medications.    10. Refractive error  The current medical regimen is effective;  continue present plan and medications.        Provided Benitez with a 5-10 year written screening schedule and personal prevention plan. Recommendations were developed using the USPSTF age appropriate recommendations. Education, counseling, and referrals were provided as needed. After Visit Summary printed and given to patient which includes a list of additional screenings\tests needed.    Follow up in about 17 weeks (around 11/8/2022) with provider.    BROOKS Cole  I offered to discuss advanced care planning, including how to pick a person who would make decisions for you if you were unable to make them for yourself, called a health care power of , and what kind of decisions you might make such as use of life sustaining treatments such as ventilators and tube feeding when faced with a  life limiting illness recorded on a living will that they will need to know. (How you want to be cared for as you near the end of your natural life)     X Patient is interested in learning more about how to make advanced directives.  I provided them paperwork and offered to discuss this with them.

## 2022-07-26 NOTE — TELEPHONE ENCOUNTER
----- Message from Michelle Polk sent at 12/5/2018  2:01 PM CST -----  Contact: Self   Pt returning call. [phone]     Plan: Apply Aquaphor to legs for moisturizer once daily Detail Level: Zone Render In Strict Bullet Format?: No Continue Regimen: Betamethasone ointment twice a day for two weeks

## 2022-10-04 ENCOUNTER — CLINICAL SUPPORT (OUTPATIENT)
Dept: FAMILY MEDICINE | Facility: CLINIC | Age: 82
End: 2022-10-04
Payer: MEDICARE

## 2022-10-04 DIAGNOSIS — Z23 NEED FOR IMMUNIZATION AGAINST INFLUENZA: Primary | ICD-10-CM

## 2022-10-04 PROCEDURE — G0008 FLU VACCINE - QUADRIVALENT - ADJUVANTED: ICD-10-PCS | Mod: S$GLB,,, | Performed by: INTERNAL MEDICINE

## 2022-10-04 PROCEDURE — G0008 ADMIN INFLUENZA VIRUS VAC: HCPCS | Mod: S$GLB,,, | Performed by: INTERNAL MEDICINE

## 2022-10-04 PROCEDURE — 90694 FLU VACCINE - QUADRIVALENT - ADJUVANTED: ICD-10-PCS | Mod: S$GLB,,, | Performed by: INTERNAL MEDICINE

## 2022-10-04 PROCEDURE — 90694 VACC AIIV4 NO PRSRV 0.5ML IM: CPT | Mod: S$GLB,,, | Performed by: INTERNAL MEDICINE

## 2022-11-15 ENCOUNTER — LAB VISIT (OUTPATIENT)
Dept: LAB | Facility: HOSPITAL | Age: 82
End: 2022-11-15
Attending: INTERNAL MEDICINE
Payer: MEDICARE

## 2022-11-15 ENCOUNTER — OFFICE VISIT (OUTPATIENT)
Dept: FAMILY MEDICINE | Facility: CLINIC | Age: 82
End: 2022-11-15
Payer: MEDICARE

## 2022-11-15 VITALS
HEIGHT: 69 IN | WEIGHT: 181.69 LBS | HEART RATE: 67 BPM | OXYGEN SATURATION: 97 % | BODY MASS INDEX: 26.91 KG/M2 | DIASTOLIC BLOOD PRESSURE: 68 MMHG | SYSTOLIC BLOOD PRESSURE: 130 MMHG | TEMPERATURE: 98 F

## 2022-11-15 DIAGNOSIS — J44.9 CHRONIC OBSTRUCTIVE PULMONARY DISEASE, UNSPECIFIED COPD TYPE: ICD-10-CM

## 2022-11-15 DIAGNOSIS — Z79.01 CURRENT USE OF LONG TERM ANTICOAGULATION: ICD-10-CM

## 2022-11-15 DIAGNOSIS — I48.0 PAROXYSMAL ATRIAL FIBRILLATION: ICD-10-CM

## 2022-11-15 DIAGNOSIS — D64.9 ANEMIA, UNSPECIFIED TYPE: ICD-10-CM

## 2022-11-15 DIAGNOSIS — N40.0 BENIGN PROSTATIC HYPERPLASIA, UNSPECIFIED WHETHER LOWER URINARY TRACT SYMPTOMS PRESENT: ICD-10-CM

## 2022-11-15 DIAGNOSIS — Z00.00 ROUTINE MEDICAL EXAM: Primary | ICD-10-CM

## 2022-11-15 DIAGNOSIS — Z12.5 SCREENING FOR PROSTATE CANCER: ICD-10-CM

## 2022-11-15 DIAGNOSIS — Z86.010 HISTORY OF COLONIC POLYPS: ICD-10-CM

## 2022-11-15 DIAGNOSIS — I70.0 AORTIC ATHEROSCLEROSIS: ICD-10-CM

## 2022-11-15 DIAGNOSIS — I10 HYPERTENSION, UNSPECIFIED TYPE: ICD-10-CM

## 2022-11-15 DIAGNOSIS — N18.31 STAGE 3A CHRONIC KIDNEY DISEASE: ICD-10-CM

## 2022-11-15 DIAGNOSIS — Z00.00 ROUTINE MEDICAL EXAM: ICD-10-CM

## 2022-11-15 DIAGNOSIS — E78.5 HYPERLIPIDEMIA, UNSPECIFIED HYPERLIPIDEMIA TYPE: ICD-10-CM

## 2022-11-15 DIAGNOSIS — E78.5 DYSLIPIDEMIA: ICD-10-CM

## 2022-11-15 LAB
ALBUMIN SERPL BCP-MCNC: 3.8 G/DL (ref 3.5–5.2)
ALP SERPL-CCNC: 68 U/L (ref 55–135)
ALT SERPL W/O P-5'-P-CCNC: 20 U/L (ref 10–44)
ANION GAP SERPL CALC-SCNC: 10 MMOL/L (ref 8–16)
AST SERPL-CCNC: 29 U/L (ref 10–40)
BASOPHILS # BLD AUTO: 0.05 K/UL (ref 0–0.2)
BASOPHILS NFR BLD: 0.6 % (ref 0–1.9)
BILIRUB SERPL-MCNC: 0.5 MG/DL (ref 0.1–1)
BUN SERPL-MCNC: 28 MG/DL (ref 8–23)
CALCIUM SERPL-MCNC: 10.3 MG/DL (ref 8.7–10.5)
CHLORIDE SERPL-SCNC: 100 MMOL/L (ref 95–110)
CO2 SERPL-SCNC: 25 MMOL/L (ref 23–29)
CREAT SERPL-MCNC: 1.4 MG/DL (ref 0.5–1.4)
DIFFERENTIAL METHOD: ABNORMAL
EOSINOPHIL # BLD AUTO: 0.2 K/UL (ref 0–0.5)
EOSINOPHIL NFR BLD: 2.8 % (ref 0–8)
ERYTHROCYTE [DISTWIDTH] IN BLOOD BY AUTOMATED COUNT: 16 % (ref 11.5–14.5)
EST. GFR  (NO RACE VARIABLE): 50.5 ML/MIN/1.73 M^2
GLUCOSE SERPL-MCNC: 95 MG/DL (ref 70–110)
HCT VFR BLD AUTO: 37.7 % (ref 40–54)
HGB BLD-MCNC: 11.9 G/DL (ref 14–18)
IMM GRANULOCYTES # BLD AUTO: 0.01 K/UL (ref 0–0.04)
IMM GRANULOCYTES NFR BLD AUTO: 0.1 % (ref 0–0.5)
LYMPHOCYTES # BLD AUTO: 1.7 K/UL (ref 1–4.8)
LYMPHOCYTES NFR BLD: 22.3 % (ref 18–48)
MCH RBC QN AUTO: 27.2 PG (ref 27–31)
MCHC RBC AUTO-ENTMCNC: 31.6 G/DL (ref 32–36)
MCV RBC AUTO: 86 FL (ref 82–98)
MONOCYTES # BLD AUTO: 0.7 K/UL (ref 0.3–1)
MONOCYTES NFR BLD: 8.9 % (ref 4–15)
NEUTROPHILS # BLD AUTO: 5 K/UL (ref 1.8–7.7)
NEUTROPHILS NFR BLD: 65.3 % (ref 38–73)
NRBC BLD-RTO: 0 /100 WBC
PLATELET # BLD AUTO: 210 K/UL (ref 150–450)
PMV BLD AUTO: 12.1 FL (ref 9.2–12.9)
POTASSIUM SERPL-SCNC: 4.2 MMOL/L (ref 3.5–5.1)
PROT SERPL-MCNC: 7.7 G/DL (ref 6–8.4)
RBC # BLD AUTO: 4.38 M/UL (ref 4.6–6.2)
SODIUM SERPL-SCNC: 135 MMOL/L (ref 136–145)
WBC # BLD AUTO: 7.72 K/UL (ref 3.9–12.7)

## 2022-11-15 PROCEDURE — 3288F FALL RISK ASSESSMENT DOCD: CPT | Mod: CPTII,S$GLB,, | Performed by: INTERNAL MEDICINE

## 2022-11-15 PROCEDURE — 85025 COMPLETE CBC W/AUTO DIFF WBC: CPT | Performed by: INTERNAL MEDICINE

## 2022-11-15 PROCEDURE — 84443 ASSAY THYROID STIM HORMONE: CPT | Performed by: INTERNAL MEDICINE

## 2022-11-15 PROCEDURE — 99397 PR PREVENTIVE VISIT,EST,65 & OVER: ICD-10-PCS | Mod: GZ,S$GLB,, | Performed by: INTERNAL MEDICINE

## 2022-11-15 PROCEDURE — 99214 PR OFFICE/OUTPT VISIT, EST, LEVL IV, 30-39 MIN: ICD-10-PCS | Mod: 25,S$GLB,, | Performed by: INTERNAL MEDICINE

## 2022-11-15 PROCEDURE — 3078F PR MOST RECENT DIASTOLIC BLOOD PRESSURE < 80 MM HG: ICD-10-PCS | Mod: CPTII,S$GLB,, | Performed by: INTERNAL MEDICINE

## 2022-11-15 PROCEDURE — 36415 COLL VENOUS BLD VENIPUNCTURE: CPT | Mod: PO | Performed by: INTERNAL MEDICINE

## 2022-11-15 PROCEDURE — 99214 OFFICE O/P EST MOD 30 MIN: CPT | Mod: 25,S$GLB,, | Performed by: INTERNAL MEDICINE

## 2022-11-15 PROCEDURE — 3075F SYST BP GE 130 - 139MM HG: CPT | Mod: CPTII,S$GLB,, | Performed by: INTERNAL MEDICINE

## 2022-11-15 PROCEDURE — 3078F DIAST BP <80 MM HG: CPT | Mod: CPTII,S$GLB,, | Performed by: INTERNAL MEDICINE

## 2022-11-15 PROCEDURE — 3075F PR MOST RECENT SYSTOLIC BLOOD PRESS GE 130-139MM HG: ICD-10-PCS | Mod: CPTII,S$GLB,, | Performed by: INTERNAL MEDICINE

## 2022-11-15 PROCEDURE — 99999 PR PBB SHADOW E&M-EST. PATIENT-LVL III: CPT | Mod: PBBFAC,,, | Performed by: INTERNAL MEDICINE

## 2022-11-15 PROCEDURE — 99999 PR PBB SHADOW E&M-EST. PATIENT-LVL III: ICD-10-PCS | Mod: PBBFAC,,, | Performed by: INTERNAL MEDICINE

## 2022-11-15 PROCEDURE — 3288F PR FALLS RISK ASSESSMENT DOCUMENTED: ICD-10-PCS | Mod: CPTII,S$GLB,, | Performed by: INTERNAL MEDICINE

## 2022-11-15 PROCEDURE — 1159F PR MEDICATION LIST DOCUMENTED IN MEDICAL RECORD: ICD-10-PCS | Mod: CPTII,S$GLB,, | Performed by: INTERNAL MEDICINE

## 2022-11-15 PROCEDURE — 99397 PER PM REEVAL EST PAT 65+ YR: CPT | Mod: GZ,S$GLB,, | Performed by: INTERNAL MEDICINE

## 2022-11-15 PROCEDURE — 84153 ASSAY OF PSA TOTAL: CPT | Performed by: INTERNAL MEDICINE

## 2022-11-15 PROCEDURE — 1101F PT FALLS ASSESS-DOCD LE1/YR: CPT | Mod: CPTII,S$GLB,, | Performed by: INTERNAL MEDICINE

## 2022-11-15 PROCEDURE — 1159F MED LIST DOCD IN RCRD: CPT | Mod: CPTII,S$GLB,, | Performed by: INTERNAL MEDICINE

## 2022-11-15 PROCEDURE — 1101F PR PT FALLS ASSESS DOC 0-1 FALLS W/OUT INJ PAST YR: ICD-10-PCS | Mod: CPTII,S$GLB,, | Performed by: INTERNAL MEDICINE

## 2022-11-15 PROCEDURE — 80053 COMPREHEN METABOLIC PANEL: CPT | Performed by: INTERNAL MEDICINE

## 2022-11-15 NOTE — PROGRESS NOTES
Chief complaint: Physical exam,     82 -year-old  male   He is due for PSA and appears to be up-to-date on colonoscopy 9/16  there was a polyp -5 yrs..  Outside records reviewed.  He is followed by cardiology.  Still working as a .  No other increased risk based upon social history or family history.  He is reduced overall his alcohol intake. Labs were a yr ago    [facility], colonoscopy 9/16  there was a polyp -5 yrs , he is friends with Dr. Harding and we discussed that he may well be healthy enough to have a colonoscopy but I would like him to make that decision with his gastroenterologist since I do not have access to his pathology report to assess what kind of polyp he had previously and so forth.     PSA 11/21    ROS:   CONST: weight stable. EYES: no vision change. ENT: no sore throat. CV: no chest pain w/ exertion. RESP: no shortness of breath. GI: no nausea, vomiting, diarrhea. No dysphagia. : no urinary issues. MUSCULOSKELETAL: no new myalgias or arthralgias. SKIN: no new changes. NEURO: no focal deficits. PSYCH: no new issues. ENDOCRINE: no polyuria. HEME: no lymph nodes. ALLERGY: no general pruritis.     PAST MEDICAL HISTORY:  1. Hypertension.  2. Hyperlipidemia.  3. GERD.  4. Vertigo after trauma  5. Colonoscopy was normal around 2009 per [facility], 9/16  there was a polyp -5 yrs.  6. Atrial fib/flutter -[facility] -cardioverted  7.  Dizziness, seen by neurology    8.peptic ulcer disease on EGDNovember 2016 with GI bleeding  9.  Small bowel obstruction, resolved on its own November 2016  10. anemia    PAST SURGICAL HISTORY:  1. Left leg surgery, sounds like venous surgery -Dr Limon  2. Nasal septal repair.      ALLERGIES: NKDA.    SOCIAL HISTORY: He puffs on a rare cigar, he is not a smoker. Drinks wine on occasion,  with 3 children. He has 7 grandchildren. 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 He is originally from [place]. He is quite active.    FAMILY  HISTORY: Sr. had breast cancer. Otherwise negative for prostate or colon cancer, negative for premature coronary disease or diabetes.    HEALTHCARE SCREENING: Colonoscopy was normal around 2009 per [facility], colonoscopy 9/16  there was a polyp -5 yrs ,       Gen: no distress  EYES: conjunctiva clear, non-icteric, PERRL  ENT: nose clear, nasal mucosa normal, oropharynx clear and moist, teeth good,  NECK:supple, thyroid non-palpable  RESP: effort is good, lungs clear  CV: heart RRR w/o murmur, gallops or rubs; no carotid bruits, no edema  GI: abdomen soft, non-distended, non-tender, no hepatosplenomegaly  MS: gait normal, no clubbing or cyanosis of the digits,   SKIN: no rashes, warm to touch           Benitez was seen today for annual exam.    Diagnoses and associated orders for this visit:    Routine medical exam--- .  We will update all his lab work including PSA.  Continue exercise.    Screening for prostate cancer  - PSA, overall in good health    Screening for colorectal cancer--- due? Still did not discuss w GI.                                           Additional evaluation and management issues:    Additionally patient has other medical issues and complaints to address. Reviewed the last visit where he had some syncope.  Reviewed the interval cardiac testing which was all unrevealing.      No further colon bleeding.  Prior records regarding this reviewed as below.  Seen by MD 10/15/19 -Bright red blood per rectum this morning after having a bowel movement  Two episodes - second occurred this morning as well  He does note some pain in his lower abdominal region - mild - 'comes and goes'  He believes it might be gas   Patient called GI/Dr Harding - states could get an appointment as early as 10/30/19   Did not take his Pradaxa today because he is concerned     Saw Dmitriy  and given pills for constipation. No bleeding    Regarding hypertension isn't a good control.  He was on Diovan HCTZ and his most recent  sodium level was 127 about two weeks ago we will repeat.  He did increase his salt intake.  He was on the HCTZ  And then chlorthalidione which we did remove.      Regarding hyperlipidemia he is on medication and will reassess.      He previously had  a moderate amount of pain that is been chronic in his feet.  It looks like in review podiatry no from a few years ago he has a bunion.  We discussed the condition will not improve and looks like podiatry said conservative therapies might work but only definitive would be surgery.  He has an appointment with Dr. Langley in Orthopedics  and he will consider seeing Podiatry again.  Better with WIDER shoes    He has atrial fibrillation which apparently is under control.      He has COPD without any problems. He has atherosclerosis of the aorta noted in prior records.      He also did have some bleeding.  I reviewed his last CBC which was improving but still not back to normal.  Iron was a year ago and will repeat.    He is due for his PSA as well.      Counseled at length to review prior records.  All these issues reviewed and patient counseled in evaluation and management will be based upon MDM    30 day monitor  At baseline, in the absence of symptoms, the rhythm was e sinus with heart rates in the 50s.  No symptom was reported over the course of the month.  There were auto trigger events for bradycardia.  The slowest of these occurred on 09/26 at 12:35 a.m..  The slowest heart rate recorded was 36 beats per minute this occurred during sinus bradycardia.  The recording also included an atrial supraventricular run perhaps with some nonconducted P waves.  No symptom was reported; this was during typical sleeping hours.  No profound bradycardia was detected during typical waking hours.     Impression:  Sinus rhythm with bradycardic episodes during typical sleeping hours (which is normal).  No symptom was reported        Assessment and plan:                Hypertension,  unspecified type ,chronic condition and stable.   -     CBC Auto Differential; Future  -     Comprehensive Metabolic Panel; Future  -     TSH; Future  -     Lipid Panel; Future    Stage 3a chronic kidney disease ,chronic condition and stable.   -     Comprehensive Metabolic Panel; Future    Paroxysmal atrial fibrillation ,chronic condition and stable.     Aortic atherosclerosis    Chronic obstructive pulmonary disease, unspecified COPD type ,chronic condition and stable.     Dyslipidemia  -   great every year    Anemia, unspecified type  -     CBC Auto Differential; Future    Hyperlipidemia, unspecified hyperlipidemia type    Benign prostatic hyperplasia, unspecified whether lower urinary tract symptoms present  -     Prostate Specific Antigen, Diagnostic; Future    Current use of long term anticoagulation ,chronic condition and stable.

## 2022-11-16 LAB
COMPLEXED PSA SERPL-MCNC: 0.81 NG/ML (ref 0–4)
TSH SERPL DL<=0.005 MIU/L-ACNC: 0.69 UIU/ML (ref 0.4–4)

## 2023-01-17 ENCOUNTER — IMMUNIZATION (OUTPATIENT)
Dept: OBSTETRICS AND GYNECOLOGY | Facility: CLINIC | Age: 83
End: 2023-01-17
Payer: MEDICARE

## 2023-01-17 DIAGNOSIS — Z23 NEED FOR VACCINATION: Primary | ICD-10-CM

## 2023-01-17 PROCEDURE — 91312 COVID-19, MRNA, LNP-S, BIVALENT BOOSTER, PF, 30 MCG/0.3 ML DOSE: CPT | Mod: S$GLB,,, | Performed by: FAMILY MEDICINE

## 2023-01-17 PROCEDURE — 91312 COVID-19, MRNA, LNP-S, BIVALENT BOOSTER, PF, 30 MCG/0.3 ML DOSE: ICD-10-PCS | Mod: S$GLB,,, | Performed by: FAMILY MEDICINE

## 2023-01-17 PROCEDURE — 0124A COVID-19, MRNA, LNP-S, BIVALENT BOOSTER, PF, 30 MCG/0.3 ML DOSE: CPT | Mod: PBBFAC | Performed by: FAMILY MEDICINE

## 2023-01-23 ENCOUNTER — PES CALL (OUTPATIENT)
Dept: ADMINISTRATIVE | Facility: CLINIC | Age: 83
End: 2023-01-23
Payer: MEDICARE

## 2023-02-14 ENCOUNTER — TELEPHONE (OUTPATIENT)
Dept: ADMINISTRATIVE | Facility: CLINIC | Age: 83
End: 2023-02-14
Payer: MEDICARE

## 2023-02-14 NOTE — TELEPHONE ENCOUNTER
Called pt, no answer; left message informing pt I was calling to remind pt of his in office EAWV on 2/16/23 and to return my call if he had any questions; left my name and number

## 2023-02-16 ENCOUNTER — OFFICE VISIT (OUTPATIENT)
Dept: FAMILY MEDICINE | Facility: CLINIC | Age: 83
End: 2023-02-16
Payer: MEDICARE

## 2023-02-16 VITALS
HEIGHT: 69 IN | BODY MASS INDEX: 27.66 KG/M2 | HEART RATE: 58 BPM | TEMPERATURE: 98 F | DIASTOLIC BLOOD PRESSURE: 68 MMHG | SYSTOLIC BLOOD PRESSURE: 132 MMHG | WEIGHT: 186.75 LBS | OXYGEN SATURATION: 98 %

## 2023-02-16 DIAGNOSIS — I70.0 AORTIC ATHEROSCLEROSIS: ICD-10-CM

## 2023-02-16 DIAGNOSIS — I48.0 PAROXYSMAL ATRIAL FIBRILLATION: ICD-10-CM

## 2023-02-16 DIAGNOSIS — E78.5 DYSLIPIDEMIA: ICD-10-CM

## 2023-02-16 DIAGNOSIS — N18.31 STAGE 3A CHRONIC KIDNEY DISEASE: ICD-10-CM

## 2023-02-16 DIAGNOSIS — I10 HYPERTENSION, UNSPECIFIED TYPE: Chronic | ICD-10-CM

## 2023-02-16 DIAGNOSIS — Z00.00 ENCOUNTER FOR PREVENTIVE HEALTH EXAMINATION: Primary | ICD-10-CM

## 2023-02-16 DIAGNOSIS — E78.5 HYPERLIPIDEMIA, UNSPECIFIED HYPERLIPIDEMIA TYPE: Chronic | ICD-10-CM

## 2023-02-16 DIAGNOSIS — J44.9 CHRONIC OBSTRUCTIVE PULMONARY DISEASE, UNSPECIFIED COPD TYPE: ICD-10-CM

## 2023-02-16 PROCEDURE — G0439 PPPS, SUBSEQ VISIT: HCPCS | Mod: S$GLB,,, | Performed by: NURSE PRACTITIONER

## 2023-02-16 PROCEDURE — 3075F SYST BP GE 130 - 139MM HG: CPT | Mod: CPTII,S$GLB,, | Performed by: NURSE PRACTITIONER

## 2023-02-16 PROCEDURE — 1170F PR FUNCTIONAL STATUS ASSESSED: ICD-10-PCS | Mod: CPTII,S$GLB,, | Performed by: NURSE PRACTITIONER

## 2023-02-16 PROCEDURE — 1159F MED LIST DOCD IN RCRD: CPT | Mod: CPTII,S$GLB,, | Performed by: NURSE PRACTITIONER

## 2023-02-16 PROCEDURE — 99999 PR PBB SHADOW E&M-EST. PATIENT-LVL V: CPT | Mod: PBBFAC,,, | Performed by: NURSE PRACTITIONER

## 2023-02-16 PROCEDURE — 99999 PR PBB SHADOW E&M-EST. PATIENT-LVL V: ICD-10-PCS | Mod: PBBFAC,,, | Performed by: NURSE PRACTITIONER

## 2023-02-16 PROCEDURE — 1101F PT FALLS ASSESS-DOCD LE1/YR: CPT | Mod: CPTII,S$GLB,, | Performed by: NURSE PRACTITIONER

## 2023-02-16 PROCEDURE — G0439 PR MEDICARE ANNUAL WELLNESS SUBSEQUENT VISIT: ICD-10-PCS | Mod: S$GLB,,, | Performed by: NURSE PRACTITIONER

## 2023-02-16 PROCEDURE — 3288F FALL RISK ASSESSMENT DOCD: CPT | Mod: CPTII,S$GLB,, | Performed by: NURSE PRACTITIONER

## 2023-02-16 PROCEDURE — 1170F FXNL STATUS ASSESSED: CPT | Mod: CPTII,S$GLB,, | Performed by: NURSE PRACTITIONER

## 2023-02-16 PROCEDURE — 1160F RVW MEDS BY RX/DR IN RCRD: CPT | Mod: CPTII,S$GLB,, | Performed by: NURSE PRACTITIONER

## 2023-02-16 PROCEDURE — 3078F DIAST BP <80 MM HG: CPT | Mod: CPTII,S$GLB,, | Performed by: NURSE PRACTITIONER

## 2023-02-16 PROCEDURE — 3075F PR MOST RECENT SYSTOLIC BLOOD PRESS GE 130-139MM HG: ICD-10-PCS | Mod: CPTII,S$GLB,, | Performed by: NURSE PRACTITIONER

## 2023-02-16 PROCEDURE — 3078F PR MOST RECENT DIASTOLIC BLOOD PRESSURE < 80 MM HG: ICD-10-PCS | Mod: CPTII,S$GLB,, | Performed by: NURSE PRACTITIONER

## 2023-02-16 PROCEDURE — 1159F PR MEDICATION LIST DOCUMENTED IN MEDICAL RECORD: ICD-10-PCS | Mod: CPTII,S$GLB,, | Performed by: NURSE PRACTITIONER

## 2023-02-16 PROCEDURE — 1160F PR REVIEW ALL MEDS BY PRESCRIBER/CLIN PHARMACIST DOCUMENTED: ICD-10-PCS | Mod: CPTII,S$GLB,, | Performed by: NURSE PRACTITIONER

## 2023-02-16 PROCEDURE — 1101F PR PT FALLS ASSESS DOC 0-1 FALLS W/OUT INJ PAST YR: ICD-10-PCS | Mod: CPTII,S$GLB,, | Performed by: NURSE PRACTITIONER

## 2023-02-16 PROCEDURE — 3288F PR FALLS RISK ASSESSMENT DOCUMENTED: ICD-10-PCS | Mod: CPTII,S$GLB,, | Performed by: NURSE PRACTITIONER

## 2023-02-16 RX ORDER — CHOLECALCIFEROL (VITAMIN D3) 125 MCG
1 CAPSULE ORAL DAILY
COMMUNITY

## 2023-02-16 NOTE — PATIENT INSTRUCTIONS
Counseling and Referral of Other Preventative  (Italic type indicates deductible and co-insurance are waived)    Patient Name: Benitez Cabrales  Today's Date: 2/16/2023    Health Maintenance         Date Due Completion Date    TETANUS VACCINE Never done ---          No orders of the defined types were placed in this encounter.      The following information is provided to all patients.  This information is to help you find resources for any of the problems found today that may be affecting your health:                Living healthy guide: www.[facility].[place].[facility]      Understanding Diabetes: www.diabetes.org      Eating healthy: www.cdc.gov/healthyweight      [facility] home safety checklist: www.cdc.gov/steadi/patient.html      Agency on Aging: www.goea.[place].[facility]      Alcoholics anonymous (AA): www.aa.org      Physical Activity: www.jesus.nih.gov/xm0syab      Tobacco use: www.quitwithusla.org       Please call [employer] at [phone] to receive a rewards card for completing your Annual Wellness Visit. Thanks

## 2023-02-16 NOTE — PROGRESS NOTES
"  Benitez Cabrales presented for a  Medicare AWV and comprehensive Health Risk Assessment today. The following components were reviewed and updated:    Medical history  Family History  Social history  Allergies and Current Medications  Health Risk Assessment  Health Maintenance  Care Team       ** See Completed Assessments for Annual Wellness Visit within the encounter summary.**       The following assessments were completed:  Living Situation  CAGE  Depression Screening  Timed Get Up and Go  Whisper Test  Cognitive Function Screening  Nutrition Screening  ADL Screening  PAQ Screening          Vitals:    02/16/23 1320   BP: 132/68   Pulse: (!) 58   Temp: 97.8 °F (36.6 °C)   TempSrc: Oral   SpO2: 98%   Weight: 84.7 kg (186 lb 11.7 oz)   Height: 5' 9" (1.753 m)     Body mass index is 27.58 kg/m².  Physical Exam  Vitals and nursing note reviewed.   Constitutional:       Appearance: Normal appearance.   Cardiovascular:      Rate and Rhythm: Normal rate.      Pulses: Normal pulses.      Heart sounds: Normal heart sounds.   Pulmonary:      Effort: Pulmonary effort is normal.      Breath sounds: Normal breath sounds.   Musculoskeletal:         General: Normal range of motion.   Neurological:      Mental Status: He is alert and oriented to person, place, and time.   Psychiatric:         Mood and Affect: Mood normal.         Behavior: Behavior normal.         Diagnoses and health risks identified today and associated recommendations/orders:    1. Encounter for preventive health examination  Pt, accompanied by his wife, was seen today for an Annual Wellness visit. Healthcare maintenance and screening recommendations were discussed and updated as indicated. Return in one year for AWV.    Review current opioid prescriptions:n/a  Screen for potential Substance Use Disorders:n/a    2. Paroxysmal atrial fibrillation  Rate controlled. The current medical regimen is effective;  continue present plan and medications.    3. Aortic " atherosclerosis  The current medical regimen is effective;  continue present plan and medications.    4. Chronic obstructive pulmonary disease, unspecified COPD type  Stable. The current medical regimen is effective;  continue present plan and medications.    5. Stage 3a chronic kidney disease  The current medical regimen is effective;  continue present plan and medications.    6. Hypertension, unspecified type  Stable. The current medical regimen is effective;  continue present plan and medications.    7. Hyperlipidemia, unspecified hyperlipidemia type  The current medical regimen is effective;  continue present plan and medications.    8. Dyslipidemia  The current medical regimen is effective;  continue present plan and medications.        Provided Benitez with a 5-10 year written screening schedule and personal prevention plan. Recommendations were developed using the USPSTF age appropriate recommendations. Education, counseling, and referrals were provided as needed. After Visit Summary printed and given to patient which includes a list of additional screenings\tests needed.    Follow up in about 1 year (around 2/16/2024).    BROOKS Cole  I offered to discuss advanced care planning, including how to pick a person who would make decisions for you if you were unable to make them for yourself, called a health care power of , and what kind of decisions you might make such as use of life sustaining treatments such as ventilators and tube feeding when faced with a life limiting illness recorded on a living will that they will need to know. (How you want to be cared for as you near the end of your natural life)     X Patient is interested in learning more about how to make advanced directives.  I provided them paperwork and offered to discuss this with them.

## 2023-03-09 DIAGNOSIS — I48.91 ATRIAL FIBRILLATION, UNSPECIFIED TYPE: ICD-10-CM

## 2023-03-09 DIAGNOSIS — I48.0 PAROXYSMAL ATRIAL FIBRILLATION: ICD-10-CM

## 2023-03-09 RX ORDER — AMIODARONE HYDROCHLORIDE 200 MG/1
200 TABLET ORAL DAILY
Qty: 90 TABLET | Refills: 3 | Status: SHIPPED | OUTPATIENT
Start: 2023-03-09 | End: 2024-03-04

## 2023-03-13 RX ORDER — TIZANIDINE 4 MG/1
TABLET ORAL
Qty: 40 TABLET | Refills: 12 | Status: SHIPPED | OUTPATIENT
Start: 2023-03-13 | End: 2023-03-13 | Stop reason: SDUPTHER

## 2023-03-13 NOTE — TELEPHONE ENCOUNTER
----- Message from Kane Muhammad sent at 3/13/2023 11:00 AM CDT -----  Regarding: CAll BAck  Name of Who is Calling: TOMEKA MARSHALL [[id number]]              What is the request in detail: Pt requesting a call back states his medication tiZANidine (ZANAFLEX) 4 MG tablet states the pharmacy doesn't have it. I told the Pt it's there Please assist              Can the clinic reply by [facility]: No              What Number to Call Back if not in [facility]: [phone]

## 2023-03-13 NOTE — TELEPHONE ENCOUNTER
No new care gaps identified.  [facility] Embedded Care Gaps. Reference number: [id number]. 3/13/2023   8:21:23 AM CDT

## 2023-03-13 NOTE — TELEPHONE ENCOUNTER
----- Message from Carmen Hensley sent at 3/10/2023 12:15 PM CST -----  Type: Patient Call Back    Who called:self    What is the request in detail:pt would like an update on tiZANidine (ZANAFLEX) 4 MG tablet refill    Can the clinic reply by [facility]?    Would the patient rather a call back or a response via [facility]? call    Best call back number:[phone] (home)

## 2023-03-14 RX ORDER — TIZANIDINE 4 MG/1
TABLET ORAL
Qty: 40 TABLET | Refills: 12 | Status: SHIPPED | OUTPATIENT
Start: 2023-03-14 | End: 2023-03-15 | Stop reason: SDUPTHER

## 2023-03-15 ENCOUNTER — PATIENT OUTREACH (OUTPATIENT)
Dept: ADMINISTRATIVE | Facility: OTHER | Age: 83
End: 2023-03-15
Payer: MEDICARE

## 2023-03-15 ENCOUNTER — HOSPITAL ENCOUNTER (INPATIENT)
Facility: HOSPITAL | Age: 83
LOS: 3 days | Discharge: HOME | DRG: 389 | End: 2023-03-18
Attending: EMERGENCY MEDICINE | Admitting: EMERGENCY MEDICINE
Payer: MEDICARE

## 2023-03-15 DIAGNOSIS — R53.1 DECREASED STRENGTH: ICD-10-CM

## 2023-03-15 DIAGNOSIS — R53.81 DEBILITY: ICD-10-CM

## 2023-03-15 DIAGNOSIS — R42 LIGHTHEADED: ICD-10-CM

## 2023-03-15 DIAGNOSIS — R10.13 EPIGASTRIC PAIN: ICD-10-CM

## 2023-03-15 DIAGNOSIS — R07.9 CHEST PAIN: ICD-10-CM

## 2023-03-15 DIAGNOSIS — K56.609 SMALL BOWEL OBSTRUCTION: Primary | ICD-10-CM

## 2023-03-15 DIAGNOSIS — R11.0 NAUSEA: ICD-10-CM

## 2023-03-15 PROBLEM — E87.6 HYPOKALEMIA: Status: ACTIVE | Noted: 2023-03-15

## 2023-03-15 PROBLEM — Z71.89 ACP (ADVANCE CARE PLANNING): Status: ACTIVE | Noted: 2023-03-15

## 2023-03-15 PROBLEM — N17.9 ACUTE RENAL FAILURE SUPERIMPOSED ON STAGE 3 CHRONIC KIDNEY DISEASE: Status: ACTIVE | Noted: 2020-08-11

## 2023-03-15 PROBLEM — D50.9 MICROCYTIC ANEMIA: Status: ACTIVE | Noted: 2023-03-15

## 2023-03-15 LAB
ALBUMIN SERPL BCP-MCNC: 3.9 G/DL (ref 3.5–5.2)
ALP SERPL-CCNC: 67 U/L (ref 55–135)
ALT SERPL W/O P-5'-P-CCNC: 22 U/L (ref 10–44)
ANION GAP SERPL CALC-SCNC: 15 MMOL/L (ref 8–16)
ANION GAP SERPL CALC-SCNC: 9 MMOL/L (ref 8–16)
APTT BLDCRRT: 25 SEC (ref 21–32)
APTT BLDCRRT: 34 SEC (ref 21–32)
AST SERPL-CCNC: 32 U/L (ref 10–40)
BASOPHILS # BLD AUTO: 0.04 K/UL (ref 0–0.2)
BASOPHILS NFR BLD: 0.3 % (ref 0–1.9)
BILIRUB SERPL-MCNC: 0.7 MG/DL (ref 0.1–1)
BUN SERPL-MCNC: 29 MG/DL (ref 8–23)
BUN SERPL-MCNC: 33 MG/DL (ref 6–30)
CALCIUM SERPL-MCNC: 10.1 MG/DL (ref 8.7–10.5)
CHLORIDE SERPL-SCNC: 95 MMOL/L (ref 95–110)
CHLORIDE SERPL-SCNC: 98 MMOL/L (ref 95–110)
CO2 SERPL-SCNC: 25 MMOL/L (ref 23–29)
CREAT SERPL-MCNC: 1.6 MG/DL (ref 0.5–1.4)
CREAT SERPL-MCNC: 1.6 MG/DL (ref 0.5–1.4)
DIFFERENTIAL METHOD: ABNORMAL
EOSINOPHIL # BLD AUTO: 0 K/UL (ref 0–0.5)
EOSINOPHIL NFR BLD: 0.3 % (ref 0–8)
ERYTHROCYTE [DISTWIDTH] IN BLOOD BY AUTOMATED COUNT: 16.1 % (ref 11.5–14.5)
EST. GFR  (NO RACE VARIABLE): 43 ML/MIN/1.73 M^2
FERRITIN SERPL-MCNC: 16 NG/ML (ref 20–300)
GLUCOSE SERPL-MCNC: 115 MG/DL (ref 70–110)
GLUCOSE SERPL-MCNC: 117 MG/DL (ref 70–110)
HCT VFR BLD AUTO: 36.2 % (ref 40–54)
HCT VFR BLD CALC: 39 %PCV (ref 36–54)
HGB BLD-MCNC: 11.9 G/DL (ref 14–18)
IMM GRANULOCYTES # BLD AUTO: 0.03 K/UL (ref 0–0.04)
IMM GRANULOCYTES NFR BLD AUTO: 0.2 % (ref 0–0.5)
INR PPP: 1 (ref 0.8–1.2)
IRON SERPL-MCNC: 53 UG/DL (ref 45–160)
LACTATE SERPL-SCNC: 1.1 MMOL/L (ref 0.5–2.2)
LACTATE SERPL-SCNC: 1.7 MMOL/L (ref 0.5–2.2)
LIPASE SERPL-CCNC: 48 U/L (ref 4–60)
LYMPHOCYTES # BLD AUTO: 1.2 K/UL (ref 1–4.8)
LYMPHOCYTES NFR BLD: 8.8 % (ref 18–48)
MCH RBC QN AUTO: 25.2 PG (ref 27–31)
MCHC RBC AUTO-ENTMCNC: 32.9 G/DL (ref 32–36)
MCV RBC AUTO: 77 FL (ref 82–98)
MONOCYTES # BLD AUTO: 0.6 K/UL (ref 0.3–1)
MONOCYTES NFR BLD: 4.8 % (ref 4–15)
NEUTROPHILS # BLD AUTO: 11.3 K/UL (ref 1.8–7.7)
NEUTROPHILS NFR BLD: 85.6 % (ref 38–73)
NRBC BLD-RTO: 0 /100 WBC
PLATELET # BLD AUTO: 222 K/UL (ref 150–450)
PMV BLD AUTO: 10.3 FL (ref 9.2–12.9)
POC IONIZED CALCIUM: 1.19 MMOL/L (ref 1.06–1.42)
POC TCO2 (MEASURED): 27 MMOL/L (ref 23–29)
POTASSIUM BLD-SCNC: 3.8 MMOL/L (ref 3.5–5.1)
POTASSIUM SERPL-SCNC: 3.4 MMOL/L (ref 3.5–5.1)
PROCALCITONIN SERPL IA-MCNC: 0.04 NG/ML
PROT SERPL-MCNC: 8 G/DL (ref 6–8.4)
PROTHROMBIN TIME: 10.4 SEC (ref 9–12.5)
RBC # BLD AUTO: 4.73 M/UL (ref 4.6–6.2)
SAMPLE: ABNORMAL
SATURATED IRON: 12 % (ref 20–50)
SODIUM BLD-SCNC: 131 MMOL/L (ref 136–145)
SODIUM SERPL-SCNC: 132 MMOL/L (ref 136–145)
TOTAL IRON BINDING CAPACITY: 445 UG/DL (ref 250–450)
TRANSFERRIN SERPL-MCNC: 301 MG/DL (ref 200–375)
TROPONIN I SERPL DL<=0.01 NG/ML-MCNC: 0.01 NG/ML (ref 0–0.03)
WBC # BLD AUTO: 13.22 K/UL (ref 3.9–12.7)

## 2023-03-15 PROCEDURE — 99223 1ST HOSP IP/OBS HIGH 75: CPT | Mod: ,,, | Performed by: SURGERY

## 2023-03-15 PROCEDURE — 63600175 PHARM REV CODE 636 W HCPCS: Performed by: PHYSICIAN ASSISTANT

## 2023-03-15 PROCEDURE — 93010 EKG 12-LEAD: ICD-10-PCS | Mod: ,,, | Performed by: INTERNAL MEDICINE

## 2023-03-15 PROCEDURE — 25000003 PHARM REV CODE 250: Performed by: PHYSICIAN ASSISTANT

## 2023-03-15 PROCEDURE — 83605 ASSAY OF LACTIC ACID: CPT | Performed by: PHYSICIAN ASSISTANT

## 2023-03-15 PROCEDURE — 63600175 PHARM REV CODE 636 W HCPCS: Performed by: HOSPITALIST

## 2023-03-15 PROCEDURE — 82330 ASSAY OF CALCIUM: CPT

## 2023-03-15 PROCEDURE — 93010 ELECTROCARDIOGRAM REPORT: CPT | Mod: ,,, | Performed by: INTERNAL MEDICINE

## 2023-03-15 PROCEDURE — 96376 TX/PRO/DX INJ SAME DRUG ADON: CPT

## 2023-03-15 PROCEDURE — 11000001 HC ACUTE MED/SURG PRIVATE ROOM

## 2023-03-15 PROCEDURE — 99223 PR INITIAL HOSPITAL CARE,LEVL III: ICD-10-PCS | Mod: ,,, | Performed by: SURGERY

## 2023-03-15 PROCEDURE — 99285 EMERGENCY DEPT VISIT HI MDM: CPT | Mod: 25

## 2023-03-15 PROCEDURE — C9113 INJ PANTOPRAZOLE SODIUM, VIA: HCPCS | Performed by: HOSPITALIST

## 2023-03-15 PROCEDURE — 85014 HEMATOCRIT: CPT

## 2023-03-15 PROCEDURE — 82565 ASSAY OF CREATININE: CPT

## 2023-03-15 PROCEDURE — 85730 THROMBOPLASTIN TIME PARTIAL: CPT | Performed by: HOSPITALIST

## 2023-03-15 PROCEDURE — 85610 PROTHROMBIN TIME: CPT | Performed by: HOSPITALIST

## 2023-03-15 PROCEDURE — 25000242 PHARM REV CODE 250 ALT 637 W/ HCPCS: Performed by: HOSPITALIST

## 2023-03-15 PROCEDURE — 87040 BLOOD CULTURE FOR BACTERIA: CPT | Mod: 59 | Performed by: PHYSICIAN ASSISTANT

## 2023-03-15 PROCEDURE — 96374 THER/PROPH/DIAG INJ IV PUSH: CPT

## 2023-03-15 PROCEDURE — 25500020 PHARM REV CODE 255: Performed by: EMERGENCY MEDICINE

## 2023-03-15 PROCEDURE — 82728 ASSAY OF FERRITIN: CPT | Performed by: HOSPITALIST

## 2023-03-15 PROCEDURE — 25000003 PHARM REV CODE 250: Performed by: HOSPITALIST

## 2023-03-15 PROCEDURE — 36415 COLL VENOUS BLD VENIPUNCTURE: CPT | Performed by: HOSPITALIST

## 2023-03-15 PROCEDURE — 84484 ASSAY OF TROPONIN QUANT: CPT | Performed by: PHYSICIAN ASSISTANT

## 2023-03-15 PROCEDURE — 96375 TX/PRO/DX INJ NEW DRUG ADDON: CPT

## 2023-03-15 PROCEDURE — 83690 ASSAY OF LIPASE: CPT | Performed by: PHYSICIAN ASSISTANT

## 2023-03-15 PROCEDURE — 82746 ASSAY OF FOLIC ACID SERUM: CPT | Performed by: HOSPITALIST

## 2023-03-15 PROCEDURE — 84145 PROCALCITONIN (PCT): CPT | Performed by: HOSPITALIST

## 2023-03-15 PROCEDURE — 85025 COMPLETE CBC W/AUTO DIFF WBC: CPT | Performed by: PHYSICIAN ASSISTANT

## 2023-03-15 PROCEDURE — 82962 GLUCOSE BLOOD TEST: CPT

## 2023-03-15 PROCEDURE — 99900035 HC TECH TIME PER 15 MIN (STAT)

## 2023-03-15 PROCEDURE — 84132 ASSAY OF SERUM POTASSIUM: CPT

## 2023-03-15 PROCEDURE — 84466 ASSAY OF TRANSFERRIN: CPT | Performed by: HOSPITALIST

## 2023-03-15 PROCEDURE — 82607 VITAMIN B-12: CPT | Performed by: HOSPITALIST

## 2023-03-15 PROCEDURE — 80053 COMPREHEN METABOLIC PANEL: CPT | Performed by: PHYSICIAN ASSISTANT

## 2023-03-15 PROCEDURE — 84295 ASSAY OF SERUM SODIUM: CPT

## 2023-03-15 PROCEDURE — 93005 ELECTROCARDIOGRAM TRACING: CPT

## 2023-03-15 PROCEDURE — 83605 ASSAY OF LACTIC ACID: CPT | Mod: 91 | Performed by: HOSPITALIST

## 2023-03-15 RX ORDER — ONDANSETRON 2 MG/ML
4 INJECTION INTRAMUSCULAR; INTRAVENOUS
Status: COMPLETED | OUTPATIENT
Start: 2023-03-15 | End: 2023-03-15

## 2023-03-15 RX ORDER — ACETAMINOPHEN 325 MG/1
650 TABLET ORAL EVERY 6 HOURS PRN
Status: DISCONTINUED | OUTPATIENT
Start: 2023-03-15 | End: 2023-03-18 | Stop reason: HOSPADM

## 2023-03-15 RX ORDER — PANTOPRAZOLE SODIUM 40 MG/10ML
40 INJECTION, POWDER, LYOPHILIZED, FOR SOLUTION INTRAVENOUS 2 TIMES DAILY
Status: DISCONTINUED | OUTPATIENT
Start: 2023-03-15 | End: 2023-03-18 | Stop reason: HOSPADM

## 2023-03-15 RX ORDER — FLUTICASONE PROPIONATE 50 MCG
1 SPRAY, SUSPENSION (ML) NASAL DAILY
Status: DISCONTINUED | OUTPATIENT
Start: 2023-03-15 | End: 2023-03-18 | Stop reason: HOSPADM

## 2023-03-15 RX ORDER — HYDROMORPHONE HYDROCHLORIDE 1 MG/ML
0.5 INJECTION, SOLUTION INTRAMUSCULAR; INTRAVENOUS; SUBCUTANEOUS EVERY 6 HOURS PRN
Status: DISCONTINUED | OUTPATIENT
Start: 2023-03-15 | End: 2023-03-15

## 2023-03-15 RX ORDER — HEPARIN SODIUM,PORCINE/D5W 25000/250
0-40 INTRAVENOUS SOLUTION INTRAVENOUS CONTINUOUS
Status: DISCONTINUED | OUTPATIENT
Start: 2023-03-15 | End: 2023-03-18 | Stop reason: HOSPADM

## 2023-03-15 RX ORDER — POTASSIUM CHLORIDE 7.45 MG/ML
10 INJECTION INTRAVENOUS
Status: COMPLETED | OUTPATIENT
Start: 2023-03-15 | End: 2023-03-15

## 2023-03-15 RX ORDER — SODIUM CHLORIDE 450 MG/100ML
INJECTION, SOLUTION INTRAVENOUS CONTINUOUS
Status: DISCONTINUED | OUTPATIENT
Start: 2023-03-15 | End: 2023-03-18 | Stop reason: HOSPADM

## 2023-03-15 RX ORDER — DEXTROSE 40 %
15 GEL (GRAM) ORAL
Status: DISCONTINUED | OUTPATIENT
Start: 2023-03-15 | End: 2023-03-18 | Stop reason: HOSPADM

## 2023-03-15 RX ORDER — FAMOTIDINE 10 MG/ML
20 INJECTION INTRAVENOUS
Status: COMPLETED | OUTPATIENT
Start: 2023-03-15 | End: 2023-03-15

## 2023-03-15 RX ORDER — HYDROMORPHONE HYDROCHLORIDE 1 MG/ML
0.5 INJECTION, SOLUTION INTRAMUSCULAR; INTRAVENOUS; SUBCUTANEOUS
Status: COMPLETED | OUTPATIENT
Start: 2023-03-15 | End: 2023-03-15

## 2023-03-15 RX ORDER — ZINC GLUCONATE 50 MG
50 TABLET ORAL DAILY
COMMUNITY

## 2023-03-15 RX ORDER — HYDROMORPHONE HYDROCHLORIDE 1 MG/ML
1 INJECTION, SOLUTION INTRAMUSCULAR; INTRAVENOUS; SUBCUTANEOUS EVERY 6 HOURS PRN
Status: DISCONTINUED | OUTPATIENT
Start: 2023-03-15 | End: 2023-03-15

## 2023-03-15 RX ORDER — TALC
6 POWDER (GRAM) TOPICAL NIGHTLY PRN
Status: DISCONTINUED | OUTPATIENT
Start: 2023-03-15 | End: 2023-03-18 | Stop reason: HOSPADM

## 2023-03-15 RX ORDER — HYDROMORPHONE HYDROCHLORIDE 1 MG/ML
0.5 INJECTION, SOLUTION INTRAMUSCULAR; INTRAVENOUS; SUBCUTANEOUS EVERY 4 HOURS PRN
Status: DISCONTINUED | OUTPATIENT
Start: 2023-03-15 | End: 2023-03-16

## 2023-03-15 RX ORDER — HYDRALAZINE HYDROCHLORIDE 20 MG/ML
15 INJECTION INTRAMUSCULAR; INTRAVENOUS EVERY 4 HOURS PRN
Status: DISCONTINUED | OUTPATIENT
Start: 2023-03-15 | End: 2023-03-18 | Stop reason: HOSPADM

## 2023-03-15 RX ORDER — ONDANSETRON 2 MG/ML
4 INJECTION INTRAMUSCULAR; INTRAVENOUS EVERY 4 HOURS PRN
Status: DISCONTINUED | OUTPATIENT
Start: 2023-03-15 | End: 2023-03-18 | Stop reason: HOSPADM

## 2023-03-15 RX ORDER — DEXTROSE 40 %
30 GEL (GRAM) ORAL
Status: DISCONTINUED | OUTPATIENT
Start: 2023-03-15 | End: 2023-03-18 | Stop reason: HOSPADM

## 2023-03-15 RX ORDER — NALOXONE HCL 0.4 MG/ML
0.02 VIAL (ML) INJECTION
Status: DISCONTINUED | OUTPATIENT
Start: 2023-03-15 | End: 2023-03-18 | Stop reason: HOSPADM

## 2023-03-15 RX ORDER — HYDROMORPHONE HYDROCHLORIDE 1 MG/ML
1 INJECTION, SOLUTION INTRAMUSCULAR; INTRAVENOUS; SUBCUTANEOUS
Status: COMPLETED | OUTPATIENT
Start: 2023-03-15 | End: 2023-03-15

## 2023-03-15 RX ORDER — SODIUM CHLORIDE 0.9 % (FLUSH) 0.9 %
10 SYRINGE (ML) INJECTION
Status: DISCONTINUED | OUTPATIENT
Start: 2023-03-15 | End: 2023-03-18 | Stop reason: HOSPADM

## 2023-03-15 RX ORDER — METOPROLOL TARTRATE 1 MG/ML
5 INJECTION, SOLUTION INTRAVENOUS EVERY 5 MIN PRN
Status: ACTIVE | OUTPATIENT
Start: 2023-03-15 | End: 2023-03-16

## 2023-03-15 RX ORDER — GLUCAGON 1 MG
1 KIT INJECTION
Status: DISCONTINUED | OUTPATIENT
Start: 2023-03-15 | End: 2023-03-18 | Stop reason: HOSPADM

## 2023-03-15 RX ORDER — HYDROMORPHONE HYDROCHLORIDE 1 MG/ML
2 INJECTION, SOLUTION INTRAMUSCULAR; INTRAVENOUS; SUBCUTANEOUS EVERY 4 HOURS PRN
Status: DISCONTINUED | OUTPATIENT
Start: 2023-03-15 | End: 2023-03-16

## 2023-03-15 RX ADMIN — HYDROMORPHONE HYDROCHLORIDE 2 MG: 1 INJECTION, SOLUTION INTRAMUSCULAR; INTRAVENOUS; SUBCUTANEOUS at 04:03

## 2023-03-15 RX ADMIN — HYDROMORPHONE HYDROCHLORIDE 1 MG: 1 INJECTION, SOLUTION INTRAMUSCULAR; INTRAVENOUS; SUBCUTANEOUS at 12:03

## 2023-03-15 RX ADMIN — HYDROMORPHONE HYDROCHLORIDE 0.5 MG: 1 INJECTION, SOLUTION INTRAMUSCULAR; INTRAVENOUS; SUBCUTANEOUS at 01:03

## 2023-03-15 RX ADMIN — FAMOTIDINE 20 MG: 10 INJECTION INTRAVENOUS at 12:03

## 2023-03-15 RX ADMIN — POTASSIUM CHLORIDE 10 MEQ: 7.46 INJECTION, SOLUTION INTRAVENOUS at 04:03

## 2023-03-15 RX ADMIN — PIPERACILLIN AND TAZOBACTAM 4.5 G: 4; .5 INJECTION, POWDER, LYOPHILIZED, FOR SOLUTION INTRAVENOUS; PARENTERAL at 01:03

## 2023-03-15 RX ADMIN — FLUTICASONE PROPIONATE 50 MCG: 50 SPRAY, METERED NASAL at 05:03

## 2023-03-15 RX ADMIN — SODIUM CHLORIDE: 4.5 INJECTION, SOLUTION INTRAVENOUS at 04:03

## 2023-03-15 RX ADMIN — ONDANSETRON 4 MG: 2 INJECTION INTRAMUSCULAR; INTRAVENOUS at 12:03

## 2023-03-15 RX ADMIN — PANTOPRAZOLE SODIUM 40 MG: 40 INJECTION, POWDER, FOR SOLUTION INTRAVENOUS at 09:03

## 2023-03-15 RX ADMIN — HEPARIN SODIUM 12 UNITS/KG/HR: 10000 INJECTION, SOLUTION INTRAVENOUS at 04:03

## 2023-03-15 RX ADMIN — IOHEXOL 100 ML: 350 INJECTION, SOLUTION INTRAVENOUS at 01:03

## 2023-03-15 RX ADMIN — SODIUM CHLORIDE 1000 ML: 9 INJECTION, SOLUTION INTRAVENOUS at 01:03

## 2023-03-15 RX ADMIN — HYDROMORPHONE HYDROCHLORIDE 2 MG: 1 INJECTION, SOLUTION INTRAMUSCULAR; INTRAVENOUS; SUBCUTANEOUS at 10:03

## 2023-03-15 NOTE — SUBJECTIVE & OBJECTIVE
Past Medical History:   Diagnosis Date    Anticoagulant long-term use     Atrial fibrillation     GERD (gastroesophageal reflux disease)     Hyperlipidemia     Hypertension     Nuclear sclerosis, bilateral 10/09/2017    Rhinitis medicamentosa 06/30/2014    Small bowel obstruction     Vertigo 06/13/2013       Past Surgical History:   Procedure Laterality Date    ICM implant      NOSE SURGERY      Septal Repair    REMOVAL OF IMPLANTABLE LOOP RECORDER N/A 12/10/2018    Procedure: REMOVAL, IMPLANTABLE LOOP RECORDER;  Surgeon: Mickey Morales MD;  Location: [facility];  Service: Cardiology;  Laterality: N/A;    VASCULAR SURGERY      left leg       Review of patient's allergies indicates:  No Known Allergies    No current facility-administered medications on file prior to encounter.     Current Outpatient Medications on File Prior to Encounter   Medication Sig    acetaminophen (TYLENOL) 500 MG tablet Take 500 mg by mouth every 8 (eight) hours as needed for Pain.    amiodarone (PACERONE) 200 MG Tab Take 1 tablet (200 mg total) by mouth once daily.    amLODIPine (NORVASC) 5 MG tablet Take 1 tablet by mouth once daily    biotin 5,000 mcg TbDL Take 1 tablet by mouth once daily.    chlorthalidone (HYGROTEN) 25 MG Tab 1 tablet daily    cholecalciferol, vitamin D3, (VITAMIN D3) 50 mcg (2,000 unit) Cap Take 1 capsule by mouth once daily.    doxepin (SINEQUAN) 10 MG capsule TAKE 1 TO 2 CAPSULES BY MOUTH AT BEDTIME AS NEEDED FOR ITCHING    ELIQUIS 5 mg Tab Take 1 tablet by mouth twice daily    fluticasone propionate (FLONASE) 50 mcg/actuation nasal spray 1 spray (50 mcg total) by Each Nostril route once daily.    magnesium 200 mg Tab Take by mouth once daily.    MV-MN/FA/LYCOPENE/LUT/HB#178 (NEHEMIAH MULTIVITAMIN FOR MEN ORAL) Take 1 tablet by mouth once daily.    rosuvastatin (CRESTOR) 10 MG tablet TAKE 1 TABLET BY MOUTH ONCE DAILY IN THE EVENING    tiZANidine (ZANAFLEX) 4 MG tablet TAKE 1 TO 2 TABLETS BY MOUTH AT BEDTIME AS NEEDED     UNABLE TO FIND medication name: tumeric cap daily    zinc gluconate 50 mg tablet Take 50 mg by mouth once daily.    [DISCONTINUED] ALLERGY RELIEF, LORATADINE, 10 mg tablet Take 1 tablet by mouth once daily    [DISCONTINUED] ascorbic acid, vitamin C, (VITAMIN C) 250 MG tablet Take 250 mg by mouth 3 (three) times a week.    [DISCONTINUED] azelastine (ASTELIN) 137 mcg (0.1 %) nasal spray 1 spray (137 mcg total) by Nasal route 2 (two) times daily.    [DISCONTINUED] clotrimazole-betamethasone 1-0.05% (LOTRISONE) cream Apply topically 2 (two) times daily. Use only a week or 2 at a time (Patient not taking: Reported on 2/16/2023)    [DISCONTINUED] fluconazole (DIFLUCAN) 200 MG Tab TAKE 1 TABLET BY MOUTH IN THE MORNING ON FRIDAY. TAKE WITH FOOD.    [DISCONTINUED] HYDROcodone-acetaminophen (NORCO) 7.5-325 mg per tablet Take 1 tablet by mouth every 6 (six) hours as needed for Pain. (Patient not taking: Reported on 2/16/2023)    [DISCONTINUED] meclizine (ANTIVERT) 25 mg tablet TAKE 1/2 TO 1 (ONE-HALF TO ONE) TABLET AT LEAST BY MOUTH AT BEDTIME FOR VERTIGO (Patient not taking: Reported on 2/16/2023)    [DISCONTINUED] tamsulosin (FLOMAX) 0.4 mg Cap Take 1 capsule by mouth once daily    [DISCONTINUED] tiZANidine (ZANAFLEX) 4 MG tablet TAKE 1 TO 2 TABLETS BY MOUTH AT BEDTIME AS NEEDED     Family History       Problem Relation (Age of Onset)    No Known Problems Mother, Father, Sister, Brother, Maternal Uncle, Paternal Aunt, Paternal Uncle, Maternal Grandmother, Maternal Grandfather, Paternal Grandmother, Paternal Grandfather          Tobacco Use    Smoking status: Never     Passive exposure: Never    Smokeless tobacco: Never    Tobacco comments:     .  Three kids.  Occup:   at [employer].     Substance and Sexual Activity    Alcohol use: Yes     Alcohol/week: 1.0 standard drink     Types: 1 Glasses of wine per week     Comment: red wine almost daily    Drug use: Never    Sexual activity: Not Currently     Review  of Systems   Constitutional:  Positive for activity change and appetite change. Negative for fever.   HENT:  Negative for congestion and dental problem.    Eyes:  Negative for discharge and itching.   Respiratory:  Negative for apnea, cough, chest tightness and shortness of breath.    Cardiovascular:  Negative for chest pain and leg swelling.   Gastrointestinal:  Positive for abdominal pain and nausea. Negative for abdominal distention.   Endocrine: Negative for cold intolerance and heat intolerance.   Genitourinary:  Negative for difficulty urinating and dysuria.   Musculoskeletal:  Negative for arthralgias and back pain.   Allergic/Immunologic: Negative for environmental allergies and food allergies.   Neurological:  Negative for dizziness, facial asymmetry and light-headedness.   Hematological:  Negative for adenopathy. Does not bruise/bleed easily.   Psychiatric/Behavioral:  Negative for agitation and behavioral problems.    Objective:     Vital Signs (Most Recent):  Temp: 97.7 °F (36.5 °C) (03/15/23 1149)  Pulse: 69 (03/15/23 1522)  Resp: (!) 31 (03/15/23 1413)  BP: (!) 196/87 (03/15/23 1503)  SpO2: 96 % (03/15/23 1522)   Vital Signs (24h Range):  Temp:  [97.7 °F (36.5 °C)] 97.7 °F (36.5 °C)  Pulse:  [59-69] 69  Resp:  [19-31] 31  SpO2:  [96 %-99 %] 96 %  BP: (160-196)/(73-87) 196/87     Weight: 83.9 kg (185 lb)  Body mass index is 25.8 kg/m².    Physical Exam  Vitals and nursing note reviewed.   Constitutional:       General: He is in acute distress.      Appearance: He is well-developed. He is ill-appearing. He is not toxic-appearing or diaphoretic.   HENT:      Head: Normocephalic and atraumatic.      Right Ear: External ear normal.      Left Ear: External ear normal.      Nose: Nose normal.      Comments: Ngtube in place     Mouth/Throat:      Mouth: Mucous membranes are dry.   Eyes:      Extraocular Movements: Extraocular movements intact.      Conjunctiva/sclera: Conjunctivae normal.   Cardiovascular:       Rate and Rhythm: Normal rate and regular rhythm.      Heart sounds: Normal heart sounds.   Pulmonary:      Effort: Pulmonary effort is normal. No respiratory distress.      Breath sounds: Normal breath sounds.   Abdominal:      Comments: Firm, distended, significant TTP in mid and right upper abdomen with guarding but no rebound - I cannot appreciate any bowel sounds.   Musculoskeletal:         General: Normal range of motion.      Cervical back: Normal range of motion. No rigidity.      Comments: Trace edema in left lower leg which is chronic from prior vein stripping procedure.   Skin:     General: Skin is warm.   Neurological:      Mental Status: He is alert and oriented to person, place, and time.      Cranial Nerves: No cranial nerve deficit.      Coordination: Coordination normal.   Psychiatric:         Behavior: Behavior normal.           Significant Labs: All pertinent labs within the past 24 hours have been reviewed.    Significant Imaging: I have reviewed all pertinent imaging results/findings within the past 24 hours.

## 2023-03-15 NOTE — ASSESSMENT & PLAN NOTE
Advance Care Planning  Date: 03/15/2023   Engaged patient and his wife in goals of care discussion surrounding his values and preferences for care in the event of cardiac arrest.  Explored pros and cons of ACLS treatment.  Both patient and wife are clear that he prefers for full aggressive care with full code status.  I spent 10 minutes in ACP today.

## 2023-03-15 NOTE — ASSESSMENT & PLAN NOTE
-Baseline Cr 1.4  -On admit Cr 1.6  -Appears mildly hypovolemic  -Avoid nephrotoxic agents and renally dose meds  -Treat with gentle IV fluids for now  -Repeat BMP in AM

## 2023-03-15 NOTE — PLAN OF CARE
[facility] - Med Surg  Initial Discharge Assessment       Primary Care Provider: Diomedes Ayoub MD    Admission Diagnosis: Nausea [R11.0]  Epigastric pain [R10.13]  Small bowel obstruction [K56.609]  Lightheaded [R42]  Chest pain [R07.9]    Admission Date: 3/15/2023  Expected Discharge Date:     SW completed initial assessment and discussed discharge planning with patient and his wife Nae at his bedside. Patient is in need for a straight cane. Patient lives with his wife and she will be helping him while he recovers. Patient's wife will provide transportation for him to get home when discharge from the hospital.    Discharge Barriers Identified: None    Payor: [employer]ARE / Plan: [employer] CHOICES 65 / Product Type: Medicare Advantage /     Extended Emergency Contact Information  Primary Emergency Contact: Nae Cabrales  Address: [address] [place], [place] [id number] [facility] [place]  Mobile Phone: [phone]  Relation: Spouse    Discharge Plan A: Home with family  Discharge Plan B: Home with family      [id number] - [place] - [address]  [address] [facility] [address]  Phone: [phone] Fax: [phone]    [facility] - [place] - [address]  [address]  [facility] [address]  Phone: [phone] Fax: [phone]      Initial Assessment (most recent)       Adult Discharge Assessment - 03/15/23 1704          Discharge Assessment    Assessment Type Discharge Planning Assessment     Confirmed/corrected address, phone number and insurance Yes     Confirmed Demographics Correct on Facesheet     Source of Information patient;family     When was your last doctors appointment? --   patient last visited his PCP in February    Communicated CARMENCITA with patient/caregiver Date not available/Unable to determine     Reason For Admission Small Bowel Obstruction     People in Home spouse     Do  you expect to return to your current living situation? Yes     Do you have help at home or someone to help you manage your care at home? Yes     Who are your caregiver(s) and their phone number(s)? Nae Cabrales (Spouse)   [phone] (Mobile)     Prior to hospitilization cognitive status: Alert/Oriented     Current cognitive status: Alert/Oriented     Equipment Currently Used at Home none     Readmission within 30 days? No     Patient currently being followed by outpatient case management? No     Do you currently have service(s) that help you manage your care at home? No     Do you take prescription medications? Yes     Do you have prescription coverage? Yes     Coverage [facility]     Do you have any problems affording any of your prescribed medications? No     Is the patient taking medications as prescribed? yes     Who is going to help you get home at discharge? Nae Cabrales (Spouse)   [phone] (Mobile)     How do you get to doctors appointments? car, drives self     Are you on dialysis? No     Discharge Plan A Home with family     Discharge Plan B Home with family     DME Needed Upon Discharge  none;cane, straight     Discharge Plan discussed with: Spouse/sig other     Name(s) and Number(s) Nae Cabrales (Spouse)   [phone] (Mobile)     Discharge Barriers Identified None

## 2023-03-15 NOTE — PROGRESS NOTES
CHW - Initial Contact    This Community Health Worker updated the Social Determinant of Health questionnaire with patient  at bedside  today.    Pt identified barriers of most importance are: has no needs at this time.   Referrals to community agencies completed with patient/caregiver consent outside of [facility] include: no, none at this time.  Referrals were put through [facility] - no: none at this time.  Support and Services: has support with spouse.  Other information discussed the patient needs / wants help with:  Patient answered all SDOH questions with wife present at bedside, pt has no needs at this time, will follow up in one week for a possible case closure.  Follow up required: yes.  Follow-up Outreach - Due: 3/21/2023

## 2023-03-15 NOTE — HPI
"Mr. Cabrales is an 82 year old man with atrial fibrillation, hypertension, hyperlipidemia and GERD who presented for evaluation of abdominal pain.  He states he was in his usual state of health up until yesterday afternoon when he developed abdominal pain.  The pain was initially a mild discomfort but overnight has progressed to severe, 10/10 pain.  It is located in his mid/upper and right upper abdomen, does not radiate and is worse with movement and deep breaths.  The pain has been mildly relieved by iv dilaudid in the ER and now the pain is coming in paroxysms of continued severe intensity.  He notes overnight feeling nausea and light headedness.   He states he felt the need to vomit or have a bowel movement, but could do neither despite attempts to induce vomiting.  He reports his last bowel movement was yesterday morning.  He is not passing any flatus.  He denies fever, chills, diarrhea, abdominal trauma, falls, chest pain, shortness of breath, new foods and new medications.  He has never had abdominal surgery before and reports his last colonoscopy was "a long time ago".  In the ER he was treated with zofran, pepcid, dilaudid and IV fluids.  "

## 2023-03-15 NOTE — ASSESSMENT & PLAN NOTE
-On admit EKG shows sinus bradycardia  -At home takes amiodarone 200mg qd and eliquis bid.  Last mazariegos of eliquis was 3/14 in afternoon  -Will hold amiodarone for now as half life is 45 days.  If needed could give non-titrating gtt 0.5mg/hour (per pharmacy)  -Will hold eliquis in case he requires surgery.  Will treat with heparin drip for now.  -Monitor on telemetry.

## 2023-03-15 NOTE — NURSING
Patient arrived to unit via bed. IVF infusing. Cardia monitor in place. NGT to left nostril. NGT to low intermittent wall suction. Family at bedside. Bed locked in lowest position, call light within reach. Patient educated to call before attempting to get out of bed. Patient verbalizes understanding

## 2023-03-15 NOTE — PHARMACY MED REC
"Admission Medication History     The home medication history was taken by Virginia Devlin CPhT.      You may go to "Admission" then "Reconcile Home Medications" tabs to review and/or act upon these items.     The home medication list has been updated by the Pharmacy department.   Please read ALL comments highlighted in yellow.   Please address this information as you see fit.    Feel free to contact us if you have any questions or require assistance.      The medications listed below were removed from the home medication list. Please reorder if appropriate:  Patient reports no longer taking the following medication(s):  Astelin 137 mcg nasal spray  Loratadine  Vitamin C  Lotrisone cream  Diflucan 200 mg   NORCO 7.5-325 mg  Antivert 25 mg tab  Flomax 0.4 mg  Zanaflex (duplicate)    Medications listed below were obtained from: Patient/family and Analytic software- [employer]  (Not in a hospital admission)    Patient stated he takes BPH medication OTC but does not remember the name of the medication. 1 tab po qd.    Virginia Devlin [facility].  [phone]                  .        "

## 2023-03-15 NOTE — ASSESSMENT & PLAN NOTE
-Appears rather weak right now, but for his age is overall quite robust fit.  Plan for pt/ot evaluation for post-discharge needs once he is clinically improving.

## 2023-03-15 NOTE — ED PROVIDER NOTES
"Encounter Date: 3/15/2023       History     Chief Complaint   Patient presents with    Abdominal Pain     Pt c/o abdominal pain and bloating with nausea starting at 5am today. Denies diarrhea. States he feels like he needs to pass gas but cannot.     The history is provided by the patient and the spouse.     83 y/o male with a PMH of HTN HLD AFib on eliquis GERD and COPD presenting to the emergency department today c/o upper abdominal pain that began this morning. Pt states the pain began this morning 10/10 located around his epigastric region and RUQ with associated nausea and lightheadedness. Pt states the pain becomes worse with deep inspiration or movement of his upper body. Pt has not taken anything for his pain. He is not passing flatus. He states "I feel like I want to vomit or pass gas but I can't." Pt denies any fever, SOB, CP, diaphoresis, V/D, constipation, difficulty urinating/frequency, melena, hematochezia. Last c-scope > 10 years ago with no reported abnormalities.       Review of patient's allergies indicates:  No Known Allergies  Past Medical History:   Diagnosis Date    Anticoagulant long-term use     Atrial fibrillation     COPD (chronic obstructive pulmonary disease)     GERD (gastroesophageal reflux disease)     Hyperlipidemia     Hypertension     Nuclear sclerosis, bilateral 10/9/2017    Rhinitis medicamentosa 6/30/2014    Small bowel obstruction     Vertigo 6/13/2013     Past Surgical History:   Procedure Laterality Date    ICM implant      NOSE SURGERY      Septal Repair    REMOVAL OF IMPLANTABLE LOOP RECORDER N/A 12/10/2018    Procedure: REMOVAL, IMPLANTABLE LOOP RECORDER;  Surgeon: Mickey Morales MD;  Location: [facility];  Service: Cardiology;  Laterality: N/A;    VASCULAR SURGERY      left leg     Family History   Problem Relation Age of Onset    No Known Problems Mother     No Known Problems Father     No Known Problems Sister     No Known Problems Brother     No Known Problems " Maternal Uncle     No Known Problems Paternal Aunt     No Known Problems Paternal Uncle     No Known Problems Maternal Grandmother     No Known Problems Maternal Grandfather     No Known Problems Paternal Grandmother     No Known Problems Paternal Grandfather     Asthma Neg Hx     Emphysema Neg Hx     Amblyopia Neg Hx     Blindness Neg Hx     Cataracts Neg Hx     Diabetes Neg Hx     Glaucoma Neg Hx     Hypertension Neg Hx     Macular degeneration Neg Hx     Retinal detachment Neg Hx     Strabismus Neg Hx     Stroke Neg Hx     Thyroid disease Neg Hx      Social History     Tobacco Use    Smoking status: Never     Passive exposure: Never    Smokeless tobacco: Never    Tobacco comments:     .  Three kids.  Occup:   at [employer].     Substance Use Topics    Alcohol use: Yes     Alcohol/week: 1.0 standard drink     Types: 1 Glasses of wine per week     Comment: red wine almost daily    Drug use: Not Currently     Review of Systems   Constitutional:  Negative for chills and fever.   HENT:  Negative for congestion.    Eyes:  Negative for visual disturbance.   Respiratory:  Negative for cough, chest tightness and shortness of breath.    Cardiovascular:  Negative for chest pain.   Gastrointestinal:  Positive for abdominal pain and nausea. Negative for abdominal distention, constipation, diarrhea and vomiting.   Genitourinary:  Negative for decreased urine volume, dysuria and frequency.   Musculoskeletal:  Negative for arthralgias, back pain and myalgias.   Neurological:  Positive for light-headedness. Negative for dizziness, weakness and headaches.   Psychiatric/Behavioral:  Negative for confusion.      Physical Exam     Initial Vitals [03/15/23 1149]   BP Pulse Resp Temp SpO2   (!) 160/73 63 20 97.7 °F (36.5 °C) 98 %      MAP       --         Physical Exam    Vitals reviewed.  Constitutional: He appears well-developed and well-nourished. He appears distressed (acutely).   HENT:   Head: Normocephalic.    Nose: Nose normal.   Mouth/Throat: Uvula is midline and oropharynx is clear and moist. Mucous membranes are dry.   Eyes: Conjunctivae and EOM are normal. Pupils are equal, round, and reactive to light.   Neck: Neck supple. No JVD present.   Cardiovascular:  Normal rate, intact distal pulses and normal pulses.     Exam reveals no gallop and no friction rub.       No murmur heard.  Pulmonary/Chest: Effort normal and breath sounds normal. No respiratory distress. He has no wheezes. He has no rhonchi. He has no rales. He exhibits no tenderness.   Abdominal: Abdomen is soft and protuberant. He exhibits distension. He exhibits no shifting dullness, no fluid wave, no pulsatile midline mass and no mass. Bowel sounds are decreased. There is abdominal tenderness in the epigastric area and periumbilical area.   No right CVA tenderness.  No left CVA tenderness. There is guarding. There is no rebound, no tenderness at McBurney's point and negative Rizo's sign. negative Rovsing's sign  Musculoskeletal:      Right shoulder: Normal.      Left shoulder: Normal.      Cervical back: Normal and neck supple.      Thoracic back: Normal.      Lumbar back: Normal.      Right hip: Normal.      Left hip: Normal.     Neurological: He is alert. He has normal strength. No sensory deficit.   Skin: Skin is warm. Capillary refill takes less than 2 seconds.   Psychiatric: He has a normal mood and affect. His speech is normal and behavior is normal. Judgment and thought content normal.       ED Course   Critical Care    Date/Time: 3/15/2023 1:57 PM  Performed by: Jeni Baker PA-C  Authorized by: Jovi Vieira MD   Direct patient critical care time: 10 minutes  Additional history critical care time: 5 minutes  Ordering / reviewing critical care time: 5 minutes  Documentation critical care time: 5 minutes  Consulting other physicians critical care time: 5 minutes  Total critical care time (exclusive of procedural time) : 30  minutes  Critical care time was exclusive of separately billable procedures and treating other patients and teaching time.  Critical care was necessary to treat or prevent imminent or life-threatening deterioration of the following conditions: shock, dehydration and sepsis.  Critical care was time spent personally by me on the following activities: blood draw for specimens, development of treatment plan with patient or surrogate, discussions with consultants, evaluation of patient's response to treatment, examination of patient, ordering and performing treatments and interventions, obtaining history from patient or surrogate, ordering and review of laboratory studies, ordering and review of radiographic studies, pulse oximetry, re-evaluation of patient's condition and review of old charts.  Subsequent provider of critical care: I assumed direction of critical care for this patient from another provider of my specialty.      Labs Reviewed   CBC W/ AUTO DIFFERENTIAL - Abnormal; Notable for the following components:       Result Value    WBC 13.22 (*)     Hemoglobin 11.9 (*)     Hematocrit 36.2 (*)     MCV 77 (*)     MCH 25.2 (*)     RDW 16.1 (*)     Gran # (ANC) 11.3 (*)     Gran % 85.6 (*)     Lymph % 8.8 (*)     All other components within normal limits   COMPREHENSIVE METABOLIC PANEL - Abnormal; Notable for the following components:    Sodium 132 (*)     Potassium 3.4 (*)     Glucose 115 (*)     BUN 29 (*)     Creatinine 1.6 (*)     eGFR 43 (*)     All other components within normal limits   ISTAT PROCEDURE - Abnormal; Notable for the following components:    POC Glucose 117 (*)     POC BUN 33 (*)     POC Creatinine 1.6 (*)     POC Sodium 131 (*)     All other components within normal limits   CULTURE, BLOOD   CULTURE, BLOOD   LIPASE   TROPONIN I   LACTIC ACID, PLASMA   URINALYSIS, REFLEX TO URINE CULTURE   ISTAT CHEM8   POCT GLUCOSE MONITORING CONTINUOUS     EKG Readings: (Independently Interpreted)   Rhythm:  Sinus Bradycardia. Heart Rate: 59. Ectopy: No Ectopy. Axis: Left Axis Deviation. Other Impression:   QTc 582 RBBB 1st degree av block   Independent interpretation     Imaging Results              CT Abdomen Pelvis With Contrast (Final result)  Result time 03/15/23 13:27:47      Final result by Judah Carrillo MD (03/15/23 13:27:47)                   Impression:      1. Proximal small bowel loops are abnormally distended up to 4.3-cm with air and fluid with abrupt transition point in the right lower quadrant with more distal small bowel loops essentially completely collapsed.  Small amount of desiccated stool in the ascending/transverse colon.  Descending/sigmoid colon and rectum are decompressed.  Findings are compatible with partial versus early high-grade small bowel obstruction.  Findings associated with a small amount of free fluid in the right lower quadrant which is likely reactive, less likely indicating bowel perforation as there is no CT evidence for free air.  --findings discussed with ordering provider, Jeni SCHNEIDER, by phone on 3/15/23 @ 13:21--      Electronically signed by: Judah Carrillo  Date:    03/15/2023  Time:    13:27               Narrative:    EXAMINATION:  CT ABDOMEN PELVIS WITH CONTRAST    CLINICAL HISTORY:  Abdominal pain, acute, nonlocalized;    TECHNIQUE:  Low dose axial images, sagittal and coronal reformations were obtained from the lung bases to the pubic symphysis following the IV administration of 100 mL of Omnipaque 350.  Oral contrast was not administered, limiting evaluation of hollow viscus organs.    COMPARISON:  None    FINDINGS:  Abdomen:    - Lower thorax:Heart is not enlarged.  No significant pericardial thickening in the field of view.    - Lung bases: Imaged portions of the lung bases are clear.    - Liver: Liver is normal in size, attenuations and morphology without appreciable surface nodularity. No appreciable sizable suspicious lesion.  No  intrahepatic/extrahepatic biliary dilatation.    - Gallbladder: No radiodense gallstones, mural thickening, pericholecystic fluid or pericholecystic fat stranding.    - Pancreas: Pancreas is normal in size and attenuation without the surrounding inflammatory fat stranding, suspicious mass or fluid/fluid collection.    - Spleen: Spleen is normal in size, morphology and attenuation without appreciable suspicious lesion.    - Adrenals: Bilateral adrenal glands are normal in size and morphology without appreciable nodularity.    - Kidneys: Kidneys are normal in size, location, morphology and attenuation. No appreciable suspicious sizable lesion, nephroliths or hydroureteronephrosis bilaterally.  Urinary bladder is well distended and grossly unremarkable without mural thickening or appreciable nodularity.    - Bowel/Mesentery: Proximal small bowel loops are abnormally distended up to 4.3-cm with air and fluid with abrupt transition point in the right lower quadrant (series 2, image 96) with all of the more distal small bowel loops essentially completely collapsed.  Small amount of desiccated stool in the ascending and transverse colon.  Descending/sigmoid colon and rectum are decompressed.  Small amount of free fluid is noted in the right lower quadrant.  Appendix is normal.    - Retroperitoneum:  Aorta is normal in course and caliber without evidence of aneurysmal degeneration.  No sizable retroperitoneal mass or fluid collection.    Pelvis:    - Reproductive organs: Reproductive organs are within normal limits.  No suspicious sizable pelvic mass, lymphadenopathy or fluid.    - Soft tissues:  Imaged soft tissues are grossly unremarkable.    - Bones:  Diffuse osseous demineralization multilevel degenerative changes of the imaged spine.  No acute displaced fracture, dislocation or suspicious lytic/blastic lesion.                                       Medications   sodium chloride 0.9% bolus 1,000 mL 1,000 mL (1,000 mLs  Intravenous New Bag 3/15/23 1346)   HYDROmorphone injection 1 mg (1 mg Intravenous Given 3/15/23 1231)   ondansetron injection 4 mg (4 mg Intravenous Given 3/15/23 1226)   famotidine (PF) injection 20 mg (20 mg Intravenous Given 3/15/23 1229)   iohexoL (OMNIPAQUE 350) injection 100 mL (100 mLs Intravenous Given 3/15/23 1308)   piperacillin-tazobactam (ZOSYN) 4.5 g in dextrose 5 % in water (D5W) 5 % 100 mL IVPB (MB+) (4.5 g Intravenous New Bag 3/15/23 1349)   HYDROmorphone injection 0.5 mg (0.5 mg Intravenous Given 3/15/23 1347)     Medical Decision Making:   Initial Assessment:   83 y/o male with PMH of a-fib taking Eliquis, GERD, COPD, and HTN presenting with 10/10 epigastric and RUQ abdominal pain with nausea since this morning.  Differential Diagnosis:   Cholecystitis, cholangitis, pancreatitis, dissection, AAA, ACS-atypical, PE, ischemic bowel, SBO/LBO, bowel perforation, among others.   Clinical Tests:   Lab Tests: Ordered and Reviewed  Radiological Study: Ordered and Reviewed  Medical Tests: Ordered and Reviewed  ED Management:  83 y/o male w/ PMH of A-fib taking Eliquis, GERD, PUD, HTN with 10/10 epigastric pain with guarding on exam with nausea. Unlikely for ACS or pancreatitis with negative lipase and Troponin both WNL. EKG with sinus shraddha and 1st degree av block. Concern for bowel perf with PMH of PUD but unlikely due to lacking emesis, hematemesis, melena.  Concern for obstruction due to PE findings with nausea, patient not wanting to eat, new onset Fe deficiency on CBC with RDW >16.1, MCH <25.2, H/H 11.9/36.2     CT abd/pelvis w/ contrast ordered for further investigation and findings are compatible with partial versus early high-grade small bowel obstruction.  Pt started on IVF, Zofran for nausea, famotidine and Hydromorphone for pain control.   Discussed with Dr. Marsh [facility] Surgery on call. She recommends NG tube placement due to distension despite absence of vomiting  Vitals stable. Pain  controlled. No vomiting in ED. Not passing flatus.   Discussed w Dr. Clark [facility] for admission.     Discussed with Dr. Gay who also evaluated pt face to face and agrees with assessment and plan.                           Clinical Impression:   Final diagnoses:  [R42] Lightheaded  [K56.609] Small bowel obstruction (Primary)  [R10.13] Epigastric pain        ED Disposition Condition    Admit Stable                Jeni Baker PA-C  03/15/23 1423       Jeni Baker PA-C  03/15/23 1449

## 2023-03-15 NOTE — H&P
"[facility] Medicine  History & Physical    Patient Name: Benitez Cabrales  MRN: [id number]  Patient Class: IP- Inpatient  Admission Date: 3/15/2023  Attending Physician: Jovi Clark MD  Primary Care Provider: Diomedes Ayoub MD         Patient information was obtained from patient, spouse/SO, past medical records and ER records.     Subjective:     Principal Problem:Small bowel obstruction    Chief Complaint:   Chief Complaint   Patient presents with    Abdominal Pain     Pt c/o abdominal pain and bloating with nausea starting at 5am today. Denies diarrhea. States he feels like he needs to pass gas but cannot.        HPI: Mr. Cabrales is an 82 year old man with atrial fibrillation, hypertension, hyperlipidemia and GERD who presented for evaluation of abdominal pain.  He states he was in his usual state of health up until yesterday afternoon when he developed abdominal pain.  The pain was initially a mild discomfort but overnight has progressed to severe, 10/10 pain.  It is located in his mid/upper and right upper abdomen, does not radiate and is worse with movement and deep breaths.  The pain has been mildly relieved by iv dilaudid in the ER and now the pain is coming in paroxysms of continued severe intensity.  He notes overnight feeling nausea and light headedness.   He states he felt the need to vomit or have a bowel movement, but could do neither despite attempts to induce vomiting.  He reports his last bowel movement was yesterday morning.  He is not passing any flatus.  He denies fever, chills, diarrhea, abdominal trauma, falls, chest pain, shortness of breath, new foods and new medications.  He has never had abdominal surgery before and reports his last colonoscopy was "a long time ago".  In the ER he was treated with zofran, pepcid, dilaudid and IV fluids.      Past Medical History:   Diagnosis Date    Anticoagulant long-term use     Atrial fibrillation     GERD (gastroesophageal " reflux disease)     Hyperlipidemia     Hypertension     Nuclear sclerosis, bilateral 10/09/2017    Rhinitis medicamentosa 06/30/2014    Small bowel obstruction     Vertigo 06/13/2013       Past Surgical History:   Procedure Laterality Date    ICM implant      NOSE SURGERY      Septal Repair    REMOVAL OF IMPLANTABLE LOOP RECORDER N/A 12/10/2018    Procedure: REMOVAL, IMPLANTABLE LOOP RECORDER;  Surgeon: Mickey Morales MD;  Location: [facility];  Service: Cardiology;  Laterality: N/A;    VASCULAR SURGERY      left leg       Review of patient's allergies indicates:  No Known Allergies    No current facility-administered medications on file prior to encounter.     Current Outpatient Medications on File Prior to Encounter   Medication Sig    acetaminophen (TYLENOL) 500 MG tablet Take 500 mg by mouth every 8 (eight) hours as needed for Pain.    amiodarone (PACERONE) 200 MG Tab Take 1 tablet (200 mg total) by mouth once daily.    amLODIPine (NORVASC) 5 MG tablet Take 1 tablet by mouth once daily    biotin 5,000 mcg TbDL Take 1 tablet by mouth once daily.    chlorthalidone (HYGROTEN) 25 MG Tab 1 tablet daily    cholecalciferol, vitamin D3, (VITAMIN D3) 50 mcg (2,000 unit) Cap Take 1 capsule by mouth once daily.    doxepin (SINEQUAN) 10 MG capsule TAKE 1 TO 2 CAPSULES BY MOUTH AT BEDTIME AS NEEDED FOR ITCHING    ELIQUIS 5 mg Tab Take 1 tablet by mouth twice daily    fluticasone propionate (FLONASE) 50 mcg/actuation nasal spray 1 spray (50 mcg total) by Each Nostril route once daily.    magnesium 200 mg Tab Take by mouth once daily.    MV-MN/FA/LYCOPENE/LUT/HB#178 (NEHEMIAH MULTIVITAMIN FOR MEN ORAL) Take 1 tablet by mouth once daily.    rosuvastatin (CRESTOR) 10 MG tablet TAKE 1 TABLET BY MOUTH ONCE DAILY IN THE EVENING    tiZANidine (ZANAFLEX) 4 MG tablet TAKE 1 TO 2 TABLETS BY MOUTH AT BEDTIME AS NEEDED    UNABLE TO FIND medication name: tumeric cap daily    zinc gluconate 50 mg tablet Take 50 mg  by mouth once daily.    [DISCONTINUED] ALLERGY RELIEF, LORATADINE, 10 mg tablet Take 1 tablet by mouth once daily    [DISCONTINUED] ascorbic acid, vitamin C, (VITAMIN C) 250 MG tablet Take 250 mg by mouth 3 (three) times a week.    [DISCONTINUED] azelastine (ASTELIN) 137 mcg (0.1 %) nasal spray 1 spray (137 mcg total) by Nasal route 2 (two) times daily.    [DISCONTINUED] clotrimazole-betamethasone 1-0.05% (LOTRISONE) cream Apply topically 2 (two) times daily. Use only a week or 2 at a time (Patient not taking: Reported on 2/16/2023)    [DISCONTINUED] fluconazole (DIFLUCAN) 200 MG Tab TAKE 1 TABLET BY MOUTH IN THE MORNING ON FRIDAY. TAKE WITH FOOD.    [DISCONTINUED] HYDROcodone-acetaminophen (NORCO) 7.5-325 mg per tablet Take 1 tablet by mouth every 6 (six) hours as needed for Pain. (Patient not taking: Reported on 2/16/2023)    [DISCONTINUED] meclizine (ANTIVERT) 25 mg tablet TAKE 1/2 TO 1 (ONE-HALF TO ONE) TABLET AT LEAST BY MOUTH AT BEDTIME FOR VERTIGO (Patient not taking: Reported on 2/16/2023)    [DISCONTINUED] tamsulosin (FLOMAX) 0.4 mg Cap Take 1 capsule by mouth once daily    [DISCONTINUED] tiZANidine (ZANAFLEX) 4 MG tablet TAKE 1 TO 2 TABLETS BY MOUTH AT BEDTIME AS NEEDED     Family History       Problem Relation (Age of Onset)    No Known Problems Mother, Father, Sister, Brother, Maternal Uncle, Paternal Aunt, Paternal Uncle, Maternal Grandmother, Maternal Grandfather, Paternal Grandmother, Paternal Grandfather          Tobacco Use    Smoking status: Never     Passive exposure: Never    Smokeless tobacco: Never    Tobacco comments:     .  Three kids.  Occup:   at [employer].     Substance and Sexual Activity    Alcohol use: Yes     Alcohol/week: 1.0 standard drink     Types: 1 Glasses of wine per week     Comment: red wine almost daily    Drug use: Never    Sexual activity: Not Currently     Review of Systems   Constitutional:  Positive for activity change and appetite change.  Negative for fever.   HENT:  Negative for congestion and dental problem.    Eyes:  Negative for discharge and itching.   Respiratory:  Negative for apnea, cough, chest tightness and shortness of breath.    Cardiovascular:  Negative for chest pain and leg swelling.   Gastrointestinal:  Positive for abdominal pain and nausea. Negative for abdominal distention.   Endocrine: Negative for cold intolerance and heat intolerance.   Genitourinary:  Negative for difficulty urinating and dysuria.   Musculoskeletal:  Negative for arthralgias and back pain.   Allergic/Immunologic: Negative for environmental allergies and food allergies.   Neurological:  Negative for dizziness, facial asymmetry and light-headedness.   Hematological:  Negative for adenopathy. Does not bruise/bleed easily.   Psychiatric/Behavioral:  Negative for agitation and behavioral problems.    Objective:     Vital Signs (Most Recent):  Temp: 97.7 °F (36.5 °C) (03/15/23 1149)  Pulse: 69 (03/15/23 1522)  Resp: (!) 31 (03/15/23 1413)  BP: (!) 196/87 (03/15/23 1503)  SpO2: 96 % (03/15/23 1522)   Vital Signs (24h Range):  Temp:  [97.7 °F (36.5 °C)] 97.7 °F (36.5 °C)  Pulse:  [59-69] 69  Resp:  [19-31] 31  SpO2:  [96 %-99 %] 96 %  BP: (160-196)/(73-87) 196/87     Weight: 83.9 kg (185 lb)  Body mass index is 25.8 kg/m².    Physical Exam  Vitals and nursing note reviewed.   Constitutional:       General: He is in acute distress.      Appearance: He is well-developed. He is ill-appearing. He is not toxic-appearing or diaphoretic.   HENT:      Head: Normocephalic and atraumatic.      Right Ear: External ear normal.      Left Ear: External ear normal.      Nose: Nose normal.      Comments: Ngtube in place     Mouth/Throat:      Mouth: Mucous membranes are dry.   Eyes:      Extraocular Movements: Extraocular movements intact.      Conjunctiva/sclera: Conjunctivae normal.   Cardiovascular:      Rate and Rhythm: Normal rate and regular rhythm.      Heart sounds: Normal  heart sounds.   Pulmonary:      Effort: Pulmonary effort is normal. No respiratory distress.      Breath sounds: Normal breath sounds.   Abdominal:      Comments: Firm, distended, significant TTP in mid and right upper abdomen with guarding but no rebound - I cannot appreciate any bowel sounds.   Musculoskeletal:         General: Normal range of motion.      Cervical back: Normal range of motion. No rigidity.      Comments: Trace edema in left lower leg which is chronic from prior vein stripping procedure.   Skin:     General: Skin is warm.   Neurological:      Mental Status: He is alert and oriented to person, place, and time.      Cranial Nerves: No cranial nerve deficit.      Coordination: Coordination normal.   Psychiatric:         Behavior: Behavior normal.           Significant Labs: All pertinent labs within the past 24 hours have been reviewed.    Significant Imaging: I have reviewed all pertinent imaging results/findings within the past 24 hours.    Assessment/Plan:     * Small bowel obstruction  -Admitted to inpatient status  -CT Abdomen showed distended proximal small bowel (4.3cm) with abrupt transition in RLQ and no free air in abdomen to suggest perforation  -On admit he has a leukocytosis but is afebrile without tachycardia or hypotension and with normal lactic acid.  -Blood cultures collected on admit  -His abdomen is severely tender with guarding but no rebound.  -Etiology unclear to me at this time - no prior surgeries and no evidence of mass lesions in abdomen.  -Hopefully we can treat conservatively with NG tube decompression (LIWS), bowel rest, serial abdominal exams, IV fluids, Iv antiemetics and IV dilaudid for analgesia.  -Will consult General Surgery as he is at risk for further deterioration requiring surgical intervention.  -If develops fevers or further deterioration will have low threshold to add antibiotic coverage (would do cipro/flagyl or zosyn)    Microcytic anemia  -Hb 11.9 on  admit  -No evidence of bleeding  -Check iron, ferritin, b12 and folate  -Repeat cbc in AM.    Acute renal failure superimposed on stage 3 chronic kidney disease  -Baseline Cr 1.4  -On admit Cr 1.6  -Appears mildly hypovolemic  -Avoid nephrotoxic agents and renally dose meds  -Treat with gentle IV fluids for now  -Repeat BMP in AM    Hypokalemia  -Replace potassium today.  -Check BMP and Mag in AM.    Paroxysmal atrial fibrillation  -On admit EKG shows sinus bradycardia  -At home takes amiodarone 200mg qd and eliquis bid.  Last mazariegos of eliquis was 3/14 in afternoon  -Will hold amiodarone for now as half life is 45 days.  If needed could give non-titrating gtt 0.5mg/hour (per pharmacy)  -Will hold eliquis in case he requires surgery.  Will treat with heparin drip for now.  -Monitor on telemetry.    Current use of long term anticoagulation  -Treatment as above.    ACP (advance care planning)  Advance Care Planning  Date: 03/15/2023  Engaged patient and his wife in goals of care discussion surrounding his values and preferences for care in the event of cardiac arrest.  Explored pros and cons of ACLS treatment.  Both patient and wife are clear that he prefers for full aggressive care with full code status.  I spent 10 minutes in ACP today.    Debility  -Appears rather weak right now, but for his age is overall quite robust fit.  Plan for pt/ot evaluation for post-discharge needs once he is clinically improving.    Dyslipidemia  -Hold home statin for now.    GERD (gastroesophageal reflux disease)  -Notes frequent GERD symptoms and takes tums prn at home  -Will provide bid ppi for now.    Hypertension  -BP significantly elevated  -At home takes norvasc and chlorthalidone - will hold these for now  -Work on better analgesia and provide PRN hydralazine for sbp > 180      VTE Risk Mitigation (From admission, onward)         Ordered     heparin 25,000 units in dextrose 5% (100 units/ml) IV bolus from bag - ADDITIONAL PRN BOLUS  - 60 units/kg  As needed (PRN)        Question:  Heparin Infusion Adjustment (DO NOT MODIFY ANSWER)  Answer:  [email or web address]\epic\Images\Pharmacy\HeparinInfusions\heparin LOW INTENSITY nomogram for [facility] [id number]    03/15/23 1444     heparin 25,000 units in dextrose 5% (100 units/ml) IV bolus from bag - ADDITIONAL PRN BOLUS - 30 units/kg  As needed (PRN)        Question:  Heparin Infusion Adjustment (DO NOT MODIFY ANSWER)  Answer:  [email or web address]\epic\Images\Pharmacy\HeparinInfusions\heparin LOW INTENSITY nomogram for [facility] [id number].pdf    03/15/23 1444     heparin 25,000 units in dextrose 5% 250 mL (100 units/mL) infusion LOW INTENSITY nomogram - [facility]  Continuous        Question Answer Comment   Heparin Infusion Adjustment (DO NOT MODIFY ANSWER) [email or web address]\epic\Images\Pharmacy\HeparinInfusions\heparin LOW INTENSITY nomogram for [facility] [id number]    Begin at (in units/kg/hr) 12        03/15/23 1444     IP VTE HIGH RISK PATIENT  Once         03/15/23 1444     Place sequential compression device  Until discontinued         03/15/23 1444                   On 03/15/2023, patient should be placed in hospital observation services under my care.        Jovi Clark MD  Department of Hospital Medicine  [facility] - Emergency Dept

## 2023-03-15 NOTE — MEDICAL/APP STUDENT
Subjective:       Patient ID: Benitez Cabrales is a 82 y.o. male.    Chief Complaint: 83 yo male with PMH of HT, HLD, Afib on eliquis, GERD and COPD consulted surgery for severe acute epigastric pain associated with nausea that started this morning at 5 am. The pain is worsened with inspiration or movement. Pt notes that his last bowel movement was last night at 11 pm. He occasionally takes Murelax for constipation. Pt had a similar episode of abdominal pain in 2016 that spontaneously resolved after vomiting.  Pt denies any fever, SOB, CP, diaphoresis, constipation, melena, hematochezia, urinary difficulties.       Review of Systems   Constitutional:  Negative for activity change, appetite change, chills, fatigue and fever.   Respiratory:  Negative for cough, chest tightness and shortness of breath.    Cardiovascular:  Negative for chest pain and palpitations.   Gastrointestinal:  Positive for abdominal pain and nausea. Negative for blood in stool, change in bowel habit, constipation, diarrhea, vomiting and change in bowel habit.       Objective:      Physical Exam  Constitutional:       General: He is in acute distress.      Appearance: He is not ill-appearing, toxic-appearing or diaphoretic.   HENT:      Head: No contusion.      Jaw: No trismus, tenderness or pain on movement.      Salivary Glands: Right salivary gland is not diffusely enlarged. Left salivary gland is not diffusely enlarged.      Comments: NG tube inplace  Pulmonary:      Effort: No respiratory distress.      Breath sounds: No wheezing.   Abdominal:      General: There is distension.      Palpations: There is no mass.      Tenderness: There is abdominal tenderness in the epigastric area. There is guarding. There is no right CVA tenderness, left CVA tenderness or rebound.      Hernia: No hernia is present.   Musculoskeletal:         General: No deformity.   Skin:     General: Skin is warm.      Coloration: Skin is not jaundiced or pale.      Findings:  No rash.   Neurological:      Mental Status: He is alert.       Assessment:       Mr. Hunt, 81 yo male, presenting with signs of SBO.      - Ex lap tomorrow     Problem List Items Addressed This Visit          GI    * (Principal) Small bowel obstruction - Primary     Other Visit Diagnoses       Lightheaded        Relevant Orders    EKG 12-lead (Completed)    Epigastric pain        Nausea        Chest pain        Relevant Orders    EKG 12-lead              Plan:       ***

## 2023-03-15 NOTE — ED TRIAGE NOTES
Pt reports to the ED with CC of radiating, 10/10, cramping, epigastric pain accompanied by nausea. Pt states at 5 AM he woke up to the pain. He states he tried making himself throw up to relieve the pain but he was not able to do so. Pt also states he feels like he might be constipated. LBM was yesterday. Pt states the pain is worse when he yawns or takes a deep breath but denies chest pain and pain under the ribcage or back. Pt also says the pain is worse with movement. Pt had previous pain years ago but does not recall what the issue was. Pt also states he take Eliquis and thinks that could be causing the pain. Pt denies any blood on the stool. No recent trauma. Denies fever, diarrhea, or recent illnesses.

## 2023-03-15 NOTE — ASSESSMENT & PLAN NOTE
-Admitted to inpatient status  -CT Abdomen showed distended proximal small bowel (4.3cm) with abrupt transition in RLQ and no free air in abdomen to suggest perforation  -On admit he has a leukocytosis but is afebrile without tachycardia or hypotension and with normal lactic acid.  -Blood cultures collected on admit  -His abdomen is severely tender with guarding but no rebound.  -Etiology unclear to me at this time - no prior surgeries and no evidence of mass lesions in abdomen.  -Hopefully we can treat conservatively with NG tube decompression (LIWS), bowel rest, serial abdominal exams, IV fluids, Iv antiemetics and IV dilaudid for analgesia.  -Will consult General Surgery as he is at risk for further deterioration requiring surgical intervention.  -If develops fevers or further deterioration will have low threshold to add antibiotic coverage (would do cipro/flagyl or zosyn)

## 2023-03-15 NOTE — ASSESSMENT & PLAN NOTE
-Hb 11.9 on admit  -No evidence of bleeding  -Check iron, ferritin, b12 and folate  -Repeat cbc in AM.

## 2023-03-15 NOTE — ASSESSMENT & PLAN NOTE
-BP significantly elevated  -At home takes norvasc and chlorthalidone - will hold these for now  -Work on better analgesia and provide PRN hydralazine for sbp > 180

## 2023-03-16 PROBLEM — R19.7 DIARRHEA: Status: ACTIVE | Noted: 2023-03-16

## 2023-03-16 PROBLEM — R41.0 DELIRIUM: Status: ACTIVE | Noted: 2023-03-16

## 2023-03-16 LAB
ANION GAP SERPL CALC-SCNC: 12 MMOL/L (ref 8–16)
ANISOCYTOSIS BLD QL SMEAR: SLIGHT
APTT BLDCRRT: 62.6 SEC (ref 21–32)
APTT BLDCRRT: 65.8 SEC (ref 21–32)
APTT BLDCRRT: 68.2 SEC (ref 21–32)
BASOPHILS # BLD AUTO: 0 K/UL (ref 0–0.2)
BASOPHILS # BLD AUTO: 0 K/UL (ref 0–0.2)
BASOPHILS # BLD AUTO: ABNORMAL K/UL (ref 0–0.2)
BASOPHILS NFR BLD: 0 % (ref 0–1.9)
BUN SERPL-MCNC: 36 MG/DL (ref 8–23)
CALCIUM SERPL-MCNC: 9 MG/DL (ref 8.7–10.5)
CHLORIDE SERPL-SCNC: 97 MMOL/L (ref 95–110)
CO2 SERPL-SCNC: 23 MMOL/L (ref 23–29)
CREAT SERPL-MCNC: 1.6 MG/DL (ref 0.5–1.4)
DIFFERENTIAL METHOD: ABNORMAL
EOSINOPHIL # BLD AUTO: 0 K/UL (ref 0–0.5)
EOSINOPHIL # BLD AUTO: 0 K/UL (ref 0–0.5)
EOSINOPHIL # BLD AUTO: ABNORMAL K/UL (ref 0–0.5)
EOSINOPHIL NFR BLD: 0 % (ref 0–8)
ERYTHROCYTE [DISTWIDTH] IN BLOOD BY AUTOMATED COUNT: 16.2 % (ref 11.5–14.5)
ERYTHROCYTE [DISTWIDTH] IN BLOOD BY AUTOMATED COUNT: 16.3 % (ref 11.5–14.5)
ERYTHROCYTE [DISTWIDTH] IN BLOOD BY AUTOMATED COUNT: 16.3 % (ref 11.5–14.5)
EST. GFR  (NO RACE VARIABLE): 43 ML/MIN/1.73 M^2
FOLATE SERPL-MCNC: 17 NG/ML (ref 4–24)
GLUCOSE SERPL-MCNC: 125 MG/DL (ref 70–110)
HCT VFR BLD AUTO: 33.8 % (ref 40–54)
HCT VFR BLD AUTO: 36.1 % (ref 40–54)
HCT VFR BLD AUTO: 36.1 % (ref 40–54)
HGB BLD-MCNC: 11.5 G/DL (ref 14–18)
HGB BLD-MCNC: 11.9 G/DL (ref 14–18)
HGB BLD-MCNC: 11.9 G/DL (ref 14–18)
IMM GRANULOCYTES # BLD AUTO: 0.01 K/UL (ref 0–0.04)
IMM GRANULOCYTES # BLD AUTO: 0.01 K/UL (ref 0–0.04)
IMM GRANULOCYTES # BLD AUTO: ABNORMAL K/UL (ref 0–0.04)
IMM GRANULOCYTES NFR BLD AUTO: 0.5 % (ref 0–0.5)
IMM GRANULOCYTES NFR BLD AUTO: 0.5 % (ref 0–0.5)
IMM GRANULOCYTES NFR BLD AUTO: ABNORMAL % (ref 0–0.5)
LACTATE SERPL-SCNC: 1 MMOL/L (ref 0.5–2.2)
LYMPHOCYTES # BLD AUTO: 0.4 K/UL (ref 1–4.8)
LYMPHOCYTES # BLD AUTO: 0.4 K/UL (ref 1–4.8)
LYMPHOCYTES # BLD AUTO: ABNORMAL K/UL (ref 1–4.8)
LYMPHOCYTES NFR BLD: 17.8 % (ref 18–48)
LYMPHOCYTES NFR BLD: 17.8 % (ref 18–48)
LYMPHOCYTES NFR BLD: 23 % (ref 18–48)
MAGNESIUM SERPL-MCNC: 2.4 MG/DL (ref 1.6–2.6)
MCH RBC QN AUTO: 25.6 PG (ref 27–31)
MCH RBC QN AUTO: 25.6 PG (ref 27–31)
MCH RBC QN AUTO: 25.9 PG (ref 27–31)
MCHC RBC AUTO-ENTMCNC: 33 G/DL (ref 32–36)
MCHC RBC AUTO-ENTMCNC: 33 G/DL (ref 32–36)
MCHC RBC AUTO-ENTMCNC: 34 G/DL (ref 32–36)
MCV RBC AUTO: 76 FL (ref 82–98)
MCV RBC AUTO: 78 FL (ref 82–98)
MCV RBC AUTO: 78 FL (ref 82–98)
METAMYELOCYTES NFR BLD MANUAL: 6 %
MONOCYTES # BLD AUTO: 0.4 K/UL (ref 0.3–1)
MONOCYTES # BLD AUTO: 0.4 K/UL (ref 0.3–1)
MONOCYTES # BLD AUTO: ABNORMAL K/UL (ref 0.3–1)
MONOCYTES NFR BLD: 14 % (ref 4–15)
MONOCYTES NFR BLD: 17.4 % (ref 4–15)
MONOCYTES NFR BLD: 17.4 % (ref 4–15)
NEUTROPHILS # BLD AUTO: 1.4 K/UL (ref 1.8–7.7)
NEUTROPHILS # BLD AUTO: 1.4 K/UL (ref 1.8–7.7)
NEUTROPHILS NFR BLD: 64.3 % (ref 38–73)
NEUTROPHILS NFR BLD: 64.3 % (ref 38–73)
NEUTROPHILS NFR BLD: 7 % (ref 38–73)
NEUTS BAND NFR BLD MANUAL: 50 %
NRBC BLD-RTO: 0 /100 WBC
PLATELET # BLD AUTO: 197 K/UL (ref 150–450)
PLATELET # BLD AUTO: 222 K/UL (ref 150–450)
PLATELET # BLD AUTO: 222 K/UL (ref 150–450)
PLATELET BLD QL SMEAR: ABNORMAL
PMV BLD AUTO: 10.8 FL (ref 9.2–12.9)
PMV BLD AUTO: 11.5 FL (ref 9.2–12.9)
PMV BLD AUTO: 11.5 FL (ref 9.2–12.9)
POIKILOCYTOSIS BLD QL SMEAR: SLIGHT
POLYCHROMASIA BLD QL SMEAR: ABNORMAL
POTASSIUM SERPL-SCNC: 3.5 MMOL/L (ref 3.5–5.1)
RBC # BLD AUTO: 4.44 M/UL (ref 4.6–6.2)
RBC # BLD AUTO: 4.65 M/UL (ref 4.6–6.2)
RBC # BLD AUTO: 4.65 M/UL (ref 4.6–6.2)
SODIUM SERPL-SCNC: 132 MMOL/L (ref 136–145)
VIT B12 SERPL-MCNC: 568 PG/ML (ref 210–950)
WBC # BLD AUTO: 2.19 K/UL (ref 3.9–12.7)
WBC # BLD AUTO: 2.19 K/UL (ref 3.9–12.7)
WBC # BLD AUTO: 3.18 K/UL (ref 3.9–12.7)
WBC #/AREA STL HPF: NORMAL /[HPF]

## 2023-03-16 PROCEDURE — 87449 NOS EACH ORGANISM AG IA: CPT | Performed by: HOSPITALIST

## 2023-03-16 PROCEDURE — 85025 COMPLETE CBC W/AUTO DIFF WBC: CPT | Performed by: HOSPITALIST

## 2023-03-16 PROCEDURE — 87040 BLOOD CULTURE FOR BACTERIA: CPT | Mod: 59 | Performed by: HOSPITALIST

## 2023-03-16 PROCEDURE — 36415 COLL VENOUS BLD VENIPUNCTURE: CPT | Performed by: HOSPITALIST

## 2023-03-16 PROCEDURE — 25500020 PHARM REV CODE 255: Performed by: HOSPITALIST

## 2023-03-16 PROCEDURE — 87045 FECES CULTURE AEROBIC BACT: CPT | Performed by: HOSPITALIST

## 2023-03-16 PROCEDURE — 27000207 HC ISOLATION

## 2023-03-16 PROCEDURE — 83735 ASSAY OF MAGNESIUM: CPT | Performed by: HOSPITALIST

## 2023-03-16 PROCEDURE — 99900035 HC TECH TIME PER 15 MIN (STAT)

## 2023-03-16 PROCEDURE — 99223 1ST HOSP IP/OBS HIGH 75: CPT | Mod: ,,, | Performed by: SURGERY

## 2023-03-16 PROCEDURE — C9113 INJ PANTOPRAZOLE SODIUM, VIA: HCPCS | Performed by: HOSPITALIST

## 2023-03-16 PROCEDURE — 85730 THROMBOPLASTIN TIME PARTIAL: CPT | Performed by: HOSPITALIST

## 2023-03-16 PROCEDURE — 99223 PR INITIAL HOSPITAL CARE,LEVL III: ICD-10-PCS | Mod: ,,, | Performed by: SURGERY

## 2023-03-16 PROCEDURE — 87046 STOOL CULTR AEROBIC BACT EA: CPT | Performed by: HOSPITALIST

## 2023-03-16 PROCEDURE — 63600175 PHARM REV CODE 636 W HCPCS: Performed by: HOSPITALIST

## 2023-03-16 PROCEDURE — 80048 BASIC METABOLIC PNL TOTAL CA: CPT | Performed by: HOSPITALIST

## 2023-03-16 PROCEDURE — 11000001 HC ACUTE MED/SURG PRIVATE ROOM

## 2023-03-16 PROCEDURE — 27000221 HC OXYGEN, UP TO 24 HOURS

## 2023-03-16 PROCEDURE — 94760 N-INVAS EAR/PLS OXIMETRY 1: CPT

## 2023-03-16 PROCEDURE — 83605 ASSAY OF LACTIC ACID: CPT | Performed by: HOSPITALIST

## 2023-03-16 PROCEDURE — 25000003 PHARM REV CODE 250: Performed by: HOSPITALIST

## 2023-03-16 PROCEDURE — 87427 SHIGA-LIKE TOXIN AG IA: CPT | Mod: 59 | Performed by: HOSPITALIST

## 2023-03-16 PROCEDURE — 89055 LEUKOCYTE ASSESSMENT FECAL: CPT | Performed by: HOSPITALIST

## 2023-03-16 RX ORDER — L. ACIDOPHILUS/L.BULGARICUS 100MM CELL
1 GRANULES IN PACKET (EA) ORAL 2 TIMES DAILY
Status: DISCONTINUED | OUTPATIENT
Start: 2023-03-16 | End: 2023-03-18 | Stop reason: HOSPADM

## 2023-03-16 RX ORDER — KETOROLAC TROMETHAMINE 30 MG/ML
15 INJECTION, SOLUTION INTRAMUSCULAR; INTRAVENOUS EVERY 6 HOURS PRN
Status: DISCONTINUED | OUTPATIENT
Start: 2023-03-16 | End: 2023-03-18 | Stop reason: HOSPADM

## 2023-03-16 RX ORDER — MAG HYDROX/ALUMINUM HYD/SIMETH 200-200-20
30 SUSPENSION, ORAL (FINAL DOSE FORM) ORAL EVERY 6 HOURS PRN
Status: DISCONTINUED | OUTPATIENT
Start: 2023-03-17 | End: 2023-03-18 | Stop reason: HOSPADM

## 2023-03-16 RX ORDER — AMLODIPINE BESYLATE 5 MG/1
5 TABLET ORAL DAILY
Status: DISCONTINUED | OUTPATIENT
Start: 2023-03-16 | End: 2023-03-17

## 2023-03-16 RX ADMIN — DIATRIZOATE MEGLUMINE AND DIATRIZOATE SODIUM 240 ML: 660; 100 LIQUID ORAL; RECTAL at 10:03

## 2023-03-16 RX ADMIN — SODIUM CHLORIDE: 4.5 INJECTION, SOLUTION INTRAVENOUS at 08:03

## 2023-03-16 RX ADMIN — ALUMINUM HYDROXIDE, MAGNESIUM HYDROXIDE, AND SIMETHICONE 30 ML: 200; 200; 20 SUSPENSION ORAL at 11:03

## 2023-03-16 RX ADMIN — SODIUM CHLORIDE: 4.5 INJECTION, SOLUTION INTRAVENOUS at 04:03

## 2023-03-16 RX ADMIN — PIPERACILLIN AND TAZOBACTAM 4.5 G: 4; .5 INJECTION, POWDER, LYOPHILIZED, FOR SOLUTION INTRAVENOUS; PARENTERAL at 10:03

## 2023-03-16 RX ADMIN — AMLODIPINE BESYLATE 5 MG: 5 TABLET ORAL at 05:03

## 2023-03-16 RX ADMIN — FLUTICASONE PROPIONATE 50 MCG: 50 SPRAY, METERED NASAL at 10:03

## 2023-03-16 RX ADMIN — PANTOPRAZOLE SODIUM 40 MG: 40 INJECTION, POWDER, FOR SOLUTION INTRAVENOUS at 08:03

## 2023-03-16 RX ADMIN — PANTOPRAZOLE SODIUM 40 MG: 40 INJECTION, POWDER, FOR SOLUTION INTRAVENOUS at 10:03

## 2023-03-16 RX ADMIN — PIPERACILLIN AND TAZOBACTAM 4.5 G: 4; .5 INJECTION, POWDER, LYOPHILIZED, FOR SOLUTION INTRAVENOUS; PARENTERAL at 06:03

## 2023-03-16 RX ADMIN — HEPARIN SODIUM 14 UNITS/KG/HR: 10000 INJECTION, SOLUTION INTRAVENOUS at 04:03

## 2023-03-16 RX ADMIN — LACTOBACILLUS ACIDOPHILUS / LACTOBACILLUS BULGARICUS 1 EACH: 100 MILLION CFU STRENGTH GRANULES at 01:03

## 2023-03-16 RX ADMIN — KETOROLAC TROMETHAMINE 15 MG: 30 INJECTION, SOLUTION INTRAMUSCULAR at 12:03

## 2023-03-16 NOTE — ASSESSMENT & PLAN NOTE
-Noted significant delirium overnight 3/15-3/16  -Suspect multifactorial due to hospitalization and opioid pain meds  -Continue delirium precautions  -Stop opioids and trial ketorolac

## 2023-03-16 NOTE — ASSESSMENT & PLAN NOTE
-Appears rather weak right now, but for his age is overall quite robust and fit.    -Plan for pt/ot evaluation for post-discharge needs once he is clinically improving.

## 2023-03-16 NOTE — ASSESSMENT & PLAN NOTE
-On admit EKG shows sinus bradycardia  -At home takes amiodarone 200mg qd and eliquis bid.  Last mazariegos of eliquis was 3/14 in afternoon  -Will hold amiodarone for now as half life is 45 days.  If needed could give non-titrating gtt 0.5mg/hour (per pharmacy)  -Holding eliquis in case he requires surgery.  Continue with heparin drip for now.  -Monitor on telemetry.

## 2023-03-16 NOTE — PROGRESS NOTES
"[facility] Medicine  Progress Note    Patient Name: Benitez Cabrales  MRN: [id number]  Patient Class: IP- Inpatient   Admission Date: 3/15/2023  Length of Stay: 1 days  Attending Physician: Jovi Clark MD  Primary Care Provider: Diomedes Ayoub MD        Subjective:     Principal Problem:Small bowel obstruction        HPI:  Mr. Cabrales is an 82 year old man with atrial fibrillation, hypertension, hyperlipidemia and GERD who presented for evaluation of abdominal pain.  He states he was in his usual state of health up until yesterday afternoon when he developed abdominal pain.  The pain was initially a mild discomfort but overnight has progressed to severe, 10/10 pain.  It is located in his mid/upper and right upper abdomen, does not radiate and is worse with movement and deep breaths.  The pain has been mildly relieved by iv dilaudid in the ER and now the pain is coming in paroxysms of continued severe intensity.  He notes overnight feeling nausea and light headedness.   He states he felt the need to vomit or have a bowel movement, but could do neither despite attempts to induce vomiting.  He reports his last bowel movement was yesterday morning.  He is not passing any flatus.  He denies fever, chills, diarrhea, abdominal trauma, falls, chest pain, shortness of breath, new foods and new medications.  He has never had abdominal surgery before and reports his last colonoscopy was "a long time ago".  In the ER he was treated with zofran, pepcid, dilaudid and IV fluids.      Overview/Hospital Course:  No notes on file    Interval History: Noted delirium overnight and pulled out his IV.  Today his biggest complaint is from irritation from his NG tube.  Is having some diarrhea today - unclear if SBO has resolved of if this is post-obstructive.  Noted fever this morning.  Nausea and abdominal pain are clearly improved and he now has vigorous bowel sounds.  Son and mother are at bedside during my visit.  " All questions answered and patient had no further complaints.    Objective:     Vital Signs (Most Recent):  Temp: 99 °F (37.2 °C) (03/16/23 1150)  Pulse: 78 (03/16/23 1214)  Resp: 20 (03/16/23 1150)  BP: (!) 150/69 (03/16/23 1214)  SpO2: 97 % (03/16/23 1221)   Vital Signs (24h Range):  Temp:  [98.3 °F (36.8 °C)-100.6 °F (38.1 °C)] 99 °F (37.2 °C)  Pulse:  [67-93] 78  Resp:  [14-20] 20  SpO2:  [92 %-97 %] 97 %  BP: (150-186)/(69-85) 150/69     Weight: 83.9 kg (184 lb 15.5 oz)  Body mass index is 25.8 kg/m².    Intake/Output Summary (Last 24 hours) at 3/16/2023 1627  Last data filed at 3/16/2023 0800  Gross per 24 hour   Intake 0 ml   Output 320 ml   Net -320 ml      Physical Exam  Vitals and nursing note reviewed.   Constitutional:       General: He is not in acute distress.     Appearance: He is well-developed. He is ill-appearing. He is not toxic-appearing or diaphoretic.   HENT:      Head: Normocephalic and atraumatic.      Right Ear: External ear normal.      Left Ear: External ear normal.      Nose: Nose normal.      Comments: Ngtube in place     Mouth/Throat:      Mouth: Mucous membranes are moist.   Eyes:      Extraocular Movements: Extraocular movements intact.      Conjunctiva/sclera: Conjunctivae normal.   Cardiovascular:      Rate and Rhythm: Normal rate and regular rhythm.      Heart sounds: Normal heart sounds.   Pulmonary:      Effort: Pulmonary effort is normal. No respiratory distress.      Breath sounds: Normal breath sounds.   Abdominal:      Comments: -Abdomen softer and less distended today, albeit still mildly distended.  Much improved abdominal pain without rebound.  Bowel sounds are vigorous.   Musculoskeletal:         General: Normal range of motion.      Cervical back: Normal range of motion. No rigidity.      Comments: Trace edema in left lower leg which is chronic from prior vein stripping procedure.   Skin:     General: Skin is warm.   Neurological:      Mental Status: He is alert and  oriented to person, place, and time.      Cranial Nerves: No cranial nerve deficit.      Coordination: Coordination normal.   Psychiatric:      Comments: Noted delirium overnight but presently resolved.       Significant Labs: All pertinent labs within the past 24 hours have been reviewed.    Significant Imaging: I have reviewed all pertinent imaging results/findings within the past 24 hours.      Assessment/Plan:      * Small bowel obstruction  -Admitted to inpatient status  -CT Abdomen showed distended proximal small bowel (4.3cm) with abrupt transition in RLQ and no free air in abdomen to suggest perforation  -On admit he has a leukocytosis but is afebrile without tachycardia or hypotension and with normal lactic acid.  -Blood cultures collected on admit  -His abdomen is severely tender with guarding but no rebound.  -Etiology unclear to me at this time - no prior surgeries and no evidence of mass lesions in abdomen.  -General surgery consulted and input appreciated.  -Today his abdominal pain and nausea are improved and he has vigorous bowel sounds and is passing liquid stool.  Unclear if SBO has resolved or if this is post-obstructive diarrhea.  Noted isolated fever 7:30 this morning and now with leukopenia.  -Continue with conservative treatment with NG tube decompression (LIWS), bowel rest, serial abdominal exams, IV fluids, Iv antiemetics.  -Change iv dialudid to toradol in efforts at minimizing delirium.  -Agree with small bowel follow through.  If SBO has resolved will remove NGT and trial clear liquids  -Lactic acid is firmly normal so hopefully his fever was due to atelectasis and not active infection.  Will repeat blood cultures and start incentive spirometry and empiric zosyn for now.    Diarrhea  -Now with loose stool  -Unclear if SBO resolved or if post-obstructive  -No recent antibiotics to suggest C.diff.  -Will check stool wbc, culture and C.diff  -Add probiotic.  -Avoid imodium for now given  SBO.    Iron deficiency anemia  -Hb 11.9 on admit and now 11.5  -No evidence of bleeding  -Folate and B12 are replete  -Ferritin is low and TIBC only 11.9% consistent with iron deficiency  -Will need FeSO4 once bowel function returns but avoid now due to SBO  -Repeat cbc in AM.    Acute renal failure superimposed on stage 3 chronic kidney disease  -Baseline Cr 1.4  -On admit Cr 1.6 and remains stable today  -Appears mildly hypovolemic  -Avoid nephrotoxic agents and renally dose meds  -Continue with gentle IV fluids for now  -Repeat BMP in AM    Hypokalemia  -Potassium normal today.  -Check BMP and Mag in AM.    Paroxysmal atrial fibrillation  -On admit EKG shows sinus bradycardia  -At home takes amiodarone 200mg qd and eliquis bid.  Last mazariegos of eliquis was 3/14 in afternoon  -Will hold amiodarone for now as half life is 45 days.  If needed could give non-titrating gtt 0.5mg/hour (per pharmacy)  -Holding eliquis in case he requires surgery.  Continue with heparin drip for now.  -Monitor on telemetry.    Current use of long term anticoagulation  -Treatment as above.    Delirium  -Noted significant delirium overnight 3/15-3/16  -Suspect multifactorial due to hospitalization and opioid pain meds  -Continue delirium precautions  -Stop opioids and trial ketorolac    ACP (advance care planning)  Advance Care Planning  Date: 03/15/2023  Engaged patient and his wife in goals of care discussion surrounding his values and preferences for care in the event of cardiac arrest.  Explored pros and cons of ACLS treatment.  Both patient and wife are clear that he prefers for full aggressive care with full code status.  I spent 10 minutes in ACP on 3/15.    Debility  -Appears rather weak right now, but for his age is overall quite robust and fit.    -Plan for pt/ot evaluation for post-discharge needs once he is clinically improving.    Dyslipidemia  -Hold home statin for now.    GERD (gastroesophageal reflux disease)  -Notes frequent  GERD symptoms and takes tums prn at home  -Will provide bid ppi for now.    Hypertension  -BP remains significantly elevated  -At home takes norvasc and chlorthalidone  -Resume home norvasc today.  Holding chlorthalidone for now  -Work on better analgesia and provide PRN hydralazine for sbp > 180      VTE Risk Mitigation (From admission, onward)         Ordered     heparin 25,000 units in dextrose 5% (100 units/ml) IV bolus from bag - ADDITIONAL PRN BOLUS - 60 units/kg  As needed (PRN)        Question:  Heparin Infusion Adjustment (DO NOT MODIFY ANSWER)  Answer:  \\[email or web address]\Images\Pharmacy\HeparinInfusions\heparin LOW INTENSITY nomogram for [facility] [id number]    03/15/23 1444     heparin 25,000 units in dextrose 5% (100 units/ml) IV bolus from bag - ADDITIONAL PRN BOLUS - 30 units/kg  As needed (PRN)        Question:  Heparin Infusion Adjustment (DO NOT MODIFY ANSWER)  Answer:  \[email or web address]\Images\Pharmacy\HeparinInfusions\heparin LOW INTENSITY nomogram for [facility] [id number]    03/15/23 1444     heparin 25,000 units in dextrose 5% 250 mL (100 units/mL) infusion LOW INTENSITY nomogram - [facility]  Continuous        Question Answer Comment   Heparin Infusion Adjustment (DO NOT MODIFY ANSWER) \\[email or web address]\epic\Images\Pharmacy\HeparinInfusions\heparin LOW INTENSITY nomogram for [facility] [id number]    Begin at (in units/kg/hr) 12        03/15/23 1444     IP VTE HIGH RISK PATIENT  Once         03/15/23 1444     Place sequential compression device  Until discontinued         03/15/23 1444                Discharge Planning   CARMENCITA: 3/19/2023     Code Status: Full Code   Is the patient medically ready for discharge?:     Reason for patient still in hospital (select all that apply): Treatment  Discharge Plan A: Home with family                  Jovi Clark MD  Department of Hospital Medicine   [facility]

## 2023-03-16 NOTE — ASSESSMENT & PLAN NOTE
-Now with loose stool  -Unclear if SBO resolved or if post-obstructive  -No recent antibiotics to suggest C.diff.  -Will check stool wbc, culture and C.diff  -Add probiotic.  -Avoid imodium for now given SBO.

## 2023-03-16 NOTE — ASSESSMENT & PLAN NOTE
Advance Care Planning  Date: 03/15/2023   Engaged patient and his wife in goals of care discussion surrounding his values and preferences for care in the event of cardiac arrest.  Explored pros and cons of ACLS treatment.  Both patient and wife are clear that he prefers for full aggressive care with full code status.  I spent 10 minutes in ACP on 3/15.

## 2023-03-16 NOTE — ASSESSMENT & PLAN NOTE
-Admitted to inpatient status  -CT Abdomen showed distended proximal small bowel (4.3cm) with abrupt transition in RLQ and no free air in abdomen to suggest perforation  -On admit he has a leukocytosis but is afebrile without tachycardia or hypotension and with normal lactic acid.  -Blood cultures collected on admit  -His abdomen is severely tender with guarding but no rebound.  -Etiology unclear to me at this time - no prior surgeries and no evidence of mass lesions in abdomen.  -General surgery consulted and input appreciated.  -Today his abdominal pain and nausea are improved and he has vigorous bowel sounds and is passing liquid stool.  Unclear if SBO has resolved or if this is post-obstructive diarrhea.  Noted isolated fever 7:30 this morning and now with leukopenia.  -Continue with conservative treatment with NG tube decompression (LIWS), bowel rest, serial abdominal exams, IV fluids, Iv antiemetics.  -Change iv dialudid to toradol in efforts at minimizing delirium.  -Agree with small bowel follow through.  If SBO has resolved will remove NGT and trial clear liquids  -Lactic acid is firmly normal so hopefully his fever was due to atelectasis and not active infection.  Will repeat blood cultures and start incentive spirometry and empiric zosyn for now.

## 2023-03-16 NOTE — SUBJECTIVE & OBJECTIVE
Interval History:   No acute events overnight.   Patient with only 125cc NGT output overnight.   No flatus or BM.   States feeling a little better today.   Denies nausea/vomiting.     Medications:  Continuous Infusions:   sodium chloride 0.45% 100 mL/hr at 03/16/23 0427    heparin (porcine) in D5W 14 Units/kg/hr (03/16/23 0059)     Scheduled Meds:   fluticasone propionate  1 spray Each Nostril Daily    pantoprazole  40 mg Intravenous BID     PRN Meds:acetaminophen, dextrose 10%, dextrose 10%, dextrose, dextrose, glucagon (human recombinant), heparin (PORCINE), heparin (PORCINE), hydrALAZINE, HYDROmorphone, HYDROmorphone, melatonin, naloxone, ondansetron, sodium chloride 0.9%     Review of patient's allergies indicates:  No Known Allergies  Objective:     Vital Signs (Most Recent):  Temp: 100.2 °F (37.9 °C) (03/16/23 0733)  Pulse: 79 (03/16/23 0726)  Resp: 20 (03/16/23 0726)  BP: (!) 165/72 (03/16/23 0726)  SpO2: 95 % (03/16/23 0726)   Vital Signs (24h Range):  Temp:  [97.7 °F (36.5 °C)-100.6 °F (38.1 °C)] 100.2 °F (37.9 °C)  Pulse:  [59-93] 79  Resp:  [14-31] 20  SpO2:  [92 %-99 %] 95 %  BP: (152-196)/(70-87) 165/72     Weight: 83.9 kg (184 lb 15.5 oz)  Body mass index is 25.8 kg/m².    Intake/Output - Last 3 Shifts         03/14 0700  03/15 0659 03/15 0700  03/16 0659 03/16 0700 03/17 0659    P.O.  0     Total Intake(mL/kg)  0 (0)     Urine (mL/kg/hr)  200     Drains  0     Stool  0     Total Output  200     Net  -200            Urine Occurrence  2 x     Stool Occurrence  0 x             Physical Exam  Constitutional:       General: He is not in acute distress.  HENT:      Head: Normocephalic and atraumatic.   Eyes:      Extraocular Movements: Extraocular movements intact.      Conjunctiva/sclera: Conjunctivae normal.      Pupils: Pupils are equal, round, and reactive to light.   Cardiovascular:      Rate and Rhythm: Normal rate and regular rhythm.   Pulmonary:      Effort: Pulmonary effort is normal. No  respiratory distress.   Abdominal:      General: There is distension (somewhat improved from yesterday).      Palpations: Abdomen is soft.      Tenderness: There is abdominal tenderness (mild).   Neurological:      General: No focal deficit present.      Mental Status: He is alert.       Significant Labs:  I have reviewed all pertinent lab results within the past 24 hours.  CBC:   Recent Labs   Lab 03/16/23  0336   WBC 2.19*  2.19*   RBC 4.65  4.65   HGB 11.9*  11.9*   HCT 36.1*  36.1*     222   MCV 78*  78*   MCH 25.6*  25.6*   MCHC 33.0  33.0     CMP:   Recent Labs   Lab 03/15/23  1223 03/16/23  0336   * 125*   CALCIUM 10.1 9.0   ALBUMIN 3.9  --    PROT 8.0  --    * 132*   K 3.4* 3.5   CO2 25 23   CL 98 97   BUN 29* 36*   CREATININE 1.6* 1.6*   ALKPHOS 67  --    ALT 22  --    AST 32  --    BILITOT 0.7  --        Significant Diagnostics:  I have reviewed all pertinent imaging results/findings within the past 24 hours.

## 2023-03-16 NOTE — PROGRESS NOTES
[facility] Surg  General Surgery  Progress Note    Subjective:     History of Present Illness:  Patient is a 82 y.o. male with history of HT, HLD, Afib on eliquis, GERD and COPD who General Surgery was consulted for severe acute epigastric pain associated with nausea that started this morning at 5 am. The pain is worsened with inspiration or movement. Pt notes that his last bowel movement was last night at 11 pm. Denies any pain or changes in BM habits prior to this morning. He occasionally takes Miralax for constipation. Pt had a similar episode of abdominal pain in 2016 that spontaneously resolved after vomiting.  Pt denies any fever, SOB, CP, diaphoresis, constipation, melena, hematochezia, urinary difficulties. Labs on admission with WBC 13.22 and Cr 1.6. CT showing dilated small bowel with an abrupt transition point in the RLQ. Denies any history of abdominal surgery.       Post-Op Info:  * No surgery found *         Interval History:   No acute events overnight.   Patient with only 125cc NGT output overnight.   No flatus or BM.   States feeling a little better today.   Denies nausea/vomiting.     Medications:  Continuous Infusions:   sodium chloride 0.45% 100 mL/hr at 03/16/23 0427    heparin (porcine) in D5W 14 Units/kg/hr (03/16/23 0059)     Scheduled Meds:   fluticasone propionate  1 spray Each Nostril Daily    pantoprazole  40 mg Intravenous BID     PRN Meds:acetaminophen, dextrose 10%, dextrose 10%, dextrose, dextrose, glucagon (human recombinant), heparin (PORCINE), heparin (PORCINE), hydrALAZINE, HYDROmorphone, HYDROmorphone, melatonin, naloxone, ondansetron, sodium chloride 0.9%     Review of patient's allergies indicates:  No Known Allergies  Objective:     Vital Signs (Most Recent):  Temp: 100.2 °F (37.9 °C) (03/16/23 0733)  Pulse: 79 (03/16/23 0726)  Resp: 20 (03/16/23 0726)  BP: (!) 165/72 (03/16/23 0726)  SpO2: 95 % (03/16/23 0726)   Vital Signs (24h Range):  Temp:  [97.7 °F (36.5  Head, normocephalic, atraumatic, Face, Face within normal limits, Ears, External ears within normal limits, Nose/Nasopharynx, External nose  normal appearance, nares patent, no nasal discharge, Mouth and Throat, Oral cavity appearance normal, Breath odor normal, Lips, Appearance normal °C)-100.6 °F (38.1 °C)] 100.2 °F (37.9 °C)  Pulse:  [59-93] 79  Resp:  [14-31] 20  SpO2:  [92 %-99 %] 95 %  BP: (152-196)/(70-87) 165/72     Weight: 83.9 kg (184 lb 15.5 oz)  Body mass index is 25.8 kg/m².    Intake/Output - Last 3 Shifts         03/14 0700  03/15 0659 03/15 0700 03/16 0659 03/16 0700 03/17 0659    P.O.  0     Total Intake(mL/kg)  0 (0)     Urine (mL/kg/hr)  200     Drains  0     Stool  0     Total Output  200     Net  -200            Urine Occurrence  2 x     Stool Occurrence  0 x             Physical Exam  Constitutional:       General: He is not in acute distress.  HENT:      Head: Normocephalic and atraumatic.   Eyes:      Extraocular Movements: Extraocular movements intact.      Conjunctiva/sclera: Conjunctivae normal.      Pupils: Pupils are equal, round, and reactive to light.   Cardiovascular:      Rate and Rhythm: Normal rate and regular rhythm.   Pulmonary:      Effort: Pulmonary effort is normal. No respiratory distress.   Abdominal:      General: There is distension (somewhat improved from yesterday).      Palpations: Abdomen is soft.      Tenderness: There is abdominal tenderness (mild).   Neurological:      General: No focal deficit present.      Mental Status: He is alert.       Significant Labs:  I have reviewed all pertinent lab results within the past 24 hours.  CBC:   Recent Labs   Lab 03/16/23  0336   WBC 2.19*  2.19*   RBC 4.65  4.65   HGB 11.9*  11.9*   HCT 36.1*  36.1*     222   MCV 78*  78*   MCH 25.6*  25.6*   MCHC 33.0  33.0     CMP:   Recent Labs   Lab 03/15/23  1223 03/16/23  0336   * 125*   CALCIUM 10.1 9.0   ALBUMIN 3.9  --    PROT 8.0  --    * 132*   K 3.4* 3.5   CO2 25 23   CL 98 97   BUN 29* 36*   CREATININE 1.6* 1.6*   ALKPHOS 67  --    ALT 22  --    AST 32  --    BILITOT 0.7  --        Significant Diagnostics:  I have reviewed all pertinent imaging results/findings within the past 24 hours.    Assessment/Plan:     * Small bowel  obstruction  82 y.o. male with history of HT, HLD, Afib on eliquis, GERD and COPD with exam and imaging findings concerning for SBO. Patient without any findings concerning for acute abdomen or perforation.     - No acute surgical intervention at this time   - NGT to STEFANO   - GGC today  - NPO  - Rest of care per primary  - Please call General Surgery with any acute changes in abdominal exam         Olivia Marsh MD  General Surgery  [facility] [place] - Med Surg

## 2023-03-16 NOTE — ASSESSMENT & PLAN NOTE
82 y.o. male with history of HT, HLD, Afib on eliquis, GERD and COPD with exam and imaging findings concerning for SBO. Patient without any findings concerning for acute abdomen or perforation.     - No acute surgical intervention at this time   - NGT placed in ED, keep to LIWS  - NPO  - Rest of care per primary  - Please call General Surgery with any acute changes in abdominal exam

## 2023-03-16 NOTE — NURSING
Pt is AAOx4 and denies pain, 0/10 Pain Scale. Pt noncompliant with some TX modalities but  remains free from falls/injuries R/T the adherence of all safety protocols by staff.    Spoke with son at 2300 hours regarding the pt's condition. Family will visit in the morning. Son is a physician and contact info is on Sticky Note in the EMR.    Pt C/O ABD pain with movement of BL LE's and requests pain med at 2200 hours. Pain med given. Afterwards, pt becomes restless: trying to get OOB, removing clothing, and disconnecting TELE. As a result of the AMS secondary to pain med administration, pt is moved from  to  for closer observation and a sitter is assigned to the pt at 0100 hours. AVS WGB 6 is still monitoring the pt.    Pt remains awake all night in an agitated state but remains safe. O2 Sat low 92% during midnight VS and a NC 2L/min placed to increase oxygenation. Post TX, O2 Sat 95%. Pt remains tachycardic and slightly hypertensive. Note left for Dr. Clark regarding overnight status.

## 2023-03-16 NOTE — ASSESSMENT & PLAN NOTE
82 y.o. male with history of HT, HLD, Afib on eliquis, GERD and COPD with exam and imaging findings concerning for SBO. Patient without any findings concerning for acute abdomen or perforation.     - No acute surgical intervention at this time   - NGT to STEFANO   - GGC today  - NPO  - Rest of care per primary  - Please call General Surgery with any acute changes in abdominal exam

## 2023-03-16 NOTE — ASSESSMENT & PLAN NOTE
-Hb 11.9 on admit and now 11.5  -No evidence of bleeding  -Folate and B12 are replete  -Ferritin is low and TIBC only 11.9% consistent with iron deficiency  -Will need FeSO4 once bowel function returns but avoid now due to SBO  -Repeat cbc in AM.

## 2023-03-16 NOTE — CONSULTS
[facility] Surg  General Surgery  Consult Note    Patient Name: Benitez Cabrales  MRN: [id number]  Code Status: Full Code  Admission Date: 3/15/2023  Hospital Length of Stay: 0 days  Attending Physician: Jovi Clark MD  Primary Care Provider: Diomedes Ayoub MD    Patient information was obtained from patient and ER records.     Inpatient consult to General Surgery  Consult performed by: Olivia Marsh MD  Consult ordered by: Jeni Baker PA-C        Subjective:     Principal Problem: Small bowel obstruction    History of Present Illness: Patient is a 82 y.o. male with history of HT, HLD, Afib on eliquis, GERD and COPD who General Surgery was consulted for severe acute epigastric pain associated with nausea that started this morning at 5 am. The pain is worsened with inspiration or movement. Pt notes that his last bowel movement was last night at 11 pm. Denies any pain or changes in BM habits prior to this morning. He occasionally takes Miralax for constipation. Pt had a similar episode of abdominal pain in 2016 that spontaneously resolved after vomiting.  Pt denies any fever, SOB, CP, diaphoresis, constipation, melena, hematochezia, urinary difficulties. Labs on admission with WBC 13.22 and Cr 1.6. CT showing dilated small bowel with an abrupt transition point in the RLQ. Denies any history of abdominal surgery.       No current facility-administered medications on file prior to encounter.     Current Outpatient Medications on File Prior to Encounter   Medication Sig    acetaminophen (TYLENOL) 500 MG tablet Take 500 mg by mouth every 8 (eight) hours as needed for Pain.    amiodarone (PACERONE) 200 MG Tab Take 1 tablet (200 mg total) by mouth once daily.    amLODIPine (NORVASC) 5 MG tablet Take 1 tablet by mouth once daily    biotin 5,000 mcg TbDL Take 1 tablet by mouth once daily.    chlorthalidone (HYGROTEN) 25 MG Tab 1 tablet daily    cholecalciferol, vitamin D3, (VITAMIN D3) 50 mcg (2,000  unit) Cap Take 1 capsule by mouth once daily.    doxepin (SINEQUAN) 10 MG capsule TAKE 1 TO 2 CAPSULES BY MOUTH AT BEDTIME AS NEEDED FOR ITCHING    ELIQUIS 5 mg Tab Take 1 tablet by mouth twice daily    fluticasone propionate (FLONASE) 50 mcg/actuation nasal spray 1 spray (50 mcg total) by Each Nostril route once daily.    magnesium 200 mg Tab Take by mouth once daily.    MV-MN/FA/LYCOPENE/LUT/HB#178 (NEHEMIAH MULTIVITAMIN FOR MEN ORAL) Take 1 tablet by mouth once daily.    rosuvastatin (CRESTOR) 10 MG tablet TAKE 1 TABLET BY MOUTH ONCE DAILY IN THE EVENING    tiZANidine (ZANAFLEX) 4 MG tablet TAKE 1 TO 2 TABLETS BY MOUTH AT BEDTIME AS NEEDED    UNABLE TO FIND medication name: tumeric cap daily    zinc gluconate 50 mg tablet Take 50 mg by mouth once daily.    [DISCONTINUED] ALLERGY RELIEF, LORATADINE, 10 mg tablet Take 1 tablet by mouth once daily    [DISCONTINUED] ascorbic acid, vitamin C, (VITAMIN C) 250 MG tablet Take 250 mg by mouth 3 (three) times a week.    [DISCONTINUED] azelastine (ASTELIN) 137 mcg (0.1 %) nasal spray 1 spray (137 mcg total) by Nasal route 2 (two) times daily.    [DISCONTINUED] clotrimazole-betamethasone 1-0.05% (LOTRISONE) cream Apply topically 2 (two) times daily. Use only a week or 2 at a time (Patient not taking: Reported on 2/16/2023)    [DISCONTINUED] fluconazole (DIFLUCAN) 200 MG Tab TAKE 1 TABLET BY MOUTH IN THE MORNING ON FRIDAY. TAKE WITH FOOD.    [DISCONTINUED] HYDROcodone-acetaminophen (NORCO) 7.5-325 mg per tablet Take 1 tablet by mouth every 6 (six) hours as needed for Pain. (Patient not taking: Reported on 2/16/2023)    [DISCONTINUED] meclizine (ANTIVERT) 25 mg tablet TAKE 1/2 TO 1 (ONE-HALF TO ONE) TABLET AT LEAST BY MOUTH AT BEDTIME FOR VERTIGO (Patient not taking: Reported on 2/16/2023)    [DISCONTINUED] tamsulosin (FLOMAX) 0.4 mg Cap Take 1 capsule by mouth once daily    [DISCONTINUED] tiZANidine (ZANAFLEX) 4 MG tablet TAKE 1 TO 2 TABLETS BY MOUTH AT BEDTIME  AS NEEDED       Review of patient's allergies indicates:  No Known Allergies    Past Medical History:   Diagnosis Date    Anticoagulant long-term use     Atrial fibrillation     GERD (gastroesophageal reflux disease)     Hyperlipidemia     Hypertension     Nuclear sclerosis, bilateral 10/09/2017    Rhinitis medicamentosa 06/30/2014    Small bowel obstruction     Vertigo 06/13/2013     Past Surgical History:   Procedure Laterality Date    ICM implant      NOSE SURGERY      Septal Repair    REMOVAL OF IMPLANTABLE LOOP RECORDER N/A 12/10/2018    Procedure: REMOVAL, IMPLANTABLE LOOP RECORDER;  Surgeon: Mickey Morales MD;  Location: [facility] LAB;  Service: Cardiology;  Laterality: N/A;    VASCULAR SURGERY      left leg     Family History       Problem Relation (Age of Onset)    No Known Problems Mother, Father, Sister, Brother, Maternal Uncle, Paternal Aunt, Paternal Uncle, Maternal Grandmother, Maternal Grandfather, Paternal Grandmother, Paternal Grandfather          Tobacco Use    Smoking status: Never     Passive exposure: Never    Smokeless tobacco: Never    Tobacco comments:     .  Three kids.  Occup:   at [employer].     Substance and Sexual Activity    Alcohol use: Yes     Alcohol/week: 1.0 standard drink     Types: 1 Glasses of wine per week     Comment: red wine almost daily    Drug use: Never    Sexual activity: Not Currently     Review of Systems   Constitutional:  Positive for appetite change. Negative for activity change, chills, fatigue and fever.   HENT:  Negative for congestion, sinus pressure, sinus pain and sore throat.    Eyes:  Negative for visual disturbance.   Respiratory:  Negative for cough, choking, chest tightness, shortness of breath and wheezing.    Cardiovascular:  Negative for chest pain and palpitations.   Gastrointestinal:  Positive for abdominal distention, abdominal pain, constipation, nausea and vomiting. Negative for diarrhea.   Genitourinary:   Negative for difficulty urinating, dysuria and urgency.   Musculoskeletal:  Negative for back pain and myalgias.   Neurological:  Negative for dizziness and weakness.   Objective:     Vital Signs (Most Recent):  Temp: 98.5 °F (36.9 °C) (03/15/23 1911)  Pulse: 74 (03/15/23 1911)  Resp: 18 (03/15/23 2201)  BP: (!) 161/70 (03/15/23 1911)  SpO2: 97 % (03/15/23 1911)   Vital Signs (24h Range):  Temp:  [97.7 °F (36.5 °C)-98.5 °F (36.9 °C)] 98.5 °F (36.9 °C)  Pulse:  [59-74] 74  Resp:  [14-31] 18  SpO2:  [96 %-99 %] 97 %  BP: (158-196)/(70-87) 161/70     Weight: 83.9 kg (184 lb 15.5 oz)  Body mass index is 25.8 kg/m².    Physical Exam  Constitutional:       General: He is not in acute distress.  HENT:      Head: Normocephalic and atraumatic.   Eyes:      Extraocular Movements: Extraocular movements intact.      Conjunctiva/sclera: Conjunctivae normal.      Pupils: Pupils are equal, round, and reactive to light.   Cardiovascular:      Rate and Rhythm: Normal rate and regular rhythm.   Pulmonary:      Effort: Pulmonary effort is normal. No respiratory distress.   Abdominal:      General: There is distension.      Palpations: Abdomen is soft.      Comments: Epigastric tenderness to palpation   Neurological:      General: No focal deficit present.      Mental Status: He is alert.       Significant Labs:  I have reviewed all pertinent lab results within the past 24 hours.  CBC:   Recent Labs   Lab 03/15/23  1223 03/15/23  1238   WBC 13.22*  --    RBC 4.73  --    HGB 11.9*  --    HCT 36.2* 39     --    MCV 77*  --    MCH 25.2*  --    MCHC 32.9  --      CMP:   Recent Labs   Lab 03/15/23  1223   *   CALCIUM 10.1   ALBUMIN 3.9   PROT 8.0   *   K 3.4*   CO2 25   CL 98   BUN 29*   CREATININE 1.6*   ALKPHOS 67   ALT 22   AST 32   BILITOT 0.7       Significant Diagnostics:  I have reviewed all pertinent imaging results/findings within the past 24 hours.      Assessment/Plan:     * Small bowel obstruction  82 y.o.  male with history of HT, HLD, Afib on eliquis, GERD and COPD with exam and imaging findings concerning for SBO. Patient without any findings concerning for acute abdomen or perforation.     - No acute surgical intervention at this time   - NGT placed in ED, keep to LIWS  - NPO  - Rest of care per primary  - Please call General Surgery with any acute changes in abdominal exam       VTE Risk Mitigation (From admission, onward)         Ordered     heparin 25,000 units in dextrose 5% (100 units/ml) IV bolus from bag - ADDITIONAL PRN BOLUS - 60 units/kg  As needed (PRN)        Question:  Heparin Infusion Adjustment (DO NOT MODIFY ANSWER)  Answer:  \\[email or web address]\epic\Images\Pharmacy\HeparinInfusions\heparin LOW INTENSITY nomogram for [facility] [id number].pdf    03/15/23 1444     heparin 25,000 units in dextrose 5% (100 units/ml) IV bolus from bag - ADDITIONAL PRN BOLUS - 30 units/kg  As needed (PRN)        Question:  Heparin Infusion Adjustment (DO NOT MODIFY ANSWER)  Answer:  \\[email or web address]\epic\Images\Pharmacy\HeparinInfusions\heparin LOW INTENSITY nomogram for [facility] [id number].pdf    03/15/23 1444     heparin 25,000 units in dextrose 5% 250 mL (100 units/mL) infusion LOW INTENSITY nomogram - [facility]  Continuous        Question Answer Comment   Heparin Infusion Adjustment (DO NOT MODIFY ANSWER) \\[email or web address]\epic\Images\Pharmacy\HeparinInfusions\heparin LOW INTENSITY nomogram for [facility] [id number].pdf    Begin at (in units/kg/hr) 12        03/15/23 1444     IP VTE HIGH RISK PATIENT  Once         03/15/23 1444     Place sequential compression device  Until discontinued         03/15/23 1444                Thank you for your consult. I will follow-up with patient. Please contact us if you have any additional questions.    Olivia Marsh MD  General Surgery  [facility]

## 2023-03-16 NOTE — ASSESSMENT & PLAN NOTE
-BP remains significantly elevated  -At home takes norvasc and chlorthalidone  -Resume home norvasc today.  Holding chlorthalidone for now  -Work on better analgesia and provide PRN hydralazine for sbp > 180

## 2023-03-16 NOTE — PLAN OF CARE
Problem: Fall Injury Risk  Goal: Absence of Fall and Fall-Related Injury  Outcome: Ongoing, Progressing     Problem: Pain Acute  Goal: Acceptable Pain Control and Functional Ability  Outcome: Ongoing, Progressing     Problem: Fluid and Electrolyte Imbalance (Acute Kidney Injury/Impairment)  Goal: Fluid and Electrolyte Balance  Outcome: Ongoing, Progressing     Problem: Oral Intake Inadequate (Acute Kidney Injury/Impairment)  Goal: Optimal Nutrition Intake  Outcome: Ongoing, Progressing     Problem: Renal Function Impairment (Acute Kidney Injury/Impairment)  Goal: Effective Renal Function  Outcome: Ongoing, Progressing     Problem: Infection  Goal: Absence of Infection Signs and Symptoms  Outcome: Ongoing, Progressing

## 2023-03-16 NOTE — NURSING
"Staff RN reported walking past pt's room and seeing small spots of blood on the floor. Staff RN noted patient to be in bathroom with IV site in Right FA pulled out and bleeding. Pt states he was "pushed to go to the bathroom". Pt NGT was noted to have been pulled slightly from marked area on tube. Patient escorted back to bed. Gauze and coban applied to Right FA IV site. NGT advanced back to marked area on tube. WYATT Andrews notified, new orders for xray to confirm NGT placement. PIV placed in right upper arm. Patient educated to call for assistance when getting out of bed, bed alarm on, telesitter in place, call light within reach.   "

## 2023-03-16 NOTE — SUBJECTIVE & OBJECTIVE
Interval History: Noted delirium overnight and pulled out his IV.  Today his biggest complaint is from irritation from his NG tube.  Is having some diarrhea today - unclear if SBO has resolved of if this is post-obstructive.  Noted fever this morning.  Nausea and abdominal pain are clearly improved and he now has vigorous bowel sounds.  Son and mother are at bedside during my visit.  All questions answered and patient had no further complaints.    Objective:     Vital Signs (Most Recent):  Temp: 99 °F (37.2 °C) (03/16/23 1150)  Pulse: 78 (03/16/23 1214)  Resp: 20 (03/16/23 1150)  BP: (!) 150/69 (03/16/23 1214)  SpO2: 97 % (03/16/23 1221)   Vital Signs (24h Range):  Temp:  [98.3 °F (36.8 °C)-100.6 °F (38.1 °C)] 99 °F (37.2 °C)  Pulse:  [67-93] 78  Resp:  [14-20] 20  SpO2:  [92 %-97 %] 97 %  BP: (150-186)/(69-85) 150/69     Weight: 83.9 kg (184 lb 15.5 oz)  Body mass index is 25.8 kg/m².    Intake/Output Summary (Last 24 hours) at 3/16/2023 1627  Last data filed at 3/16/2023 0800  Gross per 24 hour   Intake 0 ml   Output 320 ml   Net -320 ml      Physical Exam  Vitals and nursing note reviewed.   Constitutional:       General: He is not in acute distress.     Appearance: He is well-developed. He is ill-appearing. He is not toxic-appearing or diaphoretic.   HENT:      Head: Normocephalic and atraumatic.      Right Ear: External ear normal.      Left Ear: External ear normal.      Nose: Nose normal.      Comments: Ngtube in place     Mouth/Throat:      Mouth: Mucous membranes are moist.   Eyes:      Extraocular Movements: Extraocular movements intact.      Conjunctiva/sclera: Conjunctivae normal.   Cardiovascular:      Rate and Rhythm: Normal rate and regular rhythm.      Heart sounds: Normal heart sounds.   Pulmonary:      Effort: Pulmonary effort is normal. No respiratory distress.      Breath sounds: Normal breath sounds.   Abdominal:      Comments: -Abdomen softer and less distended today, albeit still mildly  distended.  Much improved abdominal pain without rebound.  Bowel sounds are vigorous.   Musculoskeletal:         General: Normal range of motion.      Cervical back: Normal range of motion. No rigidity.      Comments: Trace edema in left lower leg which is chronic from prior vein stripping procedure.   Skin:     General: Skin is warm.   Neurological:      Mental Status: He is alert and oriented to person, place, and time.      Cranial Nerves: No cranial nerve deficit.      Coordination: Coordination normal.   Psychiatric:      Comments: Noted delirium overnight but presently resolved.       Significant Labs: All pertinent labs within the past 24 hours have been reviewed.    Significant Imaging: I have reviewed all pertinent imaging results/findings within the past 24 hours.

## 2023-03-16 NOTE — PLAN OF CARE
Problem: Adult Inpatient Plan of Care  Goal: Plan of Care Review  Outcome: Ongoing, Progressing  Goal: Patient-Specific Goal (Individualized)  Outcome: Ongoing, Progressing  Goal: Absence of Hospital-Acquired Illness or Injury  Outcome: Ongoing, Progressing  Goal: Optimal Comfort and Wellbeing  Outcome: Ongoing, Progressing  Goal: Readiness for Transition of Care  Outcome: Ongoing, Progressing     Problem: Fall Injury Risk  Goal: Absence of Fall and Fall-Related Injury  Outcome: Ongoing, Progressing     Problem: Pain Acute  Goal: Acceptable Pain Control and Functional Ability  Outcome: Ongoing, Progressing     Problem: Fluid and Electrolyte Imbalance (Acute Kidney Injury/Impairment)  Goal: Fluid and Electrolyte Balance  Outcome: Ongoing, Progressing     Problem: Oral Intake Inadequate (Acute Kidney Injury/Impairment)  Goal: Optimal Nutrition Intake  Outcome: Ongoing, Progressing     Problem: Renal Function Impairment (Acute Kidney Injury/Impairment)  Goal: Effective Renal Function  Outcome: Ongoing, Progressing

## 2023-03-16 NOTE — SUBJECTIVE & OBJECTIVE
No current facility-administered medications on file prior to encounter.     Current Outpatient Medications on File Prior to Encounter   Medication Sig    acetaminophen (TYLENOL) 500 MG tablet Take 500 mg by mouth every 8 (eight) hours as needed for Pain.    amiodarone (PACERONE) 200 MG Tab Take 1 tablet (200 mg total) by mouth once daily.    amLODIPine (NORVASC) 5 MG tablet Take 1 tablet by mouth once daily    biotin 5,000 mcg TbDL Take 1 tablet by mouth once daily.    chlorthalidone (HYGROTEN) 25 MG Tab 1 tablet daily    cholecalciferol, vitamin D3, (VITAMIN D3) 50 mcg (2,000 unit) Cap Take 1 capsule by mouth once daily.    doxepin (SINEQUAN) 10 MG capsule TAKE 1 TO 2 CAPSULES BY MOUTH AT BEDTIME AS NEEDED FOR ITCHING    ELIQUIS 5 mg Tab Take 1 tablet by mouth twice daily    fluticasone propionate (FLONASE) 50 mcg/actuation nasal spray 1 spray (50 mcg total) by Each Nostril route once daily.    magnesium 200 mg Tab Take by mouth once daily.    MV-MN/FA/LYCOPENE/LUT/HB#178 (NEHEMIAH MULTIVITAMIN FOR MEN ORAL) Take 1 tablet by mouth once daily.    rosuvastatin (CRESTOR) 10 MG tablet TAKE 1 TABLET BY MOUTH ONCE DAILY IN THE EVENING    tiZANidine (ZANAFLEX) 4 MG tablet TAKE 1 TO 2 TABLETS BY MOUTH AT BEDTIME AS NEEDED    UNABLE TO FIND medication name: tumeric cap daily    zinc gluconate 50 mg tablet Take 50 mg by mouth once daily.    [DISCONTINUED] ALLERGY RELIEF, LORATADINE, 10 mg tablet Take 1 tablet by mouth once daily    [DISCONTINUED] ascorbic acid, vitamin C, (VITAMIN C) 250 MG tablet Take 250 mg by mouth 3 (three) times a week.    [DISCONTINUED] azelastine (ASTELIN) 137 mcg (0.1 %) nasal spray 1 spray (137 mcg total) by Nasal route 2 (two) times daily.    [DISCONTINUED] clotrimazole-betamethasone 1-0.05% (LOTRISONE) cream Apply topically 2 (two) times daily. Use only a week or 2 at a time (Patient not taking: Reported on 2/16/2023)    [DISCONTINUED] fluconazole (DIFLUCAN) 200 MG Tab TAKE 1 TABLET BY MOUTH IN THE  MORNING ON FRIDAY. TAKE WITH FOOD.    [DISCONTINUED] HYDROcodone-acetaminophen (NORCO) 7.5-325 mg per tablet Take 1 tablet by mouth every 6 (six) hours as needed for Pain. (Patient not taking: Reported on 2/16/2023)    [DISCONTINUED] meclizine (ANTIVERT) 25 mg tablet TAKE 1/2 TO 1 (ONE-HALF TO ONE) TABLET AT LEAST BY MOUTH AT BEDTIME FOR VERTIGO (Patient not taking: Reported on 2/16/2023)    [DISCONTINUED] tamsulosin (FLOMAX) 0.4 mg Cap Take 1 capsule by mouth once daily    [DISCONTINUED] tiZANidine (ZANAFLEX) 4 MG tablet TAKE 1 TO 2 TABLETS BY MOUTH AT BEDTIME AS NEEDED       Review of patient's allergies indicates:  No Known Allergies    Past Medical History:   Diagnosis Date    Anticoagulant long-term use     Atrial fibrillation     GERD (gastroesophageal reflux disease)     Hyperlipidemia     Hypertension     Nuclear sclerosis, bilateral 10/09/2017    Rhinitis medicamentosa 06/30/2014    Small bowel obstruction     Vertigo 06/13/2013     Past Surgical History:   Procedure Laterality Date    ICM implant      NOSE SURGERY      Septal Repair    REMOVAL OF IMPLANTABLE LOOP RECORDER N/A 12/10/2018    Procedure: REMOVAL, IMPLANTABLE LOOP RECORDER;  Surgeon: Mickey Morales MD;  Location: [facility] LAB;  Service: Cardiology;  Laterality: N/A;    VASCULAR SURGERY      left leg     Family History       Problem Relation (Age of Onset)    No Known Problems Mother, Father, Sister, Brother, Maternal Uncle, Paternal Aunt, Paternal Uncle, Maternal Grandmother, Maternal Grandfather, Paternal Grandmother, Paternal Grandfather          Tobacco Use    Smoking status: Never     Passive exposure: Never    Smokeless tobacco: Never    Tobacco comments:     .  Three kids.  Occup:   at [employer].     Substance and Sexual Activity    Alcohol use: Yes     Alcohol/week: 1.0 standard drink     Types: 1 Glasses of wine per week     Comment: red wine almost daily    Drug use: Never    Sexual activity: Not Currently     Review  of Systems   Constitutional:  Positive for appetite change. Negative for activity change, chills, fatigue and fever.   HENT:  Negative for congestion, sinus pressure, sinus pain and sore throat.    Eyes:  Negative for visual disturbance.   Respiratory:  Negative for cough, choking, chest tightness, shortness of breath and wheezing.    Cardiovascular:  Negative for chest pain and palpitations.   Gastrointestinal:  Positive for abdominal distention, abdominal pain, constipation, nausea and vomiting. Negative for diarrhea.   Genitourinary:  Negative for difficulty urinating, dysuria and urgency.   Musculoskeletal:  Negative for back pain and myalgias.   Neurological:  Negative for dizziness and weakness.   Objective:     Vital Signs (Most Recent):  Temp: 98.5 °F (36.9 °C) (03/15/23 1911)  Pulse: 74 (03/15/23 1911)  Resp: 18 (03/15/23 2201)  BP: (!) 161/70 (03/15/23 1911)  SpO2: 97 % (03/15/23 1911)   Vital Signs (24h Range):  Temp:  [97.7 °F (36.5 °C)-98.5 °F (36.9 °C)] 98.5 °F (36.9 °C)  Pulse:  [59-74] 74  Resp:  [14-31] 18  SpO2:  [96 %-99 %] 97 %  BP: (158-196)/(70-87) 161/70     Weight: 83.9 kg (184 lb 15.5 oz)  Body mass index is 25.8 kg/m².    Physical Exam  Constitutional:       General: He is not in acute distress.  HENT:      Head: Normocephalic and atraumatic.   Eyes:      Extraocular Movements: Extraocular movements intact.      Conjunctiva/sclera: Conjunctivae normal.      Pupils: Pupils are equal, round, and reactive to light.   Cardiovascular:      Rate and Rhythm: Normal rate and regular rhythm.   Pulmonary:      Effort: Pulmonary effort is normal. No respiratory distress.   Abdominal:      General: There is distension.      Palpations: Abdomen is soft.      Comments: Epigastric tenderness to palpation   Neurological:      General: No focal deficit present.      Mental Status: He is alert.       Significant Labs:  I have reviewed all pertinent lab results within the past 24 hours.  CBC:   Recent Labs    Lab 03/15/23  1223 03/15/23  1238   WBC 13.22*  --    RBC 4.73  --    HGB 11.9*  --    HCT 36.2* 39     --    MCV 77*  --    MCH 25.2*  --    MCHC 32.9  --      CMP:   Recent Labs   Lab 03/15/23  1223   *   CALCIUM 10.1   ALBUMIN 3.9   PROT 8.0   *   K 3.4*   CO2 25   CL 98   BUN 29*   CREATININE 1.6*   ALKPHOS 67   ALT 22   AST 32   BILITOT 0.7       Significant Diagnostics:  I have reviewed all pertinent imaging results/findings within the past 24 hours.

## 2023-03-16 NOTE — ASSESSMENT & PLAN NOTE
-Baseline Cr 1.4  -On admit Cr 1.6 and remains stable today  -Appears mildly hypovolemic  -Avoid nephrotoxic agents and renally dose meds  -Continue with gentle IV fluids for now  -Repeat BMP in AM

## 2023-03-17 LAB
ANION GAP SERPL CALC-SCNC: 14 MMOL/L (ref 8–16)
APTT BLDCRRT: 49 SEC (ref 21–32)
BASOPHILS # BLD AUTO: 0.03 K/UL (ref 0–0.2)
BASOPHILS # BLD AUTO: 0.03 K/UL (ref 0–0.2)
BASOPHILS NFR BLD: 0.3 % (ref 0–1.9)
BASOPHILS NFR BLD: 0.3 % (ref 0–1.9)
BUN SERPL-MCNC: 36 MG/DL (ref 8–23)
C DIFF GDH STL QL: NEGATIVE
C DIFF TOX A+B STL QL IA: NEGATIVE
CALCIUM SERPL-MCNC: 8.8 MG/DL (ref 8.7–10.5)
CHLORIDE SERPL-SCNC: 103 MMOL/L (ref 95–110)
CO2 SERPL-SCNC: 20 MMOL/L (ref 23–29)
CREAT SERPL-MCNC: 1.6 MG/DL (ref 0.5–1.4)
DIFFERENTIAL METHOD: ABNORMAL
DIFFERENTIAL METHOD: ABNORMAL
E COLI SXT1 STL QL IA: NEGATIVE
E COLI SXT2 STL QL IA: NEGATIVE
EOSINOPHIL # BLD AUTO: 0.1 K/UL (ref 0–0.5)
EOSINOPHIL # BLD AUTO: 0.1 K/UL (ref 0–0.5)
EOSINOPHIL NFR BLD: 0.5 % (ref 0–8)
EOSINOPHIL NFR BLD: 0.5 % (ref 0–8)
ERYTHROCYTE [DISTWIDTH] IN BLOOD BY AUTOMATED COUNT: 16.7 % (ref 11.5–14.5)
ERYTHROCYTE [DISTWIDTH] IN BLOOD BY AUTOMATED COUNT: 16.7 % (ref 11.5–14.5)
EST. GFR  (NO RACE VARIABLE): 43 ML/MIN/1.73 M^2
GLUCOSE SERPL-MCNC: 96 MG/DL (ref 70–110)
HCT VFR BLD AUTO: 33.4 % (ref 40–54)
HCT VFR BLD AUTO: 33.4 % (ref 40–54)
HGB BLD-MCNC: 10.9 G/DL (ref 14–18)
HGB BLD-MCNC: 10.9 G/DL (ref 14–18)
IMM GRANULOCYTES # BLD AUTO: 0.04 K/UL (ref 0–0.04)
IMM GRANULOCYTES # BLD AUTO: 0.04 K/UL (ref 0–0.04)
IMM GRANULOCYTES NFR BLD AUTO: 0.4 % (ref 0–0.5)
IMM GRANULOCYTES NFR BLD AUTO: 0.4 % (ref 0–0.5)
LYMPHOCYTES # BLD AUTO: 1 K/UL (ref 1–4.8)
LYMPHOCYTES # BLD AUTO: 1 K/UL (ref 1–4.8)
LYMPHOCYTES NFR BLD: 9.8 % (ref 18–48)
LYMPHOCYTES NFR BLD: 9.8 % (ref 18–48)
MAGNESIUM SERPL-MCNC: 2.6 MG/DL (ref 1.6–2.6)
MCH RBC QN AUTO: 25.2 PG (ref 27–31)
MCH RBC QN AUTO: 25.2 PG (ref 27–31)
MCHC RBC AUTO-ENTMCNC: 32.6 G/DL (ref 32–36)
MCHC RBC AUTO-ENTMCNC: 32.6 G/DL (ref 32–36)
MCV RBC AUTO: 77 FL (ref 82–98)
MCV RBC AUTO: 77 FL (ref 82–98)
MONOCYTES # BLD AUTO: 0.8 K/UL (ref 0.3–1)
MONOCYTES # BLD AUTO: 0.8 K/UL (ref 0.3–1)
MONOCYTES NFR BLD: 8.1 % (ref 4–15)
MONOCYTES NFR BLD: 8.1 % (ref 4–15)
NEUTROPHILS # BLD AUTO: 8.4 K/UL (ref 1.8–7.7)
NEUTROPHILS # BLD AUTO: 8.4 K/UL (ref 1.8–7.7)
NEUTROPHILS NFR BLD: 80.9 % (ref 38–73)
NEUTROPHILS NFR BLD: 80.9 % (ref 38–73)
NRBC BLD-RTO: 0 /100 WBC
NRBC BLD-RTO: 0 /100 WBC
PLATELET # BLD AUTO: 182 K/UL (ref 150–450)
PLATELET # BLD AUTO: 182 K/UL (ref 150–450)
PMV BLD AUTO: 10.3 FL (ref 9.2–12.9)
PMV BLD AUTO: 10.3 FL (ref 9.2–12.9)
POTASSIUM SERPL-SCNC: 2.6 MMOL/L (ref 3.5–5.1)
RBC # BLD AUTO: 4.32 M/UL (ref 4.6–6.2)
RBC # BLD AUTO: 4.32 M/UL (ref 4.6–6.2)
SODIUM SERPL-SCNC: 137 MMOL/L (ref 136–145)
WBC # BLD AUTO: 10.42 K/UL (ref 3.9–12.7)
WBC # BLD AUTO: 10.42 K/UL (ref 3.9–12.7)

## 2023-03-17 PROCEDURE — 63600175 PHARM REV CODE 636 W HCPCS: Performed by: HOSPITALIST

## 2023-03-17 PROCEDURE — 94799 UNLISTED PULMONARY SVC/PX: CPT

## 2023-03-17 PROCEDURE — 94760 N-INVAS EAR/PLS OXIMETRY 1: CPT

## 2023-03-17 PROCEDURE — 97161 PT EVAL LOW COMPLEX 20 MIN: CPT

## 2023-03-17 PROCEDURE — 80048 BASIC METABOLIC PNL TOTAL CA: CPT | Performed by: HOSPITALIST

## 2023-03-17 PROCEDURE — 85025 COMPLETE CBC W/AUTO DIFF WBC: CPT | Performed by: HOSPITALIST

## 2023-03-17 PROCEDURE — 83735 ASSAY OF MAGNESIUM: CPT | Performed by: HOSPITALIST

## 2023-03-17 PROCEDURE — C9113 INJ PANTOPRAZOLE SODIUM, VIA: HCPCS | Performed by: HOSPITALIST

## 2023-03-17 PROCEDURE — 25000242 PHARM REV CODE 250 ALT 637 W/ HCPCS: Performed by: HOSPITALIST

## 2023-03-17 PROCEDURE — 25000003 PHARM REV CODE 250: Performed by: HOSPITALIST

## 2023-03-17 PROCEDURE — 99900035 HC TECH TIME PER 15 MIN (STAT)

## 2023-03-17 PROCEDURE — 27000221 HC OXYGEN, UP TO 24 HOURS

## 2023-03-17 PROCEDURE — 99232 PR SUBSEQUENT HOSPITAL CARE,LEVL II: ICD-10-PCS | Mod: ,,, | Performed by: SURGERY

## 2023-03-17 PROCEDURE — 85730 THROMBOPLASTIN TIME PARTIAL: CPT | Performed by: HOSPITALIST

## 2023-03-17 PROCEDURE — 94761 N-INVAS EAR/PLS OXIMETRY MLT: CPT

## 2023-03-17 PROCEDURE — 25000003 PHARM REV CODE 250: Performed by: NURSE PRACTITIONER

## 2023-03-17 PROCEDURE — 11000001 HC ACUTE MED/SURG PRIVATE ROOM

## 2023-03-17 PROCEDURE — 99232 SBSQ HOSP IP/OBS MODERATE 35: CPT | Mod: ,,, | Performed by: SURGERY

## 2023-03-17 PROCEDURE — 36415 COLL VENOUS BLD VENIPUNCTURE: CPT | Performed by: HOSPITALIST

## 2023-03-17 RX ORDER — AMLODIPINE BESYLATE 5 MG/1
10 TABLET ORAL DAILY
Status: DISCONTINUED | OUTPATIENT
Start: 2023-03-18 | End: 2023-03-18 | Stop reason: HOSPADM

## 2023-03-17 RX ORDER — POTASSIUM CHLORIDE 7.45 MG/ML
10 INJECTION INTRAVENOUS
Status: COMPLETED | OUTPATIENT
Start: 2023-03-17 | End: 2023-03-17

## 2023-03-17 RX ORDER — POTASSIUM CHLORIDE 750 MG/1
30 TABLET, EXTENDED RELEASE ORAL ONCE
Status: DISCONTINUED | OUTPATIENT
Start: 2023-03-17 | End: 2023-03-17

## 2023-03-17 RX ORDER — AMLODIPINE BESYLATE 5 MG/1
5 TABLET ORAL ONCE
Status: COMPLETED | OUTPATIENT
Start: 2023-03-17 | End: 2023-03-17

## 2023-03-17 RX ADMIN — LACTOBACILLUS ACIDOPHILUS / LACTOBACILLUS BULGARICUS 1 EACH: 100 MILLION CFU STRENGTH GRANULES at 08:03

## 2023-03-17 RX ADMIN — POTASSIUM CHLORIDE 10 MEQ: 7.46 INJECTION, SOLUTION INTRAVENOUS at 03:03

## 2023-03-17 RX ADMIN — POTASSIUM CHLORIDE 10 MEQ: 7.46 INJECTION, SOLUTION INTRAVENOUS at 08:03

## 2023-03-17 RX ADMIN — Medication 6 MG: at 11:03

## 2023-03-17 RX ADMIN — PIPERACILLIN AND TAZOBACTAM 4.5 G: 4; .5 INJECTION, POWDER, LYOPHILIZED, FOR SOLUTION INTRAVENOUS; PARENTERAL at 11:03

## 2023-03-17 RX ADMIN — LACTOBACILLUS ACIDOPHILUS / LACTOBACILLUS BULGARICUS 1 EACH: 100 MILLION CFU STRENGTH GRANULES at 09:03

## 2023-03-17 RX ADMIN — PANTOPRAZOLE SODIUM 40 MG: 40 INJECTION, POWDER, FOR SOLUTION INTRAVENOUS at 09:03

## 2023-03-17 RX ADMIN — SODIUM CHLORIDE: 4.5 INJECTION, SOLUTION INTRAVENOUS at 08:03

## 2023-03-17 RX ADMIN — POTASSIUM CHLORIDE 10 MEQ: 7.46 INJECTION, SOLUTION INTRAVENOUS at 07:03

## 2023-03-17 RX ADMIN — AMLODIPINE BESYLATE 5 MG: 5 TABLET ORAL at 09:03

## 2023-03-17 RX ADMIN — POTASSIUM CHLORIDE 10 MEQ: 7.46 INJECTION, SOLUTION INTRAVENOUS at 09:03

## 2023-03-17 RX ADMIN — PANTOPRAZOLE SODIUM 40 MG: 40 INJECTION, POWDER, FOR SOLUTION INTRAVENOUS at 08:03

## 2023-03-17 RX ADMIN — POTASSIUM CHLORIDE 10 MEQ: 7.46 INJECTION, SOLUTION INTRAVENOUS at 01:03

## 2023-03-17 RX ADMIN — PIPERACILLIN AND TAZOBACTAM 4.5 G: 4; .5 INJECTION, POWDER, LYOPHILIZED, FOR SOLUTION INTRAVENOUS; PARENTERAL at 02:03

## 2023-03-17 RX ADMIN — HEPARIN SODIUM 14 UNITS/KG/HR: 10000 INJECTION, SOLUTION INTRAVENOUS at 02:03

## 2023-03-17 RX ADMIN — POTASSIUM CHLORIDE 10 MEQ: 7.46 INJECTION, SOLUTION INTRAVENOUS at 12:03

## 2023-03-17 RX ADMIN — PIPERACILLIN AND TAZOBACTAM 4.5 G: 4; .5 INJECTION, POWDER, LYOPHILIZED, FOR SOLUTION INTRAVENOUS; PARENTERAL at 08:03

## 2023-03-17 RX ADMIN — AMLODIPINE BESYLATE 5 MG: 5 TABLET ORAL at 11:03

## 2023-03-17 RX ADMIN — POTASSIUM CHLORIDE 10 MEQ: 7.46 INJECTION, SOLUTION INTRAVENOUS at 11:03

## 2023-03-17 RX ADMIN — FLUTICASONE PROPIONATE 50 MCG: 50 SPRAY, METERED NASAL at 09:03

## 2023-03-17 NOTE — ASSESSMENT & PLAN NOTE
-Now with loose stool  -Unclear if SBO resolved or if post-obstructive  -No recent antibiotics to suggest C.diff.  -C.diff negative.  No fecal WBC.  -Continue probiotic.  -Avoid imodium for now given SBO.

## 2023-03-17 NOTE — SUBJECTIVE & OBJECTIVE
Interval History: No acute events overnight.  Clinically looks much better today.  Passing flatus and having loose stool with much improved pain but ongoing abdominal distention.  No fevers, cp or sob.  Wife at bedside.  All questions answered and patient had no further complaints.    Objective:     Vital Signs (Most Recent):  Temp: 98.9 °F (37.2 °C) (03/17/23 0709)  Pulse: 68 (03/17/23 0709)  Resp: 18 (03/17/23 0709)  BP: (!) 168/80 (03/17/23 0709)  SpO2: (!) 93 % (03/17/23 0815)   Vital Signs (24h Range):  Temp:  [97.8 °F (36.6 °C)-99 °F (37.2 °C)] 98.9 °F (37.2 °C)  Pulse:  [68-78] 68  Resp:  [16-20] 18  SpO2:  [93 %-100 %] 93 %  BP: (150-193)/(69-87) 168/80     Weight: 83.9 kg (184 lb 15.5 oz)  Body mass index is 25.8 kg/m².    Intake/Output Summary (Last 24 hours) at 3/17/2023 1109  Last data filed at 3/17/2023 0623  Gross per 24 hour   Intake 0 ml   Output 300 ml   Net -300 ml        Physical Exam  Vitals and nursing note reviewed.   Constitutional:       General: He is not in acute distress.     Appearance: He is well-developed. He is not ill-appearing, toxic-appearing or diaphoretic.   HENT:      Head: Normocephalic and atraumatic.      Right Ear: External ear normal.      Left Ear: External ear normal.      Nose: Nose normal.      Comments: Ngtube in place     Mouth/Throat:      Mouth: Mucous membranes are moist.   Eyes:      Extraocular Movements: Extraocular movements intact.      Conjunctiva/sclera: Conjunctivae normal.   Cardiovascular:      Rate and Rhythm: Normal rate and regular rhythm.      Heart sounds: Normal heart sounds.   Pulmonary:      Effort: Pulmonary effort is normal. No respiratory distress.      Breath sounds: Normal breath sounds.   Abdominal:      Comments: -Abdomen still mildly distended  - no TTP - bowel sounds are present.   Musculoskeletal:         General: Normal range of motion.      Cervical back: Normal range of motion. No rigidity.      Comments: Trace edema in left lower leg  which is chronic from prior vein stripping procedure.   Skin:     General: Skin is warm.   Neurological:      Mental Status: He is alert and oriented to person, place, and time.      Cranial Nerves: No cranial nerve deficit.      Coordination: Coordination normal.       Significant Labs: All pertinent labs within the past 24 hours have been reviewed.    Significant Imaging: I have reviewed all pertinent imaging results/findings within the past 24 hours.

## 2023-03-17 NOTE — ASSESSMENT & PLAN NOTE
-Appears rather weak right now, but for his age is overall quite robust and fit.    -Consult PT/OT today.

## 2023-03-17 NOTE — PLAN OF CARE
Problem: Physical Therapy  Goal: Physical Therapy Goal  Description: Goals to be met by: 3/21/23     Patient will increase functional independence with mobility by performin. Pt to be mod I with bed mobility.  2. Pt to transfer with supervision/(I).  3. Pt to ambulate 200' /s AD (S).    Outcome: Ongoing, Progressing   Initial eval completed, see in chart for details.

## 2023-03-17 NOTE — ASSESSMENT & PLAN NOTE
-Admitted to inpatient status  -CT Abdomen showed distended proximal small bowel (4.3cm) with abrupt transition in RLQ and no free air in abdomen to suggest perforation  -On admit he has a leukocytosis but is afebrile without tachycardia or hypotension and with normal lactic acid.  -Blood cultures collected on admit  -His abdomen is severely tender with guarding but no rebound.  -Etiology unclear to me at this time - no prior surgeries and no evidence of mass lesions in abdomen.  -General surgery consulted and input appreciated.  -Small bowel follow through showed mildly dilated upper abdominal small bowel loops and some air within the transverse colon.  Administration of contrast through the NG tube reveals prominent gastroesophageal reflux extending at least to the level of the aortic arch.  8 hour film demonstrates some dilated small bowel loops in the central abdomen but the contrast has reached the rectum  -Today he looks better, remains afebrile, is passing flatus and having some liquid stool, but abdomen is still notably distended and SBFT still showed some dilated loops of small bowel.  -Clearly improving, but not resolved.  Discussed three options for treatment with patient and surgery team. Continue NPO and NG tube suction, Remove NG tube and trial clears, and clamp tube and trial clears.  Patient most anxious about possibly having to have the tube replaced if fails PO challenge so will clamp tube and trial clears today.  Monitor closely for possible reflux.  If tolerates well will remove NG tube in AM.  If any increased nausea, vomiting or pain will abort PO challenge and resume NG tube to low intermittent wall suction.  -Continue with conservative treatment, serial abdominal exams, IV fluids, Iv antiemetics.  -Continue toradol PRN - avoiding opioids in efforts at minimizing delirium.  -Continue zosyn for now but if cultures remain negative and no further fevers will stop tomorrow.

## 2023-03-17 NOTE — ASSESSMENT & PLAN NOTE
-BP remains significantly elevated  -At home takes norvasc and chlorthalidone  -Increase home norvasc to 10 mg daily  -Pain is well controlled  -Continue PRN hydralazine for sbp > 180

## 2023-03-17 NOTE — SUBJECTIVE & OBJECTIVE
Interval History:   No acute events overnight.   Passed GGC. Having BM and passing flatus.   Denies abdominal pain.     Medications:  Continuous Infusions:   sodium chloride 0.45% 100 mL/hr at 03/16/23 2035    heparin (porcine) in D5W 14 Units/kg/hr (03/16/23 1614)     Scheduled Meds:   amLODIPine  5 mg Per NG tube Daily    fluticasone propionate  1 spray Each Nostril Daily    lactobacillus acidophilus & bulgar  1 packet Per NG tube BID    pantoprazole  40 mg Intravenous BID    piperacillin-tazobactam (ZOSYN) IVPB  4.5 g Intravenous Q8H    potassium chloride  10 mEq Intravenous Q1H    potassium chloride  30 mEq Oral Once     PRN Meds:acetaminophen, aluminum-magnesium hydroxide-simethicone, dextrose 10%, dextrose 10%, dextrose, dextrose, glucagon (human recombinant), heparin (PORCINE), heparin (PORCINE), hydrALAZINE, ketorolac, melatonin, naloxone, ondansetron, sodium chloride 0.9%     Review of patient's allergies indicates:  No Known Allergies  Objective:     Vital Signs (Most Recent):  Temp: 98.9 °F (37.2 °C) (03/17/23 0709)  Pulse: 68 (03/17/23 0709)  Resp: 18 (03/17/23 0709)  BP: (!) 168/80 (03/17/23 0709)  SpO2: (!) 93 % (03/17/23 0815)   Vital Signs (24h Range):  Temp:  [97.8 °F (36.6 °C)-99 °F (37.2 °C)] 98.9 °F (37.2 °C)  Pulse:  [68-78] 68  Resp:  [16-20] 18  SpO2:  [93 %-100 %] 93 %  BP: (150-193)/(69-87) 168/80     Weight: 83.9 kg (184 lb 15.5 oz)  Body mass index is 25.8 kg/m².    Intake/Output - Last 3 Shifts         03/15 0700  03/16 0659 03/16 0700 03/17 0659 03/17 0700 03/18 0659    P.O. 0 0     Total Intake(mL/kg) 0 (0) 0 (0)     Urine (mL/kg/hr) 200      Drains 0 420     Other  0     Stool 0 0     Total Output 200 420     Net -200 -420            Urine Occurrence 2 x 2 x     Stool Occurrence 0 x 8 x 1 x            Physical Exam  Constitutional:       General: He is not in acute distress.  HENT:      Head: Normocephalic and atraumatic.   Eyes:      Extraocular Movements: Extraocular movements  intact.      Conjunctiva/sclera: Conjunctivae normal.      Pupils: Pupils are equal, round, and reactive to light.   Cardiovascular:      Rate and Rhythm: Normal rate and regular rhythm.   Pulmonary:      Effort: Pulmonary effort is normal. No respiratory distress.   Abdominal:      General: There is no distension.      Palpations: Abdomen is soft.      Tenderness: There is no abdominal tenderness.   Neurological:      General: No focal deficit present.      Mental Status: He is alert.       Significant Labs:  I have reviewed all pertinent lab results within the past 24 hours.  CBC:   Recent Labs   Lab 03/17/23  0546   WBC 10.42  10.42   RBC 4.32*  4.32*   HGB 10.9*  10.9*   HCT 33.4*  33.4*     182   MCV 77*  77*   MCH 25.2*  25.2*   MCHC 32.6  32.6       CMP:   Recent Labs   Lab 03/15/23  1223 03/16/23  0336 03/17/23  0546   *   < > 96   CALCIUM 10.1   < > 8.8   ALBUMIN 3.9  --   --    PROT 8.0  --   --    *   < > 137   K 3.4*   < > 2.6*   CO2 25   < > 20*   CL 98   < > 103   BUN 29*   < > 36*   CREATININE 1.6*   < > 1.6*   ALKPHOS 67  --   --    ALT 22  --   --    AST 32  --   --    BILITOT 0.7  --   --     < > = values in this interval not displayed.         Significant Diagnostics:  I have reviewed all pertinent imaging results/findings within the past 24 hours.

## 2023-03-17 NOTE — ASSESSMENT & PLAN NOTE
-Hb 11.9 on admit and now 10.9  -No evidence of bleeding  -Folate and B12 are replete  -Ferritin is low and TIBC only 11.9% consistent with iron deficiency  -Will need FeSO4 once bowel function returns but avoid now due to SBO  -Repeat cbc in AM.

## 2023-03-17 NOTE — NURSING
Patient's ptt results 49.0. ptt in therapeutic range. No change needed to heparin infusion. Heparin infusing at 14 units/kg/hr.

## 2023-03-17 NOTE — PROGRESS NOTES
"[facility]  Progress Note    Patient Name: Benitez Cabrales  MRN: [id number]  Patient Class: IP- Inpatient   Admission Date: 3/15/2023  Length of Stay: 2 days  Attending Physician: Jovi Clark MD  Primary Care Provider: Diomedes Ayoub MD        Subjective:     Principal Problem:Small bowel obstruction        HPI:  Mr. Cabrales is an 82 year old man with atrial fibrillation, hypertension, hyperlipidemia and GERD who presented for evaluation of abdominal pain.  He states he was in his usual state of health up until yesterday afternoon when he developed abdominal pain.  The pain was initially a mild discomfort but overnight has progressed to severe, 10/10 pain.  It is located in his mid/upper and right upper abdomen, does not radiate and is worse with movement and deep breaths.  The pain has been mildly relieved by iv dilaudid in the ER and now the pain is coming in paroxysms of continued severe intensity.  He notes overnight feeling nausea and light headedness.   He states he felt the need to vomit or have a bowel movement, but could do neither despite attempts to induce vomiting.  He reports his last bowel movement was yesterday morning.  He is not passing any flatus.  He denies fever, chills, diarrhea, abdominal trauma, falls, chest pain, shortness of breath, new foods and new medications.  He has never had abdominal surgery before and reports his last colonoscopy was "a long time ago".  In the ER he was treated with zofran, pepcid, dilaudid and IV fluids.      Overview/Hospital Course:  No notes on file    Interval History: No acute events overnight.  Clinically looks much better today.  Passing flatus and having loose stool with much improved pain but ongoing abdominal distention.  No fevers, cp or sob.  Wife at bedside.  All questions answered and patient had no further complaints.    Objective:     Vital Signs (Most Recent):  Temp: 98.9 °F (37.2 °C) (03/17/23 0709)  Pulse: 68 " (03/17/23 0709)  Resp: 18 (03/17/23 0709)  BP: (!) 168/80 (03/17/23 0709)  SpO2: (!) 93 % (03/17/23 0815)   Vital Signs (24h Range):  Temp:  [97.8 °F (36.6 °C)-99 °F (37.2 °C)] 98.9 °F (37.2 °C)  Pulse:  [68-78] 68  Resp:  [16-20] 18  SpO2:  [93 %-100 %] 93 %  BP: (150-193)/(69-87) 168/80     Weight: 83.9 kg (184 lb 15.5 oz)  Body mass index is 25.8 kg/m².    Intake/Output Summary (Last 24 hours) at 3/17/2023 1109  Last data filed at 3/17/2023 0623  Gross per 24 hour   Intake 0 ml   Output 300 ml   Net -300 ml        Physical Exam  Vitals and nursing note reviewed.   Constitutional:       General: He is not in acute distress.     Appearance: He is well-developed. He is not ill-appearing, toxic-appearing or diaphoretic.   HENT:      Head: Normocephalic and atraumatic.      Right Ear: External ear normal.      Left Ear: External ear normal.      Nose: Nose normal.      Comments: Ngtube in place     Mouth/Throat:      Mouth: Mucous membranes are moist.   Eyes:      Extraocular Movements: Extraocular movements intact.      Conjunctiva/sclera: Conjunctivae normal.   Cardiovascular:      Rate and Rhythm: Normal rate and regular rhythm.      Heart sounds: Normal heart sounds.   Pulmonary:      Effort: Pulmonary effort is normal. No respiratory distress.      Breath sounds: Normal breath sounds.   Abdominal:      Comments: -Abdomen still mildly distended  - no TTP - bowel sounds are present.   Musculoskeletal:         General: Normal range of motion.      Cervical back: Normal range of motion. No rigidity.      Comments: Trace edema in left lower leg which is chronic from prior vein stripping procedure.   Skin:     General: Skin is warm.   Neurological:      Mental Status: He is alert and oriented to person, place, and time.      Cranial Nerves: No cranial nerve deficit.      Coordination: Coordination normal.       Significant Labs: All pertinent labs within the past 24 hours have been reviewed.    Significant Imaging: I  have reviewed all pertinent imaging results/findings within the past 24 hours.        Assessment/Plan:      * Small bowel obstruction  -Admitted to inpatient status  -CT Abdomen showed distended proximal small bowel (4.3cm) with abrupt transition in RLQ and no free air in abdomen to suggest perforation  -On admit he has a leukocytosis but is afebrile without tachycardia or hypotension and with normal lactic acid.  -Blood cultures collected on admit  -His abdomen is severely tender with guarding but no rebound.  -Etiology unclear to me at this time - no prior surgeries and no evidence of mass lesions in abdomen.  -General surgery consulted and input appreciated.  -Small bowel follow through showed mildly dilated upper abdominal small bowel loops and some air within the transverse colon.  Administration of contrast through the NG tube reveals prominent gastroesophageal reflux extending at least to the level of the aortic arch.  8 hour film demonstrates some dilated small bowel loops in the central abdomen but the contrast has reached the rectum  -Today he looks better, remains afebrile, is passing flatus and having some liquid stool, but abdomen is still notably distended and SBFT still showed some dilated loops of small bowel.  -Clearly improving, but not resolved.  Discussed three options for treatment with patient and surgery team. Continue NPO and NG tube suction, Remove NG tube and trial clears, and clamp tube and trial clears.  Patient most anxious about possibly having to have the tube replaced if fails PO challenge so will clamp tube and trial clears today.  Monitor closely for possible reflux.  If tolerates well will remove NG tube in AM.  If any increased nausea, vomiting or pain will abort PO challenge and resume NG tube to low intermittent wall suction.  -Continue with conservative treatment, serial abdominal exams, IV fluids, Iv antiemetics.  -Continue toradol PRN - avoiding opioids in efforts at  minimizing delirium.  -Continue zosyn for now but if cultures remain negative and no further fevers will stop tomorrow.    Diarrhea  -Now with loose stool  -Unclear if SBO resolved or if post-obstructive  -No recent antibiotics to suggest C.diff.  -C.diff negative.  No fecal WBC.  -Continue probiotic.  -Avoid imodium for now given SBO.    Iron deficiency anemia  -Hb 11.9 on admit and now 10.9  -No evidence of bleeding  -Folate and B12 are replete  -Ferritin is low and TIBC only 11.9% consistent with iron deficiency  -Will need FeSO4 once bowel function returns but avoid now due to SBO  -Repeat cbc in AM.    Acute renal failure superimposed on stage 3 chronic kidney disease  -Baseline Cr 1.4  -On admit Cr 1.6 and remains stable today  -Appears mildly hypovolemic  -Avoid nephrotoxic agents and renally dose meds  -Continue with gentle IV fluids for now  -Repeat BMP in AM    Hypokalemia  -Replace potassium today.  -Check BMP and Mag in AM.    Paroxysmal atrial fibrillation  -On admit EKG shows sinus bradycardia  -At home takes amiodarone 200mg qd and eliquis bid.  Last mazariegos of eliquis was 3/14 in afternoon  -Will hold amiodarone for now as half life is 45 days.  If needed could give non-titrating gtt 0.5mg/hour (per pharmacy)  -Holding eliquis in case he requires surgery.  Continue with heparin drip for now.  -Monitor on telemetry.    Current use of long term anticoagulation  -Treatment as above.    Delirium  -Noted significant delirium overnight 3/15-3/16  -Suspect multifactorial due to hospitalization and opioid pain meds  -Continue delirium precautions  -Stopped opioids 3/16     ACP (advance care planning)  Advance Care Planning  Date: 03/15/2023  Engaged patient and his wife in goals of care discussion surrounding his values and preferences for care in the event of cardiac arrest.  Explored pros and cons of ACLS treatment.  Both patient and wife are clear that he prefers for full aggressive care with full code  status.  I spent 10 minutes in ACP on 3/15.    Debility  -Appears rather weak right now, but for his age is overall quite robust and fit.    -Consult PT/OT today.    Dyslipidemia  -Hold home statin for now.    GERD (gastroesophageal reflux disease)  -Notes frequent GERD symptoms and takes tums prn at home  -Will provide bid ppi for now.    Hypertension  -BP remains significantly elevated  -At home takes norvasc and chlorthalidone  -Increase home norvasc to 10 mg daily  -Pain is well controlled  -Continue PRN hydralazine for sbp > 180      VTE Risk Mitigation (From admission, onward)         Ordered     heparin 25,000 units in dextrose 5% (100 units/ml) IV bolus from bag - ADDITIONAL PRN BOLUS - 60 units/kg  As needed (PRN)        Question:  Heparin Infusion Adjustment (DO NOT MODIFY ANSWER)  Answer:  \\[email or web address]\Images\Pharmacy\HeparinInfusions\heparin LOW INTENSITY nomogram for [facility] [id number]    03/15/23 1444     heparin 25,000 units in dextrose 5% (100 units/ml) IV bolus from bag - ADDITIONAL PRN BOLUS - 30 units/kg  As needed (PRN)        Question:  Heparin Infusion Adjustment (DO NOT MODIFY ANSWER)  Answer:  [email or web address]\[email or web address]\Images\Pharmacy\HeparinInfusions\heparin LOW INTENSITY nomogram for [facility] [id number].pdf    03/15/23 1444     heparin 25,000 units in dextrose 5% 250 mL (100 units/mL) infusion LOW INTENSITY nomogram - [facility]  Continuous        Question Answer Comment   Heparin Infusion Adjustment (DO NOT MODIFY ANSWER) \\[email or web address]\[email or web address]\Images\Pharmacy\HeparinInfusions\heparin LOW INTENSITY nomogram for [facility] [id number].pdf    Begin at (in units/kg/hr) 12        03/15/23 1444     IP VTE HIGH RISK PATIENT  Once         03/15/23 1444     Place sequential compression device  Until discontinued         03/15/23 1444                Discharge Planning   CARMENCITA: 3/19/2023     Code Status: Full Code   Is the patient medically ready for discharge?:     Reason for patient still in hospital (select all that apply): Treatment  and PT / OT recommendations  Discharge Plan A: Home with family                  Jovi Clark MD  Department of Hospital Medicine   [facility]

## 2023-03-17 NOTE — CARE UPDATE
03/17/23 0815   PRE-TX-O2   Device (Oxygen Therapy) room air   SpO2 (!) 93 %     Pt titrated to room air o2 sat keep greater than 90%

## 2023-03-17 NOTE — PT/OT/SLP EVAL
Physical Therapy Evaluation    Patient Name:  Benitez Cabrales   MRN:  [id number]    Recommendations:     Discharge Recommendations: home   Discharge Equipment Recommendations: none     Assessment:     Benitez Cabrales is a 82 y.o. male admitted with a medical diagnosis of Small bowel obstruction.  He presents with the following impairments/functional limitations: impaired functional mobility .    Rehab Prognosis: Good; patient would benefit from acute skilled PT services to address these deficits and reach maximum level of function.    Recent Surgery: * No surgery found *      Plan:     During this hospitalization, patient to be seen 3 x/week (Sat, 3/18/23) to address the identified rehab impairments via gait training, therapeutic activities and progress toward the following goals:    Plan of Care Expires:  03/21/23    Subjective     Chief Complaint: Loose stool  Patient/Family Comments/goals: Pt agreeable to PT evaluation  Pain/Comfort:  Pain Rating Post-Intervention 1: 0/10    Patients cultural, spiritual, Temple conflicts given the current situation: no    Living Environment:  PTA pt lived with his wife in a 1 story house with no steps to enter.  Prior to admission, patients level of function was independent.  Equipment used at home: none.  DME owned (not currently used): none.  Upon discharge, patient will have assistance from wife.    Objective:     Communicated with nurseMarjorie prior to session.  Patient found supine with NG tube, peripheral IV  upon PT entry to room.    General Precautions: Standard,    Orthopedic Precautions:N/A   Braces: N/A  Respiratory Status: Room air    Exams:  Cognitive Exam:  Patient is oriented to Person, Place, and Situation  Sensation:    -       Intact  light/touch BLEs  RLE ROM: WFL  RLE Strength: WFL  LLE ROM: WFL  LLE Strength: WFL    Functional Mobility:  Bed Mobility:     Supine to Sit: stand by assistance  Transfers:     Sit to Stand:  contact guard assistance with rolling  walker  Gait: 150' w/RW CGA  Balance: Fair+ static/dynamic standing      AM-PAC 6 CLICK MOBILITY  Total Score:20       Treatment & Education:  Educated on role of PT and POC, ambulated ~150' w/RW CGA.  Pt requested to walk to BR, ambulated to toilet and assisted to sit on commode.    Patient left  on toilet  with all lines intact, call button in reach, and nurse notified.    GOALS:   Multidisciplinary Problems       Physical Therapy Goals          Problem: Physical Therapy    Goal Priority Disciplines Outcome Goal Variances Interventions   Physical Therapy Goal     PT, PT/OT Ongoing, Progressing     Description: Goals to be met by: 3/21/23     Patient will increase functional independence with mobility by performin. Pt to be mod I with bed mobility.  2. Pt to transfer with supervision/(I).  3. Pt to ambulate 200' /s AD (S).                         History:     Past Medical History:   Diagnosis Date    Anticoagulant long-term use     Atrial fibrillation     GERD (gastroesophageal reflux disease)     Hyperlipidemia     Hypertension     Nuclear sclerosis, bilateral 10/09/2017    Rhinitis medicamentosa 2014    Small bowel obstruction     Vertigo 2013       Past Surgical History:   Procedure Laterality Date    ICM implant      NOSE SURGERY      Septal Repair    REMOVAL OF IMPLANTABLE LOOP RECORDER N/A 12/10/2018    Procedure: REMOVAL, IMPLANTABLE LOOP RECORDER;  Surgeon: Mickey Morales MD;  [facility];  Service: Cardiology;  Laterality: N/A;    VASCULAR SURGERY      left leg       Time Tracking:     PT Received On: 23  PT Start Time: 1355     PT Stop Time: 1413  PT Total Time (min): 18 min     Billable Minutes: Evaluation 18      2023

## 2023-03-17 NOTE — ASSESSMENT & PLAN NOTE
82 y.o. male with history of HT, HLD, Afib on eliquis, GERD and COPD with exam and imaging findings concerning for SBO. Patient without any findings concerning for acute abdomen or perforation.     - Passed GGC 3/16. Okay to remove NGT and start CLD then advance diet as tolerated.   - Rest of care per primary  - Please call General Surgery with any questions or concerns

## 2023-03-17 NOTE — ASSESSMENT & PLAN NOTE
-Noted significant delirium overnight 3/15-3/16  -Suspect multifactorial due to hospitalization and opioid pain meds  -Continue delirium precautions  -Stopped opioids 3/16    37.1

## 2023-03-17 NOTE — PROGRESS NOTES
[facility] S[facility]  General Surgery  Progress Note    Subjective:     History of Present Illness:  Patient is a 82 y.o. male with history of HT, HLD, Afib on eliquis, GERD and COPD who General Surgery was consulted for severe acute epigastric pain associated with nausea that started this morning at 5 am. The pain is worsened with inspiration or movement. Pt notes that his last bowel movement was last night at 11 pm. Denies any pain or changes in BM habits prior to this morning. He occasionally takes Miralax for constipation. Pt had a similar episode of abdominal pain in 2016 that spontaneously resolved after vomiting.  Pt denies any fever, SOB, CP, diaphoresis, constipation, melena, hematochezia, urinary difficulties. Labs on admission with WBC 13.22 and Cr 1.6. CT showing dilated small bowel with an abrupt transition point in the RLQ. Denies any history of abdominal surgery.       Post-Op Info:  * No surgery found *         Interval History:   No acute events overnight.   Passed GGC. Having BM and passing flatus.   Denies abdominal pain.     Medications:  Continuous Infusions:   sodium chloride 0.45% 100 mL/hr at 03/16/23 2035    heparin (porcine) in D5W 14 Units/kg/hr (03/16/23 1614)     Scheduled Meds:   amLODIPine  5 mg Per NG tube Daily    fluticasone propionate  1 spray Each Nostril Daily    lactobacillus acidophilus & bulgar  1 packet Per NG tube BID    pantoprazole  40 mg Intravenous BID    piperacillin-tazobactam (ZOSYN) IVPB  4.5 g Intravenous Q8H    potassium chloride  10 mEq Intravenous Q1H    potassium chloride  30 mEq Oral Once     PRN Meds:acetaminophen, aluminum-magnesium hydroxide-simethicone, dextrose 10%, dextrose 10%, dextrose, dextrose, glucagon (human recombinant), heparin (PORCINE), heparin (PORCINE), hydrALAZINE, ketorolac, melatonin, naloxone, ondansetron, sodium chloride 0.9%     Review of patient's allergies indicates:  No Known Allergies  Objective:     Vital Signs (Most  Recent):  Temp: 98.9 °F (37.2 °C) (03/17/23 0709)  Pulse: 68 (03/17/23 0709)  Resp: 18 (03/17/23 0709)  BP: (!) 168/80 (03/17/23 0709)  SpO2: (!) 93 % (03/17/23 0815)   Vital Signs (24h Range):  Temp:  [97.8 °F (36.6 °C)-99 °F (37.2 °C)] 98.9 °F (37.2 °C)  Pulse:  [68-78] 68  Resp:  [16-20] 18  SpO2:  [93 %-100 %] 93 %  BP: (150-193)/(69-87) 168/80     Weight: 83.9 kg (184 lb 15.5 oz)  Body mass index is 25.8 kg/m².    Intake/Output - Last 3 Shifts         03/15 0700 03/16 0659 03/16 0700 03/17 0659 03/17 0700 03/18 0659    P.O. 0 0     Total Intake(mL/kg) 0 (0) 0 (0)     Urine (mL/kg/hr) 200      Drains 0 420     Other  0     Stool 0 0     Total Output 200 420     Net -200 -420            Urine Occurrence 2 x 2 x     Stool Occurrence 0 x 8 x 1 x            Physical Exam  Constitutional:       General: He is not in acute distress.  HENT:      Head: Normocephalic and atraumatic.   Eyes:      Extraocular Movements: Extraocular movements intact.      Conjunctiva/sclera: Conjunctivae normal.      Pupils: Pupils are equal, round, and reactive to light.   Cardiovascular:      Rate and Rhythm: Normal rate and regular rhythm.   Pulmonary:      Effort: Pulmonary effort is normal. No respiratory distress.   Abdominal:      General: There is no distension.      Palpations: Abdomen is soft.      Tenderness: There is no abdominal tenderness.   Neurological:      General: No focal deficit present.      Mental Status: He is alert.       Significant Labs:  I have reviewed all pertinent lab results within the past 24 hours.  CBC:   Recent Labs   Lab 03/17/23  0546   WBC 10.42  10.42   RBC 4.32*  4.32*   HGB 10.9*  10.9*   HCT 33.4*  33.4*     182   MCV 77*  77*   MCH 25.2*  25.2*   MCHC 32.6  32.6       CMP:   Recent Labs   Lab 03/15/23  1223 03/16/23  0336 03/17/23  0546   *   < > 96   CALCIUM 10.1   < > 8.8   ALBUMIN 3.9  --   --    PROT 8.0  --   --    *   < > 137   K 3.4*   < > 2.6*   CO2 25   < >  20*   CL 98   < > 103   BUN 29*   < > 36*   CREATININE 1.6*   < > 1.6*   ALKPHOS 67  --   --    ALT 22  --   --    AST 32  --   --    BILITOT 0.7  --   --     < > = values in this interval not displayed.         Significant Diagnostics:  I have reviewed all pertinent imaging results/findings within the past 24 hours.    Assessment/Plan:     * Small bowel obstruction  82 y.o. male with history of HT, HLD, Afib on eliquis, GERD and COPD with exam and imaging findings concerning for SBO. Patient without any findings concerning for acute abdomen or perforation.     - Passed GGC 3/16. Okay to remove NGT and start CLD then advance diet as tolerated.   - Rest of care per primary  - Please call General Surgery with any questions or concerns        Olivia Marsh MD  General Surgery  [facility] [place] - Med Surg

## 2023-03-18 VITALS
TEMPERATURE: 97 F | BODY MASS INDEX: 25.89 KG/M2 | RESPIRATION RATE: 19 BRPM | OXYGEN SATURATION: 97 % | HEART RATE: 68 BPM | HEIGHT: 71 IN | SYSTOLIC BLOOD PRESSURE: 112 MMHG | WEIGHT: 184.94 LBS | DIASTOLIC BLOOD PRESSURE: 56 MMHG

## 2023-03-18 LAB
ANION GAP SERPL CALC-SCNC: 11 MMOL/L (ref 8–16)
APTT BLDCRRT: 43 SEC (ref 21–32)
BASOPHILS # BLD AUTO: 0.03 K/UL (ref 0–0.2)
BASOPHILS # BLD AUTO: 0.03 K/UL (ref 0–0.2)
BASOPHILS NFR BLD: 0.4 % (ref 0–1.9)
BASOPHILS NFR BLD: 0.4 % (ref 0–1.9)
BUN SERPL-MCNC: 29 MG/DL (ref 8–23)
CALCIUM SERPL-MCNC: 8.7 MG/DL (ref 8.7–10.5)
CHLORIDE SERPL-SCNC: 106 MMOL/L (ref 95–110)
CO2 SERPL-SCNC: 20 MMOL/L (ref 23–29)
CREAT SERPL-MCNC: 1.7 MG/DL (ref 0.5–1.4)
DIFFERENTIAL METHOD: ABNORMAL
DIFFERENTIAL METHOD: ABNORMAL
EOSINOPHIL # BLD AUTO: 0.1 K/UL (ref 0–0.5)
EOSINOPHIL # BLD AUTO: 0.1 K/UL (ref 0–0.5)
EOSINOPHIL NFR BLD: 1.2 % (ref 0–8)
EOSINOPHIL NFR BLD: 1.2 % (ref 0–8)
ERYTHROCYTE [DISTWIDTH] IN BLOOD BY AUTOMATED COUNT: 16.9 % (ref 11.5–14.5)
ERYTHROCYTE [DISTWIDTH] IN BLOOD BY AUTOMATED COUNT: 16.9 % (ref 11.5–14.5)
EST. GFR  (NO RACE VARIABLE): 40 ML/MIN/1.73 M^2
GLUCOSE SERPL-MCNC: 105 MG/DL (ref 70–110)
HCT VFR BLD AUTO: 31.6 % (ref 40–54)
HCT VFR BLD AUTO: 31.6 % (ref 40–54)
HGB BLD-MCNC: 10.6 G/DL (ref 14–18)
HGB BLD-MCNC: 10.6 G/DL (ref 14–18)
IMM GRANULOCYTES # BLD AUTO: 0.1 K/UL (ref 0–0.04)
IMM GRANULOCYTES # BLD AUTO: 0.1 K/UL (ref 0–0.04)
IMM GRANULOCYTES NFR BLD AUTO: 1.2 % (ref 0–0.5)
IMM GRANULOCYTES NFR BLD AUTO: 1.2 % (ref 0–0.5)
LYMPHOCYTES # BLD AUTO: 1.2 K/UL (ref 1–4.8)
LYMPHOCYTES # BLD AUTO: 1.2 K/UL (ref 1–4.8)
LYMPHOCYTES NFR BLD: 14.8 % (ref 18–48)
LYMPHOCYTES NFR BLD: 14.8 % (ref 18–48)
MAGNESIUM SERPL-MCNC: 2.4 MG/DL (ref 1.6–2.6)
MCH RBC QN AUTO: 26 PG (ref 27–31)
MCH RBC QN AUTO: 26 PG (ref 27–31)
MCHC RBC AUTO-ENTMCNC: 33.5 G/DL (ref 32–36)
MCHC RBC AUTO-ENTMCNC: 33.5 G/DL (ref 32–36)
MCV RBC AUTO: 78 FL (ref 82–98)
MCV RBC AUTO: 78 FL (ref 82–98)
MONOCYTES # BLD AUTO: 0.7 K/UL (ref 0.3–1)
MONOCYTES # BLD AUTO: 0.7 K/UL (ref 0.3–1)
MONOCYTES NFR BLD: 8 % (ref 4–15)
MONOCYTES NFR BLD: 8 % (ref 4–15)
NEUTROPHILS # BLD AUTO: 6.2 K/UL (ref 1.8–7.7)
NEUTROPHILS # BLD AUTO: 6.2 K/UL (ref 1.8–7.7)
NEUTROPHILS NFR BLD: 74.4 % (ref 38–73)
NEUTROPHILS NFR BLD: 74.4 % (ref 38–73)
NRBC BLD-RTO: 0 /100 WBC
NRBC BLD-RTO: 0 /100 WBC
PLATELET # BLD AUTO: 177 K/UL (ref 150–450)
PLATELET # BLD AUTO: 177 K/UL (ref 150–450)
PMV BLD AUTO: 10.1 FL (ref 9.2–12.9)
PMV BLD AUTO: 10.1 FL (ref 9.2–12.9)
POTASSIUM SERPL-SCNC: 2.7 MMOL/L (ref 3.5–5.1)
POTASSIUM SERPL-SCNC: 3.5 MMOL/L (ref 3.5–5.1)
RBC # BLD AUTO: 4.07 M/UL (ref 4.6–6.2)
RBC # BLD AUTO: 4.07 M/UL (ref 4.6–6.2)
SODIUM SERPL-SCNC: 137 MMOL/L (ref 136–145)
WBC # BLD AUTO: 8.3 K/UL (ref 3.9–12.7)
WBC # BLD AUTO: 8.3 K/UL (ref 3.9–12.7)

## 2023-03-18 PROCEDURE — C9113 INJ PANTOPRAZOLE SODIUM, VIA: HCPCS | Performed by: HOSPITALIST

## 2023-03-18 PROCEDURE — 97530 THERAPEUTIC ACTIVITIES: CPT | Mod: CQ

## 2023-03-18 PROCEDURE — 84132 ASSAY OF SERUM POTASSIUM: CPT | Performed by: HOSPITALIST

## 2023-03-18 PROCEDURE — 85025 COMPLETE CBC W/AUTO DIFF WBC: CPT | Performed by: HOSPITALIST

## 2023-03-18 PROCEDURE — 25000003 PHARM REV CODE 250: Performed by: HOSPITALIST

## 2023-03-18 PROCEDURE — 94761 N-INVAS EAR/PLS OXIMETRY MLT: CPT

## 2023-03-18 PROCEDURE — 63600175 PHARM REV CODE 636 W HCPCS: Performed by: HOSPITALIST

## 2023-03-18 PROCEDURE — 99232 PR SUBSEQUENT HOSPITAL CARE,LEVL II: ICD-10-PCS | Mod: ,,, | Performed by: SURGERY

## 2023-03-18 PROCEDURE — 36415 COLL VENOUS BLD VENIPUNCTURE: CPT | Performed by: HOSPITALIST

## 2023-03-18 PROCEDURE — 99232 SBSQ HOSP IP/OBS MODERATE 35: CPT | Mod: ,,, | Performed by: SURGERY

## 2023-03-18 PROCEDURE — 99900035 HC TECH TIME PER 15 MIN (STAT)

## 2023-03-18 PROCEDURE — 80048 BASIC METABOLIC PNL TOTAL CA: CPT | Performed by: HOSPITALIST

## 2023-03-18 PROCEDURE — 85730 THROMBOPLASTIN TIME PARTIAL: CPT | Performed by: HOSPITALIST

## 2023-03-18 PROCEDURE — 83735 ASSAY OF MAGNESIUM: CPT | Performed by: HOSPITALIST

## 2023-03-18 RX ORDER — POTASSIUM CHLORIDE 20 MEQ/1
40 TABLET, EXTENDED RELEASE ORAL ONCE
Status: COMPLETED | OUTPATIENT
Start: 2023-03-18 | End: 2023-03-18

## 2023-03-18 RX ORDER — ONDANSETRON 4 MG/1
4 TABLET, FILM COATED ORAL EVERY 6 HOURS PRN
Qty: 30 TABLET | Refills: 1 | Status: SHIPPED | OUTPATIENT
Start: 2023-03-18

## 2023-03-18 RX ORDER — FERROUS SULFATE 325(65) MG
325 TABLET, DELAYED RELEASE (ENTERIC COATED) ORAL DAILY
Qty: 30 TABLET | Refills: 1 | Status: SHIPPED | OUTPATIENT
Start: 2023-03-18 | End: 2023-06-27 | Stop reason: SDUPTHER

## 2023-03-18 RX ORDER — POTASSIUM CHLORIDE 7.45 MG/ML
10 INJECTION INTRAVENOUS
Status: COMPLETED | OUTPATIENT
Start: 2023-03-18 | End: 2023-03-18

## 2023-03-18 RX ORDER — L. ACIDOPHILUS/L.BULGARICUS 100MM CELL
1 GRANULES IN PACKET (EA) ORAL 2 TIMES DAILY
Qty: 28 PACKET | Refills: 0 | Status: SHIPPED | OUTPATIENT
Start: 2023-03-18 | End: 2023-04-01

## 2023-03-18 RX ADMIN — POTASSIUM CHLORIDE 10 MEQ: 7.46 INJECTION, SOLUTION INTRAVENOUS at 10:03

## 2023-03-18 RX ADMIN — AMLODIPINE BESYLATE 10 MG: 5 TABLET ORAL at 08:03

## 2023-03-18 RX ADMIN — LACTOBACILLUS ACIDOPHILUS / LACTOBACILLUS BULGARICUS 1 EACH: 100 MILLION CFU STRENGTH GRANULES at 08:03

## 2023-03-18 RX ADMIN — PANTOPRAZOLE SODIUM 40 MG: 40 INJECTION, POWDER, FOR SOLUTION INTRAVENOUS at 08:03

## 2023-03-18 RX ADMIN — POTASSIUM CHLORIDE 40 MEQ: 1500 TABLET, EXTENDED RELEASE ORAL at 08:03

## 2023-03-18 RX ADMIN — HEPARIN SODIUM 13.98 UNITS/KG/HR: 10000 INJECTION, SOLUTION INTRAVENOUS at 02:03

## 2023-03-18 RX ADMIN — FLUTICASONE PROPIONATE 50 MCG: 50 SPRAY, METERED NASAL at 09:03

## 2023-03-18 RX ADMIN — SODIUM CHLORIDE: 4.5 INJECTION, SOLUTION INTRAVENOUS at 06:03

## 2023-03-18 RX ADMIN — POTASSIUM CHLORIDE 10 MEQ: 7.46 INJECTION, SOLUTION INTRAVENOUS at 08:03

## 2023-03-18 RX ADMIN — PIPERACILLIN AND TAZOBACTAM 4.5 G: 4; .5 INJECTION, POWDER, LYOPHILIZED, FOR SOLUTION INTRAVENOUS; PARENTERAL at 03:03

## 2023-03-18 NOTE — ASSESSMENT & PLAN NOTE
-BP moderately elevated at times  -At home takes norvasc and chlorthalidone  -Resume home chlorthalidone and norvasc at discharge  -Consider increasing norvasc to 10 mg qd  -Follow up with pcp within 1 week.

## 2023-03-18 NOTE — CONSULTS
[facility] - Med Surg  Discharge Final Note    Patient clear to discharge from case management stand point. Reviewed follow up appointments with pt, verbalized understanding, listed on avs. Pt stated his wife will be taking him home.     Pt's spouse inquired about home health and rw. Referral and plan of care orders faxed to N for set up if qualifies. Per [facility], pt does not qualify for rw, [place] notified pt's spouse, informed rw could be purchased out of pocket, pt's spouse declined rw at this time.     Primary Care Provider: Diomedes Ayoub MD    Expected Discharge Date: 3/18/2023    Final Discharge Note (most recent)       Final Note - 03/18/23 0930          Final Note    Assessment Type Final Discharge Note     Anticipated Discharge Disposition Home or Self Care     What phone number can be called within the next 1-3 days to see how you are doing after discharge? [phone]     Hospital Resources/Appts/Education Provided Provided patient/caregiver with written discharge plan information;Appointments scheduled and added to AVS        Post-Acute Status    Coverage PHN     Discharge Delays None known at this time                     Important Message from Medicare  Important Message from Medicare regarding Discharge Appeal Rights: Given to patient/caregiver, Explained to patient/caregiver, Signed/date by patient/caregiver     Date IMM was signed: 03/18/23  Time IMM was signed: 0905    Contact Info       [facility] [place] - Gastroenterology   Specialty: Gastroenterology    [address] [facility] [address] [place] [address]   Phone: [phone]       Next Steps: Schedule an appointment as soon as possible for a visit in 2 week(s)    Instructions: Clinic to contact patient to schedule follow up appointment.

## 2023-03-18 NOTE — PLAN OF CARE
Problem: Adult Inpatient Plan of Care  Goal: Plan of Care Review  Outcome: Ongoing, Progressing  Goal: Patient-Specific Goal (Individualized)  Outcome: Ongoing, Progressing  Goal: Absence of Hospital-Acquired Illness or Injury  Outcome: Ongoing, Progressing  Goal: Optimal Comfort and Wellbeing  Outcome: Ongoing, Progressing  Goal: Readiness for Transition of Care  Outcome: Ongoing, Progressing     Problem: Fall Injury Risk  Goal: Absence of Fall and Fall-Related Injury  Outcome: Ongoing, Progressing     Problem: Pain Acute  Goal: Acceptable Pain Control and Functional Ability  Outcome: Ongoing, Progressing     Problem: Fluid and Electrolyte Imbalance (Acute Kidney Injury/Impairment)  Goal: Fluid and Electrolyte Balance  Outcome: Ongoing, Progressing     Problem: Oral Intake Inadequate (Acute Kidney Injury/Impairment)  Goal: Optimal Nutrition Intake  Outcome: Ongoing, Progressing     Problem: Renal Function Impairment (Acute Kidney Injury/Impairment)  Goal: Effective Renal Function  Outcome: Ongoing, Progressing     Problem: Infection  Goal: Absence of Infection Signs and Symptoms  Outcome: Ongoing, Progressing

## 2023-03-18 NOTE — PLAN OF CARE
Hospital follow up appointment for pcp and gen surgery scheduled and listed to avs.     Referral placed and in basket message sent to GI to contact patient to scheduled appointment.

## 2023-03-18 NOTE — PT/OT/SLP PROGRESS
"Occupational Therapy      Patient Name:  Benitez Cabrales   MRN:  [id number]    Patient not seen OOB today secondary to Patient fatigue. Per Patient encountered resting in darkened room/PCT present" Oh please. I can't get any sleep. I got up before. Please can I just rest?" PCT states Patient has been OOB x2 w/ nursing assist (CGA<> SBA described, not observed by OT however).  OT in agreement, will follow up as able this date.   Addendum: OT follow up ~1:45. Patient toileting w/o assist in toilet area (vs BSC), door closed for toilet PCT at doorway of Patient's room, reporting Patient engaging OOB well at present. Discharge planned for ~2pm per am report.   3/18/2023  "

## 2023-03-18 NOTE — ASSESSMENT & PLAN NOTE
-Admitted to inpatient status  -CT Abdomen showed distended proximal small bowel (4.3cm) with abrupt transition in RLQ and no free air in abdomen to suggest perforation  -On admit he has a leukocytosis but is afebrile without tachycardia or hypotension and with normal lactic acid.  -Blood cultures collected on admit  -His abdomen is severely tender with guarding but no rebound.  -Etiology unclear to me at this time - no prior surgeries and no evidence of mass lesions in abdomen.  -General surgery consulted and input appreciated.  -Small bowel follow through showed mildly dilated upper abdominal small bowel loops and some air within the transverse colon.  Administration of contrast through the NG tube reveals prominent gastroesophageal reflux extending at least to the level of the aortic arch.  8 hour film demonstrates some dilated small bowel loops in the central abdomen but the contrast has reached the rectum  -Treated conservatively with bowel rest, NGT to LIWS, IV fluids, anti-emetics and iv analgesics.  NG tube now out and tolerating diet, albeit not eating very much.  -Has been cleared for discharge by general surgery  -Likely ok for discharge home.  Will prescribe pro-biotic and zofran and recommend low residue diet with slow advancement over next week to normal consistency.  Recommend f/u with pcp, general surgery and gastroenterology

## 2023-03-18 NOTE — NURSING
Discharge instructions given. Pt and wife verbalized understanding of discharge instructions. IV removed from left forearm X2 and right upper arm. All intact. Pressure dressing applied to sites. Awaiting wheelchair.

## 2023-03-18 NOTE — SUBJECTIVE & OBJECTIVE
Interval History:  Having loose bowel movements, passing gas, tolerating liquid diet.    NG tube was inadvertently pulled this a.m. and left out.    Patient not having any nausea or vomiting.        Medications:  Continuous Infusions:   sodium chloride 0.45% 100 mL/hr at 03/18/23 0626    heparin (porcine) in D5W 14 Units/kg/hr (03/17/23 1457)     Scheduled Meds:   amLODIPine  10 mg Per NG tube Daily    fluticasone propionate  1 spray Each Nostril Daily    lactobacillus acidophilus & bulgar  1 packet Per NG tube BID    pantoprazole  40 mg Intravenous BID     PRN Meds:acetaminophen, aluminum-magnesium hydroxide-simethicone, dextrose 10%, dextrose 10%, dextrose, dextrose, glucagon (human recombinant), heparin (PORCINE), heparin (PORCINE), hydrALAZINE, ketorolac, melatonin, naloxone, ondansetron, sodium chloride 0.9%     Review of patient's allergies indicates:  No Known Allergies  Objective:     Vital Signs (Most Recent):  Temp: 97.1 °F (36.2 °C) (03/18/23 1118)  Pulse: 68 (03/18/23 1118)  Resp: 19 (03/18/23 1118)  BP: (!) 112/56 (03/18/23 1118)  SpO2: 97 % (03/18/23 1118)   Vital Signs (24h Range):  Temp:  [97.1 °F (36.2 °C)-99.2 °F (37.3 °C)] 97.1 °F (36.2 °C)  Pulse:  [64-73] 68  Resp:  [17-20] 19  SpO2:  [94 %-97 %] 97 %  BP: (112-177)/(56-80) 112/56     Weight: 83.9 kg (184 lb 15.5 oz)  Body mass index is 25.8 kg/m².    Intake/Output - Last 3 Shifts         03/16 0700  03/17 0659 03/17 0700  03/18 0659 03/18 0700  03/19 0659    P.O. 0 960 240    Total Intake(mL/kg) 0 (0) 960 (11.4) 240 (2.9)    Urine (mL/kg/hr)       Drains 420      Other 0      Stool 0      Total Output 420      Net -420 +960 +240           Urine Occurrence 2 x 3 x     Stool Occurrence 8 x 10 x             Physical Exam  Vitals and nursing note reviewed.   Constitutional:       Appearance: He is well-developed.   HENT:      Head: Normocephalic and atraumatic.   Cardiovascular:      Rate and Rhythm: Normal rate.      Heart sounds: Normal heart  sounds.   Pulmonary:      Effort: Pulmonary effort is normal.   Abdominal:      General: Bowel sounds are normal. There is no distension.      Palpations: Abdomen is soft.      Tenderness: There is no abdominal tenderness.   Musculoskeletal:         General: Normal range of motion.      Cervical back: Normal range of motion.   Skin:     General: Skin is warm and dry.      Capillary Refill: Capillary refill takes less than 2 seconds.   Neurological:      Mental Status: He is alert and oriented to person, place, and time.   Psychiatric:         Behavior: Behavior normal.       Significant Labs:  I have reviewed all pertinent lab results within the past 24 hours.  CBC:   Recent Labs   Lab 03/18/23  0535   WBC 8.30  8.30   RBC 4.07*  4.07*   HGB 10.6*  10.6*   HCT 31.6*  31.6*     177   MCV 78*  78*   MCH 26.0*  26.0*   MCHC 33.5  33.5     CMP:   Recent Labs   Lab 03/15/23  1223 03/16/23  0336 03/18/23  0535   *   < > 105   CALCIUM 10.1   < > 8.7   ALBUMIN 3.9  --   --    PROT 8.0  --   --    *   < > 137   K 3.4*   < > 2.7*   CO2 25   < > 20*   CL 98   < > 106   BUN 29*   < > 29*   CREATININE 1.6*   < > 1.7*   ALKPHOS 67  --   --    ALT 22  --   --    AST 32  --   --    BILITOT 0.7  --   --     < > = values in this interval not displayed.

## 2023-03-18 NOTE — PROGRESS NOTES
[facility]  General Surgery  Progress Note    Subjective:     History of Present Illness:  Patient is a 82 y.o. male with history of HT, HLD, Afib on eliquis, GERD and COPD who General Surgery was consulted for severe acute epigastric pain associated with nausea that started this morning at 5 am. The pain is worsened with inspiration or movement. Pt notes that his last bowel movement was last night at 11 pm. Denies any pain or changes in BM habits prior to this morning. He occasionally takes Miralax for constipation. Pt had a similar episode of abdominal pain in 2016 that spontaneously resolved after vomiting.  Pt denies any fever, SOB, CP, diaphoresis, constipation, melena, hematochezia, urinary difficulties. Labs on admission with WBC 13.22 and Cr 1.6. CT showing dilated small bowel with an abrupt transition point in the RLQ. Denies any history of abdominal surgery.       Post-Op Info:  * No surgery found *         Interval History:  Having loose bowel movements, passing gas, tolerating liquid diet.    NG tube was inadvertently pulled this a.m. and left out.    Patient not having any nausea or vomiting.        Medications:  Continuous Infusions:   sodium chloride 0.45% 100 mL/hr at 03/18/23 0626    heparin (porcine) in D5W 14 Units/kg/hr (03/17/23 1457)     Scheduled Meds:   amLODIPine  10 mg Per NG tube Daily    fluticasone propionate  1 spray Each Nostril Daily    lactobacillus acidophilus & bulgar  1 packet Per NG tube BID    pantoprazole  40 mg Intravenous BID     PRN Meds:acetaminophen, aluminum-magnesium hydroxide-simethicone, dextrose 10%, dextrose 10%, dextrose, dextrose, glucagon (human recombinant), heparin (PORCINE), heparin (PORCINE), hydrALAZINE, ketorolac, melatonin, naloxone, ondansetron, sodium chloride 0.9%     Review of patient's allergies indicates:  No Known Allergies  Objective:     Vital Signs (Most Recent):  Temp: 97.1 °F (36.2 °C) (03/18/23 1118)  Pulse: 68 (03/18/23  1118)  Resp: 19 (03/18/23 1118)  BP: (!) 112/56 (03/18/23 1118)  SpO2: 97 % (03/18/23 1118)   Vital Signs (24h Range):  Temp:  [97.1 °F (36.2 °C)-99.2 °F (37.3 °C)] 97.1 °F (36.2 °C)  Pulse:  [64-73] 68  Resp:  [17-20] 19  SpO2:  [94 %-97 %] 97 %  BP: (112-177)/(56-80) 112/56     Weight: 83.9 kg (184 lb 15.5 oz)  Body mass index is 25.8 kg/m².    Intake/Output - Last 3 Shifts         03/16 0700  03/17 0659 03/17 0700  03/18 0659 03/18 0700  03/19 0659    P.O. 0 960 240    Total Intake(mL/kg) 0 (0) 960 (11.4) 240 (2.9)    Urine (mL/kg/hr)       Drains 420      Other 0      Stool 0      Total Output 420      Net -420 +960 +240           Urine Occurrence 2 x 3 x     Stool Occurrence 8 x 10 x             Physical Exam  Vitals and nursing note reviewed.   Constitutional:       Appearance: He is well-developed.   HENT:      Head: Normocephalic and atraumatic.   Cardiovascular:      Rate and Rhythm: Normal rate.      Heart sounds: Normal heart sounds.   Pulmonary:      Effort: Pulmonary effort is normal.   Abdominal:      General: Bowel sounds are normal. There is no distension.      Palpations: Abdomen is soft.      Tenderness: There is no abdominal tenderness.   Musculoskeletal:         General: Normal range of motion.      Cervical back: Normal range of motion.   Skin:     General: Skin is warm and dry.      Capillary Refill: Capillary refill takes less than 2 seconds.   Neurological:      Mental Status: He is alert and oriented to person, place, and time.   Psychiatric:         Behavior: Behavior normal.       Significant Labs:  I have reviewed all pertinent lab results within the past 24 hours.  CBC:   Recent Labs   Lab 03/18/23  0535   WBC 8.30  8.30   RBC 4.07*  4.07*   HGB 10.6*  10.6*   HCT 31.6*  31.6*     177   MCV 78*  78*   MCH 26.0*  26.0*   MCHC 33.5  33.5     CMP:   Recent Labs   Lab 03/15/23  1223 03/16/23  0336 03/18/23  0535   *   < > 105   CALCIUM 10.1   < > 8.7   ALBUMIN 3.9  --    --    PROT 8.0  --   --    *   < > 137   K 3.4*   < > 2.7*   CO2 25   < > 20*   CL 98   < > 106   BUN 29*   < > 29*   CREATININE 1.6*   < > 1.7*   ALKPHOS 67  --   --    ALT 22  --   --    AST 32  --   --    BILITOT 0.7  --   --     < > = values in this interval not displayed.         Assessment/Plan:     * Small bowel obstruction  82 y.o. male with history of HT, HLD, Afib on eliquis, GERD and COPD with exam and imaging findings concerning for SBO. Patient without any findings concerning for acute abdomen or perforation.     - Passed GGC 3/16.   NG tube inadvertently pulled out patient tolerating liquid diet having bowel movements.    Small-bowel obstruction seemed to have resolved.    Okay for discharge from surgery standpoint likely today.       - Rest of care per primary  - Please call General Surgery with any questions or concerns        Jacobo Bruno MD  General Surgery  [facility] - [place] - Med Surg

## 2023-03-18 NOTE — NURSING
Ptt is 43 this am. Ptt in therapeutic range. No changes made to heparin infusion. Heparin infusing at 14units/kg/hr.

## 2023-03-18 NOTE — ASSESSMENT & PLAN NOTE
-Hb 11.9 on admit and now 10.6  -No evidence of bleeding  -Folate and B12 are replete  -Ferritin is low and TIBC only 11.9% consistent with iron deficiency  -Sent Rx for FeSO4 at discharge and placed GI referral.

## 2023-03-18 NOTE — DISCHARGE INSTRUCTIONS
Take all medications as prescribed.  Eat a strict low salt cardiac diet.  Eat a soft bland diet and slowly over the next week resume your usual consistency diet.  Follow up with your physicians as scheduled - pcp within 1 week and general surgery within 2 weeks.  We are also placing a referral for you to follow up with a gastroenterologist.  Thank you for trusting [facility] and Dr. Clark with your care.  We are honored that you entrusted us with your healthcare needs. Your satisfaction is very important to us and we hope you have been very pleased with your experience at [facility]. After your discharge you may receive a survey asking you to rate your hospital experience. We encourage you to take the time to complete the survey as your feedback allows us to identify areas for improvement as well as recognize our staff.   We hope that you have received the very best care possible during your hospitalization at [facility], as your satisfaction is our top priority.

## 2023-03-18 NOTE — PLAN OF CARE
[facility] Surg      HOME HEALTH ORDERS  FACE TO FACE ENCOUNTER    Patient Name: Benitez Cabrales  YOB: 1940    PCP: Diomedes Ayoub MD   [address] [facility] / [place] [address]  PCP Phone Number: [phone]  PCP Fax: [phone]    Encounter Date: 3/15/23    Admit to Home Health    Diagnoses:  Active Hospital Problems    Diagnosis  POA    *Small bowel obstruction [K56.609]  Yes     Priority: 1 - High    Diarrhea [R19.7]  No     Priority: 2     Iron deficiency anemia [D50.9]  Yes     Priority: 3     Acute renal failure superimposed on stage 3 chronic kidney disease [N17.9, N18.30]  Yes     Priority: 5     Hypokalemia [E87.6]  Yes     Priority: 6     Paroxysmal atrial fibrillation [I48.0]  Yes     Priority: 6     Current use of long term anticoagulation [Z79.01]  Not Applicable     Priority: 7     Delirium [R41.0]  No    Debility [R53.81]  Yes    ACP (advance care planning) [Z71.89]  Not Applicable    Dyslipidemia [E78.5]  Yes    GERD (gastroesophageal reflux disease) [K21.9]  Yes    Hypertension [I10]  Yes     Chronic      Resolved Hospital Problems    Diagnosis Date Resolved POA    Hyperlipidemia [E78.5] 03/15/2023 Yes     Chronic       Follow Up Appointments:  Future Appointments   Date Time Provider Department Center   3/22/2023  9:45 AM Adolfo Sorenson MD [facility]   3/30/2023 10:00 AM Diomedes Ayoub MD [facility] [place]       Allergies:Review of patient's allergies indicates:  No Known Allergies    Medications: Review discharge medications with patient and family and provide education.      Current Discharge Medication List        START taking these medications    Details   ferrous sulfate 325 (65 FE) MG EC tablet Take 1 tablet (325 mg total) by mouth once daily.  Qty: 30 tablet, Refills: 1      lactobacillus acidophilus & bulgar (LACTINEX) 100 million cell packet Take 1 packet (1 each total) by mouth 2 (two) times daily. for 14 days  Qty: 28 packet,  Refills: 0      ondansetron (ZOFRAN) 4 MG tablet Take 1 tablet (4 mg total) by mouth every 6 (six) hours as needed for Nausea.  Qty: 30 tablet, Refills: 1           CONTINUE these medications which have NOT CHANGED    Details   acetaminophen (TYLENOL) 500 MG tablet Take 500 mg by mouth every 8 (eight) hours as needed for Pain.      amiodarone (PACERONE) 200 MG Tab Take 1 tablet (200 mg total) by mouth once daily.  Qty: 90 tablet, Refills: 3    Associated Diagnoses: Atrial fibrillation, unspecified type; Paroxysmal atrial fibrillation      amLODIPine (NORVASC) 5 MG tablet Take 1 tablet by mouth once daily  Qty: 90 tablet, Refills: 0      biotin 5,000 mcg TbDL Take 1 tablet by mouth once daily.      chlorthalidone (HYGROTEN) 25 MG Tab 1 tablet daily  Qty: 90 tablet, Refills: 3    Comments: .  Associated Diagnoses: Essential hypertension      cholecalciferol, vitamin D3, (VITAMIN D3) 50 mcg (2,000 unit) Cap Take 1 capsule by mouth once daily.      doxepin (SINEQUAN) 10 MG capsule TAKE 1 TO 2 CAPSULES BY MOUTH AT BEDTIME AS NEEDED FOR ITCHING  Qty: 60 capsule, Refills: 12      ELIQUIS 5 mg Tab Take 1 tablet by mouth twice daily  Qty: 180 tablet, Refills: 3      fluticasone propionate (FLONASE) 50 mcg/actuation nasal spray 1 spray (50 mcg total) by Each Nostril route once daily.  Qty: 9.9 mL, Refills: 0    Associated Diagnoses: Bacterial URI; Acute otitis media with effusion      magnesium 200 mg Tab Take by mouth once daily.      MV-MN/FA/LYCOPENE/LUT/HB#178 (NEHEMIAH MULTIVITAMIN FOR MEN ORAL) Take 1 tablet by mouth once daily.      rosuvastatin (CRESTOR) 10 MG tablet TAKE 1 TABLET BY MOUTH ONCE DAILY IN THE EVENING  Qty: 90 tablet, Refills: 0    Associated Diagnoses: Hyperlipidemia, unspecified hyperlipidemia type      tiZANidine (ZANAFLEX) 4 MG tablet TAKE 1 TO 2 TABLETS BY MOUTH AT BEDTIME AS NEEDED  Qty: 40 tablet, Refills: 12      UNABLE TO FIND medication name: tumeric cap daily      zinc gluconate 50 mg tablet Take  50 mg by mouth once daily.               I have seen and examined this patient within the last 30 days. My clinical findings that support the need for the home health skilled services and home bound status are the following:no   Weakness/numbness causing balance and gait disturbance due to Weakness/Debility making it taxing to leave home.     Diet:   cardiac diet - start with low residue soft bland diet and slowly advance to regular consistency diet over next week    Labs:  SN to check BMP weekly on Monday x 2 weeks and fax results to primary care provider.    Referrals/ Consults  Physical Therapy to evaluate and treat. Evaluate for home safety and equipment needs; Establish/upgrade home exercise program. Perform / instruct on therapeutic exercises, gait training, transfer training, and Range of Motion.  Occupational Therapy to evaluate and treat. Evaluate home environment for safety and equipment needs. Perform/Instruct on transfers, ADL training, ROM, and therapeutic exercises.  Aide to provide assistance with personal care, ADLs, and vital signs.    Activities:   ambulate in house with assistance    Nursing:   Agency to admit patient within 24 hours of hospital discharge unless specified on physician order or at patient request    SN to complete comprehensive assessment including routine vital signs. Instruct on disease process and s/s of complications to report to MD. Review/verify medication list sent home with the patient at time of discharge  and instruct patient/caregiver as needed. Frequency may be adjusted depending on start of care date.     Skilled nurse to perform up to 3 visits PRN for symptoms related to diagnosis    Notify MD if SBP > 160 or < 90; DBP > 90 or < 50; HR > 120 or < 50; Temp > 101; O2 < 88%; Other:       Ok to schedule additional visits based on staff availability and patient request on consecutive days within the home health episode.    When multiple disciplines ordered:    Start of  Care occurs on Sunday - Wednesday schedule remaining discipline evaluations as ordered on separate consecutive days following the start of care.    Thursday SOC -schedule subsequent evaluations Friday and Monday the following week.     Friday - Saturday SOC - schedule subsequent discipline evaluations on consecutive days starting Monday of the following week.    For all post-discharge communication and subsequent orders please contact patient's primary care physician.     Home Health Aide:  Nursing Twice weekly, Physical Therapy Three times weekly, Occupational Therapy Three times weekly, and Home Health Aide Twice weekly    Wound Care Orders  no    I certify that this patient is confined to his home and needs intermittent skilled nursing care, physical therapy, and occupational therapy.

## 2023-03-18 NOTE — ASSESSMENT & PLAN NOTE
-Noted significant delirium overnight 3/15-3/16  -Suspect multifactorial due to hospitalization and opioid pain meds  -Continue delirium precautions  -Stopped opioids 3/16   -Delirium resolved

## 2023-03-18 NOTE — DISCHARGE SUMMARY
"[facility] Medicine  Discharge Summary      Patient Name: Benitez Cabrales  MRN: [id number]  CLAIRE: [id number]  Patient Class: IP- Inpatient  Admission Date: 3/15/2023  Hospital Length of Stay: 3 days  Discharge Date and Time: No discharge date for patient encounter.  Attending Physician: Kelly Clark MD   Discharging Provider: Kelly Clark MD  Primary Care Provider: Diomedes Ayoub MD    Primary Care Team: Networked reference to record PCT     HPI:   Mr. Cabrales is an 82 year old man with atrial fibrillation, hypertension, hyperlipidemia and GERD who presented for evaluation of abdominal pain.  He states he was in his usual state of health up until yesterday afternoon when he developed abdominal pain.  The pain was initially a mild discomfort but overnight has progressed to severe, 10/10 pain.  It is located in his mid/upper and right upper abdomen, does not radiate and is worse with movement and deep breaths.  The pain has been mildly relieved by iv dilaudid in the ER and now the pain is coming in paroxysms of continued severe intensity.  He notes overnight feeling nausea and light headedness.   He states he felt the need to vomit or have a bowel movement, but could do neither despite attempts to induce vomiting.  He reports his last bowel movement was yesterday morning.  He is not passing any flatus.  He denies fever, chills, diarrhea, abdominal trauma, falls, chest pain, shortness of breath, new foods and new medications.  He has never had abdominal surgery before and reports his last colonoscopy was "a long time ago".  In the ER he was treated with zofran, pepcid, dilaudid and IV fluids.      Goals of Care Treatment Preferences:  Code Status: Full Code      Consults:   Consults (From admission, onward)        Status Ordering Provider     Inpatient consult to Social Work  Once        Provider:  (Not yet assigned)    Ordered KELLY CLARK     Inpatient consult to Social Work  Once      "   Provider:  (Not yet assigned)    Completed KELLY LUGO     Inpatient consult to General Surgery  Once        Provider:  Olivia Marsh MD    Completed MAURISIO GUY        Hospital Course By Problem:   * Small bowel obstruction  -Admitted to inpatient status  -CT Abdomen showed distended proximal small bowel (4.3cm) with abrupt transition in RLQ and no free air in abdomen to suggest perforation  -On admit he has a leukocytosis but is afebrile without tachycardia or hypotension and with normal lactic acid.  -Blood cultures collected on admit  -His abdomen is severely tender with guarding but no rebound.  -Etiology unclear to me at this time - no prior surgeries and no evidence of mass lesions in abdomen.  -General surgery consulted and input appreciated.  -Small bowel follow through showed mildly dilated upper abdominal small bowel loops and some air within the transverse colon.  Administration of contrast through the NG tube reveals prominent gastroesophageal reflux extending at least to the level of the aortic arch.  8 hour film demonstrates some dilated small bowel loops in the central abdomen but the contrast has reached the rectum  -Treated conservatively with bowel rest, NGT to LIWS, IV fluids, anti-emetics and iv analgesics.  NG tube now out and tolerating diet, albeit not eating very much.  -Has been cleared for discharge by general surgery  -Likely ok for discharge home.  Will prescribe pro-biotic and zofran and recommend low residue diet with slow advancement over next week to normal consistency.  Recommend f/u with pcp, general surgery and gastroenterology    Diarrhea  -Now with loose stool  -Unclear if SBO resolved or if post-obstructive  -No recent antibiotics to suggest C.diff.  -C.diff negative.  No fecal WBC.  -Continue probiotic.  -Avoid imodium for now given SBO.    Iron deficiency anemia  -Hb 11.9 on admit and now 10.6  -No evidence of bleeding  -Folate and B12 are replete  -Ferritin  is low and TIBC only 11.9% consistent with iron deficiency  -Sent Rx for FeSO4 at discharge and placed GI referral.    Paroxysmal atrial fibrillation  -On admit EKG shows sinus bradycardia  -At home takes amiodarone 200mg qd and eliquis bid.  Last mazariegos of eliquis was 3/14 in afternoon  -Will hold amiodarone for now as half life is 45 days.  If needed could give non-titrating gtt 0.5mg/hour (per pharmacy)  -Held eliquis and treated with heparin drip.    -OK to resume eliquis and amiodarone     Hypertension  -BP moderately elevated at times  -At home takes norvasc and chlorthalidone  -Resume home chlorthalidone and norvasc at discharge  -Consider increasing norvasc to 10 mg qd  -Follow up with pcp within 1 week.    Current use of long term anticoagulation  -Treatment as above.    Acute renal failure superimposed on stage 3 chronic kidney disease  -Baseline Cr 1.4  -On admit Cr 1.6 and remains stable today  -Appears mildly hypovolemic  -Avoid nephrotoxic agents and renally dose meds  -Treated with gentle IV fluids     Delirium  -Noted significant delirium overnight 3/15-3/16  -Suspect multifactorial due to hospitalization and opioid pain meds  -Continue delirium precautions  -Stopped opioids 3/16   -Delirium resolved    Dyslipidemia  -Resume statin at discharge    Hypokalemia  -Replaced potassium today and on repeat potassium had normalized    GERD (gastroesophageal reflux disease)  -Notes frequent GERD symptoms and takes tums prn at home  -Treated with bid ppi during his stay    ACP (advance care planning)  Advance Care Planning  Date: 03/15/2023  Engaged patient and his wife in goals of care discussion surrounding his values and preferences for care in the event of cardiac arrest.  Explored pros and cons of ACLS treatment.  Both patient and wife are clear that he prefers for full aggressive care with full code status.  I spent 10 minutes in ACP on 3/15.    Debility  -Appears rather weak right now, but for his age is  "overall quite robust and fit.    -Consulted PT/OT - Ordered walker and HH for pt/ot/SN and aid.      Final Active Diagnoses:    Diagnosis Date Noted POA    PRINCIPAL PROBLEM:  Small bowel obstruction [K56.609] 03/15/2023 Yes    Diarrhea [R19.7] 03/16/2023 No    Iron deficiency anemia [D50.9] 03/15/2023 Yes    Acute renal failure superimposed on stage 3 chronic kidney disease [N17.9, N18.30] 08/11/2020 Yes    Hypokalemia [E87.6] 03/15/2023 Yes    Paroxysmal atrial fibrillation [I48.0] 11/24/2016 Yes    Current use of long term anticoagulation [Z79.01] 09/01/2020 Not Applicable    Delirium [R41.0] 03/16/2023 No    Debility [R53.81] 03/15/2023 Yes    ACP (advance care planning) [Z71.89] 03/15/2023 Not Applicable    Dyslipidemia [E78.5] 07/30/2020 Yes    GERD (gastroesophageal reflux disease) [K21.9] 09/06/2012 Yes    Hypertension [I10] 09/06/2012 Yes     Chronic      Problems Resolved During this Admission:    Diagnosis Date Noted Date Resolved POA    Hyperlipidemia [E78.5] 09/06/2012 03/15/2023 Yes     Chronic       Discharged Condition: stable    Disposition: Home or Self Care    Follow Up:   Follow-up Information     [facility]. Schedule an appointment as soon as possible for a visit in 2 week(s).    Specialty: Gastroenterology  Why: Clinic to contact patient to schedule follow up appointment.  Contact information:  [address] [place] [address] [place] [id number] [phone]  Additional information:  Please park in garage or surface lot and use [address] entrance. Check in at [address]                     Patient Instructions:      WALKER FOR HOME USE     Order Specific Question Answer Comments   Type of Walker: Adult (5'4"-6'6")    With wheels? Yes    Height: 5' 11" (1.803 m)    Weight: 83.9 kg (184 lb 15.5 oz)    Length of need (1-99 months): 99    Does patient have medical equipment at home? none    Please check all that apply: Patient is unable to safely ambulate " without equipment.    Please check all that apply: Patient's condition impairs ambulation.      Ambulatory referral/consult to Gastroenterology   Standing Status: Future   Referral Priority: Routine Referral Type: Consultation   Referral Reason: Specialty Services Required   Requested Specialty: Gastroenterology   Number of Visits Requested: 1     Diet Cardiac     Notify your health care provider if you experience any of the following:  increased confusion or weakness     Notify your health care provider if you experience any of the following:  persistent dizziness, light-headedness, or visual disturbances     Notify your health care provider if you experience any of the following:  worsening rash     Notify your health care provider if you experience any of the following:  severe persistent headache     Notify your health care provider if you experience any of the following:  difficulty breathing or increased cough     Notify your health care provider if you experience any of the following:  severe uncontrolled pain     Notify your health care provider if you experience any of the following:  persistent nausea and vomiting or diarrhea     Notify your health care provider if you experience any of the following:  temperature >100.4       Significant Diagnostic Studies: Labs:   BMP:   Recent Labs   Lab 03/17/23  0546 03/18/23  0535 03/18/23  1303   GLU 96 105  --     137  --    K 2.6* 2.7* 3.5    106  --    CO2 20* 20*  --    BUN 36* 29*  --    CREATININE 1.6* 1.7*  --    CALCIUM 8.8 8.7  --    MG 2.6 2.4  --    , CMP   Recent Labs   Lab 03/17/23  0546 03/18/23  0535 03/18/23  1303    137  --    K 2.6* 2.7* 3.5    106  --    CO2 20* 20*  --    GLU 96 105  --    BUN 36* 29*  --    CREATININE 1.6* 1.7*  --    CALCIUM 8.8 8.7  --    ANIONGAP 14 11  --    , CBC   Recent Labs   Lab 03/17/23 0546 03/18/23  0535   WBC 10.42  10.42 8.30  8.30   HGB 10.9*  10.9* 10.6*  10.6*   HCT 33.4*  33.4*  31.6*  31.6*     182 177  177   , INR   Lab Results   Component Value Date    INR 1.0 03/15/2023    INR 1.0 11/24/2016    INR 1.0 07/01/2015   , Lipid Panel   Lab Results   Component Value Date    CHOL 145 11/10/2021    HDL 63 11/10/2021    LDLCALC 63.6 11/10/2021    TRIG 92 11/10/2021    CHOLHDL 43.4 11/10/2021   , Troponin   Recent Labs   Lab 03/15/23  1223   TROPONINI 0.012    and A1C: No results for input(s): HGBA1C in the last 4320 hours.    Pending Diagnostic Studies:     None         Medications:  Reconciled Home Medications:      Medication List      START taking these medications    ferrous sulfate 325 (65 FE) MG EC tablet  Take 1 tablet (325 mg total) by mouth once daily.     lactobacillus acidophilus & bulgar 100 million cell packet  Commonly known as: LACTINEX  Take 1 packet (1 each total) by mouth 2 (two) times daily. for 14 days     ondansetron 4 MG tablet  Commonly known as: ZOFRAN  Take 1 tablet (4 mg total) by mouth every 6 (six) hours as needed for Nausea.        CONTINUE taking these medications    acetaminophen 500 MG tablet  Commonly known as: TYLENOL  Take 500 mg by mouth every 8 (eight) hours as needed for Pain.     amiodarone 200 MG Tab  Commonly known as: PACERONE  Take 1 tablet (200 mg total) by mouth once daily.     amLODIPine 5 MG tablet  Commonly known as: NORVASC  Take 1 tablet by mouth once daily     biotin 5,000 mcg Tbdl  Take 1 tablet by mouth once daily.     chlorthalidone 25 MG Tab  Commonly known as: HYGROTEN  1 tablet daily     cholecalciferol (vitamin D3) 50 mcg (2,000 unit) Cap capsule  Commonly known as: VITAMIN D3  Take 1 capsule by mouth once daily.     doxepin 10 MG capsule  Commonly known as: SINEQUAN  TAKE 1 TO 2 CAPSULES BY MOUTH AT BEDTIME AS NEEDED FOR ITCHING     ELIQUIS 5 mg Tab  Generic drug: apixaban  Take 1 tablet by mouth twice daily     fluticasone propionate 50 mcg/actuation nasal spray  Commonly known as: FLONASE  1 spray (50 mcg total) by Each  Nostril route once daily.     magnesium 200 mg Tab  Take by mouth once daily.     NEHEMIAH MULTIVITAMIN FOR MEN ORAL  Take 1 tablet by mouth once daily.     rosuvastatin 10 MG tablet  Commonly known as: CRESTOR  TAKE 1 TABLET BY MOUTH ONCE DAILY IN THE EVENING     tiZANidine 4 MG tablet  Commonly known as: ZANAFLEX  TAKE 1 TO 2 TABLETS BY MOUTH AT BEDTIME AS NEEDED     UNABLE TO FIND  medication name: tumeric cap daily     zinc gluconate 50 mg tablet  Take 50 mg by mouth once daily.            Indwelling Lines/Drains at time of discharge:   Lines/Drains/Airways     None                 Time spent on the discharge of patient: 35 minutes         Jovi Clark MD  Department of Hospital Medicine  [facility] - Med Surg

## 2023-03-18 NOTE — ASSESSMENT & PLAN NOTE
82 y.o. male with history of HT, HLD, Afib on eliquis, GERD and COPD with exam and imaging findings concerning for SBO. Patient without any findings concerning for acute abdomen or perforation.     - Passed GGC 3/16.   NG tube inadvertently pulled out patient tolerating liquid diet having bowel movements.    Small-bowel obstruction seemed to have resolved.    Okay for discharge from surgery standpoint likely today.       - Rest of care per primary  - Please call General Surgery with any questions or concerns

## 2023-03-18 NOTE — ASSESSMENT & PLAN NOTE
-Baseline Cr 1.4  -On admit Cr 1.6 and remains stable today  -Appears mildly hypovolemic  -Avoid nephrotoxic agents and renally dose meds  -Treated with gentle IV fluids

## 2023-03-18 NOTE — PT/OT/SLP PROGRESS
Physical Therapy Treatment    Patient Name:  Benitez Cabrales   MRN:  [id number]    Recommendations:     Discharge Recommendations: home  Discharge Equipment Recommendations: none      Assessment:     Benitez Cabrales is a 82 y.o. male admitted with a medical diagnosis of Small bowel obstruction.  He presents with the following impairments/functional limitations: impaired functional mobility, gait instability, impaired endurance, decreased coordination, weakness.    Rehab Prognosis: Good; patient would benefit from acute skilled PT services to address these deficits and reach maximum level of function.    Recent Surgery: * No surgery found *      Plan:     During this hospitalization, patient to be seen 3 x/week to address the identified rehab impairments via gait training, therapeutic activities and progress toward the following goals:    Plan of Care Expires:  03/28/23    Subjective     Chief Complaint: Pt with no complaints or concerns at this time.   Patient/Family Comments/goals: Pt agreeable to treatment session.   Pain/Comfort:  Pain Rating 1: 0/10      Objective:     Communicated with nursing prior to session.  Patient found HOB elevated with peripheral IV upon PT entry to room.     General Precautions: Standard, fall  Orthopedic Precautions: N/A  Braces: N/A  Respiratory Status: Room air     Functional Mobility:  Bed Mobility:     Supine to Sit: stand by assistance  Sit to Supine: stand by assistance  Transfers:     Sit to Stand:  stand by assistance with rolling walker  Gait: Pt ambulated 120 ft with RW, CGA an IV pole in tow requiring verbal cueing on proper gait speed to improve gait mechanics to reduce fall risk.  Pt also required verbal cueing on proper turn technique with AD to prevent LOB.   Balance: Pt with good sitting and fair standing balance.       AM-PAC 6 CLICK MOBILITY  Turning over in bed (including adjusting bedclothes, sheets and blankets)?: 4  Sitting down on and standing up from a chair with arms  (e.g., wheelchair, bedside commode, etc.): 3  Moving from lying on back to sitting on the side of the bed?: 4  Moving to and from a bed to a chair (including a wheelchair)?: 3  Need to walk in hospital room?: 3  Climbing 3-5 steps with a railing?: 3  Basic Mobility Total Score: 20       Treatment & Education:      Patient left supine with all lines intact, call button in reach, and PCT present.    GOALS:   Multidisciplinary Problems       Physical Therapy Goals          Problem: Physical Therapy    Goal Priority Disciplines Outcome Goal Variances Interventions   Physical Therapy Goal     PT, PT/OT Ongoing, Progressing     Description: Goals to be met by: 3/21/23     Patient will increase functional independence with mobility by performin. Pt to be mod I with bed mobility.  2. Pt to transfer with supervision/(I).  3. Pt to ambulate 200' /s AD (S).                         Time Tracking:     PT Received On: 23  PT Start Time: 928     PT Stop Time: 944  PT Total Time (min): 16 min     Billable Minutes: Therapeutic Activity 16    Treatment Type: Treatment  PT/PTA: PTA     Number of PTA visits since last PT visit: 2023

## 2023-03-18 NOTE — NURSING
Patient inadvertently removed ng tube at this time. Plans to d/c ng tube in the am. Patient on clear liquid diet. Per surgery recommendations ok to d/c ng tube. Will leave ng tube out.

## 2023-03-18 NOTE — ASSESSMENT & PLAN NOTE
-Appears rather weak right now, but for his age is overall quite robust and fit.    -Consulted PT/OT - patient will not require home health or DME.

## 2023-03-18 NOTE — ASSESSMENT & PLAN NOTE
-On admit EKG shows sinus bradycardia  -At home takes amiodarone 200mg qd and eliquis bid.  Last mazariegos of eliquis was 3/14 in afternoon  -Will hold amiodarone for now as half life is 45 days.  If needed could give non-titrating gtt 0.5mg/hour (per pharmacy)  -Held eliquis and treated with heparin drip.    -OK to resume eliquis and amiodarone

## 2023-03-18 NOTE — NURSING
Notified by lab of critical potassium of 2.7. notified Dr. Clark at this time. Will await orders. Will also report to am nurse.

## 2023-03-19 LAB
BACTERIA BLD CULT: NORMAL
BACTERIA BLD CULT: NORMAL
BACTERIA STL CULT: NORMAL

## 2023-03-20 ENCOUNTER — PATIENT OUTREACH (OUTPATIENT)
Dept: ADMINISTRATIVE | Facility: CLINIC | Age: 83
End: 2023-03-20
Payer: MEDICARE

## 2023-03-20 LAB
BACTERIA BLD CULT: NORMAL
BACTERIA BLD CULT: NORMAL

## 2023-03-20 NOTE — PHYSICIAN QUERY
PT Name: Benitez Cabrales  MR #: [id number]     DOCUMENTATION CLARIFICATION     CDS: Clare MARTINEZ RN  Contact information: [email or web address]    This form is a permanent document in the medical record.     Query Date: March 20, 2023    By submitting this query, we are merely seeking further clarification of documentation to reflect the severity of illness of your patient. Please utilize your independent clinical judgment when addressing the question(s) below.    The medical record reflects the following:     Indicators   Supporting Clinical Findings Location in Medical Record   X Bowel obstruction, intestinal obstruction, LBO or SBO documented * Small bowel obstruction  -Admitted to inpatient status  -CT Abdomen showed distended proximal small bowel (4.3cm) with abrupt transition in RLQ and no free air in abdomen to suggest perforation  -On admit he has a leukocytosis but is afebrile without tachycardia or hypotension and with normal lactic acid.  -Blood cultures collected on admit  -His abdomen is severely tender with guarding but no rebound.  -Etiology unclear to me at this time - no prior surgeries and no evidence of mass lesions in abdomen.    * Small bowel obstruction  82 y.o. male with history of HT, HLD, Afib on eliquis, GERD and COPD with exam and imaging findings concerning for SBO. Patient without any findings concerning for acute abdomen or perforation.  H&P Hosp Med 3/15/2023                      Consults Gen Surg 3/15/2023   X Radiology findings Proximal small bowel loops are abnormally distended up to 4.3-cm with air and fluid with abrupt transition point in the right lower quadrant with more distal small bowel loops essentially completely collapsed.  Small amount of desiccated stool in the ascending/transverse colon.  Descending/sigmoid colon and rectum are decompressed.  Findings are compatible with partial versus early high-grade small bowel obstruction.  Findings associated with a small  amount of free fluid in the right lower quadrant which is likely reactive, less likely indicating bowel perforation as there is no CT evidence for free air. CT Abd Pelvis 3/15/2023   X Treatment/Medication -Treated conservatively with bowel rest, NGT to LIWS, IV fluids, anti-emetics and iv analgesics.  NG tube now out and tolerating diet, albeit not eating very much. PN Hosp Med 3/18/2023   X Procedure/Surgery -Small bowel follow through showed mildly dilated upper abdominal small bowel loops and some air within the transverse colon.  Administration of contrast through the NG tube reveals prominent gastroesophageal reflux extending at least to the level of the aortic arch.  8 hour film demonstrates some dilated small bowel loops in the central abdomen but the contrast has reached the rectum XR SBFT 3/16/2023    Other       Provider, please further specify the bowel obstruction diagnosis:  [   ] Partial or incomplete intestinal obstruction, due to other (please specify): ____________   [   ] Partial or incomplete intestinal obstruction, unknown or unspecified etiology   [   ] Complete intestinal obstruction, due to other (please specify): ____________   [ x  ] Complete intestinal obstruction, unknown or unspecified etiology   [   ] Other intestinal condition (please specify): _____________________   [   ]  Clinically Undetermined         Please document in your progress notes daily for the duration of treatment until resolved, and include in your discharge summary.

## 2023-03-20 NOTE — PROGRESS NOTES
C3 nurse attempted to contact Benitez Cabrales for a TCC post hospital discharge follow up call. No answer. Left voicemail with callback information. The patient has a scheduled HOSFU appointment with Diomedes Ayoub MD on 3/30 @ 10am.

## 2023-03-21 ENCOUNTER — TELEPHONE (OUTPATIENT)
Dept: FAMILY MEDICINE | Facility: CLINIC | Age: 83
End: 2023-03-21
Payer: MEDICARE

## 2023-03-21 ENCOUNTER — TELEPHONE (OUTPATIENT)
Dept: GASTROENTEROLOGY | Facility: CLINIC | Age: 83
End: 2023-03-21
Payer: MEDICARE

## 2023-03-21 DIAGNOSIS — N18.31 STAGE 3A CHRONIC KIDNEY DISEASE: Primary | ICD-10-CM

## 2023-03-21 NOTE — TELEPHONE ENCOUNTER
----- Message from Isabel Gallo MA sent at 3/21/2023 12:35 PM CDT -----  Type: Patient Call Back    Who called: Wife - Ms. Cabrales    What is the request in detail: is asking if the pt. Can be seen in the afternoon .. its easier to get him up and going ..     Can the clinic reply by [facility]?No    Would the patient rather a call back or a response via [facility]? yes    Best call back number: [phone]

## 2023-03-21 NOTE — TELEPHONE ENCOUNTER
----- Message from Suad Yang sent at 3/21/2023  3:39 PM CDT -----  Type: Patient Call Back    Who called:[facility] with [facility] [phone]    What is the request in detail:pt was admitted today for skilled nurses pt and ot. Pt has been frequent falls. Doesn't take his medications. Still has vertigo and doesn't take medication. Wants to confirm he is still supposed to be taking meclizine. Call patrick. Also wants to confirm what labs have to be drawn on Monday.     Can the clinic reply by [facility]?    Would the patient rather a call back or a response via [facility]? call    Best call back number:[phone] (home)       Additional Information:

## 2023-03-21 NOTE — TELEPHONE ENCOUNTER
Let home health know that Dr. Ayoub is not quite aware of all the interval issues.      However, if having vertigo he can take one meclizine at night to suppress    Dr. Ayoub did not order any labs, if there are labs ordered from the hospital discharge they would be on the discharge orders/home health orders.    Dr. Ayoub did review discharge labs and recommends repeating a BMP and CBC but only and only if that particular home health agency will bring the labs to an [facility] facility.  Otherwise will wait and patient can repeat labs when he makes a follow-up appointment

## 2023-03-21 NOTE — TELEPHONE ENCOUNTER
Patient was contacted.  Patient was rescheduled for an afternoon appointment on 03/22/23 @ 2:00 pm.

## 2023-03-21 NOTE — PROGRESS NOTES
3rd attempt made to reach patient for TCC Call. Left voicemail please call [phone] leave first name, last name and . I will return your call.

## 2023-03-22 ENCOUNTER — OFFICE VISIT (OUTPATIENT)
Dept: SURGERY | Facility: CLINIC | Age: 83
End: 2023-03-22
Payer: MEDICARE

## 2023-03-22 ENCOUNTER — OFFICE VISIT (OUTPATIENT)
Dept: GASTROENTEROLOGY | Facility: CLINIC | Age: 83
End: 2023-03-22
Payer: MEDICARE

## 2023-03-22 ENCOUNTER — TELEPHONE (OUTPATIENT)
Dept: FAMILY MEDICINE | Facility: CLINIC | Age: 83
End: 2023-03-22
Payer: MEDICARE

## 2023-03-22 VITALS
HEART RATE: 63 BPM | DIASTOLIC BLOOD PRESSURE: 77 MMHG | WEIGHT: 181 LBS | OXYGEN SATURATION: 97 % | BODY MASS INDEX: 25.34 KG/M2 | HEIGHT: 71 IN | SYSTOLIC BLOOD PRESSURE: 158 MMHG

## 2023-03-22 VITALS
DIASTOLIC BLOOD PRESSURE: 70 MMHG | SYSTOLIC BLOOD PRESSURE: 162 MMHG | WEIGHT: 182.44 LBS | BODY MASS INDEX: 25.54 KG/M2 | HEART RATE: 65 BPM | HEIGHT: 71 IN

## 2023-03-22 DIAGNOSIS — R10.13 EPIGASTRIC PAIN: Primary | ICD-10-CM

## 2023-03-22 DIAGNOSIS — D50.9 IRON DEFICIENCY ANEMIA, UNSPECIFIED IRON DEFICIENCY ANEMIA TYPE: Primary | ICD-10-CM

## 2023-03-22 PROCEDURE — 99999 PR PBB SHADOW E&M-EST. PATIENT-LVL IV: ICD-10-PCS | Mod: PBBFAC,,, | Performed by: SURGERY

## 2023-03-22 PROCEDURE — 1101F PT FALLS ASSESS-DOCD LE1/YR: CPT | Mod: CPTII,S$GLB,, | Performed by: SURGERY

## 2023-03-22 PROCEDURE — 1159F PR MEDICATION LIST DOCUMENTED IN MEDICAL RECORD: ICD-10-PCS | Mod: CPTII,S$GLB,, | Performed by: STUDENT IN AN ORGANIZED HEALTH CARE EDUCATION/TRAINING PROGRAM

## 2023-03-22 PROCEDURE — 3078F DIAST BP <80 MM HG: CPT | Mod: CPTII,S$GLB,, | Performed by: STUDENT IN AN ORGANIZED HEALTH CARE EDUCATION/TRAINING PROGRAM

## 2023-03-22 PROCEDURE — 99999 PR PBB SHADOW E&M-EST. PATIENT-LVL IV: CPT | Mod: PBBFAC,,, | Performed by: STUDENT IN AN ORGANIZED HEALTH CARE EDUCATION/TRAINING PROGRAM

## 2023-03-22 PROCEDURE — 3077F SYST BP >= 140 MM HG: CPT | Mod: CPTII,S$GLB,, | Performed by: SURGERY

## 2023-03-22 PROCEDURE — 99204 OFFICE O/P NEW MOD 45 MIN: CPT | Mod: S$GLB,,, | Performed by: STUDENT IN AN ORGANIZED HEALTH CARE EDUCATION/TRAINING PROGRAM

## 2023-03-22 PROCEDURE — 3077F SYST BP >= 140 MM HG: CPT | Mod: CPTII,S$GLB,, | Performed by: STUDENT IN AN ORGANIZED HEALTH CARE EDUCATION/TRAINING PROGRAM

## 2023-03-22 PROCEDURE — 3288F FALL RISK ASSESSMENT DOCD: CPT | Mod: CPTII,S$GLB,, | Performed by: SURGERY

## 2023-03-22 PROCEDURE — 3288F PR FALLS RISK ASSESSMENT DOCUMENTED: ICD-10-PCS | Mod: CPTII,S$GLB,, | Performed by: STUDENT IN AN ORGANIZED HEALTH CARE EDUCATION/TRAINING PROGRAM

## 2023-03-22 PROCEDURE — 99204 PR OFFICE/OUTPT VISIT, NEW, LEVL IV, 45-59 MIN: ICD-10-PCS | Mod: S$GLB,,, | Performed by: STUDENT IN AN ORGANIZED HEALTH CARE EDUCATION/TRAINING PROGRAM

## 2023-03-22 PROCEDURE — 99213 PR OFFICE/OUTPT VISIT, EST, LEVL III, 20-29 MIN: ICD-10-PCS | Mod: S$GLB,,, | Performed by: SURGERY

## 2023-03-22 PROCEDURE — 99213 OFFICE O/P EST LOW 20 MIN: CPT | Mod: S$GLB,,, | Performed by: SURGERY

## 2023-03-22 PROCEDURE — 1101F PR PT FALLS ASSESS DOC 0-1 FALLS W/OUT INJ PAST YR: ICD-10-PCS | Mod: CPTII,S$GLB,, | Performed by: SURGERY

## 2023-03-22 PROCEDURE — 3078F PR MOST RECENT DIASTOLIC BLOOD PRESSURE < 80 MM HG: ICD-10-PCS | Mod: CPTII,S$GLB,, | Performed by: STUDENT IN AN ORGANIZED HEALTH CARE EDUCATION/TRAINING PROGRAM

## 2023-03-22 PROCEDURE — 3288F PR FALLS RISK ASSESSMENT DOCUMENTED: ICD-10-PCS | Mod: CPTII,S$GLB,, | Performed by: SURGERY

## 2023-03-22 PROCEDURE — 1126F AMNT PAIN NOTED NONE PRSNT: CPT | Mod: CPTII,S$GLB,, | Performed by: SURGERY

## 2023-03-22 PROCEDURE — 3288F FALL RISK ASSESSMENT DOCD: CPT | Mod: CPTII,S$GLB,, | Performed by: STUDENT IN AN ORGANIZED HEALTH CARE EDUCATION/TRAINING PROGRAM

## 2023-03-22 PROCEDURE — 1159F MED LIST DOCD IN RCRD: CPT | Mod: CPTII,S$GLB,, | Performed by: SURGERY

## 2023-03-22 PROCEDURE — 3077F PR MOST RECENT SYSTOLIC BLOOD PRESSURE >= 140 MM HG: ICD-10-PCS | Mod: CPTII,S$GLB,, | Performed by: SURGERY

## 2023-03-22 PROCEDURE — 1111F PR DISCHARGE MEDS RECONCILED W/ CURRENT OUTPATIENT MED LIST: ICD-10-PCS | Mod: CPTII,S$GLB,, | Performed by: SURGERY

## 2023-03-22 PROCEDURE — 3078F PR MOST RECENT DIASTOLIC BLOOD PRESSURE < 80 MM HG: ICD-10-PCS | Mod: CPTII,S$GLB,, | Performed by: SURGERY

## 2023-03-22 PROCEDURE — 1111F PR DISCHARGE MEDS RECONCILED W/ CURRENT OUTPATIENT MED LIST: ICD-10-PCS | Mod: CPTII,S$GLB,, | Performed by: STUDENT IN AN ORGANIZED HEALTH CARE EDUCATION/TRAINING PROGRAM

## 2023-03-22 PROCEDURE — 3078F DIAST BP <80 MM HG: CPT | Mod: CPTII,S$GLB,, | Performed by: SURGERY

## 2023-03-22 PROCEDURE — 1126F AMNT PAIN NOTED NONE PRSNT: CPT | Mod: CPTII,S$GLB,, | Performed by: STUDENT IN AN ORGANIZED HEALTH CARE EDUCATION/TRAINING PROGRAM

## 2023-03-22 PROCEDURE — 3077F PR MOST RECENT SYSTOLIC BLOOD PRESSURE >= 140 MM HG: ICD-10-PCS | Mod: CPTII,S$GLB,, | Performed by: STUDENT IN AN ORGANIZED HEALTH CARE EDUCATION/TRAINING PROGRAM

## 2023-03-22 PROCEDURE — 1111F DSCHRG MED/CURRENT MED MERGE: CPT | Mod: CPTII,S$GLB,, | Performed by: SURGERY

## 2023-03-22 PROCEDURE — 1126F PR PAIN SEVERITY QUANTIFIED, NO PAIN PRESENT: ICD-10-PCS | Mod: CPTII,S$GLB,, | Performed by: SURGERY

## 2023-03-22 PROCEDURE — 99999 PR PBB SHADOW E&M-EST. PATIENT-LVL IV: ICD-10-PCS | Mod: PBBFAC,,, | Performed by: STUDENT IN AN ORGANIZED HEALTH CARE EDUCATION/TRAINING PROGRAM

## 2023-03-22 PROCEDURE — 1101F PR PT FALLS ASSESS DOC 0-1 FALLS W/OUT INJ PAST YR: ICD-10-PCS | Mod: CPTII,S$GLB,, | Performed by: STUDENT IN AN ORGANIZED HEALTH CARE EDUCATION/TRAINING PROGRAM

## 2023-03-22 PROCEDURE — 1126F PR PAIN SEVERITY QUANTIFIED, NO PAIN PRESENT: ICD-10-PCS | Mod: CPTII,S$GLB,, | Performed by: STUDENT IN AN ORGANIZED HEALTH CARE EDUCATION/TRAINING PROGRAM

## 2023-03-22 PROCEDURE — 99999 PR PBB SHADOW E&M-EST. PATIENT-LVL IV: CPT | Mod: PBBFAC,,, | Performed by: SURGERY

## 2023-03-22 PROCEDURE — 1101F PT FALLS ASSESS-DOCD LE1/YR: CPT | Mod: CPTII,S$GLB,, | Performed by: STUDENT IN AN ORGANIZED HEALTH CARE EDUCATION/TRAINING PROGRAM

## 2023-03-22 PROCEDURE — 1159F MED LIST DOCD IN RCRD: CPT | Mod: CPTII,S$GLB,, | Performed by: STUDENT IN AN ORGANIZED HEALTH CARE EDUCATION/TRAINING PROGRAM

## 2023-03-22 PROCEDURE — 1111F DSCHRG MED/CURRENT MED MERGE: CPT | Mod: CPTII,S$GLB,, | Performed by: STUDENT IN AN ORGANIZED HEALTH CARE EDUCATION/TRAINING PROGRAM

## 2023-03-22 PROCEDURE — 1159F PR MEDICATION LIST DOCUMENTED IN MEDICAL RECORD: ICD-10-PCS | Mod: CPTII,S$GLB,, | Performed by: SURGERY

## 2023-03-22 NOTE — PROGRESS NOTES
Surgery Clinic Note    Benitez Cabrales is a 82 y.o. year old male in clinic today for follow up of admission for abdominal pain with possible enteritis versus obstruction which resolved quickly.  He reports this similar thing has happened approximately 8 years ago but overall vast majority of the time he has no issues.  Since discharge he has had no pain and no problems eating and having bowel movements.  He is due to discuss a colonoscopy with GI.  No f/c/n/v/sob/cp    ROS:  Negative except above      Imaging:    CT  Impression:     1. Proximal small bowel loops are abnormally distended up to 4.3-cm with air and fluid with abrupt transition point in the right lower quadrant with more distal small bowel loops essentially completely collapsed.  Small amount of desiccated stool in the ascending/transverse colon.  Descending/sigmoid colon and rectum are decompressed.  Findings are compatible with partial versus early high-grade small bowel obstruction.  Findings associated with a small amount of free fluid in the right lower quadrant which is likely reactive, less likely indicating bowel perforation as there is no CT evidence for free air.    Xray gastrograffin challenge  Impression:     Partial small bowel obstruction with contrast within the rectum at 08:00 hours    PE:  Vitals:    03/22/23 0951   BP: (!) 158/77   Pulse: 63       NAD  No belabored breathing  Abd soft nt nd    A/P:  Benitez Cabrales is a 82 y.o. year old male s/p brief admission for abdominal pain, likely enteritis which resolved    -no surgical intervention  -rtc prn    Adolfo Sorenson  General Surgery - [facility]  3/22/2023

## 2023-03-22 NOTE — PROGRESS NOTES
"    [facility] Consultation Note    Reason for Consult:  SBO     PCP:   Diomedes Ayoub   No address on file    Referring MD:  Aaareferral Self  No address on file    HPI:  This is a 82 y.o. male here for evaluation of anemia. He was recently seen in hospital for acute onset of abdominal pain and distention. Blood work showed a microcytic anemia as well as hypokalemia and CANDELARIO. CT scan 3/15 showed dilated loops of small bowel with a transition point in RLQ. Patient had NGT placed and treated supportively. A follow up SBFL showed contrast made it to rectum and showed a partial SBO.     He has issues at baseline with chronic constipation and straining. He has seen blood with wiping on occasion. Blood is small volume. No melena. Has intermittent reflux symptoms that he takes tums for. Drinks ETOH most nights. Denied any dysphagia, N/V, hematemesis.     Last EGD was in 2016 which showed small hiatal hernia, gastric ulcer. Biopsies from the ulcer showed inactive gastritis with some atypical stomal cells (favor reactive) at the ulcer. Path report mentions slides to be reviewed a "GI dysplasia consensus conference". HP not reported in that result. There are no follow up notes from GI on chart after this time.     Denied NSAID use. No Fh of CRC, gastric cancer, celiac or IBD.      ROS:  Constitutional: No fevers, chills, No weight loss  ENT: No allergies  CV: No chest pain  Pulm: No cough, No shortness of breath  Ophtho: No vision changes  GI: see HPI  Derm: No rash  Heme: No lymphadenopathy, No bruising  MSK: No arthritis  : No dysuria, No hematuria  Endo: No hot or cold intolerance  Neuro: No syncope, No seizure  Psych: No anxiety, No depression    Medical History:  has a past medical history of Anticoagulant long-term use, Atrial fibrillation, GERD (gastroesophageal reflux disease), Hyperlipidemia, Hypertension, Nuclear sclerosis, bilateral (10/09/2017), Rhinitis medicamentosa (06/30/2014), Small " bowel obstruction, and Vertigo (06/13/2013).    Surgical History:  has a past surgical history that includes Vascular surgery; Nose surgery; ICM implant; and Removal of implantable loop recorder (N/A, 12/10/2018).    Family History: family history includes No Known Problems in his brother, father, maternal grandfather, maternal grandmother, maternal uncle, mother, paternal aunt, paternal grandfather, paternal grandmother, paternal uncle, and sister..     Social History:  reports that he has never smoked. He has never been exposed to tobacco smoke. He has never used smokeless tobacco. He reports current alcohol use of about 1.0 standard drink per week. He reports that he does not use drugs.    Review of patient's allergies indicates:  No Known Allergies    Current Outpatient Rx   Medication Sig Dispense Refill    acetaminophen (TYLENOL) 500 MG tablet Take 500 mg by mouth every 8 (eight) hours as needed for Pain.      amiodarone (PACERONE) 200 MG Tab Take 1 tablet (200 mg total) by mouth once daily. 90 tablet 3    amLODIPine (NORVASC) 5 MG tablet Take 1 tablet by mouth once daily 90 tablet 0    biotin 5,000 mcg TbDL Take 1 tablet by mouth once daily.      chlorthalidone (HYGROTEN) 25 MG Tab 1 tablet daily 90 tablet 3    cholecalciferol, vitamin D3, (VITAMIN D3) 50 mcg (2,000 unit) Cap Take 1 capsule by mouth once daily.      doxepin (SINEQUAN) 10 MG capsule TAKE 1 TO 2 CAPSULES BY MOUTH AT BEDTIME AS NEEDED FOR ITCHING 60 capsule 12    ELIQUIS 5 mg Tab Take 1 tablet by mouth twice daily 180 tablet 3    ferrous sulfate 325 (65 FE) MG EC tablet Take 1 tablet (325 mg total) by mouth once daily. 30 tablet 1    fluticasone propionate (FLONASE) 50 mcg/actuation nasal spray 1 spray (50 mcg total) by Each Nostril route once daily. 9.9 mL 0    lactobacillus acidophilus & bulgar (LACTINEX) 100 million cell packet Take 1 packet (1 each total) by mouth 2 (two) times daily. for 14 days 28 packet 0    magnesium 200 mg Tab Take by  "mouth once daily.      MV-MN/FA/LYCOPENE/LUT/HB#178 (NEHEMIAH MULTIVITAMIN FOR MEN ORAL) Take 1 tablet by mouth once daily.      ondansetron (ZOFRAN) 4 MG tablet Take 1 tablet (4 mg total) by mouth every 6 (six) hours as needed for Nausea. 30 tablet 1    rosuvastatin (CRESTOR) 10 MG tablet TAKE 1 TABLET BY MOUTH ONCE DAILY IN THE EVENING 90 tablet 0    tiZANidine (ZANAFLEX) 4 MG tablet TAKE 1 TO 2 TABLETS BY MOUTH AT BEDTIME AS NEEDED 40 tablet 12    UNABLE TO FIND medication name: tumeric cap daily      zinc gluconate 50 mg tablet Take 50 mg by mouth once daily.         Objective Findings:    Vital Signs:  BP (!) 162/70   Pulse 65   Ht 5' 11" (1.803 m)   Wt 82.7 kg (182 lb 6.9 oz)   BMI 25.44 kg/m²   Body mass index is 25.44 kg/m².    Physical Exam:  General Appearance: Well appearing in no acute distress  Head:   Normocephalic, without obvious abnormality  Eyes:    No scleral icterus, EOMI  ENT: Neck supple, Lips, mucosa, and tongue normal    Lungs: CTA bilaterally in anterior and posterior fields, no wheezes, no crackles.  Heart:  Regular rate and rhythm, S1, S2 normal, no murmurs heard  Abdomen: Soft, non tender, non distended with positive bowel sounds in all four quadrants. No hepatosplenomegaly, ascites, or mass  Extremities: 2+ pulses, no clubbing, cyanosis or edema  Skin: No rash  Neurologic: no focal deficits       Labs:  Lab Results   Component Value Date    WBC 8.30 03/18/2023    WBC 8.30 03/18/2023    HGB 10.6 (L) 03/18/2023    HGB 10.6 (L) 03/18/2023    HCT 31.6 (L) 03/18/2023    HCT 31.6 (L) 03/18/2023     03/18/2023     03/18/2023    CHOL 145 11/10/2021    TRIG 92 11/10/2021    HDL 63 11/10/2021    ALT 22 03/15/2023    AST 32 03/15/2023     03/18/2023    K 3.5 03/18/2023     03/18/2023    CREATININE 1.7 (H) 03/18/2023    BUN 29 (H) 03/18/2023    CO2 20 (L) 03/18/2023    TSH 0.690 11/15/2022    PSA 1.1 09/15/2015    INR 1.0 03/15/2023    HGBA1C 5.9 06/13/2013 "       Imaging:    SBFL   Impression:     Partial small bowel obstruction with contrast within the rectum at 08:00 hours     Gastroesophageal reflux, placing the patient at risk for aspiration pneumonitis after administration of Gastrografin     CT   FINDINGS:  Abdomen:     - Lower thorax:Heart is not enlarged.  No significant pericardial thickening in the field of view.     - Lung bases: Imaged portions of the lung bases are clear.     - Liver: Liver is normal in size, attenuations and morphology without appreciable surface nodularity. No appreciable sizable suspicious lesion.  No intrahepatic/extrahepatic biliary dilatation.     - Gallbladder: No radiodense gallstones, mural thickening, pericholecystic fluid or pericholecystic fat stranding.     - Pancreas: Pancreas is normal in size and attenuation without the surrounding inflammatory fat stranding, suspicious mass or fluid/fluid collection.     - Spleen: Spleen is normal in size, morphology and attenuation without appreciable suspicious lesion.     - Adrenals: Bilateral adrenal glands are normal in size and morphology without appreciable nodularity.     - Kidneys: Kidneys are normal in size, location, morphology and attenuation. No appreciable suspicious sizable lesion, nephroliths or hydroureteronephrosis bilaterally.  Urinary bladder is well distended and grossly unremarkable without mural thickening or appreciable nodularity.     - Bowel/Mesentery: Proximal small bowel loops are abnormally distended up to 4.3-cm with air and fluid with abrupt transition point in the right lower quadrant (series 2, image 96) with all of the more distal small bowel loops essentially completely collapsed.  Small amount of desiccated stool in the ascending and transverse colon.  Descending/sigmoid colon and rectum are decompressed.  Small amount of free fluid is noted in the right lower quadrant.  Appendix is normal.     - Retroperitoneum:  Aorta is normal in course and caliber  without evidence of aneurysmal degeneration.  No sizable retroperitoneal mass or fluid collection.     Pelvis:     - Reproductive organs: Reproductive organs are within normal limits.  No suspicious sizable pelvic mass, lymphadenopathy or fluid.     - Soft tissues:  Imaged soft tissues are grossly unremarkable.     - Bones:  Diffuse osseous demineralization multilevel degenerative changes of the imaged spine.  No acute displaced fracture, dislocation or suspicious lytic/blastic lesion.     Impression:     1. Proximal small bowel loops are abnormally distended up to 4.3-cm with air and fluid with abrupt transition point in the right lower quadrant with more distal small bowel loops essentially completely collapsed.  Small amount of desiccated stool in the ascending/transverse colon.  Descending/sigmoid colon and rectum are decompressed.  Findings are compatible with partial versus early high-grade small bowel obstruction.  Findings associated with a small amount of free fluid in the right lower quadrant which is likely reactive, less likely indicating bowel perforation as there is no CT evidence for free air.    Endoscopy:      EGD 2016   Findings:        A small hiatus hernia was present.        One non-bleeding cratered gastric ulcer with no stigmata of bleeding        was found on the greater curvature of the stomach. The lesion was 6        mm in largest dimension. Biopsies were taken with a cold forceps for        histology. Verification of patient identification for the specimen        was done.        A few localized, 1 mm non-bleeding erosions were found in the        gastric antrum. There were no stigmata of recent bleeding. Biopsies        were taken with a cold forceps for histology. Verification of        patient identification for the specimen was done. Estimated blood        loss was minimal.        The examined duodenum was normal.   Impression:          - Small hiatus hernia.     Cscope 2016   2  polyps   Diverticular disease  Repeat 5 years     Assessment:    JOSÉ   Rectal bleeding   Constipation  History of PUD   History of colon polyps   Recent SBO     Patient with JOSÉ on blood work with constipation and intermittent rectal bleeding. Also with prior history of PUD on 2016 EGD which was not followed up. We discussed his indications for EGD and cscope and all the risks and benefits with anesthesia and endoscopy. I do recommend he has these procedures due to his symptoms but he would like conservative management at first. Discussed fiber and miralax. Continue iron replacement. Follow up in clinic in 3 months to check blood work. Given number to reach clinic if he becomes agreeable for the procedures in the meantime     Recommendations:    - Fiber daily, given handout   - Miralax daily   - Squatty potty use   - He has indications for EGD and cscope but he would like to be management conservatively initially. We discussed the possibly of missed cancer and he is understanding   - RTC 3 months      Follow up in about 3 months (around 6/22/2023).      Order summary:         Thank you so much for allowing me to participate in the care of Benitez Elam MD  Gastroenterology   [facility]

## 2023-03-22 NOTE — TELEPHONE ENCOUNTER
Spoke with HH nurse and instructions given from provider. Enid states she will contact the ordering provider.

## 2023-03-23 NOTE — PHYSICIAN QUERY
PT Name: Benitez Cabrales  MR #: [id number]     Documentation Clarification      CDS: Clare MARTINEZ RN  Contact information: [email or web address]    This form is a permanent document in the medical record.     Query Date: March 23, 2023    By submitting this query, we are merely seeking further clarification of documentation. Please utilize your independent clinical judgment when addressing the question(s) below.    The Medical Record reflects the following:    Clinical Findings Location in Medical Records   He notes overnight feeling nausea and light headedness.   He states he felt the need to vomit or have a bowel movement, but could do neither despite attempts to induce vomiting.     Acute renal failure superimposed on stage 3 chronic kidney disease  -Baseline Cr 1.4  -On admit Cr 1.6  -Appears mildly hypovolemic  -Avoid nephrotoxic agents and renally dose meds  -Treat with gentle IV fluids for now  -Repeat BMP in AM     03/15/23 12:23 03/16/23 03:36 03/17/23 05:46 03/18/23 05:35   BUN 29 (H) 36 (H) 36 (H) 29 (H)   Creatinine 1.6 (H) 1.6 (H) 1.6 (H) 1.7 (H)   eGFR 43 ! 43 ! 43 ! 40 !    H&P Hosp Med 3/15/2023        H&P Hosp Med 3/15/2023                Labs 3/15-3/18     [facility] approved diagnostic criteria for acute kidney injury is based on KDIGO criteria:    An increase in serum creatinine > 0.3mg/dl within 48 hours  OR  Increase in serum creatinine to > 1.5x baseline, which is known or presumed to have occurred within the prior 7 days  OR  Urine volume <0.5 ml/kg/hr for 6 hours       The clinical guidelines noted above are only a system guideline. It does not replace the providers clinical judgment.         Due to the conflicting clinical picture, please clinically validate the diagnosis of __Acute renal failure____.      If validated, please provide additional clinical support for the diagnosis.       [ x  ] Above stated diagnosis is not confirmed and/or it has been ruled out   [   ] Above stated  diagnosis is not confirmed and/or it has been ruled out, other diagnosis ruled in (please          specify):_______________                          [    ] Acute Renal Insufficiency  - Consider if SCr rise is transient and normalizes quickly with no efforts at real resuscitation of vital signs and perfusion                          [   ] Other ______________________________     [   ] Above stated diagnosis is confirmed. Please specify clinical support (signs & symptoms) for the confirmed diagnosis: ____________________   [   ] Other clarification (please specify): ___________________         Form No. [id number]

## 2023-03-27 ENCOUNTER — TELEPHONE (OUTPATIENT)
Dept: FAMILY MEDICINE | Facility: CLINIC | Age: 83
End: 2023-03-27
Payer: MEDICARE

## 2023-03-27 NOTE — TELEPHONE ENCOUNTER
----- Message from Breanna Choudhary sent at 3/27/2023  9:22 AM CDT -----  Type: Patient Call Back    Who called:  nurse     What is the request in detail: Wants to know if the order was sent to the lab and if she can  the requisition  if not her company deals with [employer] and she can send the lab through [employer] just call her with the fax number for results Pt is scheduled to see Dr Ayoub on Thursday 3/30    Can the clinic reply by [facility]?    Would the patient rather a call back or a response via [facility]?     Best call back number: [phone]    Additional Information:

## 2023-03-28 ENCOUNTER — TELEPHONE (OUTPATIENT)
Dept: FAMILY MEDICINE | Facility: CLINIC | Age: 83
End: 2023-03-28
Payer: MEDICARE

## 2023-03-28 ENCOUNTER — PATIENT OUTREACH (OUTPATIENT)
Dept: ADMINISTRATIVE | Facility: OTHER | Age: 83
End: 2023-03-28
Payer: MEDICARE

## 2023-03-28 NOTE — TELEPHONE ENCOUNTER
----- Message from Carmen Hensley sent at 3/28/2023 10:42 AM CDT -----  Type: Patient Call Back    Who called:Enid gonzáles [facility]    What is the request in detail:unable to obtain pt lab. Pt refused to allow second attempt. Please call    Can the clinic reply by [facility]?    Would the patient rather a call back or a response via [facility]? call    Best call back number:

## 2023-03-28 NOTE — PROGRESS NOTES
CHW - Case Closure    This Community Health Worker spoke to patient via telephone today.   Pt reported: Patient has no needs.  Pt denied any additional needs at this time and agrees with episode closure at this time.  Provided patient with Community Health Worker's contact information and encouraged him to contact this Community Health Worker if additional needs arise.

## 2023-03-30 ENCOUNTER — OFFICE VISIT (OUTPATIENT)
Dept: FAMILY MEDICINE | Facility: CLINIC | Age: 83
End: 2023-03-30
Payer: MEDICARE

## 2023-03-30 VITALS
HEART RATE: 54 BPM | SYSTOLIC BLOOD PRESSURE: 138 MMHG | WEIGHT: 178.81 LBS | TEMPERATURE: 98 F | BODY MASS INDEX: 25.03 KG/M2 | HEIGHT: 71 IN | DIASTOLIC BLOOD PRESSURE: 60 MMHG | OXYGEN SATURATION: 98 %

## 2023-03-30 DIAGNOSIS — Z79.01 CURRENT USE OF LONG TERM ANTICOAGULATION: ICD-10-CM

## 2023-03-30 DIAGNOSIS — J44.9 CHRONIC OBSTRUCTIVE PULMONARY DISEASE, UNSPECIFIED COPD TYPE: ICD-10-CM

## 2023-03-30 DIAGNOSIS — I70.0 AORTIC ATHEROSCLEROSIS: ICD-10-CM

## 2023-03-30 DIAGNOSIS — I10 ESSENTIAL HYPERTENSION: ICD-10-CM

## 2023-03-30 DIAGNOSIS — D69.2 OTHER NONTHROMBOCYTOPENIC PURPURA: ICD-10-CM

## 2023-03-30 DIAGNOSIS — I48.0 PAROXYSMAL ATRIAL FIBRILLATION: ICD-10-CM

## 2023-03-30 DIAGNOSIS — N18.30 STAGE 3 CHRONIC KIDNEY DISEASE, UNSPECIFIED WHETHER STAGE 3A OR 3B CKD: ICD-10-CM

## 2023-03-30 DIAGNOSIS — Z86.010 HISTORY OF COLONIC POLYPS: ICD-10-CM

## 2023-03-30 DIAGNOSIS — K56.609 SMALL BOWEL OBSTRUCTION: Primary | ICD-10-CM

## 2023-03-30 DIAGNOSIS — E78.5 HYPERLIPIDEMIA, UNSPECIFIED HYPERLIPIDEMIA TYPE: ICD-10-CM

## 2023-03-30 DIAGNOSIS — N18.31 STAGE 3A CHRONIC KIDNEY DISEASE: ICD-10-CM

## 2023-03-30 DIAGNOSIS — D50.9 IRON DEFICIENCY ANEMIA, UNSPECIFIED IRON DEFICIENCY ANEMIA TYPE: ICD-10-CM

## 2023-03-30 DIAGNOSIS — D64.9 ANEMIA, UNSPECIFIED TYPE: ICD-10-CM

## 2023-03-30 PROCEDURE — 99999 PR PBB SHADOW E&M-EST. PATIENT-LVL IV: ICD-10-PCS | Mod: PBBFAC,,, | Performed by: INTERNAL MEDICINE

## 2023-03-30 PROCEDURE — 3075F SYST BP GE 130 - 139MM HG: CPT | Mod: CPTII,S$GLB,, | Performed by: INTERNAL MEDICINE

## 2023-03-30 PROCEDURE — 1159F PR MEDICATION LIST DOCUMENTED IN MEDICAL RECORD: ICD-10-PCS | Mod: CPTII,S$GLB,, | Performed by: INTERNAL MEDICINE

## 2023-03-30 PROCEDURE — 3078F PR MOST RECENT DIASTOLIC BLOOD PRESSURE < 80 MM HG: ICD-10-PCS | Mod: CPTII,S$GLB,, | Performed by: INTERNAL MEDICINE

## 2023-03-30 PROCEDURE — 1126F PR PAIN SEVERITY QUANTIFIED, NO PAIN PRESENT: ICD-10-PCS | Mod: CPTII,S$GLB,, | Performed by: INTERNAL MEDICINE

## 2023-03-30 PROCEDURE — 3288F FALL RISK ASSESSMENT DOCD: CPT | Mod: CPTII,S$GLB,, | Performed by: INTERNAL MEDICINE

## 2023-03-30 PROCEDURE — 1159F MED LIST DOCD IN RCRD: CPT | Mod: CPTII,S$GLB,, | Performed by: INTERNAL MEDICINE

## 2023-03-30 PROCEDURE — 1101F PT FALLS ASSESS-DOCD LE1/YR: CPT | Mod: CPTII,S$GLB,, | Performed by: INTERNAL MEDICINE

## 2023-03-30 PROCEDURE — 1101F PR PT FALLS ASSESS DOC 0-1 FALLS W/OUT INJ PAST YR: ICD-10-PCS | Mod: CPTII,S$GLB,, | Performed by: INTERNAL MEDICINE

## 2023-03-30 PROCEDURE — 3075F PR MOST RECENT SYSTOLIC BLOOD PRESS GE 130-139MM HG: ICD-10-PCS | Mod: CPTII,S$GLB,, | Performed by: INTERNAL MEDICINE

## 2023-03-30 PROCEDURE — 3078F DIAST BP <80 MM HG: CPT | Mod: CPTII,S$GLB,, | Performed by: INTERNAL MEDICINE

## 2023-03-30 PROCEDURE — 1126F AMNT PAIN NOTED NONE PRSNT: CPT | Mod: CPTII,S$GLB,, | Performed by: INTERNAL MEDICINE

## 2023-03-30 PROCEDURE — 99999 PR PBB SHADOW E&M-EST. PATIENT-LVL IV: CPT | Mod: PBBFAC,,, | Performed by: INTERNAL MEDICINE

## 2023-03-30 PROCEDURE — 3288F PR FALLS RISK ASSESSMENT DOCUMENTED: ICD-10-PCS | Mod: CPTII,S$GLB,, | Performed by: INTERNAL MEDICINE

## 2023-03-30 PROCEDURE — 99495 TRANSJ CARE MGMT MOD F2F 14D: CPT | Mod: S$GLB,,, | Performed by: INTERNAL MEDICINE

## 2023-03-30 PROCEDURE — 99495 TCM SERVICES (MODERATE COMPLEXITY): ICD-10-PCS | Mod: S$GLB,,, | Performed by: INTERNAL MEDICINE

## 2023-03-30 NOTE — PROGRESS NOTES
"Chief complaint:TCC, 12 days ago d/c     Physical exam, 11/22    82 -year-old  male  Doing well after his hospitalization.  He is eating and drinking fine.  The home health nurse tried to draw blood but blue a vein.  He has no symptoms of worsening anemia.  He has iron pills at home and gave him written instructions to take it with vitamin-C and we can recheck labs in 6-8 weeks.  His stools are little bit dark and he can assess on and off iron as it may well relate to the iron pill.  GI obviously wanted to do an EGD and colonoscopy especially with his history of ulcers.  His ferritin was 15.  Blood pressure running well.         HPI:   Mr. Cabrales is an 82 year old man with atrial fibrillation, hypertension, hyperlipidemia and GERD who presented for evaluation of abdominal pain.  He states he was in his usual state of health up until yesterday afternoon when he developed abdominal pain.  The pain was initially a mild discomfort but overnight has progressed to severe, 10/10 pain.  It is located in his mid/upper and right upper abdomen, does not radiate and is worse with movement and deep breaths.  The pain has been mildly relieved by iv dilaudid in the ER and now the pain is coming in paroxysms of continued severe intensity.  He notes overnight feeling nausea and light headedness.   He states he felt the need to vomit or have a bowel movement, but could do neither despite attempts to induce vomiting.  He reports his last bowel movement was yesterday morning.  He is not passing any flatus.  He denies fever, chills, diarrhea, abdominal trauma, falls, chest pain, shortness of breath, new foods and new medications.  He has never had abdominal surgery before and reports his last colonoscopy was "a long time ago".  In the ER he was treated with zofran, pepcid, dilaudid and IV fluids.     Hospital Course By Problem:   * Small bowel obstruction  -Admitted to inpatient status  -CT Abdomen showed distended proximal " small bowel (4.3cm) with abrupt transition in RLQ and no free air in abdomen to suggest perforation  -On admit he has a leukocytosis but is afebrile without tachycardia or hypotension and with normal lactic acid.  -Blood cultures collected on admit  -His abdomen is severely tender with guarding but no rebound.  -Etiology unclear to me at this time - no prior surgeries and no evidence of mass lesions in abdomen.  -General surgery consulted and input appreciated.  -Small bowel follow through showed mildly dilated upper abdominal small bowel loops and some air within the transverse colon.  Administration of contrast through the NG tube reveals prominent gastroesophageal reflux extending at least to the level of the aortic arch.  8 hour film demonstrates some dilated small bowel loops in the central abdomen but the contrast has reached the rectum  -Treated conservatively with bowel rest, NGT to LIWS, IV fluids, anti-emetics and iv analgesics.  NG tube now out and tolerating diet, albeit not eating very much.  -Has been cleared for discharge by general surgery  -Likely ok for discharge home.  Will prescribe pro-biotic and zofran and recommend low residue diet with slow advancement over next week to normal consistency.  Recommend f/u with pcp, general surgery and gastroenterology     Diarrhea  -Now with loose stool  -Unclear if SBO resolved or if post-obstructive  -No recent antibiotics to suggest C.diff.  -C.diff negative.  No fecal WBC.  -Continue probiotic.  -Avoid imodium for now given SBO.     Iron deficiency anemia  -Hb 11.9 on admit and now 10.6  -No evidence of bleeding  -Folate and B12 are replete  -Ferritin is low and TIBC only 11.9% consistent with iron deficiency  -Sent Rx for FeSO4 at discharge and placed GI referral.     Paroxysmal atrial fibrillation  -On admit EKG shows sinus bradycardia  -At home takes amiodarone 200mg qd and eliquis bid.  Last mazariegos of eliquis was 3/14 in afternoon  -Will hold amiodarone  for now as half life is 45 days.  If needed could give non-titrating gtt 0.5mg/hour (per pharmacy)  -Held eliquis and treated with heparin drip.    -OK to resume eliquis and amiodarone      Hypertension  -BP moderately elevated at times  -At home takes norvasc and chlorthalidone  -Resume home chlorthalidone and norvasc at discharge  -Consider increasing norvasc to 10 mg qd  -Follow up with pcp within 1 week.     Current use of long term anticoagulation  -Treatment as above.     Acute renal failure superimposed on stage 3 chronic kidney disease  -Baseline Cr 1.4  -On admit Cr 1.6 and remains stable today  -Appears mildly hypovolemic  -Avoid nephrotoxic agents and renally dose meds  -Treated with gentle IV fluids      Delirium  -Noted significant delirium overnight 3/15-3/16  -Suspect multifactorial due to hospitalization and opioid pain meds  -Continue delirium precautions  -Stopped opioids 3/16   -Delirium resolved     Dyslipidemia  -Resume statin at discharge     Hypokalemia  -Replaced potassium today and on repeat potassium had normalized     GERD (gastroesophageal reflux disease)  -Notes frequent GERD symptoms and takes tums prn at home  -Treated with bid ppi during his stay        Seen GI  Assessment:  JOSÉ   Rectal bleeding   Constipation  History of PUD   History of colon polyps   Recent SBO   Patient with JOSÉ on blood work with constipation and intermittent rectal bleeding. Also with prior history of PUD on 2016 EGD which was not followed up. We discussed his indications for EGD and cscope and all the risks and benefits with anesthesia and endoscopy. I do recommend he has these procedures due to his symptoms but he would like conservative management at first. Discussed fiber and miralax. Continue iron replacement. Follow up in clinic in 3 months to check blood work. Given number to reach clinic if he becomes agreeable for the procedures in the meantime    Recommendations:   - Fiber daily, given handout   -  Miralax daily   - Squatty potty use   - He has indications for EGD and cscope but he would like to be management conservatively initially. We discussed the possibly of missed cancer and he is understanding   - RTC 3 months      ROS:   CONST: weight stable. EYES: no vision change. ENT: no sore throat. CV: no chest pain w/ exertion. RESP: no shortness of breath. GI: no nausea, vomiting, diarrhea. No dysphagia. : no urinary issues. MUSCULOSKELETAL: no new myalgias or arthralgias. SKIN: no new changes. NEURO: no focal deficits. PSYCH: no new issues. ENDOCRINE: no polyuria. HEME: no lymph nodes. ALLERGY: no general pruritis.     PAST MEDICAL HISTORY:  1. Hypertension.  2. Hyperlipidemia.  3. GERD.  4. Vertigo after trauma  5. Colonoscopy was normal around 2009 per [facility], 9/16  there was a polyp -5 yrs.  6. Atrial fib/flutter -[facility] -cardioverted  7.  Dizziness, seen by neurology    8.peptic ulcer disease on EGDNovember 2016 with GI bleeding  9.  Small bowel obstruction, resolved on its own November 2016  10. anemia    PAST SURGICAL HISTORY:  1. Left leg surgery, sounds like venous surgery -Dr Limon  2. Nasal septal repair.      ALLERGIES: NKDA.    SOCIAL HISTORY: He puffs on a rare cigar, he is not a smoker. Drinks wine on occasion,  with 3 children. He has 7 grandchildren. 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 He is originally from [facility]. He is quite active.    FAMILY HISTORY: Sr. had breast cancer. Otherwise negative for prostate or colon cancer, negative for premature coronary disease or diabetes.    HEALTHCARE SCREENING: Colonoscopy was normal around 2009 per [facility], colonoscopy 9/16  there was a polyp -5 yrs ,       Gen: no distress  EYES: conjunctiva clear, non-icteric, PERRL , not pale  ENT: nose clear, nasal mucosa normal, oropharynx clear and moist, teeth good,  NECK:supple, thyroid non-palpable  RESP: effort is good, lungs clear  CV: heart RRR w/o murmur,  gallops or rubs; no carotid bruits, no edema  GI: abdomen soft, non-distended, non-tender, no hepatosplenomegaly  MS: gait normal, no clubbing or cyanosis of the digits,   SKIN: no rashes, warm to touch         No further colon bleeding.  Prior records regarding this reviewed as below.  Seen by MD 10/15/19 -Bright red blood per rectum this morning after having a bowel movement  Two episodes - second occurred this morning as well  He does note some pain in his lower abdominal region - mild - 'comes and goes'  He believes it might be gas   Patient called GI/Dr Harding - states could get an appointment as early as 10/30/19   Did not take his Pradaxa today because he is concerned     Saw Dmitriy  and given pills for constipation. No bleeding    Regarding hypertension is good control.  He was on Diovan HCTZ and his most recent sodium level was 127.  He did increase his salt intake.  He was on the HCTZ  And then chlorthalidione which we did remove.      Regarding hyperlipidemia he is on medication      He previously had  a moderate amount of pain that is been chronic in his feet.  It looks like in review podiatry no from a few years ago he has a bunion.  We discussed the condition will not improve and looks like podiatry said conservative therapies might work but only definitive would be surgery.  He has an appointment with Dr. Langley in Orthopedics  and he will consider seeing Podiatry again.  Better with WIDER shoes    He has atrial fibrillation which apparently is under control.      He has COPD without any problems. He has atherosclerosis of the aorta noted in prior records.            Counseled at length to review prior records.  All these issues reviewed and patient counseled in evaluation and management will be based upon [facility]    30 day monitor  At baseline, in the absence of symptoms, the rhythm was e sinus with heart rates in the 50s.  No symptom was reported over the course of the month.  There were auto  trigger events for bradycardia.  The slowest of these occurred on 09/26 at 12:35 a.m..  The slowest heart rate recorded was 36 beats per minute this occurred during sinus bradycardia.  The recording also included an atrial supraventricular run perhaps with some nonconducted P waves.  No symptom was reported; this was during typical sleeping hours.  No profound bradycardia was detected during typical waking hours.     Impression:  Sinus rhythm with bradycardic episodes during typical sleeping hours (which is normal).  No symptom was reported      Family and/or Caretaker present at visit?  No.  Diagnostic tests reviewed/disposition: No diagnosic tests pending after this hospitalization.  Disease/illness education: yes  Home health/community services discussion/referrals: Patient has home health established at  .   Establishment or re-establishment of referral orders for community resources: No other necessary community resources.   Discussion with other health care providers: No discussion with other health care providers necessary.              Assessment and plan:      Diagnoses and all orders for this visit:    Small bowel obstruction, resolved    Iron deficiency anemia, unspecified iron deficiency anemia type, a multitude of potential sources of GI blood loss.  Assess response to iron supplementation, watch for constipation and he has MiraLax to use and use it probably on a daily basis to prevent any constipation and increased risk for recurrent bowel obstruction.  -     Iron and TIBC; Future  -     Ferritin; Future  -     Comprehensive Metabolic Panel; Future  -     CBC Auto Differential; Future    Paroxysmal atrial fibrillation , chronic and stable    Chronic obstructive pulmonary disease, unspecified COPD type, chronic and stable    Aortic atherosclerosis    Essential hypertension, chronic and stable    Stage 3a chronic kidney disease, some expected worsening of renal insufficiency likely due to his acute  illness and very likely will returned to baseline as he is back to normal hydration.  Recheck with next lab work.  I do not see any immediate need based on presentation to attempt repeat  -     Comprehensive Metabolic Panel; Future    Hyperlipidemia, unspecified hyperlipidemia type    Current use of long term anticoagulation    Stage 3 chronic kidney disease, unspecified whether stage 3a or 3b CKD    Anemia, unspecified type    History of colonic polyps    Other nonthrombocytopenic purpura

## 2023-05-01 RX ORDER — AMLODIPINE BESYLATE 5 MG/1
TABLET ORAL
Qty: 90 TABLET | Refills: 3 | Status: SHIPPED | OUTPATIENT
Start: 2023-05-01

## 2023-05-01 NOTE — TELEPHONE ENCOUNTER
Refill Decision Note      Refill Decision Note   Benitez Cabrales  is requesting a refill authorization.  Brief Assessment and Rationale for Refill:  Approve     Medication Therapy Plan:         Comments:     Note composed:6:14 AM 05/01/2023             Appointments     Last Visit   3/30/2023 Diomedes Ayoub MD   Next Visit   Visit date not found Diomedes Ayoub MD

## 2023-05-01 NOTE — TELEPHONE ENCOUNTER
No care due was identified.  [facility] Embedded Care Due Messages. Reference number: [id number].   5/01/2023 5:31:16 AM CDT

## 2023-05-02 ENCOUNTER — DOCUMENT SCAN (OUTPATIENT)
Dept: HOME HEALTH SERVICES | Facility: HOSPITAL | Age: 83
End: 2023-05-02
Payer: MEDICARE

## 2023-05-09 DIAGNOSIS — I10 ESSENTIAL HYPERTENSION: Chronic | ICD-10-CM

## 2023-05-11 RX ORDER — CHLORTHALIDONE 25 MG/1
TABLET ORAL
Qty: 90 TABLET | Refills: 3 | Status: SHIPPED | OUTPATIENT
Start: 2023-05-11

## 2023-05-19 RX ORDER — APIXABAN 5 MG/1
TABLET, FILM COATED ORAL
Qty: 180 TABLET | Refills: 3 | Status: SHIPPED | OUTPATIENT
Start: 2023-05-19 | End: 2023-07-24 | Stop reason: SDUPTHER

## 2023-06-19 PROBLEM — N17.9 ACUTE RENAL FAILURE SUPERIMPOSED ON STAGE 3 CHRONIC KIDNEY DISEASE: Status: RESOLVED | Noted: 2020-08-11 | Resolved: 2023-06-19

## 2023-06-19 PROBLEM — N18.30 ACUTE RENAL FAILURE SUPERIMPOSED ON STAGE 3 CHRONIC KIDNEY DISEASE: Status: RESOLVED | Noted: 2020-08-11 | Resolved: 2023-06-19

## 2023-06-20 ENCOUNTER — OFFICE VISIT (OUTPATIENT)
Dept: GASTROENTEROLOGY | Facility: CLINIC | Age: 83
End: 2023-06-20
Payer: MEDICARE

## 2023-06-20 VITALS
BODY MASS INDEX: 25.46 KG/M2 | HEIGHT: 71 IN | SYSTOLIC BLOOD PRESSURE: 145 MMHG | HEART RATE: 62 BPM | DIASTOLIC BLOOD PRESSURE: 65 MMHG | WEIGHT: 181.88 LBS

## 2023-06-20 DIAGNOSIS — K59.04 CHRONIC IDIOPATHIC CONSTIPATION: Primary | ICD-10-CM

## 2023-06-20 PROCEDURE — 1159F PR MEDICATION LIST DOCUMENTED IN MEDICAL RECORD: ICD-10-PCS | Mod: CPTII,S$GLB,, | Performed by: INTERNAL MEDICINE

## 2023-06-20 PROCEDURE — 1159F MED LIST DOCD IN RCRD: CPT | Mod: CPTII,S$GLB,, | Performed by: INTERNAL MEDICINE

## 2023-06-20 PROCEDURE — 3078F DIAST BP <80 MM HG: CPT | Mod: CPTII,S$GLB,, | Performed by: INTERNAL MEDICINE

## 2023-06-20 PROCEDURE — 3078F PR MOST RECENT DIASTOLIC BLOOD PRESSURE < 80 MM HG: ICD-10-PCS | Mod: CPTII,S$GLB,, | Performed by: INTERNAL MEDICINE

## 2023-06-20 PROCEDURE — 1126F PR PAIN SEVERITY QUANTIFIED, NO PAIN PRESENT: ICD-10-PCS | Mod: CPTII,S$GLB,, | Performed by: INTERNAL MEDICINE

## 2023-06-20 PROCEDURE — 3288F PR FALLS RISK ASSESSMENT DOCUMENTED: ICD-10-PCS | Mod: CPTII,S$GLB,, | Performed by: INTERNAL MEDICINE

## 2023-06-20 PROCEDURE — 3288F FALL RISK ASSESSMENT DOCD: CPT | Mod: CPTII,S$GLB,, | Performed by: INTERNAL MEDICINE

## 2023-06-20 PROCEDURE — 99999 PR PBB SHADOW E&M-EST. PATIENT-LVL IV: CPT | Mod: PBBFAC,,, | Performed by: INTERNAL MEDICINE

## 2023-06-20 PROCEDURE — 1126F AMNT PAIN NOTED NONE PRSNT: CPT | Mod: CPTII,S$GLB,, | Performed by: INTERNAL MEDICINE

## 2023-06-20 PROCEDURE — 99212 OFFICE O/P EST SF 10 MIN: CPT | Mod: S$GLB,,, | Performed by: INTERNAL MEDICINE

## 2023-06-20 PROCEDURE — 3077F PR MOST RECENT SYSTOLIC BLOOD PRESSURE >= 140 MM HG: ICD-10-PCS | Mod: CPTII,S$GLB,, | Performed by: INTERNAL MEDICINE

## 2023-06-20 PROCEDURE — 99212 PR OFFICE/OUTPT VISIT, EST, LEVL II, 10-19 MIN: ICD-10-PCS | Mod: S$GLB,,, | Performed by: INTERNAL MEDICINE

## 2023-06-20 PROCEDURE — 1160F RVW MEDS BY RX/DR IN RCRD: CPT | Mod: CPTII,S$GLB,, | Performed by: INTERNAL MEDICINE

## 2023-06-20 PROCEDURE — 99999 PR PBB SHADOW E&M-EST. PATIENT-LVL IV: ICD-10-PCS | Mod: PBBFAC,,, | Performed by: INTERNAL MEDICINE

## 2023-06-20 PROCEDURE — 3077F SYST BP >= 140 MM HG: CPT | Mod: CPTII,S$GLB,, | Performed by: INTERNAL MEDICINE

## 2023-06-20 PROCEDURE — 1101F PR PT FALLS ASSESS DOC 0-1 FALLS W/OUT INJ PAST YR: ICD-10-PCS | Mod: CPTII,S$GLB,, | Performed by: INTERNAL MEDICINE

## 2023-06-20 PROCEDURE — 1101F PT FALLS ASSESS-DOCD LE1/YR: CPT | Mod: CPTII,S$GLB,, | Performed by: INTERNAL MEDICINE

## 2023-06-20 PROCEDURE — 1160F PR REVIEW ALL MEDS BY PRESCRIBER/CLIN PHARMACIST DOCUMENTED: ICD-10-PCS | Mod: CPTII,S$GLB,, | Performed by: INTERNAL MEDICINE

## 2023-06-20 NOTE — PROGRESS NOTES
"[facility] Note    CC: constipation    HPI 82 y.o. male with past medical history of SBO, JOSÉ who presents for follow up of chronic, daily, improved constipation without nausea and vomiting.  His constipation is improved with Miralax.    He does not wish to proceed with EGD and colonoscopy to evaluate his anemia.    Past Medical History  Past Medical History:   Diagnosis Date    Anticoagulant long-term use     Atrial fibrillation     GERD (gastroesophageal reflux disease)     Hyperlipidemia     Hypertension     Nuclear sclerosis, bilateral 10/09/2017    Rhinitis medicamentosa 06/30/2014    Small bowel obstruction     Vertigo 06/13/2013         Physical Examination  BP (!) 145/65   Pulse 62   Ht 5' 11" (1.803 m)   Wt 82.5 kg (181 lb 14.1 oz)   BMI 25.37 kg/m²   General appearance: alert, cooperative, no distress  HENT: Normocephalic, atraumatic, neck symmetrical, no nasal discharge   Abdomen: soft, non-tender; non distended, no rebound or guarding  Neurologic: Alert and oriented X 3, moving all four extremities, intact sensation to light touch    Labs:  Lab Results   Component Value Date    WBC 8.30 03/18/2023    WBC 8.30 03/18/2023    HGB 10.6 (L) 03/18/2023    HGB 10.6 (L) 03/18/2023    HCT 31.6 (L) 03/18/2023    HCT 31.6 (L) 03/18/2023    MCV 78 (L) 03/18/2023    MCV 78 (L) 03/18/2023     03/18/2023     03/18/2023         CMP  Sodium   Date Value Ref Range Status   03/18/2023 137 136 - 145 mmol/L Final     Potassium   Date Value Ref Range Status   03/18/2023 3.5 3.5 - 5.1 mmol/L Final     Chloride   Date Value Ref Range Status   03/18/2023 106 95 - 110 mmol/L Final     CO2   Date Value Ref Range Status   03/18/2023 20 (L) 23 - 29 mmol/L Final     Glucose   Date Value Ref Range Status   03/18/2023 105 70 - 110 mg/dL Final     BUN   Date Value Ref Range Status   03/18/2023 29 (H) 8 - 23 mg/dL Final     Creatinine   Date Value Ref Range Status   03/18/2023 1.7 (H) 0.5 - 1.4 mg/dL Final "     Calcium   Date Value Ref Range Status   03/18/2023 8.7 8.7 - 10.5 mg/dL Final     Total Protein   Date Value Ref Range Status   03/15/2023 8.0 6.0 - 8.4 g/dL Final     Albumin   Date Value Ref Range Status   03/15/2023 3.9 3.5 - 5.2 g/dL Final     Total Bilirubin   Date Value Ref Range Status   03/15/2023 0.7 0.1 - 1.0 mg/dL Final     Comment:     For infants and newborns, interpretation of results should be based  on gestational age, weight and in agreement with clinical  observations.    Premature Infant recommended reference ranges:  Up to 24 hours.............<8.0 mg/dL  Up to 48 hours............<12.0 mg/dL  3-5 days..................<15.0 mg/dL  6-29 days.................<15.0 mg/dL       Alkaline Phosphatase   Date Value Ref Range Status   03/15/2023 67 55 - 135 U/L Final     AST   Date Value Ref Range Status   03/15/2023 32 10 - 40 U/L Final     ALT   Date Value Ref Range Status   03/15/2023 22 10 - 44 U/L Final     Anion Gap   Date Value Ref Range Status   03/18/2023 11 8 - 16 mmol/L Final     eGFR   Date Value Ref Range Status   03/18/2023 40 (A) >60 mL/min/1.73 m^2 Final           Assessment:   The patient is an 81 yo man with past medical history of SBO, JOSÉ who presents for follow up of his anemia and constipation.  He does not wish to proceed with EGD or colonoscopy to evaluate his anemia.  His constipation is improved with Miralax.    Plan:  -Return to GI clinic as needed.    Chiqui Tolentino MD

## 2023-06-27 ENCOUNTER — PATIENT OUTREACH (OUTPATIENT)
Dept: ADMINISTRATIVE | Facility: HOSPITAL | Age: 83
End: 2023-06-27
Payer: MEDICARE

## 2023-06-27 ENCOUNTER — OFFICE VISIT (OUTPATIENT)
Dept: FAMILY MEDICINE | Facility: CLINIC | Age: 83
End: 2023-06-27
Payer: MEDICARE

## 2023-06-27 ENCOUNTER — LAB VISIT (OUTPATIENT)
Dept: LAB | Facility: HOSPITAL | Age: 83
End: 2023-06-27
Attending: INTERNAL MEDICINE
Payer: MEDICARE

## 2023-06-27 VITALS
BODY MASS INDEX: 25.16 KG/M2 | HEIGHT: 71 IN | WEIGHT: 179.69 LBS | HEART RATE: 60 BPM | DIASTOLIC BLOOD PRESSURE: 60 MMHG | SYSTOLIC BLOOD PRESSURE: 138 MMHG | TEMPERATURE: 98 F | OXYGEN SATURATION: 96 %

## 2023-06-27 DIAGNOSIS — D50.9 IRON DEFICIENCY ANEMIA, UNSPECIFIED IRON DEFICIENCY ANEMIA TYPE: Primary | ICD-10-CM

## 2023-06-27 DIAGNOSIS — N18.31 STAGE 3A CHRONIC KIDNEY DISEASE: ICD-10-CM

## 2023-06-27 DIAGNOSIS — I10 ESSENTIAL HYPERTENSION: ICD-10-CM

## 2023-06-27 DIAGNOSIS — Z79.01 CURRENT USE OF LONG TERM ANTICOAGULATION: ICD-10-CM

## 2023-06-27 DIAGNOSIS — I48.0 PAROXYSMAL ATRIAL FIBRILLATION: ICD-10-CM

## 2023-06-27 DIAGNOSIS — R42 DIZZINESS: ICD-10-CM

## 2023-06-27 DIAGNOSIS — Z86.010 HISTORY OF COLONIC POLYPS: ICD-10-CM

## 2023-06-27 DIAGNOSIS — D50.9 IRON DEFICIENCY ANEMIA, UNSPECIFIED IRON DEFICIENCY ANEMIA TYPE: ICD-10-CM

## 2023-06-27 LAB
ALBUMIN SERPL BCP-MCNC: 3.9 G/DL (ref 3.5–5.2)
ALBUMIN SERPL BCP-MCNC: 3.9 G/DL (ref 3.5–5.2)
ALP SERPL-CCNC: 67 U/L (ref 55–135)
ALP SERPL-CCNC: 67 U/L (ref 55–135)
ALT SERPL W/O P-5'-P-CCNC: 20 U/L (ref 10–44)
ALT SERPL W/O P-5'-P-CCNC: 20 U/L (ref 10–44)
ANION GAP SERPL CALC-SCNC: 11 MMOL/L (ref 8–16)
AST SERPL-CCNC: 27 U/L (ref 10–40)
AST SERPL-CCNC: 27 U/L (ref 10–40)
BASOPHILS # BLD AUTO: 0.06 K/UL (ref 0–0.2)
BASOPHILS NFR BLD: 0.9 % (ref 0–1.9)
BILIRUB SERPL-MCNC: 0.3 MG/DL (ref 0.1–1)
BILIRUB SERPL-MCNC: 0.3 MG/DL (ref 0.1–1)
BUN SERPL-MCNC: 27 MG/DL (ref 8–23)
CALCIUM SERPL-MCNC: 10.2 MG/DL (ref 8.7–10.5)
CHLORIDE SERPL-SCNC: 99 MMOL/L (ref 95–110)
CO2 SERPL-SCNC: 26 MMOL/L (ref 23–29)
CREAT SERPL-MCNC: 1.5 MG/DL (ref 0.5–1.4)
DIFFERENTIAL METHOD: ABNORMAL
EOSINOPHIL # BLD AUTO: 0.2 K/UL (ref 0–0.5)
EOSINOPHIL NFR BLD: 3 % (ref 0–8)
ERYTHROCYTE [DISTWIDTH] IN BLOOD BY AUTOMATED COUNT: 16.6 % (ref 11.5–14.5)
EST. GFR  (NO RACE VARIABLE): 46.5 ML/MIN/1.73 M^2
FERRITIN SERPL-MCNC: 26 NG/ML (ref 20–300)
FERRITIN SERPL-MCNC: 26 NG/ML (ref 20–300)
GLUCOSE SERPL-MCNC: 82 MG/DL (ref 70–110)
HCT VFR BLD AUTO: 40.4 % (ref 40–54)
HGB BLD-MCNC: 12.9 G/DL (ref 14–18)
IMM GRANULOCYTES # BLD AUTO: 0.02 K/UL (ref 0–0.04)
IMM GRANULOCYTES NFR BLD AUTO: 0.3 % (ref 0–0.5)
IRON SERPL-MCNC: 72 UG/DL (ref 45–160)
IRON SERPL-MCNC: 72 UG/DL (ref 45–160)
LYMPHOCYTES # BLD AUTO: 1.5 K/UL (ref 1–4.8)
LYMPHOCYTES NFR BLD: 21.6 % (ref 18–48)
MCH RBC QN AUTO: 28.7 PG (ref 27–31)
MCHC RBC AUTO-ENTMCNC: 31.9 G/DL (ref 32–36)
MCV RBC AUTO: 90 FL (ref 82–98)
MONOCYTES # BLD AUTO: 0.7 K/UL (ref 0.3–1)
MONOCYTES NFR BLD: 10.2 % (ref 4–15)
NEUTROPHILS # BLD AUTO: 4.4 K/UL (ref 1.8–7.7)
NEUTROPHILS NFR BLD: 64 % (ref 38–73)
NRBC BLD-RTO: 0 /100 WBC
PLATELET # BLD AUTO: 164 K/UL (ref 150–450)
PMV BLD AUTO: 11.6 FL (ref 9.2–12.9)
POTASSIUM SERPL-SCNC: 4.1 MMOL/L (ref 3.5–5.1)
PROT SERPL-MCNC: 7.8 G/DL (ref 6–8.4)
PROT SERPL-MCNC: 7.8 G/DL (ref 6–8.4)
RBC # BLD AUTO: 4.49 M/UL (ref 4.6–6.2)
SATURATED IRON: 16 % (ref 20–50)
SATURATED IRON: 16 % (ref 20–50)
SODIUM SERPL-SCNC: 136 MMOL/L (ref 136–145)
TOTAL IRON BINDING CAPACITY: 457 UG/DL (ref 250–450)
TOTAL IRON BINDING CAPACITY: 457 UG/DL (ref 250–450)
TRANSFERRIN SERPL-MCNC: 309 MG/DL (ref 200–375)
TRANSFERRIN SERPL-MCNC: 309 MG/DL (ref 200–375)
WBC # BLD AUTO: 6.89 K/UL (ref 3.9–12.7)

## 2023-06-27 PROCEDURE — 99214 OFFICE O/P EST MOD 30 MIN: CPT | Mod: S$GLB,,, | Performed by: INTERNAL MEDICINE

## 2023-06-27 PROCEDURE — 1125F AMNT PAIN NOTED PAIN PRSNT: CPT | Mod: CPTII,S$GLB,, | Performed by: INTERNAL MEDICINE

## 2023-06-27 PROCEDURE — 99999 PR PBB SHADOW E&M-EST. PATIENT-LVL III: ICD-10-PCS | Mod: PBBFAC,,, | Performed by: INTERNAL MEDICINE

## 2023-06-27 PROCEDURE — 80053 COMPREHEN METABOLIC PANEL: CPT | Performed by: INTERNAL MEDICINE

## 2023-06-27 PROCEDURE — 3075F PR MOST RECENT SYSTOLIC BLOOD PRESS GE 130-139MM HG: ICD-10-PCS | Mod: CPTII,S$GLB,, | Performed by: INTERNAL MEDICINE

## 2023-06-27 PROCEDURE — 3075F SYST BP GE 130 - 139MM HG: CPT | Mod: CPTII,S$GLB,, | Performed by: INTERNAL MEDICINE

## 2023-06-27 PROCEDURE — 3288F PR FALLS RISK ASSESSMENT DOCUMENTED: ICD-10-PCS | Mod: CPTII,S$GLB,, | Performed by: INTERNAL MEDICINE

## 2023-06-27 PROCEDURE — 1101F PT FALLS ASSESS-DOCD LE1/YR: CPT | Mod: CPTII,S$GLB,, | Performed by: INTERNAL MEDICINE

## 2023-06-27 PROCEDURE — 3288F FALL RISK ASSESSMENT DOCD: CPT | Mod: CPTII,S$GLB,, | Performed by: INTERNAL MEDICINE

## 2023-06-27 PROCEDURE — 82728 ASSAY OF FERRITIN: CPT | Performed by: INTERNAL MEDICINE

## 2023-06-27 PROCEDURE — 1125F PR PAIN SEVERITY QUANTIFIED, PAIN PRESENT: ICD-10-PCS | Mod: CPTII,S$GLB,, | Performed by: INTERNAL MEDICINE

## 2023-06-27 PROCEDURE — 36415 COLL VENOUS BLD VENIPUNCTURE: CPT | Mod: PO | Performed by: INTERNAL MEDICINE

## 2023-06-27 PROCEDURE — 99214 PR OFFICE/OUTPT VISIT, EST, LEVL IV, 30-39 MIN: ICD-10-PCS | Mod: S$GLB,,, | Performed by: INTERNAL MEDICINE

## 2023-06-27 PROCEDURE — 1101F PR PT FALLS ASSESS DOC 0-1 FALLS W/OUT INJ PAST YR: ICD-10-PCS | Mod: CPTII,S$GLB,, | Performed by: INTERNAL MEDICINE

## 2023-06-27 PROCEDURE — 3078F DIAST BP <80 MM HG: CPT | Mod: CPTII,S$GLB,, | Performed by: INTERNAL MEDICINE

## 2023-06-27 PROCEDURE — 3078F PR MOST RECENT DIASTOLIC BLOOD PRESSURE < 80 MM HG: ICD-10-PCS | Mod: CPTII,S$GLB,, | Performed by: INTERNAL MEDICINE

## 2023-06-27 PROCEDURE — 85025 COMPLETE CBC W/AUTO DIFF WBC: CPT | Performed by: INTERNAL MEDICINE

## 2023-06-27 PROCEDURE — 99999 PR PBB SHADOW E&M-EST. PATIENT-LVL III: CPT | Mod: PBBFAC,,, | Performed by: INTERNAL MEDICINE

## 2023-06-27 PROCEDURE — 84466 ASSAY OF TRANSFERRIN: CPT | Performed by: INTERNAL MEDICINE

## 2023-06-27 RX ORDER — MOMETASONE FUROATE 1 MG/G
CREAM TOPICAL 2 TIMES DAILY
COMMUNITY
Start: 2023-06-22

## 2023-06-27 RX ORDER — FERROUS SULFATE 325(65) MG
325 TABLET, DELAYED RELEASE (ENTERIC COATED) ORAL DAILY
Qty: 30 TABLET | Refills: 1 | Status: SHIPPED | OUTPATIENT
Start: 2023-06-27

## 2023-06-27 RX ORDER — MECLIZINE HYDROCHLORIDE 25 MG/1
25 TABLET ORAL 3 TIMES DAILY PRN
Qty: 60 TABLET | Refills: 12 | Status: SHIPPED | OUTPATIENT
Start: 2023-06-27

## 2023-06-27 NOTE — PROGRESS NOTES
Chief complaint:  Follow-up on anemia, refill meclizine.     Physical exam, 11/22    82 -year-old  male  Doing well after his hospitalization.  He is eating and drinking fine.  The home health nurse tried to draw blood but blew a vein.  He has no symptoms of worsening anemia.  He has iron pills at home and gave him written instructions to take it with vitamin-C and we can recheck labs in 6-8 weeks.  His stools are little bit dark and he can assess on and off iron as it may well relate to the iron pill.  GI obviously wanted to do an EGD and colonoscopy especially with his history of ulcers.  His ferritin was 15.  Blood pressure running well.   No refill on iron so last taken was March.  No symptoms of anemia, no clinical blood loss.  Was taking with vitamin-C we discussed the rationale of probably restarting the iron daily and he had no constipation with it.  Continue vitamin-C with it.  Will repeat labs today.      Occasionally gets some dizziness and vertigo any does request refill of his meclizine.      HPI:   Mr. Cabrales is an 82 year old man with atrial fibrillation, hypertension, hyperlipidemia and GERD who presented for evaluation of abdominal pain.  He states he was in his usual state of health up until yesterday afternoon when he developed abdominal pain.  The pain was initially a mild discomfort but overnight has progressed to severe, 10/10 pain.  It is located in his mid/upper and right upper abdomen, does not radiate and is worse with movement and deep breaths.  The pain has been mildly relieved by iv dilaudid in the ER and now the pain is coming in paroxysms of continued severe intensity.  He notes overnight feeling nausea and light headedness.   He states he felt the need to vomit or have a bowel movement, but could do neither despite attempts to induce vomiting.  He reports his last bowel movement was yesterday morning.  He is not passing any flatus.  He denies fever, chills, diarrhea, abdominal  "trauma, falls, chest pain, shortness of breath, new foods and new medications.  He has never had abdominal surgery before and reports his last colonoscopy was "a long time ago".  In the ER he was treated with zofran, pepcid, dilaudid and IV fluids.     Hospital Course By Problem:   * Small bowel obstruction  -Admitted to inpatient status  -CT Abdomen showed distended proximal small bowel (4.3cm) with abrupt transition in RLQ and no free air in abdomen to suggest perforation  -On admit he has a leukocytosis but is afebrile without tachycardia or hypotension and with normal lactic acid.  -Blood cultures collected on admit  -His abdomen is severely tender with guarding but no rebound.  -Etiology unclear to me at this time - no prior surgeries and no evidence of mass lesions in abdomen.  -General surgery consulted and input appreciated.  -Small bowel follow through showed mildly dilated upper abdominal small bowel loops and some air within the transverse colon.  Administration of contrast through the NG tube reveals prominent gastroesophageal reflux extending at least to the level of the aortic arch.  8 hour film demonstrates some dilated small bowel loops in the central abdomen but the contrast has reached the rectum  -Treated conservatively with bowel rest, NGT to LIWS, IV fluids, anti-emetics and iv analgesics.  NG tube now out and tolerating diet, albeit not eating very much.  -Has been cleared for discharge by general surgery  -Likely ok for discharge home.  Will prescribe pro-biotic and zofran and recommend low residue diet with slow advancement over next week to normal consistency.  Recommend f/u with pcp, general surgery and gastroenterology     Diarrhea  -Now with loose stool  -Unclear if SBO resolved or if post-obstructive  -No recent antibiotics to suggest C.diff.  -C.diff negative.  No fecal WBC.  -Continue probiotic.  -Avoid imodium for now given SBO.     Iron deficiency anemia  -Hb 11.9 on admit and now " 10.6  -No evidence of bleeding  -Folate and B12 are replete  -Ferritin is low and TIBC only 11.9% consistent with iron deficiency  -Sent Rx for FeSO4 at discharge and placed GI referral.     Paroxysmal atrial fibrillation  -On admit EKG shows sinus bradycardia  -At home takes amiodarone 200mg qd and eliquis bid.  Last mazariegos of eliquis was 3/14 in afternoon  -Will hold amiodarone for now as half life is 45 days.  If needed could give non-titrating gtt 0.5mg/hour (per pharmacy)  -Held eliquis and treated with heparin drip.    -OK to resume eliquis and amiodarone      Hypertension  -BP moderately elevated at times  -At home takes norvasc and chlorthalidone  -Resume home chlorthalidone and norvasc at discharge  -Consider increasing norvasc to 10 mg qd  -Follow up with pcp within 1 week.     Current use of long term anticoagulation  -Treatment as above.     Acute renal failure superimposed on stage 3 chronic kidney disease  -Baseline Cr 1.4  -On admit Cr 1.6 and remains stable today  -Appears mildly hypovolemic  -Avoid nephrotoxic agents and renally dose meds  -Treated with gentle IV fluids      Delirium  -Noted significant delirium overnight 3/15-3/16  -Suspect multifactorial due to hospitalization and opioid pain meds  -Continue delirium precautions  -Stopped opioids 3/16   -Delirium resolved     Dyslipidemia  -Resume statin at discharge     Hypokalemia  -Replaced potassium today and on repeat potassium had normalized     GERD (gastroesophageal reflux disease)  -Notes frequent GERD symptoms and takes tums prn at home  -Treated with bid ppi during his stay        Seen GI  Assessment:  JOSÉ   Rectal bleeding   Constipation  History of PUD   History of colon polyps   Recent SBO   Patient with JOSÉ on blood work with constipation and intermittent rectal bleeding. Also with prior history of PUD on 2016 EGD which was not followed up. We discussed his indications for EGD and cscope and all the risks and benefits with anesthesia  and endoscopy. I do recommend he has these procedures due to his symptoms but he would like conservative management at first. Discussed fiber and miralax. Continue iron replacement. Follow up in clinic in 3 months to check blood work. Given number to reach clinic if he becomes agreeable for the procedures in the meantime    Recommendations:   - Fiber daily, given handout   - Miralax daily   - Squatty potty use   - He has indications for EGD and cscope but he would like to be management conservatively initially. We discussed the possibly of missed cancer and he is understanding   - RTC 3 months      ROS:   CONST: weight stable. EYES: no vision change. ENT: no sore throat. CV: no chest pain w/ exertion. RESP: no shortness of breath. GI: no nausea, vomiting, diarrhea. No dysphagia. : no urinary issues. MUSCULOSKELETAL: no new myalgias or arthralgias. SKIN: no new changes. NEURO: no focal deficits. PSYCH: no new issues. ENDOCRINE: no polyuria. HEME: no lymph nodes. ALLERGY: no general pruritis.     PAST MEDICAL HISTORY:  1. Hypertension.  2. Hyperlipidemia.  3. GERD.  4. Vertigo after trauma  5. Colonoscopy was normal around 2009 per [facility], [place], operated by [facility], 9/16  there was a polyp -5 yrs.  6. Atrial fib/flutter -[facility] EP -cardioverted  7.  Dizziness, seen by neurology    8.peptic ulcer disease on EGDNovember 2016 with GI bleeding  9.  Small bowel obstruction, resolved on its own November 2016  10. anemia    PAST SURGICAL HISTORY:  1. Left leg surgery, sounds like venous surgery -Dr Limon  2. Nasal septal repair.      ALLERGIES: NKDA.    SOCIAL HISTORY: He puffs on a rare cigar, he is not a smoker. Drinks wine on occasion,  with 3 children. He has 7 grandchildren. 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 He is originally from [facility]. He is quite active.    FAMILY HISTORY: Sr. had breast cancer. Otherwise negative for prostate or colon cancer, negative for premature coronary disease or  diabetes.    HEALTHCARE SCREENING: Colonoscopy was normal around 2009 per [facility], colonoscopy 9/16  there was a polyp -5 yrs ,       Gen: no distress  Exam otherwise deferred, gait is normal        No further colon bleeding.  Prior records regarding this reviewed as below.  Seen by MD 10/15/19 -Bright red blood per rectum this morning after having a bowel movement  Two episodes - second occurred this morning as well  He does note some pain in his lower abdominal region - mild - 'comes and goes'  He believes it might be gas   Patient called GI/Dr Harding - states could get an appointment as early as 10/30/19   Did not take his Pradaxa today because he is concerned     Saw Dmitriy  and given pills for constipation. No bleeding    Regarding hypertension is good control.  He was on Diovan HCTZ and his most recent sodium level was 127.  He did increase his salt intake.  He was on the HCTZ  And then chlorthalidione which we did remove.      Regarding hyperlipidemia he is on medication      He previously had  a moderate amount of pain that is been chronic in his feet.  It looks like in review podiatry no from a few years ago he has a bunion.  We discussed the condition will not improve and looks like podiatry said conservative therapies might work but only definitive would be surgery.  He has an appointment with Dr. Langley in Orthopedics  and he will consider seeing Podiatry again.  Better with WIDER shoes    He has atrial fibrillation which apparently is under control.      He has COPD without any problems. He has atherosclerosis of the aorta noted in prior records.            Counseled at length to review prior records.  All these issues reviewed and patient counseled in evaluation and management will be based upon [facility]    30 day monitor  At baseline, in the absence of symptoms, the rhythm was e sinus with heart rates in the 50s.  No symptom was reported over the course of the month.  There were auto trigger  events for bradycardia.  The slowest of these occurred on 09/26 at 12:35 a.m..  The slowest heart rate recorded was 36 beats per minute this occurred during sinus bradycardia.  The recording also included an atrial supraventricular run perhaps with some nonconducted P waves.  No symptom was reported; this was during typical sleeping hours.  No profound bradycardia was detected during typical waking hours.     Impression:  Sinus rhythm with bradycardic episodes during typical sleeping hours (which is normal).  No symptom was reported            Assessment and plan:      Diagnoses and all orders for this visit:    Iron deficiency anemia, unspecified iron deficiency anemia type, reassess status of anemia and iron levels realizing he has been off of iron and vitamin-C since March due to lack of a refill.  Highly probable we will restart iron  -     Iron and TIBC; Future  -     Ferritin; Future  -     Comprehensive Metabolic Panel; Future  -     CBC Auto Differential; Future    Essential hypertension, chronic and stable  -     Comprehensive Metabolic Panel; Future  -     CBC Auto Differential; Future    Stage 3a chronic kidney disease, reassess  -     Comprehensive Metabolic Panel; Future    Paroxysmal atrial fibrillation, chronic and stable    Current use of long term anticoagulation, follow anemia    History of colonic polyps    Dizziness    Other orders  -     ferrous sulfate 325 (65 FE) MG EC tablet; Take 1 tablet (325 mg total) by mouth once daily.  -     meclizine (ANTIVERT) 25 mg tablet; Take 1 tablet (25 mg total) by mouth 3 (three) times daily as needed for Dizziness (or at least one HS for 1 week when vertigo active).

## 2023-07-06 NOTE — PROGRESS NOTES
Mr. Cabrales is a patient of Dr. Morales and was last seen in clinic 5/13/2022.      Subjective:   Patient ID:  Benitez Cabrales is an 82 y.o. male who presents for follow up of Atrial Fibrillation  .     HPI:    Mr. Cabrales is an 82 y.o. male with AF, HTN, HLD, RBBB, ILR (removed) here for annual follow up.     Background:    Remote dx of AF (~10 y ago; only Rx was beta blockade)  HTN on meds   HL on meds   RBBB, longstanding     Had event monitor placed for dizzy/palpitations. Turns out dizziness was really vertigo and balance issues -- no LH, no presync/sync.  He remains on amiodarone (normal PFT and TFT/LFT); CHADSVASC 3.  ILR with no events on several interrogations. We removed the ILR 12/2018.    He's been feeling well except for frequent nocturia.   He has retired. Works in yard and works out at gym w/o problems.  echo 60%. severe LAE.  TSH OK. LFTs OK. PFT mildly low DLCO  Ecg is sinus shraddha at 52 bpm with first deg AVB and RBBB (baseline).      81 y.o. male with pAF on amiodarone.  Continue eliquis  f/u TSH, LFTs today  For HTN, restart chlorthalidone (he declined higher dose of norvasc due to dizziness in past). Take it in AM.  Return in 1 year with amio tests, or earlier prn.    Update (07/10/2023):    3/2023: SBO treated conservatively.    Today he says he has no new cardiac complaints. Denies worsening MAZA, CP, palps, LH, syncope. Bruises easily.    He is currently taking eliquis 5mg BID for stroke prophylaxis and denies significant bleeding episodes. He is currently being treated with amiodarone 200mg daily for rhythm control.  Kidney function is stable, with a creatinine of 1.5 on 6/27/2023. TSH ok 11/2022. LFTs WNL 6/2023. PFTs 5/2022 DLCO ratio 64.5.    I have personally reviewed the patient's EKG today, which shows sinus bradycardia with 1st deg AVB and RBBB at 58bpm. CO interval is 368. QRS is 154. QT is 498. (Note: CO 390ms at last EP clinic visit)    Relevant Cardiac Test Results:    2D Echo  (4/13/2021):  The estimated ejection fraction is 60%.  The left ventricle is normal in size with concentric hypertrophy and normal systolic function.  Normal left ventricular diastolic function.  Mild mitral regurgitation.  Mild aortic regurgitation.  Normal right ventricular size with normal right ventricular systolic function.  Mild left atrial enlargement.  Mild right atrial enlargement.  Normal central venous pressure (3 mmHg).  The estimated PA systolic pressure is 21 mmHg.     Current Outpatient Medications   Medication Sig    acetaminophen (TYLENOL) 500 MG tablet Take 500 mg by mouth every 8 (eight) hours as needed for Pain.    amiodarone (PACERONE) 200 MG Tab Take 1 tablet (200 mg total) by mouth once daily.    amLODIPine (NORVASC) 5 MG tablet Take 1 tablet by mouth once daily    biotin 5,000 mcg TbDL Take 1 tablet by mouth once daily.    chlorthalidone (HYGROTEN) 25 MG Tab Take 1 tablet by mouth once daily    cholecalciferol, vitamin D3, (VITAMIN D3) 50 mcg (2,000 unit) Cap Take 1 capsule by mouth once daily.    doxepin (SINEQUAN) 10 MG capsule TAKE 1 TO 2 CAPSULES BY MOUTH AT BEDTIME AS NEEDED FOR ITCHING    ELIQUIS 5 mg Tab Take 1 tablet by mouth twice daily    ferrous sulfate 325 (65 FE) MG EC tablet Take 1 tablet (325 mg total) by mouth once daily.    fluticasone propionate (FLONASE) 50 mcg/actuation nasal spray 1 spray (50 mcg total) by Each Nostril route once daily.    magnesium 200 mg Tab Take by mouth once daily.    meclizine (ANTIVERT) 25 mg tablet Take 1 tablet (25 mg total) by mouth 3 (three) times daily as needed for Dizziness (or at least one HS for 1 week when vertigo active).    mometasone 0.1% (ELOCON) 0.1 % cream Apply topically 2 (two) times daily.    MV-MN/FA/LYCOPENE/LUT/HB#178 (NEHEMIAH MULTIVITAMIN FOR MEN ORAL) Take 1 tablet by mouth once daily.    ondansetron (ZOFRAN) 4 MG tablet Take 1 tablet (4 mg total) by mouth every 6 (six) hours as needed for Nausea.    rosuvastatin (CRESTOR) 10 MG  "tablet TAKE 1 TABLET BY MOUTH ONCE DAILY IN THE EVENING    tiZANidine (ZANAFLEX) 4 MG tablet TAKE 1 TO 2 TABLETS BY MOUTH AT BEDTIME AS NEEDED    UNABLE TO FIND medication name: tumeric cap daily    zinc gluconate 50 mg tablet Take 50 mg by mouth once daily.     No current facility-administered medications for this visit.     Review of Systems   Constitutional: Negative for malaise/fatigue.   Cardiovascular:  Negative for chest pain, dyspnea on exertion, irregular heartbeat, leg swelling and palpitations.   Respiratory:  Negative for shortness of breath.    Hematologic/Lymphatic: Negative for bleeding problem. Bruises/bleeds easily.   Skin:  Negative for rash.   Musculoskeletal:  Negative for myalgias.   Gastrointestinal:  Negative for hematemesis, hematochezia and nausea.   Genitourinary:  Negative for hematuria.   Neurological:  Negative for light-headedness.   Psychiatric/Behavioral:  Negative for altered mental status.    Allergic/Immunologic: Negative for persistent infections.     Objective:          /70   Pulse (!) 58   Ht 5' 11" (1.803 m)   Wt 81.9 kg (180 lb 8.9 oz)   BMI 25.18 kg/m²     Physical Exam  Vitals and nursing note reviewed.   Constitutional:       Appearance: Normal appearance. He is well-developed.   HENT:      Head: Normocephalic.      Nose: Nose normal.   Eyes:      Pupils: Pupils are equal, round, and reactive to light.   Cardiovascular:      Rate and Rhythm: Normal rate and regular rhythm.   Pulmonary:      Effort: No respiratory distress.      Breath sounds: Normal breath sounds.   Musculoskeletal:         General: Normal range of motion.   Skin:     General: Skin is warm and dry.      Findings: No erythema.   Neurological:      Mental Status: He is alert and oriented to person, place, and time.   Psychiatric:         Speech: Speech normal.         Behavior: Behavior normal.         Lab Results   Component Value Date     06/27/2023     06/27/2023     06/27/2023 "    K 4.1 06/27/2023    K 4.1 06/27/2023    K 4.1 06/27/2023    MG 2.4 03/18/2023    BUN 27 (H) 06/27/2023    BUN 27 (H) 06/27/2023    BUN 27 (H) 06/27/2023    CREATININE 1.5 (H) 06/27/2023    CREATININE 1.5 (H) 06/27/2023    CREATININE 1.5 (H) 06/27/2023    ALT 20 06/27/2023    ALT 20 06/27/2023    AST 27 06/27/2023    AST 27 06/27/2023    HGB 12.9 (L) 06/27/2023    HGB 12.9 (L) 06/27/2023    HGB 12.9 (L) 06/27/2023    HCT 40.4 06/27/2023    HCT 40.4 06/27/2023    HCT 40.4 06/27/2023    HCT 39 03/15/2023    TSH 0.690 11/15/2022    LDLCALC 63.6 11/10/2021       Recent Labs   Lab 03/15/23  1538   INR 1.0       Assessment:     1. Paroxysmal atrial fibrillation    2. Primary hypertension    3. Current use of long term anticoagulation    4. On amiodarone therapy      Plan:     In summary, Mr. Cabrales is an 82 y.o. male with AF, HTN, HLD, RBBB, ILR (removed) here for annual follow up.   Pt is stable from a rhythm standpoint, with no documented AF on amiodarone. Pt denies AF symptoms. No new cardiac complaints. ECG shows 1st deg AVB and RBBB, unchanged since last EP clinic visit in 5/2022. LFTs and TSH stable. Needs PFTs. Creatinine has been >1.5. He has chronic anemia (has declined further GI evaluation of this). Repeat BMP in 1 mo. If creatinine 1.5 or higher will reduce eliquis to 2.5mg BID per dosing recommendations.     PFTs, BMP in 1 mo  Continue other meds  RTC 1 yr, sooner if needed    *A copy of this note has been sent to Dr. Morales*    Follow up in about 1 year (around 7/10/2024).    ------------------------------------------------------------------    SHA Recio, NP-C  Cardiac Electrophysiology

## 2023-07-10 ENCOUNTER — OFFICE VISIT (OUTPATIENT)
Dept: ELECTROPHYSIOLOGY | Facility: CLINIC | Age: 83
End: 2023-07-10
Payer: MEDICARE

## 2023-07-10 ENCOUNTER — HOSPITAL ENCOUNTER (OUTPATIENT)
Dept: CARDIOLOGY | Facility: CLINIC | Age: 83
Discharge: HOME | End: 2023-07-10
Payer: MEDICARE

## 2023-07-10 VITALS
BODY MASS INDEX: 25.28 KG/M2 | SYSTOLIC BLOOD PRESSURE: 136 MMHG | DIASTOLIC BLOOD PRESSURE: 70 MMHG | WEIGHT: 180.56 LBS | HEIGHT: 71 IN | HEART RATE: 58 BPM

## 2023-07-10 DIAGNOSIS — I48.0 PAROXYSMAL ATRIAL FIBRILLATION: Primary | Chronic | ICD-10-CM

## 2023-07-10 DIAGNOSIS — Z79.899 ON AMIODARONE THERAPY: ICD-10-CM

## 2023-07-10 DIAGNOSIS — I10 PRIMARY HYPERTENSION: Chronic | ICD-10-CM

## 2023-07-10 DIAGNOSIS — I48.0 PAROXYSMAL ATRIAL FIBRILLATION: ICD-10-CM

## 2023-07-10 DIAGNOSIS — Z79.01 CURRENT USE OF LONG TERM ANTICOAGULATION: ICD-10-CM

## 2023-07-10 PROCEDURE — 93005 ELECTROCARDIOGRAM TRACING: CPT | Mod: S$GLB,,, | Performed by: INTERNAL MEDICINE

## 2023-07-10 PROCEDURE — 1159F MED LIST DOCD IN RCRD: CPT | Mod: CPTII,S$GLB,, | Performed by: NURSE PRACTITIONER

## 2023-07-10 PROCEDURE — 99999 PR PBB SHADOW E&M-EST. PATIENT-LVL IV: ICD-10-PCS | Mod: PBBFAC,,, | Performed by: NURSE PRACTITIONER

## 2023-07-10 PROCEDURE — 99999 PR PBB SHADOW E&M-EST. PATIENT-LVL IV: CPT | Mod: PBBFAC,,, | Performed by: NURSE PRACTITIONER

## 2023-07-10 PROCEDURE — 1160F PR REVIEW ALL MEDS BY PRESCRIBER/CLIN PHARMACIST DOCUMENTED: ICD-10-PCS | Mod: CPTII,S$GLB,, | Performed by: NURSE PRACTITIONER

## 2023-07-10 PROCEDURE — 93005 RHYTHM STRIP: ICD-10-PCS | Mod: S$GLB,,, | Performed by: INTERNAL MEDICINE

## 2023-07-10 PROCEDURE — 3288F FALL RISK ASSESSMENT DOCD: CPT | Mod: CPTII,S$GLB,, | Performed by: NURSE PRACTITIONER

## 2023-07-10 PROCEDURE — 3075F PR MOST RECENT SYSTOLIC BLOOD PRESS GE 130-139MM HG: ICD-10-PCS | Mod: CPTII,S$GLB,, | Performed by: NURSE PRACTITIONER

## 2023-07-10 PROCEDURE — 99214 PR OFFICE/OUTPT VISIT, EST, LEVL IV, 30-39 MIN: ICD-10-PCS | Mod: S$GLB,,, | Performed by: NURSE PRACTITIONER

## 2023-07-10 PROCEDURE — 3075F SYST BP GE 130 - 139MM HG: CPT | Mod: CPTII,S$GLB,, | Performed by: NURSE PRACTITIONER

## 2023-07-10 PROCEDURE — 1101F PR PT FALLS ASSESS DOC 0-1 FALLS W/OUT INJ PAST YR: ICD-10-PCS | Mod: CPTII,S$GLB,, | Performed by: NURSE PRACTITIONER

## 2023-07-10 PROCEDURE — 3288F PR FALLS RISK ASSESSMENT DOCUMENTED: ICD-10-PCS | Mod: CPTII,S$GLB,, | Performed by: NURSE PRACTITIONER

## 2023-07-10 PROCEDURE — 99214 OFFICE O/P EST MOD 30 MIN: CPT | Mod: S$GLB,,, | Performed by: NURSE PRACTITIONER

## 2023-07-10 PROCEDURE — 3078F PR MOST RECENT DIASTOLIC BLOOD PRESSURE < 80 MM HG: ICD-10-PCS | Mod: CPTII,S$GLB,, | Performed by: NURSE PRACTITIONER

## 2023-07-10 PROCEDURE — 1126F PR PAIN SEVERITY QUANTIFIED, NO PAIN PRESENT: ICD-10-PCS | Mod: CPTII,S$GLB,, | Performed by: NURSE PRACTITIONER

## 2023-07-10 PROCEDURE — 1126F AMNT PAIN NOTED NONE PRSNT: CPT | Mod: CPTII,S$GLB,, | Performed by: NURSE PRACTITIONER

## 2023-07-10 PROCEDURE — 93010 ELECTROCARDIOGRAM REPORT: CPT | Mod: S$GLB,,, | Performed by: INTERNAL MEDICINE

## 2023-07-10 PROCEDURE — 1101F PT FALLS ASSESS-DOCD LE1/YR: CPT | Mod: CPTII,S$GLB,, | Performed by: NURSE PRACTITIONER

## 2023-07-10 PROCEDURE — 1159F PR MEDICATION LIST DOCUMENTED IN MEDICAL RECORD: ICD-10-PCS | Mod: CPTII,S$GLB,, | Performed by: NURSE PRACTITIONER

## 2023-07-10 PROCEDURE — 93010 RHYTHM STRIP: ICD-10-PCS | Mod: S$GLB,,, | Performed by: INTERNAL MEDICINE

## 2023-07-10 PROCEDURE — 3078F DIAST BP <80 MM HG: CPT | Mod: CPTII,S$GLB,, | Performed by: NURSE PRACTITIONER

## 2023-07-10 PROCEDURE — 1160F RVW MEDS BY RX/DR IN RCRD: CPT | Mod: CPTII,S$GLB,, | Performed by: NURSE PRACTITIONER

## 2023-07-11 ENCOUNTER — TELEPHONE (OUTPATIENT)
Dept: ELECTROPHYSIOLOGY | Facility: CLINIC | Age: 83
End: 2023-07-11
Payer: MEDICARE

## 2023-07-21 ENCOUNTER — LAB VISIT (OUTPATIENT)
Dept: LAB | Facility: HOSPITAL | Age: 83
End: 2023-07-21
Payer: MEDICARE

## 2023-07-21 ENCOUNTER — HOSPITAL ENCOUNTER (OUTPATIENT)
Dept: PULMONOLOGY | Facility: CLINIC | Age: 83
Discharge: HOME | End: 2023-07-21
Payer: MEDICARE

## 2023-07-21 DIAGNOSIS — I10 PRIMARY HYPERTENSION: Chronic | ICD-10-CM

## 2023-07-21 DIAGNOSIS — I48.0 PAROXYSMAL ATRIAL FIBRILLATION: Chronic | ICD-10-CM

## 2023-07-21 DIAGNOSIS — Z79.899 ON AMIODARONE THERAPY: ICD-10-CM

## 2023-07-21 LAB
ANION GAP SERPL CALC-SCNC: 9 MMOL/L (ref 8–16)
BUN SERPL-MCNC: 28 MG/DL (ref 8–23)
CALCIUM SERPL-MCNC: 9.7 MG/DL (ref 8.7–10.5)
CHLORIDE SERPL-SCNC: 103 MMOL/L (ref 95–110)
CO2 SERPL-SCNC: 26 MMOL/L (ref 23–29)
CREAT SERPL-MCNC: 1.6 MG/DL (ref 0.5–1.4)
DLCO ADJ PRE: 17.82 ML/(MIN*MMHG) (ref 18.09–31.94)
DLCO SINGLE BREATH LLN: 18.09
DLCO SINGLE BREATH PRE REF: 67.6 %
DLCO SINGLE BREATH REF: 25.02
DLCOC SBVA LLN: 2.38
DLCOC SBVA PRE REF: 89 %
DLCOC SBVA REF: 3.51
DLCOC SINGLE BREATH LLN: 18.09
DLCOC SINGLE BREATH PRE REF: 71.2 %
DLCOC SINGLE BREATH REF: 25.02
DLCOCSBVAULN: 4.64
DLCOCSINGLEBREATHULN: 31.94
DLCOSINGLEBREATHULN: 31.94
DLCOVA LLN: 2.38
DLCOVA PRE REF: 84.4 %
DLCOVA PRE: 2.96 ML/(MIN*MMHG*L) (ref 2.38–4.64)
DLCOVA REF: 3.51
DLCOVAULN: 4.64
DLVAADJ PRE: 3.12 ML/(MIN*MMHG*L) (ref 2.38–4.64)
EST. GFR  (NO RACE VARIABLE): 43 ML/MIN/1.73 M^2
FEF 25 75 LLN: 0.73
FEF 25 75 PRE REF: 139.2 %
FEF 25 75 REF: 1.97
FEV05 LLN: 1.32
FEV05 REF: 2.46
FEV1 FVC LLN: 59
FEV1 FVC PRE REF: 109.2 %
FEV1 FVC REF: 74
FEV1 LLN: 1.94
FEV1 PRE REF: 99.2 %
FEV1 REF: 2.82
FVC LLN: 2.75
FVC PRE REF: 89.9 %
FVC REF: 3.83
GLUCOSE SERPL-MCNC: 84 MG/DL (ref 70–110)
IVC PRE: 3.27 L (ref 2.75–4.93)
IVC SINGLE BREATH LLN: 2.75
IVC SINGLE BREATH PRE REF: 85.2 %
IVC SINGLE BREATH REF: 3.83
IVCSINGLEBREATHULN: 4.93
PEF LLN: 4.79
PEF PRE REF: 109.9 %
PEF REF: 7.11
PHYSICIAN COMMENT: ABNORMAL
POTASSIUM SERPL-SCNC: 4.4 MMOL/L (ref 3.5–5.1)
PRE DLCO: 16.9 ML/(MIN*MMHG) (ref 18.09–31.94)
PRE FEF 25 75: 2.73 L/S (ref 0.73–3.81)
PRE FET 100: 5.07 SEC
PRE FEV05 REF: 90.4 %
PRE FEV1 FVC: 81.3 % (ref 59.43–88.08)
PRE FEV1: 2.8 L (ref 1.94–3.64)
PRE FEV5: 2.22 L (ref 1.32–3.59)
PRE FVC: 3.44 L (ref 2.75–4.93)
PRE PEF: 7.81 L/S (ref 4.79–9.42)
SODIUM SERPL-SCNC: 138 MMOL/L (ref 136–145)
VA PRE: 5.7 L (ref 6.98–6.98)
VA SINGLE BREATH LLN: 6.98
VA SINGLE BREATH PRE REF: 81.7 %
VA SINGLE BREATH REF: 6.98
VASINGLEBREATHULN: 6.98

## 2023-07-21 PROCEDURE — 94010 BREATHING CAPACITY TEST: CPT | Mod: S$GLB,,, | Performed by: INTERNAL MEDICINE

## 2023-07-21 PROCEDURE — 94729 DIFFUSING CAPACITY: CPT | Mod: S$GLB,,, | Performed by: INTERNAL MEDICINE

## 2023-07-21 PROCEDURE — 94729 PR C02/MEMBANE DIFFUSE CAPACITY: ICD-10-PCS | Mod: S$GLB,,, | Performed by: INTERNAL MEDICINE

## 2023-07-21 PROCEDURE — 80048 BASIC METABOLIC PNL TOTAL CA: CPT | Performed by: NURSE PRACTITIONER

## 2023-07-21 PROCEDURE — 36415 COLL VENOUS BLD VENIPUNCTURE: CPT | Performed by: NURSE PRACTITIONER

## 2023-07-21 PROCEDURE — 94010 BREATHING CAPACITY TEST: ICD-10-PCS | Mod: S$GLB,,, | Performed by: INTERNAL MEDICINE

## 2023-07-24 ENCOUNTER — TELEPHONE (OUTPATIENT)
Dept: ELECTROPHYSIOLOGY | Facility: CLINIC | Age: 83
End: 2023-07-24
Payer: MEDICARE

## 2023-07-24 DIAGNOSIS — I48.0 PAF (PAROXYSMAL ATRIAL FIBRILLATION): Primary | ICD-10-CM

## 2023-10-21 ENCOUNTER — IMMUNIZATION (OUTPATIENT)
Dept: OBSTETRICS AND GYNECOLOGY | Facility: CLINIC | Age: 83
End: 2023-10-21
Payer: MEDICARE

## 2023-10-21 DIAGNOSIS — Z23 FLU VACCINE NEED: Primary | ICD-10-CM

## 2023-10-21 PROCEDURE — 90694 FLU VACCINE - QUADRIVALENT - ADJUVANTED: ICD-10-PCS | Mod: S$GLB,,, | Performed by: INTERNAL MEDICINE

## 2023-10-21 PROCEDURE — G0008 FLU VACCINE - QUADRIVALENT - ADJUVANTED: ICD-10-PCS | Mod: S$GLB,,, | Performed by: INTERNAL MEDICINE

## 2023-10-21 PROCEDURE — G0008 ADMIN INFLUENZA VIRUS VAC: HCPCS | Mod: S$GLB,,, | Performed by: INTERNAL MEDICINE

## 2023-10-21 PROCEDURE — 90694 VACC AIIV4 NO PRSRV 0.5ML IM: CPT | Mod: S$GLB,,, | Performed by: INTERNAL MEDICINE

## 2023-11-09 ENCOUNTER — PATIENT OUTREACH (OUTPATIENT)
Dept: ADMINISTRATIVE | Facility: HOSPITAL | Age: 83
End: 2023-11-09
Payer: MEDICARE

## 2023-11-09 ENCOUNTER — PATIENT MESSAGE (OUTPATIENT)
Dept: ADMINISTRATIVE | Facility: HOSPITAL | Age: 83
End: 2023-11-09
Payer: MEDICARE

## 2023-11-30 ENCOUNTER — OFFICE VISIT (OUTPATIENT)
Dept: FAMILY MEDICINE | Facility: CLINIC | Age: 83
End: 2023-11-30
Payer: MEDICARE

## 2023-11-30 VITALS
TEMPERATURE: 98 F | DIASTOLIC BLOOD PRESSURE: 56 MMHG | BODY MASS INDEX: 25.5 KG/M2 | HEIGHT: 71 IN | OXYGEN SATURATION: 98 % | WEIGHT: 182.13 LBS | HEART RATE: 58 BPM | SYSTOLIC BLOOD PRESSURE: 134 MMHG

## 2023-11-30 DIAGNOSIS — Z86.010 HISTORY OF COLONIC POLYPS: ICD-10-CM

## 2023-11-30 DIAGNOSIS — D50.9 IRON DEFICIENCY ANEMIA, UNSPECIFIED IRON DEFICIENCY ANEMIA TYPE: ICD-10-CM

## 2023-11-30 DIAGNOSIS — N18.31 STAGE 3A CHRONIC KIDNEY DISEASE: ICD-10-CM

## 2023-11-30 DIAGNOSIS — Z12.5 SCREENING FOR PROSTATE CANCER: ICD-10-CM

## 2023-11-30 DIAGNOSIS — I48.0 PAROXYSMAL ATRIAL FIBRILLATION: ICD-10-CM

## 2023-11-30 DIAGNOSIS — Z79.01 CURRENT USE OF LONG TERM ANTICOAGULATION: ICD-10-CM

## 2023-11-30 DIAGNOSIS — N40.0 BENIGN PROSTATIC HYPERPLASIA, UNSPECIFIED WHETHER LOWER URINARY TRACT SYMPTOMS PRESENT: ICD-10-CM

## 2023-11-30 DIAGNOSIS — S39.012A BACK STRAIN, INITIAL ENCOUNTER: ICD-10-CM

## 2023-11-30 DIAGNOSIS — I10 ESSENTIAL HYPERTENSION: ICD-10-CM

## 2023-11-30 DIAGNOSIS — R29.898 LEG WEAKNESS, BILATERAL: ICD-10-CM

## 2023-11-30 DIAGNOSIS — Z00.00 ROUTINE MEDICAL EXAM: Primary | ICD-10-CM

## 2023-11-30 PROCEDURE — 1101F PT FALLS ASSESS-DOCD LE1/YR: CPT | Mod: CPTII,S$GLB,, | Performed by: INTERNAL MEDICINE

## 2023-11-30 PROCEDURE — 1159F MED LIST DOCD IN RCRD: CPT | Mod: CPTII,S$GLB,, | Performed by: INTERNAL MEDICINE

## 2023-11-30 PROCEDURE — 99999 PR PBB SHADOW E&M-EST. PATIENT-LVL V: CPT | Mod: PBBFAC,,, | Performed by: INTERNAL MEDICINE

## 2023-11-30 PROCEDURE — 3288F PR FALLS RISK ASSESSMENT DOCUMENTED: ICD-10-PCS | Mod: CPTII,S$GLB,, | Performed by: INTERNAL MEDICINE

## 2023-11-30 PROCEDURE — 99397 PER PM REEVAL EST PAT 65+ YR: CPT | Mod: GZ,S$GLB,, | Performed by: INTERNAL MEDICINE

## 2023-11-30 PROCEDURE — 3075F PR MOST RECENT SYSTOLIC BLOOD PRESS GE 130-139MM HG: ICD-10-PCS | Mod: CPTII,S$GLB,, | Performed by: INTERNAL MEDICINE

## 2023-11-30 PROCEDURE — 99999 PR PBB SHADOW E&M-EST. PATIENT-LVL V: ICD-10-PCS | Mod: PBBFAC,,, | Performed by: INTERNAL MEDICINE

## 2023-11-30 PROCEDURE — 1101F PR PT FALLS ASSESS DOC 0-1 FALLS W/OUT INJ PAST YR: ICD-10-PCS | Mod: CPTII,S$GLB,, | Performed by: INTERNAL MEDICINE

## 2023-11-30 PROCEDURE — 3078F DIAST BP <80 MM HG: CPT | Mod: CPTII,S$GLB,, | Performed by: INTERNAL MEDICINE

## 2023-11-30 PROCEDURE — 3078F PR MOST RECENT DIASTOLIC BLOOD PRESSURE < 80 MM HG: ICD-10-PCS | Mod: CPTII,S$GLB,, | Performed by: INTERNAL MEDICINE

## 2023-11-30 PROCEDURE — 3075F SYST BP GE 130 - 139MM HG: CPT | Mod: CPTII,S$GLB,, | Performed by: INTERNAL MEDICINE

## 2023-11-30 PROCEDURE — 99214 PR OFFICE/OUTPT VISIT, EST, LEVL IV, 30-39 MIN: ICD-10-PCS | Mod: 25,S$GLB,, | Performed by: INTERNAL MEDICINE

## 2023-11-30 PROCEDURE — 99397 PR PREVENTIVE VISIT,EST,65 & OVER: ICD-10-PCS | Mod: GZ,S$GLB,, | Performed by: INTERNAL MEDICINE

## 2023-11-30 PROCEDURE — 1125F AMNT PAIN NOTED PAIN PRSNT: CPT | Mod: CPTII,S$GLB,, | Performed by: INTERNAL MEDICINE

## 2023-11-30 PROCEDURE — 1125F PR PAIN SEVERITY QUANTIFIED, PAIN PRESENT: ICD-10-PCS | Mod: CPTII,S$GLB,, | Performed by: INTERNAL MEDICINE

## 2023-11-30 PROCEDURE — 1159F PR MEDICATION LIST DOCUMENTED IN MEDICAL RECORD: ICD-10-PCS | Mod: CPTII,S$GLB,, | Performed by: INTERNAL MEDICINE

## 2023-11-30 PROCEDURE — 3288F FALL RISK ASSESSMENT DOCD: CPT | Mod: CPTII,S$GLB,, | Performed by: INTERNAL MEDICINE

## 2023-11-30 PROCEDURE — 99214 OFFICE O/P EST MOD 30 MIN: CPT | Mod: 25,S$GLB,, | Performed by: INTERNAL MEDICINE

## 2023-11-30 RX ORDER — OFLOXACIN 3 MG/ML
1 SOLUTION/ DROPS OPHTHALMIC 4 TIMES DAILY
COMMUNITY
Start: 2023-11-21

## 2023-11-30 RX ORDER — KETOROLAC TROMETHAMINE 5 MG/ML
1 SOLUTION OPHTHALMIC 2 TIMES DAILY
COMMUNITY
Start: 2023-11-21

## 2023-11-30 RX ORDER — PREDNISOLONE ACETATE 10 MG/ML
1 SUSPENSION/ DROPS OPHTHALMIC 4 TIMES DAILY
COMMUNITY
Start: 2023-11-21

## 2023-11-30 NOTE — PROGRESS NOTES
Chief complaint: Physical exam,     83 -year-old  male   He is due for PSA and appears to be up-to-date on colonoscopy 9/16  there was a polyp -5 yrs..  Outside records reviewed.  He is followed by cardiology.  Still working as a .  No other increased risk based upon social history or family history.  He is reduced overall his alcohol intake. Labs were a yr ago    [facility], colonoscopy 9/16  there was a polyp -5 yrs , he is friends with Dr. Harding and we discussed that he may well be healthy enough to have a colonoscopy but I would like him to make that decision with his gastroenterologist since I do not have access to his pathology report to assess what kind of polyp he had previously and so forth.         PSA 11/22    ROS:   CONST: weight stable. EYES: no vision change. ENT: no sore throat. CV: no chest pain w/ exertion. RESP: no shortness of breath. GI: no nausea, vomiting, diarrhea. No dysphagia. : no urinary issues. MUSCULOSKELETAL: no new myalgias or arthralgias. SKIN: no new changes. NEURO: no focal deficits. PSYCH: no new issues. ENDOCRINE: no polyuria. HEME: no lymph nodes. ALLERGY: no general pruritis.     PAST MEDICAL HISTORY:  1. Hypertension.  2. Hyperlipidemia.  3. GERD.  4. Vertigo after trauma  5. Colonoscopy was normal around 2009 per [facility], 9/16  there was a polyp -5 yrs.  6. Atrial fib/flutter -[facility] -cardioverted  7.  Dizziness, seen by neurology    8.peptic ulcer disease on EGDNovember 2016 with GI bleeding  9.  Small bowel obstruction, resolved on its own November 2016  10. anemia    PAST SURGICAL HISTORY:  1. Left leg surgery, sounds like venous surgery -Dr Limon  2. Nasal septal repair.      ALLERGIES: NKDA.    SOCIAL HISTORY: He puffs on a rare cigar, he is not a smoker. Drinks wine on occasion,  with 3 children. He has 7 grandchildren. 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 He is originally from [place]. He is quite  active.    FAMILY HISTORY: Sr. had breast cancer. Otherwise negative for prostate or colon cancer, negative for premature coronary disease or diabetes.    HEALTHCARE SCREENING: Colonoscopy was normal around 2009 per [facility], colonoscopy 9/16  there was a polyp -5 yrs ,       Gen: no distress  EYES: conjunctiva clear, non-icteric, PERRL  ENT: nose clear, nasal mucosa normal, oropharynx clear and moist, teeth good,  NECK:supple, thyroid non-palpable  RESP: effort is good, lungs clear  CV: heart RRR w/o murmur, gallops or rubs; no carotid bruits, no edema  GI: abdomen soft, non-distended, non-tender, no hepatosplenomegaly  MS: gait normal, no clubbing or cyanosis of the digits,   SKIN: no rashes, warm to touch           Benitez was seen today for annual exam.    Diagnoses and associated orders for this visit:    Routine medical exam--- .  We will update all his lab work including PSA.  Continue exercise.    Screening for prostate cancer  - PSA, overall in good health    Screening for colorectal cancer--- due? Still did not discuss w GI.   [facility]  Assessment:   The patient is an 81 yo man with past medical history of SBO, JOSÉ who presents for follow up of his anemia and constipation.  He does not wish to proceed with EGD or colonoscopy to evaluate his anemia.  His constipation is improved with Miralax.   Plan:  -Return to GI clinic as needed.   Chiqui Tolentino MD                                        Additional evaluation and management issues:    Additionally patient has other medical issues and complaints to address. Reviewed the last visit where he had some syncope.  Reviewed the interval cardiac testing which was all unrevealing.      No further colon bleeding.  Prior records regarding this reviewed as below.  Due to check labs and repeat iron studies.  Seen by MD 10/15/19 -Bright red blood per rectum this morning after having a bowel movement  Two episodes - second occurred this morning as well  He does note some  pain in his lower abdominal region - mild - 'comes and goes'  He believes it might be gas   Patient called GI/Dr Harding - states could get an appointment as early as 10/30/19   Did not take his Pradaxa today because he is concerned     Saw Dmitriy  and given pills for constipation. No bleeding  [facility]  Assessment:   The patient is an 83 yo man with past medical history of SBO, JOSÉ who presents for follow up of his anemia and constipation.  He does not wish to proceed with EGD or colonoscopy to evaluate his anemia.  His constipation is improved with Miralax.   Plan:  -Return to GI clinic as needed.   Chiqui Tolentino MD        Regarding hypertension isn't a good control.  He was on Diovan HCTZ and his most recent sodium level was 127 about two weeks ago we will repeat.  He did increase his salt intake.  He was on the HCTZ  And then chlorthalidione which we did remove.      Regarding hyperlipidemia he is on medication and will reassess.      He previously had  a moderate amount of pain that is been chronic in his feet.  It looks like in review podiatry no from a few years ago he has a bunion.  We discussed the condition will not improve and looks like podiatry said conservative therapies might work but only definitive would be surgery.  He has an appointment with Dr. Langley in Orthopedics  and he will consider seeing Podiatry again.  Better with WIDER shoes    He has atrial fibrillation which apparently is under control.      He has COPD without any problems. He has atherosclerosis of the aorta noted in prior records.      He also did have some bleeding.  I reviewed his last CBC which was improving but still not back to normal.  Iron was a year ago and will repeat.    He is due for his PSA as well.      Counseled at length to review prior records.  All these issues reviewed and patient counseled in evaluation and management will be based upon MDM    Also for about one month he is had some pain in the right  flank and right back after he was bending over hooking up a washer.  Wife reports that any time he gets down to do anything he has difficulty getting up and has to pull on things.  His legs still work and there is no numbness.  We discussed going to physical therapy although he is reluctant I encouraged him to do so so that his wife will not be the one pulling him up all the time.  He also needs to do home exercises getting up and down out of a chair repetitively to strength in his legs.  I think a leg strengthening and fall prevention program with PT would be important.  He has reproducible pain in his right upper lumbar paraspinal area.  I can squeeze is ribs without any rib pain and there was no direct injury.  Reassured patient it is a pulled muscle from the activity he was doing.  He was concerned it might be his liver but reassured his liver would not cause such symptoms.  He is due for lab work and reassured his liver assessment will be in that lab work but I do not suspect his current symptoms would relate to any inflammation of the liver.            Assessment and plan:                Essential hypertension, chronic and stable, check labs  -     Basic Metabolic Panel; Future  -     CBC Auto Differential; Future  -     Iron and TIBC; Future  -     Ferritin; Future  -     TSH; Future    Iron deficiency anemia, unspecified iron deficiency anemia type, reassess  -     CBC Auto Differential; Future  -     Iron and TIBC; Future  -     Ferritin; Future    Stage 3a chronic kidney disease, reassess  -     Basic Metabolic Panel; Future    Paroxysmal atrial fibrillation, chronic and stable    Current use of long term anticoagulation, reassess anemia    Benign prostatic hyperplasia, unspecified whether lower urinary tract symptoms present  -     Prostate Specific Antigen, Diagnostic; Future    Leg weakness, bilateral, combination of weak muscles, knee arthritis and so forth.  Do not suspect any positional nerve  impingement and when he is up and walking he does not describe any spinal stenosis symptoms.  I think he needs some leg strengthening  -     Ambulatory referral/consult to Physical/Occupational Therapy; Future    Back strain, initial encounter, clearly related to the position he was in and reproducible today so reassured.  It will need to heal on its own but frequent heat will treat associated spasm which would put him at risk for repeat injury.

## 2023-12-25 ENCOUNTER — HOSPITAL ENCOUNTER (EMERGENCY)
Facility: HOSPITAL | Age: 83
Discharge: HOME | End: 2023-12-26
Attending: EMERGENCY MEDICINE
Payer: MEDICARE

## 2023-12-25 DIAGNOSIS — S00.03XA CONTUSION OF SCALP, INITIAL ENCOUNTER: ICD-10-CM

## 2023-12-25 DIAGNOSIS — S51.012A ELBOW LACERATION, LEFT, INITIAL ENCOUNTER: ICD-10-CM

## 2023-12-25 DIAGNOSIS — S51.012A SKIN TEAR OF ELBOW WITHOUT COMPLICATION, LEFT, INITIAL ENCOUNTER: Primary | ICD-10-CM

## 2023-12-25 PROCEDURE — 99282 EMERGENCY DEPT VISIT SF MDM: CPT | Mod: 25

## 2023-12-25 PROCEDURE — 12002 RPR S/N/AX/GEN/TRNK2.6-7.5CM: CPT

## 2023-12-25 RX ORDER — LIDOCAINE HYDROCHLORIDE AND EPINEPHRINE 20; 10 MG/ML; UG/ML
10 INJECTION, SOLUTION INFILTRATION; PERINEURAL ONCE
Status: COMPLETED | OUTPATIENT
Start: 2023-12-25 | End: 2023-12-26

## 2023-12-25 RX ORDER — LIDOCAINE HYDROCHLORIDE AND EPINEPHRINE 10; 10 MG/ML; UG/ML
10 INJECTION, SOLUTION INFILTRATION; PERINEURAL
Status: DISCONTINUED | OUTPATIENT
Start: 2023-12-25 | End: 2023-12-25

## 2023-12-26 VITALS
TEMPERATURE: 98 F | SYSTOLIC BLOOD PRESSURE: 159 MMHG | DIASTOLIC BLOOD PRESSURE: 72 MMHG | OXYGEN SATURATION: 97 % | HEIGHT: 71 IN | BODY MASS INDEX: 25.48 KG/M2 | WEIGHT: 182 LBS | RESPIRATION RATE: 18 BRPM | HEART RATE: 77 BPM

## 2023-12-26 PROCEDURE — 25000003 PHARM REV CODE 250: Performed by: EMERGENCY MEDICINE

## 2023-12-26 PROCEDURE — 12002 RPR S/N/AX/GEN/TRNK2.6-7.5CM: CPT

## 2023-12-26 RX ADMIN — LIDOCAINE HYDROCHLORIDE,EPINEPHRINE BITARTRATE 10 ML: 20; .01 INJECTION, SOLUTION INFILTRATION; PERINEURAL at 12:12

## 2023-12-26 RX ADMIN — BACITRACIN ZINC, NEOMYCIN, POLYMYXIN B 1 EACH: 400; 3.5; 5 OINTMENT TOPICAL at 12:12

## 2023-12-26 NOTE — ED PROVIDER NOTES
Encounter Date: 12/25/2023       History     Chief Complaint   Patient presents with    Fall     Pt presents to ED c/o fall around 2100 tonight while getting out of the car.  Spouse reports pt hitting the back of his head on concrete with no loc. Pt also c/o left arm pain. Denies weakness, chest pain, sob, n/v or any other symptoms.    Pt reports being on Eliquis.  Pain 2/10.  Pt reports consume alcohol tonight.  Bruising noted to the back of the head.       83-year-old male who is on Eliquis for atrial fibrillation states he was getting out of a vehicle this evening and tripped.  He put out his arm to catch himself but landed on his left elbow.  He then went backwards and struck the back of his head on concrete.  Did not lose conscious.  Came in to get checked out basically due to being on a blood thinner.  Denies any significant headache.  No blurred vision.  No nausea vomiting.  Denies neck pain or back pain.  No chest pain or pain with breathing.  No abdominal pain or other GI symptoms.  Denies any hip pain or leg pain.  Was able to get back up and walk.  States his tetanus shot is up-to-date.      Review of patient's allergies indicates:  No Known Allergies  Past Medical History:   Diagnosis Date    Anticoagulant long-term use     Atrial fibrillation     GERD (gastroesophageal reflux disease)     Hyperlipidemia     Hypertension     Nuclear sclerosis, bilateral 10/09/2017    Rhinitis medicamentosa 06/30/2014    Small bowel obstruction     Vertigo 06/13/2013     Past Surgical History:   Procedure Laterality Date    ICM implant      NOSE SURGERY      Septal Repair    REMOVAL OF IMPLANTABLE LOOP RECORDER N/A 12/10/2018    Procedure: REMOVAL, IMPLANTABLE LOOP RECORDER;  Surgeon: Mickey Morales MD;  Location: [facility];  Service: Cardiology;  Laterality: N/A;    VASCULAR SURGERY      left leg     Family History   Problem Relation Age of Onset    No Known Problems Mother     No Known Problems Father     No Known  Problems Sister     No Known Problems Brother     No Known Problems Maternal Uncle     No Known Problems Paternal Aunt     No Known Problems Paternal Uncle     No Known Problems Maternal Grandmother     No Known Problems Maternal Grandfather     No Known Problems Paternal Grandmother     No Known Problems Paternal Grandfather     Asthma Neg Hx     Emphysema Neg Hx     Amblyopia Neg Hx     Blindness Neg Hx     Cataracts Neg Hx     Diabetes Neg Hx     Glaucoma Neg Hx     Hypertension Neg Hx     Macular degeneration Neg Hx     Retinal detachment Neg Hx     Strabismus Neg Hx     Stroke Neg Hx     Thyroid disease Neg Hx      Social History     Tobacco Use    Smoking status: Never     Passive exposure: Never    Smokeless tobacco: Never    Tobacco comments:     .  Three kids.  Occup:   at [employer].     Substance Use Topics    Alcohol use: Yes     Alcohol/week: 1.0 standard drink of alcohol     Types: 1 Glasses of wine per week     Comment: red wine almost daily    Drug use: Never     Review of Systems   Constitutional:  Negative for fever.   HENT:  Negative for sore throat.    Respiratory:  Negative for shortness of breath.    Cardiovascular:  Negative for chest pain.   Gastrointestinal:  Negative for nausea.   Genitourinary:  Negative for dysuria.   Musculoskeletal:  Negative for back pain.   Skin:  Positive for wound. Negative for rash.   Neurological:  Negative for weakness.   Hematological:  Bruises/bleeds easily.       Physical Exam     Initial Vitals [12/25/23 2206]   BP Pulse Resp Temp SpO2   (!) 155/72 63 18 97.9 °F (36.6 °C) 98 %      MAP       --         Physical Exam    Nursing note and vitals reviewed.  Constitutional: He appears well-developed and well-nourished.   HENT:   Minimal contusion to mid posterior occipital scalp.    Eyes: EOM are normal. Pupils are equal, round, and reactive to light.   Neck: Neck supple. No JVD present.   Normal range of motion.  Cardiovascular:  Normal rate,  regular rhythm, normal heart sounds and intact distal pulses.     Exam reveals no gallop and no friction rub.       No murmur heard.  Pulmonary/Chest: Breath sounds normal.   Abdominal: Abdomen is soft. Bowel sounds are normal. There is no abdominal tenderness.   Musculoskeletal:         General: No tenderness or edema. Normal range of motion.      Cervical back: Normal range of motion and neck supple.      Comments: Left elbow with bruising and laceration. FROM elbow. No bony ttp. 3.5 cm laceration overlying the olecranon with bony periosteum exposed. NO visual FB.      Lymphadenopathy:     He has no cervical adenopathy.   Neurological: He is alert and oriented to person, place, and time. He has normal strength.   Skin: Skin is warm and dry. Capillary refill takes less than 2 seconds.   Psychiatric: He has a normal mood and affect. Thought content normal.         ED Course   Lac Repair    Date/Time: 12/26/2023 12:31 AM    Performed by: Garett Vogt MD  Authorized by: Garett Vogt MD    Consent:     Consent obtained:  Verbal    Consent given by:  Patient    Risks, benefits, and alternatives were discussed: yes      Risks discussed:  Infection, pain, retained foreign body and poor cosmetic result  Universal protocol:     Patient identity confirmed:  Verbally with patient  Anesthesia:     Anesthesia method:  Local infiltration    Local anesthetic:  Lidocaine 2% WITH epi  Laceration details:     Location:  Shoulder/arm    Shoulder/arm location:  L elbow    Length (cm):  4    Depth (mm):  4  Pre-procedure details:     Preparation:  Patient was prepped and draped in usual sterile fashion and imaging obtained to evaluate for foreign bodies  Exploration:     Limited defect created (wound extended): no      Hemostasis achieved with:  Direct pressure    Imaging obtained: x-ray      Imaging outcome: foreign body not noted      Wound exploration: wound explored through full range of motion and entire depth of  wound visualized      Contaminated: no    Treatment:     Area cleansed with:  Povidone-iodine    Amount of cleaning:  Standard    Irrigation solution:  Sterile saline    Irrigation volume:  1 liter    Irrigation method:  Pressure wash    Visualized foreign bodies/material removed: no      Debridement:  Minimal    Undermining:  None    Scar revision: no    Skin repair:     Repair method:  Sutures    Suture size:  4-0    Suture material:  Prolene    Suture technique:  Simple interrupted    Number of sutures:  5  Approximation:     Approximation:  Close  Repair type:     Repair type:  Simple  Post-procedure details:     Dressing:  Antibiotic ointment and non-adherent dressing    Procedure completion:  Tolerated well, no immediate complications    Labs Reviewed - No data to display       Imaging Results              X-Ray Elbow Complete Left (Final result)  Result time 12/25/23 23:24:18      Final result by Perico Rosas MD (12/25/23 23:24:18)                   Impression:      No acute osseous abnormality identified.      Electronically signed by: Perico Rosas MD  Date:    12/25/2023  Time:    23:24               Narrative:    EXAMINATION:  XR ELBOW COMPLETE 3 VIEW LEFT    CLINICAL HISTORY:  Laceration without foreign body of left elbow, initial encounter    TECHNIQUE:  AP, lateral, and oblique views of the left elbow were performed.    COMPARISON:  None    FINDINGS:  No evidence of acute displaced fracture, dislocation, or osseous destructive process.  No significant elbow joint effusion.  Posterior elbow laceration is seen.                                       CT Head Without Contrast (Final result)  Result time 12/25/23 23:00:48      Final result by Perico Rosas MD (12/25/23 23:00:48)                   Impression:      No acute intracranial abnormalities identified.      Electronically signed by: Perico Rosas MD  Date:    12/25/2023  Time:    23:00               Narrative:    EXAMINATION:  CT HEAD  WITHOUT CONTRAST    CLINICAL HISTORY:  Head trauma, moderate-severe;    TECHNIQUE:  Low dose axial images were obtained through the head.  Coronal and sagittal reformations were also performed. Contrast was not administered.    COMPARISON:  CT head from August 2015.    FINDINGS:  There is generalized cerebral volume loss.  No evidence of acute/recent major vascular distribution cerebral infarction, intraparenchymal hemorrhage, or intra-axial space occupying lesion. The ventricular system is normal in size and configuration with no evidence of hydrocephalus. No effacement of the skull-base cisterns. No abnormal extra-axial fluid collections or blood products. Visualized paranasal sinuses and mastoid air cells are clear. The calvarium shows no significant abnormality.                                       CT Cervical Spine Without Contrast (Final result)  Result time 12/25/23 23:03:27      Final result by Perico Rosas MD (12/25/23 23:03:27)                   Impression:      No evidence of acute cervical spine fracture or dislocation.      Electronically signed by: Perico Rosas MD  Date:    12/25/2023  Time:    23:03               Narrative:    EXAMINATION:  CT CERVICAL SPINE WITHOUT CONTRAST    CLINICAL HISTORY:  Neck trauma (Age >= 65y);    TECHNIQUE:  Low dose axial images, sagittal and coronal reformations were performed though the cervical spine.  Contrast was not administered.    COMPARISON:  CT cervical spine from April 2015.    FINDINGS:  No evidence of acute cervical spine fracture or dislocation.  Odontoid process is intact.  Craniocervical junction is unremarkable.  Cervical spine alignment is within normal limits.  Minor degenerative changes are seen.    Surrounding soft tissues show no significant abnormalities.  Airway is patent.  Partially visualized lung apices are clear.                                       Medications   neomycin-bacitracnZn-polymyxnB packet (has no administration in time  range)   LIDOcaine 2%/EPINEPHrine 1:100,000 injection 10 mL (has no administration in time range)     Medical Decision Making  Amount and/or Complexity of Data Reviewed  Radiology: ordered.    Risk  OTC drugs.  Prescription drug management.                                      Clinical Impression:  Final diagnoses:  [S51.012A] Elbow laceration, left, initial encounter  [S51.012A] Skin tear of elbow without complication, left, initial encounter (Primary)  [S00.03XA] Contusion of scalp, initial encounter          ED Disposition Condition    Discharge Stable          ED Prescriptions    None       Follow-up Information       Follow up With Specialties Details Why Contact Info    Diomedes Ayoub MD Internal Medicine   [address] [facility] [address]  [phone]      [facility] [place] - Emergency Dept Emergency Medicine  10-14 days for suture removal. Or immediately for any signs of infection. [address] [facility] [place] [address] [phone]             Garett Vogt MD  12/26/23 0033

## 2023-12-26 NOTE — ED NOTES
Bed: 10main  Expected date: 12/25/23  Expected time: 10:16 PM  Means of arrival:   Comments:  Samra

## 2023-12-27 ENCOUNTER — PATIENT OUTREACH (OUTPATIENT)
Dept: EMERGENCY MEDICINE | Facility: HOSPITAL | Age: 83
End: 2023-12-27
Payer: MEDICARE

## 2024-02-27 DIAGNOSIS — Z00.00 ENCOUNTER FOR MEDICARE ANNUAL WELLNESS EXAM: ICD-10-CM

## 2024-03-03 DIAGNOSIS — I48.0 PAROXYSMAL ATRIAL FIBRILLATION: ICD-10-CM

## 2024-03-03 DIAGNOSIS — I48.91 ATRIAL FIBRILLATION, UNSPECIFIED TYPE: ICD-10-CM

## 2024-03-04 RX ORDER — AMIODARONE HYDROCHLORIDE 200 MG/1
200 TABLET ORAL
Qty: 90 TABLET | Refills: 3 | Status: SHIPPED | OUTPATIENT
Start: 2024-03-04

## 2024-04-03 NOTE — TELEPHONE ENCOUNTER
No care due was identified.  [facility] Embedded Care Due Messages. Reference number: [id number].   4/03/2024 5:36:00 PM CDT

## 2024-04-03 NOTE — TELEPHONE ENCOUNTER
----- Message from Isabel Gallo MA sent at 4/3/2024  2:48 PM CDT -----  Type: Patient Call Back    Who called: Self    What is the request in detail: pt. Is asking if the doctor can approve his medication request..      Can the clinic reply by [facility]?No    Would the patient rather a call back or a response via [facility]? Yes, call     Best call back number: [phone] (home)

## 2024-04-05 RX ORDER — TIZANIDINE 4 MG/1
TABLET ORAL
Qty: 40 TABLET | Refills: 12 | Status: SHIPPED | OUTPATIENT
Start: 2024-04-05

## 2024-04-09 NOTE — TELEPHONE ENCOUNTER
Called ELBA Rossi and left a message to call back.  
Detail Level: Generalized
Detail Level: Detailed
Detail Level: Simple

## 2024-04-30 DIAGNOSIS — I10 ESSENTIAL HYPERTENSION: Chronic | ICD-10-CM

## 2024-05-01 RX ORDER — CHLORTHALIDONE 25 MG/1
TABLET ORAL
Qty: 90 TABLET | Refills: 0 | Status: SHIPPED | OUTPATIENT
Start: 2024-05-01

## 2024-06-27 ENCOUNTER — OFFICE VISIT (OUTPATIENT)
Dept: GASTROENTEROLOGY | Facility: CLINIC | Age: 84
End: 2024-06-27
Payer: MEDICARE

## 2024-06-27 VITALS
DIASTOLIC BLOOD PRESSURE: 68 MMHG | BODY MASS INDEX: 25.75 KG/M2 | WEIGHT: 179.88 LBS | HEIGHT: 70 IN | SYSTOLIC BLOOD PRESSURE: 167 MMHG | HEART RATE: 51 BPM

## 2024-06-27 DIAGNOSIS — R14.1 GAS PAIN: Primary | ICD-10-CM

## 2024-06-27 PROCEDURE — 1159F MED LIST DOCD IN RCRD: CPT | Mod: CPTII,S$GLB,, | Performed by: INTERNAL MEDICINE

## 2024-06-27 PROCEDURE — 1126F AMNT PAIN NOTED NONE PRSNT: CPT | Mod: CPTII,S$GLB,, | Performed by: INTERNAL MEDICINE

## 2024-06-27 PROCEDURE — 1101F PT FALLS ASSESS-DOCD LE1/YR: CPT | Mod: CPTII,S$GLB,, | Performed by: INTERNAL MEDICINE

## 2024-06-27 PROCEDURE — 3288F FALL RISK ASSESSMENT DOCD: CPT | Mod: CPTII,S$GLB,, | Performed by: INTERNAL MEDICINE

## 2024-06-27 PROCEDURE — 99999 PR PBB SHADOW E&M-EST. PATIENT-LVL IV: CPT | Mod: PBBFAC,,, | Performed by: INTERNAL MEDICINE

## 2024-06-27 PROCEDURE — 99212 OFFICE O/P EST SF 10 MIN: CPT | Mod: S$GLB,,, | Performed by: INTERNAL MEDICINE

## 2024-06-27 PROCEDURE — 3078F DIAST BP <80 MM HG: CPT | Mod: CPTII,S$GLB,, | Performed by: INTERNAL MEDICINE

## 2024-06-27 PROCEDURE — 3077F SYST BP >= 140 MM HG: CPT | Mod: CPTII,S$GLB,, | Performed by: INTERNAL MEDICINE

## 2024-06-27 NOTE — PROGRESS NOTES
"[facility] G[facility]ology [facility]    CC: gas    HPI 83 y.o. male with past medical history who presents with chronic, intermittent, mild gas without nausea and vomiting.  It is relieved with simethicone.  He has no other complaints.    He has a history of colon polyps.  He does not wish to continue with surveillance colonoscopy and I agree with this.    Past Medical History  Past Medical History:   Diagnosis Date    Anticoagulant long-term use     Atrial fibrillation     GERD (gastroesophageal reflux disease)     Hyperlipidemia     Hypertension     Nuclear sclerosis, bilateral 10/09/2017    Rhinitis medicamentosa 06/30/2014    Small bowel obstruction     Vertigo 06/13/2013       Physical Examination  BP (!) 167/68   Pulse (!) 51   Ht 5' 10" (1.778 m)   Wt 81.6 kg (179 lb 14.3 oz)   BMI 25.81 kg/m²   General appearance: alert, cooperative, no distress  HENT: Normocephalic, atraumatic, neck symmetrical, no nasal discharge   Abdomen: soft, non-tender; non distended, no rebound or guarding  Neurologic: Alert and oriented X 3, moving all four extremities, intact sensation to light touch    Labs:  Lab Results   Component Value Date    WBC 6.89 06/27/2023    WBC 6.89 06/27/2023    WBC 6.89 06/27/2023    HGB 12.9 (L) 06/27/2023    HGB 12.9 (L) 06/27/2023    HGB 12.9 (L) 06/27/2023    HCT 40.4 06/27/2023    HCT 40.4 06/27/2023    HCT 40.4 06/27/2023    MCV 90 06/27/2023    MCV 90 06/27/2023    MCV 90 06/27/2023     06/27/2023     06/27/2023     06/27/2023         CMP  Sodium   Date Value Ref Range Status   07/21/2023 138 136 - 145 mmol/L Final     Potassium   Date Value Ref Range Status   07/21/2023 4.4 3.5 - 5.1 mmol/L Final     Chloride   Date Value Ref Range Status   07/21/2023 103 95 - 110 mmol/L Final     CO2   Date Value Ref Range Status   07/21/2023 26 23 - 29 mmol/L Final     Glucose   Date Value Ref Range Status   07/21/2023 84 70 - 110 mg/dL Final     BUN   Date Value Ref Range Status "   07/21/2023 28 (H) 8 - 23 mg/dL Final     Creatinine   Date Value Ref Range Status   07/21/2023 1.6 (H) 0.5 - 1.4 mg/dL Final     Calcium   Date Value Ref Range Status   07/21/2023 9.7 8.7 - 10.5 mg/dL Final     Total Protein   Date Value Ref Range Status   06/27/2023 7.8 6.0 - 8.4 g/dL Final   06/27/2023 7.8 6.0 - 8.4 g/dL Final     Albumin   Date Value Ref Range Status   06/27/2023 3.9 3.5 - 5.2 g/dL Final   06/27/2023 3.9 3.5 - 5.2 g/dL Final     Total Bilirubin   Date Value Ref Range Status   06/27/2023 0.3 0.1 - 1.0 mg/dL Final     Comment:     For infants and newborns, interpretation of results should be based  on gestational age, weight and in agreement with clinical  observations.    Premature Infant recommended reference ranges:  Up to 24 hours.............<8.0 mg/dL  Up to 48 hours............<12.0 mg/dL  3-5 days..................<15.0 mg/dL  6-29 days.................<15.0 mg/dL     06/27/2023 0.3 0.1 - 1.0 mg/dL Final     Comment:     For infants and newborns, interpretation of results should be based  on gestational age, weight and in agreement with clinical  observations.    Premature Infant recommended reference ranges:  Up to 24 hours.............<8.0 mg/dL  Up to 48 hours............<12.0 mg/dL  3-5 days..................<15.0 mg/dL  6-29 days.................<15.0 mg/dL       Alkaline Phosphatase   Date Value Ref Range Status   06/27/2023 67 55 - 135 U/L Final   06/27/2023 67 55 - 135 U/L Final     AST   Date Value Ref Range Status   06/27/2023 27 10 - 40 U/L Final   06/27/2023 27 10 - 40 U/L Final     ALT   Date Value Ref Range Status   06/27/2023 20 10 - 44 U/L Final   06/27/2023 20 10 - 44 U/L Final     Anion Gap   Date Value Ref Range Status   07/21/2023 9 8 - 16 mmol/L Final     eGFR   Date Value Ref Range Status   07/21/2023 43.0 (A) >60 mL/min/1.73 m^2 Final           Assessment:   The patient is an 82 yo man with intermittent, mild gas pains improved with simethicone.    Plan:  -Continue  simethicone as needed.  -Return to clinic as needed.    Chiqui Tolentino MD

## 2024-07-01 DIAGNOSIS — E78.5 HYPERLIPIDEMIA, UNSPECIFIED HYPERLIPIDEMIA TYPE: ICD-10-CM

## 2024-07-01 RX ORDER — ROSUVASTATIN CALCIUM 10 MG/1
TABLET, COATED ORAL
Qty: 90 TABLET | Refills: 0 | Status: SHIPPED | OUTPATIENT
Start: 2024-07-01

## 2024-07-01 NOTE — TELEPHONE ENCOUNTER
Care Due:                  Date            Visit Type   Department     Provider  --------------------------------------------------------------------------------                                EP [facility]                              PRIMARY      MED/ INTERNAL  Diomedes Garay  Last Visit: 11-      CARE ([facility])   MED/ PEDS      Ehrensing  Next Visit: None Scheduled  None         None Found                                                            Last  Test          Frequency    Reason                     Performed    Due Date  --------------------------------------------------------------------------------    CMP.........  12 months..  chlorthalidone,            06- 06-                             rosuvastatin.............    [employer] Embedded Care Due Messages. Reference number: [id number].   7/01/2024 12:52:29 AM CDT

## 2024-07-31 DIAGNOSIS — I10 ESSENTIAL HYPERTENSION: Chronic | ICD-10-CM

## 2024-07-31 RX ORDER — CHLORTHALIDONE 25 MG/1
TABLET ORAL
Qty: 90 TABLET | Refills: 3 | Status: SHIPPED | OUTPATIENT
Start: 2024-07-31

## 2024-08-05 DIAGNOSIS — I48.0 PAF (PAROXYSMAL ATRIAL FIBRILLATION): ICD-10-CM

## 2024-08-06 ENCOUNTER — OFFICE VISIT (OUTPATIENT)
Dept: FAMILY MEDICINE | Facility: CLINIC | Age: 84
End: 2024-08-06
Payer: MEDICARE

## 2024-08-06 VITALS
BODY MASS INDEX: 25.03 KG/M2 | OXYGEN SATURATION: 97 % | HEART RATE: 59 BPM | DIASTOLIC BLOOD PRESSURE: 66 MMHG | TEMPERATURE: 98 F | SYSTOLIC BLOOD PRESSURE: 130 MMHG | WEIGHT: 174.81 LBS | HEIGHT: 70 IN

## 2024-08-06 DIAGNOSIS — Z00.00 ROUTINE MEDICAL EXAM: Primary | ICD-10-CM

## 2024-08-06 DIAGNOSIS — N40.0 BENIGN PROSTATIC HYPERPLASIA, UNSPECIFIED WHETHER LOWER URINARY TRACT SYMPTOMS PRESENT: ICD-10-CM

## 2024-08-06 DIAGNOSIS — N18.31 STAGE 3A CHRONIC KIDNEY DISEASE: ICD-10-CM

## 2024-08-06 DIAGNOSIS — Z12.5 SCREENING FOR PROSTATE CANCER: ICD-10-CM

## 2024-08-06 DIAGNOSIS — D50.9 IRON DEFICIENCY ANEMIA, UNSPECIFIED IRON DEFICIENCY ANEMIA TYPE: ICD-10-CM

## 2024-08-06 DIAGNOSIS — N18.30 STAGE 3 CHRONIC KIDNEY DISEASE, UNSPECIFIED WHETHER STAGE 3A OR 3B CKD: ICD-10-CM

## 2024-08-06 DIAGNOSIS — D69.2 OTHER NONTHROMBOCYTOPENIC PURPURA: ICD-10-CM

## 2024-08-06 DIAGNOSIS — I48.0 PAROXYSMAL ATRIAL FIBRILLATION: ICD-10-CM

## 2024-08-06 DIAGNOSIS — Z86.010 HISTORY OF COLONIC POLYPS: ICD-10-CM

## 2024-08-06 DIAGNOSIS — E78.5 HYPERLIPIDEMIA, UNSPECIFIED HYPERLIPIDEMIA TYPE: ICD-10-CM

## 2024-08-06 DIAGNOSIS — I10 ESSENTIAL HYPERTENSION: ICD-10-CM

## 2024-08-06 DIAGNOSIS — J44.9 CHRONIC OBSTRUCTIVE PULMONARY DISEASE, UNSPECIFIED COPD TYPE: ICD-10-CM

## 2024-08-06 DIAGNOSIS — Z79.01 CURRENT USE OF LONG TERM ANTICOAGULATION: ICD-10-CM

## 2024-08-06 PROCEDURE — 99999 PR PBB SHADOW E&M-EST. PATIENT-LVL IV: CPT | Mod: PBBFAC,,, | Performed by: INTERNAL MEDICINE

## 2024-08-06 PROCEDURE — 3075F SYST BP GE 130 - 139MM HG: CPT | Mod: CPTII,S$GLB,, | Performed by: INTERNAL MEDICINE

## 2024-08-06 PROCEDURE — 1100F PTFALLS ASSESS-DOCD GE2>/YR: CPT | Mod: CPTII,S$GLB,, | Performed by: INTERNAL MEDICINE

## 2024-08-06 PROCEDURE — 3078F DIAST BP <80 MM HG: CPT | Mod: CPTII,S$GLB,, | Performed by: INTERNAL MEDICINE

## 2024-08-06 PROCEDURE — 99397 PER PM REEVAL EST PAT 65+ YR: CPT | Mod: S$GLB,,, | Performed by: INTERNAL MEDICINE

## 2024-08-06 PROCEDURE — 1159F MED LIST DOCD IN RCRD: CPT | Mod: CPTII,S$GLB,, | Performed by: INTERNAL MEDICINE

## 2024-08-06 PROCEDURE — 1126F AMNT PAIN NOTED NONE PRSNT: CPT | Mod: CPTII,S$GLB,, | Performed by: INTERNAL MEDICINE

## 2024-08-06 PROCEDURE — 3288F FALL RISK ASSESSMENT DOCD: CPT | Mod: CPTII,S$GLB,, | Performed by: INTERNAL MEDICINE

## 2024-08-30 RX ORDER — DOXEPIN HYDROCHLORIDE 10 MG/1
CAPSULE ORAL
Qty: 180 CAPSULE | Refills: 3 | Status: SHIPPED | OUTPATIENT
Start: 2024-08-30

## 2024-08-30 NOTE — TELEPHONE ENCOUNTER
Refill Routing Note   Medication(s) are not appropriate for processing by [facility] for the following reason(s):        Drug-disease interaction: : doxepin and AF (atrial fibrillation); Paroxysmal atrial fibrillation; PAF (paroxysmal atrial fibrillation); RBBB     [facility] action(s):  Defer   Requires labs : Yes             Appointments  past 12m or future 3m with PCP    Date Provider   Last Visit   8/6/2024 Diomedes Ayoub MD   Next Visit   Visit date not found Diomedes Ayoub MD   ED visits in past 90 days: 0        Note composed:6:52 PM 08/30/2024

## 2024-08-30 NOTE — TELEPHONE ENCOUNTER
Care Due:                  Date            Visit Type   Department     Provider  --------------------------------------------------------------------------------                                EP [facility]                              PRIMARY      MED/ INTERNAL  Diomedes Garay  Last Visit: 08-      CARE ([facility])   MED/ PEDS      Ehrensing  Next Visit: None Scheduled  None         None Found                                                            Last  Test          Frequency    Reason                     Performed    Due Date  --------------------------------------------------------------------------------    Lipid Panel.  12 months..  rosuvastatin.............  11-   11-    [facility] Embedded Care Due Messages. Reference number: [id number].   8/29/2024 10:03:53 PM CDT

## 2024-09-05 ENCOUNTER — LAB VISIT (OUTPATIENT)
Dept: LAB | Facility: HOSPITAL | Age: 84
End: 2024-09-05
Attending: INTERNAL MEDICINE
Payer: MEDICARE

## 2024-09-05 DIAGNOSIS — N18.31 STAGE 3A CHRONIC KIDNEY DISEASE: ICD-10-CM

## 2024-09-05 DIAGNOSIS — N40.0 BENIGN PROSTATIC HYPERPLASIA, UNSPECIFIED WHETHER LOWER URINARY TRACT SYMPTOMS PRESENT: ICD-10-CM

## 2024-09-05 DIAGNOSIS — Z12.5 SCREENING FOR PROSTATE CANCER: ICD-10-CM

## 2024-09-05 DIAGNOSIS — Z79.01 CURRENT USE OF LONG TERM ANTICOAGULATION: ICD-10-CM

## 2024-09-05 DIAGNOSIS — D50.9 IRON DEFICIENCY ANEMIA, UNSPECIFIED IRON DEFICIENCY ANEMIA TYPE: ICD-10-CM

## 2024-09-05 DIAGNOSIS — I10 ESSENTIAL HYPERTENSION: ICD-10-CM

## 2024-09-05 DIAGNOSIS — I48.0 PAROXYSMAL ATRIAL FIBRILLATION: ICD-10-CM

## 2024-09-05 DIAGNOSIS — E78.5 HYPERLIPIDEMIA, UNSPECIFIED HYPERLIPIDEMIA TYPE: ICD-10-CM

## 2024-09-05 DIAGNOSIS — Z00.00 ROUTINE MEDICAL EXAM: ICD-10-CM

## 2024-09-05 LAB
ALBUMIN SERPL BCP-MCNC: 3.6 G/DL (ref 3.5–5.2)
ALP SERPL-CCNC: 64 U/L (ref 55–135)
ALT SERPL W/O P-5'-P-CCNC: 20 U/L (ref 10–44)
ANION GAP SERPL CALC-SCNC: 10 MMOL/L (ref 8–16)
AST SERPL-CCNC: 33 U/L (ref 10–40)
BASOPHILS # BLD AUTO: 0.05 K/UL (ref 0–0.2)
BASOPHILS NFR BLD: 0.8 % (ref 0–1.9)
BILIRUB SERPL-MCNC: 0.6 MG/DL (ref 0.1–1)
BUN SERPL-MCNC: 42 MG/DL (ref 8–23)
CALCIUM SERPL-MCNC: 9.8 MG/DL (ref 8.7–10.5)
CHLORIDE SERPL-SCNC: 93 MMOL/L (ref 95–110)
CHOLEST SERPL-MCNC: 154 MG/DL (ref 120–199)
CHOLEST/HDLC SERPL: 2.7 {RATIO} (ref 2–5)
CO2 SERPL-SCNC: 27 MMOL/L (ref 23–29)
COMPLEXED PSA SERPL-MCNC: 0.72 NG/ML (ref 0–4)
CREAT SERPL-MCNC: 1.6 MG/DL (ref 0.5–1.4)
DIFFERENTIAL METHOD BLD: ABNORMAL
EOSINOPHIL # BLD AUTO: 0.5 K/UL (ref 0–0.5)
EOSINOPHIL NFR BLD: 7.5 % (ref 0–8)
ERYTHROCYTE [DISTWIDTH] IN BLOOD BY AUTOMATED COUNT: 13.3 % (ref 11.5–14.5)
EST. GFR  (NO RACE VARIABLE): 43 ML/MIN/1.73 M^2
FERRITIN SERPL-MCNC: 58 NG/ML (ref 20–300)
GLUCOSE SERPL-MCNC: 91 MG/DL (ref 70–110)
HCT VFR BLD AUTO: 36.6 % (ref 40–54)
HDLC SERPL-MCNC: 57 MG/DL (ref 40–75)
HDLC SERPL: 37 % (ref 20–50)
HGB BLD-MCNC: 12.3 G/DL (ref 14–18)
IMM GRANULOCYTES # BLD AUTO: 0.02 K/UL (ref 0–0.04)
IMM GRANULOCYTES NFR BLD AUTO: 0.3 % (ref 0–0.5)
IRON SERPL-MCNC: 85 UG/DL (ref 45–160)
LDLC SERPL CALC-MCNC: 73.4 MG/DL (ref 63–159)
LYMPHOCYTES # BLD AUTO: 1.2 K/UL (ref 1–4.8)
LYMPHOCYTES NFR BLD: 18.3 % (ref 18–48)
MCH RBC QN AUTO: 29.9 PG (ref 27–31)
MCHC RBC AUTO-ENTMCNC: 33.6 G/DL (ref 32–36)
MCV RBC AUTO: 89 FL (ref 82–98)
MONOCYTES # BLD AUTO: 0.6 K/UL (ref 0.3–1)
MONOCYTES NFR BLD: 9.8 % (ref 4–15)
NEUTROPHILS # BLD AUTO: 4.1 K/UL (ref 1.8–7.7)
NEUTROPHILS NFR BLD: 63.3 % (ref 38–73)
NONHDLC SERPL-MCNC: 97 MG/DL
NRBC BLD-RTO: 0 /100 WBC
PLATELET # BLD AUTO: 177 K/UL (ref 150–450)
PMV BLD AUTO: 10.4 FL (ref 9.2–12.9)
POTASSIUM SERPL-SCNC: 3.7 MMOL/L (ref 3.5–5.1)
PROT SERPL-MCNC: 7.5 G/DL (ref 6–8.4)
RBC # BLD AUTO: 4.12 M/UL (ref 4.6–6.2)
SATURATED IRON: 20 % (ref 20–50)
SODIUM SERPL-SCNC: 130 MMOL/L (ref 136–145)
TOTAL IRON BINDING CAPACITY: 428 UG/DL (ref 250–450)
TRANSFERRIN SERPL-MCNC: 289 MG/DL (ref 200–375)
TRIGL SERPL-MCNC: 118 MG/DL (ref 30–150)
TSH SERPL DL<=0.005 MIU/L-ACNC: 0.89 UIU/ML (ref 0.4–4)
WBC # BLD AUTO: 6.51 K/UL (ref 3.9–12.7)

## 2024-09-05 PROCEDURE — 36415 COLL VENOUS BLD VENIPUNCTURE: CPT | Performed by: INTERNAL MEDICINE

## 2024-09-05 PROCEDURE — 84443 ASSAY THYROID STIM HORMONE: CPT | Performed by: INTERNAL MEDICINE

## 2024-09-05 PROCEDURE — 82728 ASSAY OF FERRITIN: CPT | Performed by: INTERNAL MEDICINE

## 2024-09-05 PROCEDURE — 83540 ASSAY OF IRON: CPT | Performed by: INTERNAL MEDICINE

## 2024-09-05 PROCEDURE — 84153 ASSAY OF PSA TOTAL: CPT | Performed by: INTERNAL MEDICINE

## 2024-09-05 PROCEDURE — 85025 COMPLETE CBC W/AUTO DIFF WBC: CPT | Performed by: INTERNAL MEDICINE

## 2024-09-05 PROCEDURE — 80053 COMPREHEN METABOLIC PANEL: CPT | Performed by: INTERNAL MEDICINE

## 2024-09-05 PROCEDURE — 80061 LIPID PANEL: CPT | Performed by: INTERNAL MEDICINE

## 2024-09-26 RX ORDER — AMLODIPINE BESYLATE 5 MG/1
TABLET ORAL
Qty: 90 TABLET | Refills: 3 | Status: SHIPPED | OUTPATIENT
Start: 2024-09-26

## 2024-09-26 NOTE — TELEPHONE ENCOUNTER
No care due was identified.  [facility] Embedded Care Due Messages. Reference number: [id number].   9/26/2024 12:21:08 PM CDT

## 2024-09-27 NOTE — TELEPHONE ENCOUNTER
Refill Decision Note   Benitez Cabrales  is requesting a refill authorization.  Brief Assessment and Rationale for Refill:  Approve     Medication Therapy Plan:         Comments:     Note composed:9:14 PM 09/26/2024

## 2025-01-28 ENCOUNTER — TELEPHONE (OUTPATIENT)
Dept: ELECTROPHYSIOLOGY | Facility: CLINIC | Age: 85
End: 2025-01-28
Payer: MEDICARE

## 2025-01-28 DIAGNOSIS — I48.0 PAF (PAROXYSMAL ATRIAL FIBRILLATION): Primary | ICD-10-CM

## 2025-01-28 NOTE — TELEPHONE ENCOUNTER
Pt wife stated pt will be making an carlos with general cards on WB . I advised pt wife we do book up fast if he would like to proceed to give me a call . Pt verbalized understanding.

## 2025-02-23 ENCOUNTER — PATIENT MESSAGE (OUTPATIENT)
Dept: FAMILY MEDICINE | Facility: CLINIC | Age: 85
End: 2025-02-23
Payer: MEDICARE

## 2025-02-23 DIAGNOSIS — I10 ESSENTIAL HYPERTENSION: Primary | ICD-10-CM

## 2025-02-25 ENCOUNTER — LAB VISIT (OUTPATIENT)
Dept: LAB | Facility: HOSPITAL | Age: 85
End: 2025-02-25
Attending: INTERNAL MEDICINE
Payer: MEDICARE

## 2025-02-25 DIAGNOSIS — I10 ESSENTIAL HYPERTENSION: ICD-10-CM

## 2025-02-25 LAB
ALBUMIN SERPL BCP-MCNC: 3.6 G/DL (ref 3.5–5.2)
ALP SERPL-CCNC: 68 U/L (ref 40–150)
ALT SERPL W/O P-5'-P-CCNC: 32 U/L (ref 10–44)
ANION GAP SERPL CALC-SCNC: 10 MMOL/L (ref 8–16)
AST SERPL-CCNC: 39 U/L (ref 10–40)
BASOPHILS # BLD AUTO: 0.04 K/UL (ref 0–0.2)
BASOPHILS NFR BLD: 0.7 % (ref 0–1.9)
BILIRUB SERPL-MCNC: 0.8 MG/DL (ref 0.1–1)
BUN SERPL-MCNC: 39 MG/DL (ref 8–23)
CALCIUM SERPL-MCNC: 9.1 MG/DL (ref 8.7–10.5)
CHLORIDE SERPL-SCNC: 97 MMOL/L (ref 95–110)
CO2 SERPL-SCNC: 27 MMOL/L (ref 23–29)
CREAT SERPL-MCNC: 1.6 MG/DL (ref 0.5–1.4)
DIFFERENTIAL METHOD BLD: ABNORMAL
EOSINOPHIL # BLD AUTO: 0.2 K/UL (ref 0–0.5)
EOSINOPHIL NFR BLD: 3.9 % (ref 0–8)
ERYTHROCYTE [DISTWIDTH] IN BLOOD BY AUTOMATED COUNT: 14.1 % (ref 11.5–14.5)
EST. GFR  (NO RACE VARIABLE): 42 ML/MIN/1.73 M^2
GLUCOSE SERPL-MCNC: 89 MG/DL (ref 70–110)
HCT VFR BLD AUTO: 37.1 % (ref 40–54)
HGB BLD-MCNC: 12.5 G/DL (ref 14–18)
IMM GRANULOCYTES # BLD AUTO: 0.02 K/UL (ref 0–0.04)
IMM GRANULOCYTES NFR BLD AUTO: 0.3 % (ref 0–0.5)
LYMPHOCYTES # BLD AUTO: 1.7 K/UL (ref 1–4.8)
LYMPHOCYTES NFR BLD: 28.1 % (ref 18–48)
MCH RBC QN AUTO: 30.3 PG (ref 27–31)
MCHC RBC AUTO-ENTMCNC: 33.7 G/DL (ref 32–36)
MCV RBC AUTO: 90 FL (ref 82–98)
MONOCYTES # BLD AUTO: 0.7 K/UL (ref 0.3–1)
MONOCYTES NFR BLD: 11.9 % (ref 4–15)
NEUTROPHILS # BLD AUTO: 3.3 K/UL (ref 1.8–7.7)
NEUTROPHILS NFR BLD: 55.1 % (ref 38–73)
NRBC BLD-RTO: 0 /100 WBC
PLATELET # BLD AUTO: 185 K/UL (ref 150–450)
PMV BLD AUTO: 10.4 FL (ref 9.2–12.9)
POTASSIUM SERPL-SCNC: 3.7 MMOL/L (ref 3.5–5.1)
PROT SERPL-MCNC: 7.4 G/DL (ref 6–8.4)
RBC # BLD AUTO: 4.12 M/UL (ref 4.6–6.2)
SODIUM SERPL-SCNC: 134 MMOL/L (ref 136–145)
WBC # BLD AUTO: 5.9 K/UL (ref 3.9–12.7)

## 2025-02-25 PROCEDURE — 85025 COMPLETE CBC W/AUTO DIFF WBC: CPT | Performed by: INTERNAL MEDICINE

## 2025-02-25 PROCEDURE — 80053 COMPREHEN METABOLIC PANEL: CPT | Performed by: INTERNAL MEDICINE

## 2025-02-25 PROCEDURE — 36415 COLL VENOUS BLD VENIPUNCTURE: CPT | Performed by: INTERNAL MEDICINE

## 2025-03-17 ENCOUNTER — OFFICE VISIT (OUTPATIENT)
Dept: CARDIOLOGY | Facility: CLINIC | Age: 85
End: 2025-03-17
Payer: MEDICARE

## 2025-03-17 VITALS
DIASTOLIC BLOOD PRESSURE: 64 MMHG | OXYGEN SATURATION: 98 % | WEIGHT: 182 LBS | HEIGHT: 71 IN | HEART RATE: 61 BPM | SYSTOLIC BLOOD PRESSURE: 140 MMHG | BODY MASS INDEX: 25.48 KG/M2

## 2025-03-17 DIAGNOSIS — I48.0 PAROXYSMAL ATRIAL FIBRILLATION: ICD-10-CM

## 2025-03-17 DIAGNOSIS — E78.5 DYSLIPIDEMIA: ICD-10-CM

## 2025-03-17 DIAGNOSIS — Z79.899 ON AMIODARONE THERAPY: Primary | ICD-10-CM

## 2025-03-17 DIAGNOSIS — I48.91 ATRIAL FIBRILLATION, UNSPECIFIED TYPE: ICD-10-CM

## 2025-03-17 DIAGNOSIS — I10 PRIMARY HYPERTENSION: Chronic | ICD-10-CM

## 2025-03-17 PROCEDURE — 1159F MED LIST DOCD IN RCRD: CPT | Mod: CPTII,S$GLB,, | Performed by: INTERNAL MEDICINE

## 2025-03-17 PROCEDURE — 99999 PR PBB SHADOW E&M-EST. PATIENT-LVL IV: CPT | Mod: PBBFAC,,, | Performed by: INTERNAL MEDICINE

## 2025-03-17 PROCEDURE — 3077F SYST BP >= 140 MM HG: CPT | Mod: CPTII,S$GLB,, | Performed by: INTERNAL MEDICINE

## 2025-03-17 PROCEDURE — 1124F ACP DISCUSS-NO DSCNMKR DOCD: CPT | Mod: CPTII,S$GLB,, | Performed by: INTERNAL MEDICINE

## 2025-03-17 PROCEDURE — 3078F DIAST BP <80 MM HG: CPT | Mod: CPTII,S$GLB,, | Performed by: INTERNAL MEDICINE

## 2025-03-17 PROCEDURE — 99214 OFFICE O/P EST MOD 30 MIN: CPT | Mod: 25,S$GLB,, | Performed by: INTERNAL MEDICINE

## 2025-03-17 PROCEDURE — 93000 ELECTROCARDIOGRAM COMPLETE: CPT | Mod: S$GLB,,, | Performed by: INTERNAL MEDICINE

## 2025-03-17 PROCEDURE — 3288F FALL RISK ASSESSMENT DOCD: CPT | Mod: CPTII,S$GLB,, | Performed by: INTERNAL MEDICINE

## 2025-03-17 PROCEDURE — 1126F AMNT PAIN NOTED NONE PRSNT: CPT | Mod: CPTII,S$GLB,, | Performed by: INTERNAL MEDICINE

## 2025-03-17 PROCEDURE — 1101F PT FALLS ASSESS-DOCD LE1/YR: CPT | Mod: CPTII,S$GLB,, | Performed by: INTERNAL MEDICINE

## 2025-03-17 RX ORDER — AMIODARONE HYDROCHLORIDE 200 MG/1
200 TABLET ORAL DAILY
Qty: 90 TABLET | Refills: 3 | Status: SHIPPED | OUTPATIENT
Start: 2025-03-17

## 2025-03-17 NOTE — PROGRESS NOTES
Subjective   Patient ID:  Benitez Cabrales is a 84 y.o. male who presents for follow-up of No chief complaint on file.      HPI    PAF on amiodarone and eliquis, HTN, HLD    Referred by EP    Mr. Cabrales is an 82 y.o. male with AF, HTN, HLD, RBBB, ILR (removed) here for annual follow up.      Background:     Remote dx of AF (~10 y ago; only Rx was beta blockade)  HTN on meds   HL on meds   RBBB, longstanding     Had event monitor placed for dizzy/palpitations. Turns out dizziness was really vertigo and balance issues -- no LH, no presync/sync.  He remains on amiodarone (normal PFT and TFT/LFT); CHADSVASC 3.  ILR with no events on several interrogations. We removed the ILR 12/2018.     He's been feeling well except for frequent nocturia.   He has retired. Works in yard and works out at gym w/o problems.  echo 60%. severe LAE.  TSH OK. LFTs OK. PFT mildly low DLCO  Ecg is sinus shraddha at 52 bpm with first deg AVB and RBBB (baseline).      81 y.o. male with pAF on amiodarone.  Continue eliquis  f/u TSH, LFTs today  For HTN, restart chlorthalidone (he declined higher dose of norvasc due to dizziness in past). Take it in AM.  Return in 1 year with amio tests, or earlier prn.     Update (07/10/2023):     3/2023: SBO treated conservatively.     Today he says he has no new cardiac complaints. Denies worsening MAZA, CP, palps, LH, syncope. Bruises easily.     He is currently taking eliquis 5mg BID for stroke prophylaxis and denies significant bleeding episodes. He is currently being treated with amiodarone 200mg daily for rhythm control.  Kidney function is stable, with a creatinine of 1.5 on 6/27/2023. TSH ok 11/2022. LFTs WNL 6/2023. PFTs 5/2022 DLCO ratio 64.5.     I have personally reviewed the patient's EKG today, which shows sinus bradycardia with 1st deg AVB and RBBB at 58bpm. WI interval is 368. QRS is 154. QT is 498. (Note: WI 390ms at last EP clinic visit)     Relevant Cardiac Test Results:     2D Echo (4/13/2021):  The  estimated ejection fraction is 60%.  The left ventricle is normal in size with concentric hypertrophy and normal systolic function.  Normal left ventricular diastolic function.  Mild mitral regurgitation.  Mild aortic regurgitation.  Normal right ventricular size with normal right ventricular systolic function.  Mild left atrial enlargement.  Mild right atrial enlargement.  Normal central venous pressure (3 mmHg).  The estimated PA systolic pressure is 21 mmHg.    3/17/25 Denies CP or SOB  EKG NSR 1st degree AVB RBBB  BP controlled    Review of Systems   Constitutional: Negative for decreased appetite.   HENT:  Negative for ear discharge.    Eyes:  Negative for blurred vision.   Endocrine: Negative for polyphagia.   Skin:  Negative for nail changes.   Genitourinary:  Negative for bladder incontinence.   Neurological:  Negative for aphonia.   Psychiatric/Behavioral:  Negative for hallucinations.    Allergic/Immunologic: Negative for hives.          Objective     Physical Exam  Constitutional:       Appearance: He is well-developed.   HENT:      Head: Normocephalic and atraumatic.   Eyes:      Conjunctiva/sclera: Conjunctivae normal.      Pupils: Pupils are equal, round, and reactive to light.   Cardiovascular:      Rate and Rhythm: Normal rate.      Pulses: Intact distal pulses.      Heart sounds: Normal heart sounds.   Pulmonary:      Effort: Pulmonary effort is normal.      Breath sounds: Normal breath sounds.   Abdominal:      General: Bowel sounds are normal.      Palpations: Abdomen is soft.   Musculoskeletal:         General: Normal range of motion.      Cervical back: Normal range of motion and neck supple.   Skin:     General: Skin is warm and dry.   Neurological:      Mental Status: He is alert and oriented to person, place, and time.            Assessment and Plan     1. On amiodarone therapy    2. Paroxysmal atrial fibrillation    3. Dyslipidemia    4. Primary hypertension        Plan:     Continue Rx for  PAF, HTN, HLD  OV 6 months with echo    Advance Care Planning     Date: 03/17/2025  Patient did not wish or was not able to name a surrogate decision maker or provide an Advance Care Plan.

## 2025-03-18 LAB
OHS QRS DURATION: 152 MS
OHS QTC CALCULATION: 491 MS

## 2025-03-24 DIAGNOSIS — Z00.00 ENCOUNTER FOR MEDICARE ANNUAL WELLNESS EXAM: ICD-10-CM

## 2025-03-31 ENCOUNTER — PATIENT MESSAGE (OUTPATIENT)
Dept: ADMINISTRATIVE | Facility: HOSPITAL | Age: 85
End: 2025-03-31
Payer: MEDICARE

## 2025-04-02 ENCOUNTER — PATIENT MESSAGE (OUTPATIENT)
Dept: FAMILY MEDICINE | Facility: CLINIC | Age: 85
End: 2025-04-02
Payer: MEDICARE

## 2025-04-02 ENCOUNTER — PATIENT OUTREACH (OUTPATIENT)
Dept: ADMINISTRATIVE | Facility: HOSPITAL | Age: 85
End: 2025-04-02
Payer: MEDICARE

## 2025-04-02 VITALS — SYSTOLIC BLOOD PRESSURE: 142 MMHG | DIASTOLIC BLOOD PRESSURE: 78 MMHG

## 2025-04-02 DIAGNOSIS — I10 ESSENTIAL HYPERTENSION: Primary | ICD-10-CM

## 2025-04-04 NOTE — TELEPHONE ENCOUNTER
Repeat CBC and CMP were ordered based on Dr. HAWTHORNE previous labs.  Please schedule labs for patient

## 2025-04-10 ENCOUNTER — PATIENT MESSAGE (OUTPATIENT)
Dept: FAMILY MEDICINE | Facility: CLINIC | Age: 85
End: 2025-04-10
Payer: MEDICARE

## 2025-04-10 ENCOUNTER — LAB VISIT (OUTPATIENT)
Dept: LAB | Facility: HOSPITAL | Age: 85
End: 2025-04-10
Attending: FAMILY MEDICINE
Payer: MEDICARE

## 2025-04-10 ENCOUNTER — RESULTS FOLLOW-UP (OUTPATIENT)
Dept: FAMILY MEDICINE | Facility: CLINIC | Age: 85
End: 2025-04-10

## 2025-04-10 DIAGNOSIS — I10 ESSENTIAL HYPERTENSION: ICD-10-CM

## 2025-04-10 LAB
ABSOLUTE EOSINOPHIL (OHS): 0.24 K/UL
ABSOLUTE MONOCYTE (OHS): 0.61 K/UL (ref 0.3–1)
ABSOLUTE NEUTROPHIL COUNT (OHS): 3.27 K/UL (ref 1.8–7.7)
ALBUMIN SERPL BCP-MCNC: 3.5 G/DL (ref 3.5–5.2)
ALP SERPL-CCNC: 64 UNIT/L (ref 40–150)
ALT SERPL W/O P-5'-P-CCNC: 31 UNIT/L (ref 10–44)
ANION GAP (OHS): 10 MMOL/L (ref 8–16)
AST SERPL-CCNC: 43 UNIT/L (ref 11–45)
BASOPHILS # BLD AUTO: 0.05 K/UL
BASOPHILS NFR BLD AUTO: 0.9 %
BILIRUB SERPL-MCNC: 0.5 MG/DL (ref 0.1–1)
BUN SERPL-MCNC: 39 MG/DL (ref 8–23)
CALCIUM SERPL-MCNC: 9.3 MG/DL (ref 8.7–10.5)
CHLORIDE SERPL-SCNC: 92 MMOL/L (ref 95–110)
CO2 SERPL-SCNC: 27 MMOL/L (ref 23–29)
CREAT SERPL-MCNC: 1.5 MG/DL (ref 0.5–1.4)
ERYTHROCYTE [DISTWIDTH] IN BLOOD BY AUTOMATED COUNT: 13.7 % (ref 11.5–14.5)
GFR SERPLBLD CREATININE-BSD FMLA CKD-EPI: 46 ML/MIN/1.73/M2
GLUCOSE SERPL-MCNC: 95 MG/DL (ref 70–110)
HCT VFR BLD AUTO: 36.2 % (ref 40–54)
HGB BLD-MCNC: 12.4 GM/DL (ref 14–18)
IMM GRANULOCYTES # BLD AUTO: 0.01 K/UL (ref 0–0.04)
IMM GRANULOCYTES NFR BLD AUTO: 0.2 % (ref 0–0.5)
LYMPHOCYTES # BLD AUTO: 1.22 K/UL (ref 1–4.8)
MCH RBC QN AUTO: 30.2 PG (ref 27–31)
MCHC RBC AUTO-ENTMCNC: 34.3 G/DL (ref 32–36)
MCV RBC AUTO: 88 FL (ref 82–98)
NUCLEATED RBC (/100WBC) (OHS): 0 /100 WBC
PLATELET # BLD AUTO: 183 K/UL (ref 150–450)
PMV BLD AUTO: 10.6 FL (ref 9.2–12.9)
POTASSIUM SERPL-SCNC: 3.4 MMOL/L (ref 3.5–5.1)
PROT SERPL-MCNC: 7.4 GM/DL (ref 6–8.4)
RBC # BLD AUTO: 4.11 M/UL (ref 4.6–6.2)
RELATIVE EOSINOPHIL (OHS): 4.4 %
RELATIVE LYMPHOCYTE (OHS): 22.6 % (ref 18–48)
RELATIVE MONOCYTE (OHS): 11.3 % (ref 4–15)
RELATIVE NEUTROPHIL (OHS): 60.6 % (ref 38–73)
SODIUM SERPL-SCNC: 129 MMOL/L (ref 136–145)
WBC # BLD AUTO: 5.4 K/UL (ref 3.9–12.7)

## 2025-04-10 PROCEDURE — 36415 COLL VENOUS BLD VENIPUNCTURE: CPT

## 2025-04-10 PROCEDURE — 84075 ASSAY ALKALINE PHOSPHATASE: CPT

## 2025-04-10 PROCEDURE — 85025 COMPLETE CBC W/AUTO DIFF WBC: CPT

## 2025-04-15 ENCOUNTER — RESULTS FOLLOW-UP (OUTPATIENT)
Dept: FAMILY MEDICINE | Facility: CLINIC | Age: 85
End: 2025-04-15

## 2025-04-16 NOTE — ASSESSMENT & PLAN NOTE
No show for appointment today. Rescheduled for 5/6/25.   Suspect vasovagal. Will need to allow resting HTN give labile BP. Mild resting bradycardia - stable - doubt this is the etiology for his syncope

## 2025-04-23 ENCOUNTER — PATIENT MESSAGE (OUTPATIENT)
Dept: ADMINISTRATIVE | Facility: CLINIC | Age: 85
End: 2025-04-23
Payer: MEDICARE

## 2025-05-16 NOTE — TELEPHONE ENCOUNTER
No care due was identified.  [facility] Embedded Care Due Messages. Reference number: [id number].   5/16/2025 4:11:01 PM CDT

## 2025-05-20 RX ORDER — TIZANIDINE 4 MG/1
TABLET ORAL
Qty: 40 TABLET | Refills: 12 | Status: SHIPPED | OUTPATIENT
Start: 2025-05-20

## 2025-05-20 RX ORDER — MECLIZINE HYDROCHLORIDE 25 MG/1
TABLET ORAL
Qty: 60 TABLET | Refills: 12 | Status: SHIPPED | OUTPATIENT
Start: 2025-05-20

## 2025-05-21 NOTE — TELEPHONE ENCOUNTER
----- Message from Penny Tracey sent at 8/11/2020  2:02 PM CDT -----  Type:  Patient Returning Call    Who Called: TOMEKA MARSHALL [[id number]]    Who Left Message for Patient: Ashley    Does the patient know what this is regarding?: yes    Best Call Back Number: [phone]    Additional Information:                 
Called patient and informed.     ----- Message from Yin Olmedo MD sent at 8/11/2020  1:36 PM CDT -----  No rib fracture or dislocation      You likely have a rib bruise.   Please remember to do deep breathing exercises as discussed in clinic   Take pain medication only as needed for severe pain  
What Type Of Note Output Would You Prefer (Optional)?: Standard Output
How Severe Are Your Spot(S)?: mild
Have Your Spot(S) Been Treated In The Past?: has not been treated
Hpi Title: Evaluation of Skin Lesions

## 2025-06-02 ENCOUNTER — LAB VISIT (OUTPATIENT)
Dept: LAB | Facility: HOSPITAL | Age: 85
End: 2025-06-02
Payer: MEDICARE

## 2025-06-02 ENCOUNTER — OFFICE VISIT (OUTPATIENT)
Dept: FAMILY MEDICINE | Facility: CLINIC | Age: 85
End: 2025-06-02
Payer: MEDICARE

## 2025-06-02 VITALS
OXYGEN SATURATION: 98 % | HEIGHT: 71 IN | WEIGHT: 179.25 LBS | SYSTOLIC BLOOD PRESSURE: 124 MMHG | DIASTOLIC BLOOD PRESSURE: 62 MMHG | TEMPERATURE: 98 F | HEART RATE: 55 BPM | BODY MASS INDEX: 25.1 KG/M2

## 2025-06-02 DIAGNOSIS — R29.6 FREQUENT FALLS: ICD-10-CM

## 2025-06-02 DIAGNOSIS — E87.1 HYPONATREMIA: ICD-10-CM

## 2025-06-02 DIAGNOSIS — R26.81 GAIT INSTABILITY: ICD-10-CM

## 2025-06-02 DIAGNOSIS — Z12.5 PROSTATE CANCER SCREENING: ICD-10-CM

## 2025-06-02 DIAGNOSIS — R26.89 OTHER ABNORMALITIES OF GAIT AND MOBILITY: ICD-10-CM

## 2025-06-02 DIAGNOSIS — J44.9 CHRONIC OBSTRUCTIVE PULMONARY DISEASE, UNSPECIFIED COPD TYPE: ICD-10-CM

## 2025-06-02 DIAGNOSIS — Z71.89 ADVANCED CARE PLANNING/COUNSELING DISCUSSION: ICD-10-CM

## 2025-06-02 DIAGNOSIS — N18.31 STAGE 3A CHRONIC KIDNEY DISEASE: ICD-10-CM

## 2025-06-02 DIAGNOSIS — Z00.00 ENCOUNTER FOR MEDICARE ANNUAL WELLNESS EXAM: Primary | ICD-10-CM

## 2025-06-02 DIAGNOSIS — R26.9 ABNORMALITY OF GAIT AND MOBILITY: ICD-10-CM

## 2025-06-02 LAB
ABSOLUTE EOSINOPHIL (OHS): 0.13 K/UL
ABSOLUTE MONOCYTE (OHS): 0.92 K/UL (ref 0.3–1)
ABSOLUTE NEUTROPHIL COUNT (OHS): 5.1 K/UL (ref 1.8–7.7)
ALBUMIN SERPL BCP-MCNC: 3.9 G/DL (ref 3.5–5.2)
ALP SERPL-CCNC: 75 UNIT/L (ref 40–150)
ALT SERPL W/O P-5'-P-CCNC: 24 UNIT/L (ref 10–44)
ANION GAP (OHS): 15 MMOL/L (ref 8–16)
AST SERPL-CCNC: 38 UNIT/L (ref 11–45)
BASOPHILS # BLD AUTO: 0.07 K/UL
BASOPHILS NFR BLD AUTO: 0.9 %
BILIRUB SERPL-MCNC: 0.6 MG/DL (ref 0.1–1)
BUN SERPL-MCNC: 38 MG/DL (ref 8–23)
CALCIUM SERPL-MCNC: 9.6 MG/DL (ref 8.7–10.5)
CHLORIDE SERPL-SCNC: 93 MMOL/L (ref 95–110)
CO2 SERPL-SCNC: 26 MMOL/L (ref 23–29)
CREAT SERPL-MCNC: 1.6 MG/DL (ref 0.5–1.4)
ERYTHROCYTE [DISTWIDTH] IN BLOOD BY AUTOMATED COUNT: 13.8 % (ref 11.5–14.5)
GFR SERPLBLD CREATININE-BSD FMLA CKD-EPI: 42 ML/MIN/1.73/M2
GLUCOSE SERPL-MCNC: 84 MG/DL (ref 70–110)
HCT VFR BLD AUTO: 37.5 % (ref 40–54)
HGB BLD-MCNC: 12.5 GM/DL (ref 14–18)
IMM GRANULOCYTES # BLD AUTO: 0.03 K/UL (ref 0–0.04)
IMM GRANULOCYTES NFR BLD AUTO: 0.4 % (ref 0–0.5)
LYMPHOCYTES # BLD AUTO: 1.62 K/UL (ref 1–4.8)
MCH RBC QN AUTO: 28.9 PG (ref 27–31)
MCHC RBC AUTO-ENTMCNC: 33.3 G/DL (ref 32–36)
MCV RBC AUTO: 87 FL (ref 82–98)
NUCLEATED RBC (/100WBC) (OHS): 0 /100 WBC
PLATELET # BLD AUTO: 216 K/UL (ref 150–450)
PMV BLD AUTO: 11.9 FL (ref 9.2–12.9)
POTASSIUM SERPL-SCNC: 3.7 MMOL/L (ref 3.5–5.1)
PROT SERPL-MCNC: 7.3 GM/DL (ref 6–8.4)
RBC # BLD AUTO: 4.33 M/UL (ref 4.6–6.2)
RELATIVE EOSINOPHIL (OHS): 1.7 %
RELATIVE LYMPHOCYTE (OHS): 20.6 % (ref 18–48)
RELATIVE MONOCYTE (OHS): 11.7 % (ref 4–15)
RELATIVE NEUTROPHIL (OHS): 64.7 % (ref 38–73)
SODIUM SERPL-SCNC: 134 MMOL/L (ref 136–145)
WBC # BLD AUTO: 7.87 K/UL (ref 3.9–12.7)

## 2025-06-02 PROCEDURE — 84153 ASSAY OF PSA TOTAL: CPT

## 2025-06-02 PROCEDURE — 80053 COMPREHEN METABOLIC PANEL: CPT

## 2025-06-02 PROCEDURE — 85025 COMPLETE CBC W/AUTO DIFF WBC: CPT

## 2025-06-02 PROCEDURE — 99999 PR PBB SHADOW E&M-EST. PATIENT-LVL V: CPT | Mod: PBBFAC,,,

## 2025-06-02 PROCEDURE — 82607 VITAMIN B-12: CPT

## 2025-06-02 PROCEDURE — 36415 COLL VENOUS BLD VENIPUNCTURE: CPT | Mod: PO

## 2025-06-03 ENCOUNTER — RESULTS FOLLOW-UP (OUTPATIENT)
Dept: FAMILY MEDICINE | Facility: CLINIC | Age: 85
End: 2025-06-03

## 2025-06-03 LAB
PSA SERPL-MCNC: 0.99 NG/ML
VIT B12 SERPL-MCNC: 464 PG/ML (ref 210–950)

## 2025-07-21 RX ORDER — DOXEPIN HYDROCHLORIDE 10 MG/1
CAPSULE ORAL
Qty: 180 CAPSULE | Refills: 0 | Status: SHIPPED | OUTPATIENT
Start: 2025-07-21

## 2025-07-21 NOTE — TELEPHONE ENCOUNTER
Care Due:                  Date            Visit Type   Department     Provider  --------------------------------------------------------------------------------                                EP [facility] MED                              PRIMARY      / INTERNAL MED Diomedes Garay  Last Visit: 08-      CARE ([facility])   / PEDS         Ehrensing  Next Visit: None Scheduled  None         None Found                                                            Last  Test          Frequency    Reason                     Performed    Due Date  --------------------------------------------------------------------------------    Lipid Panel.  12 months..  rosuvastatin.............  09- 08-    [facility] Embedded Care Due Messages. Reference number: [id number].   7/21/2025 1:17:32 PM CDT

## 2025-08-25 ENCOUNTER — OFFICE VISIT (OUTPATIENT)
Dept: NEUROLOGY | Facility: CLINIC | Age: 85
End: 2025-08-25
Payer: MEDICARE

## 2025-08-25 VITALS
SYSTOLIC BLOOD PRESSURE: 151 MMHG | HEART RATE: 57 BPM | DIASTOLIC BLOOD PRESSURE: 71 MMHG | BODY MASS INDEX: 24.26 KG/M2 | WEIGHT: 173.94 LBS

## 2025-08-25 DIAGNOSIS — I48.0 PAF (PAROXYSMAL ATRIAL FIBRILLATION): ICD-10-CM

## 2025-08-25 DIAGNOSIS — R26.81 UNSTEADINESS ON FEET: ICD-10-CM

## 2025-08-25 DIAGNOSIS — R26.89 IMBALANCE: Primary | ICD-10-CM

## 2025-08-25 PROCEDURE — 3077F SYST BP >= 140 MM HG: CPT | Mod: CPTII,S$GLB,, | Performed by: STUDENT IN AN ORGANIZED HEALTH CARE EDUCATION/TRAINING PROGRAM

## 2025-08-25 PROCEDURE — 1126F AMNT PAIN NOTED NONE PRSNT: CPT | Mod: CPTII,S$GLB,, | Performed by: STUDENT IN AN ORGANIZED HEALTH CARE EDUCATION/TRAINING PROGRAM

## 2025-08-25 PROCEDURE — 99203 OFFICE O/P NEW LOW 30 MIN: CPT | Mod: S$GLB,,, | Performed by: STUDENT IN AN ORGANIZED HEALTH CARE EDUCATION/TRAINING PROGRAM

## 2025-08-25 PROCEDURE — 1159F MED LIST DOCD IN RCRD: CPT | Mod: CPTII,S$GLB,, | Performed by: STUDENT IN AN ORGANIZED HEALTH CARE EDUCATION/TRAINING PROGRAM

## 2025-08-25 PROCEDURE — 99999 PR PBB SHADOW E&M-EST. PATIENT-LVL IV: CPT | Mod: PBBFAC,,, | Performed by: STUDENT IN AN ORGANIZED HEALTH CARE EDUCATION/TRAINING PROGRAM

## 2025-08-25 PROCEDURE — 3078F DIAST BP <80 MM HG: CPT | Mod: CPTII,S$GLB,, | Performed by: STUDENT IN AN ORGANIZED HEALTH CARE EDUCATION/TRAINING PROGRAM

## 2025-08-25 PROCEDURE — 1160F RVW MEDS BY RX/DR IN RCRD: CPT | Mod: CPTII,S$GLB,, | Performed by: STUDENT IN AN ORGANIZED HEALTH CARE EDUCATION/TRAINING PROGRAM

## 2025-08-25 PROCEDURE — 1101F PT FALLS ASSESS-DOCD LE1/YR: CPT | Mod: CPTII,S$GLB,, | Performed by: STUDENT IN AN ORGANIZED HEALTH CARE EDUCATION/TRAINING PROGRAM

## 2025-08-25 PROCEDURE — 3288F FALL RISK ASSESSMENT DOCD: CPT | Mod: CPTII,S$GLB,, | Performed by: STUDENT IN AN ORGANIZED HEALTH CARE EDUCATION/TRAINING PROGRAM

## 2025-09-05 ENCOUNTER — OFFICE VISIT (OUTPATIENT)
Dept: CARDIOLOGY | Facility: CLINIC | Age: 85
End: 2025-09-05
Payer: MEDICARE

## 2025-09-05 VITALS
DIASTOLIC BLOOD PRESSURE: 70 MMHG | HEART RATE: 61 BPM | BODY MASS INDEX: 24.68 KG/M2 | HEIGHT: 71 IN | WEIGHT: 176.25 LBS | SYSTOLIC BLOOD PRESSURE: 130 MMHG | OXYGEN SATURATION: 98 % | RESPIRATION RATE: 18 BRPM

## 2025-09-05 DIAGNOSIS — I48.0 PAROXYSMAL ATRIAL FIBRILLATION: ICD-10-CM

## 2025-09-05 DIAGNOSIS — Z79.899 ON AMIODARONE THERAPY: ICD-10-CM

## 2025-09-05 DIAGNOSIS — I45.10 RBBB: ICD-10-CM

## 2025-09-05 DIAGNOSIS — E78.5 DYSLIPIDEMIA: ICD-10-CM

## 2025-09-05 DIAGNOSIS — I10 ESSENTIAL HYPERTENSION: Chronic | ICD-10-CM

## 2025-09-05 DIAGNOSIS — I70.0 AORTIC ATHEROSCLEROSIS: ICD-10-CM

## 2025-09-05 DIAGNOSIS — I10 PRIMARY HYPERTENSION: Primary | Chronic | ICD-10-CM

## 2025-09-05 PROCEDURE — 99999 PR PBB SHADOW E&M-EST. PATIENT-LVL V: CPT | Mod: PBBFAC,,, | Performed by: INTERNAL MEDICINE

## 2025-09-05 RX ORDER — CHLORTHALIDONE 25 MG/1
TABLET ORAL
Qty: 90 TABLET | Refills: 3 | Status: SHIPPED | OUTPATIENT
Start: 2025-09-05

## (undated) DEVICE — DRESSING ADH COVADERM PLUS 4X4

## (undated) DEVICE — DRAPE OPTIMA MAJOR PEDIATRIC

## (undated) DEVICE — ELECTRODE POLYHESIVEPRE-ATTACH

## (undated) DEVICE — ADHESIVE DERMABOND ADVANCED